# Patient Record
Sex: MALE | Race: WHITE | NOT HISPANIC OR LATINO
[De-identification: names, ages, dates, MRNs, and addresses within clinical notes are randomized per-mention and may not be internally consistent; named-entity substitution may affect disease eponyms.]

---

## 2019-12-14 ENCOUNTER — TRANSCRIPTION ENCOUNTER (OUTPATIENT)
Age: 54
End: 2019-12-14

## 2020-08-28 ENCOUNTER — APPOINTMENT (OUTPATIENT)
Dept: ORTHOPEDIC SURGERY | Facility: CLINIC | Age: 55
End: 2020-08-28
Payer: COMMERCIAL

## 2020-08-28 VITALS
HEART RATE: 66 BPM | BODY MASS INDEX: 26.48 KG/M2 | HEIGHT: 70 IN | WEIGHT: 185 LBS | DIASTOLIC BLOOD PRESSURE: 81 MMHG | SYSTOLIC BLOOD PRESSURE: 118 MMHG

## 2020-08-28 DIAGNOSIS — Z78.9 OTHER SPECIFIED HEALTH STATUS: ICD-10-CM

## 2020-08-28 DIAGNOSIS — F17.200 NICOTINE DEPENDENCE, UNSPECIFIED, UNCOMPLICATED: ICD-10-CM

## 2020-08-28 DIAGNOSIS — S66.911D STRAIN OF UNSPECIFIED MUSCLE, FASCIA AND TENDON AT WRIST AND HAND LEVEL, RIGHT HAND, SUBSEQUENT ENCOUNTER: ICD-10-CM

## 2020-08-28 PROCEDURE — 73120 X-RAY EXAM OF HAND: CPT | Mod: RT

## 2020-08-28 PROCEDURE — 99204 OFFICE O/P NEW MOD 45 MIN: CPT

## 2020-08-28 NOTE — ASSESSMENT
[FreeTextEntry1] : a 54-year-old male status post injury playing golf on Wednesday now with point tenderness over the hamate. I recommend MRI to rule out occult fracture he will use a wrist immobilizer and taken course of marks a cam for 2 weeks. We will schedule a telephone call to review the results of the MRI and discuss further treatment.

## 2020-08-28 NOTE — CONSULT LETTER
[Consult Letter:] : I had the pleasure of evaluating your patient, [unfilled]. [Please see my note below.] : Please see my note below. [Consult Closing:] : Thank you very much for allowing me to participate in the care of this patient.  If you have any questions, please do not hesitate to contact me. [Sincerely,] : Sincerely, [FreeTextEntry3] : Dr. Kristine Mares\par Orthopaedic Surgery\par Hand & Upper Extremity.\par

## 2020-08-28 NOTE — PHYSICAL EXAM
[Normal Finger/nose] : finger to nose coordination [Normal] : Oriented to person, place, and time, insight and judgement were intact and the affect was normal [de-identified] : right hand:\par skin intact moderate swelling no erythema no ecchymosis\par The tenderness over the metacarpals no tenderness over the distal radius snuffbox\par no tenderness over the triquetrum\par Point tenderness over the hamate as well as the hypothenar musculature\par Range of motion of the digits and wrist slightly limited secondary to pain and stiffness finger tip to palm distance of 1 cm\par neurovascularly intact distally [de-identified] : eusebiar [de-identified] : x-rays from outside imaging source are reviewed and there is no acute fracture noted, carpal tunnel view of the right wrist taken in the office today is reviewed no obvious fracture of the hook of hamate noted

## 2020-08-28 NOTE — HISTORY OF PRESENT ILLNESS
[FreeTextEntry1] : 54-year-old male presents for evaluation of a right hand injury.  he was playing golf on Wednesday upon swinging the club he experienced sharp pain at the ulnar aspect of the hand. He has tenderness over the hook of the hamatethat radiates to the wrist. He was seen at an urgent care where x-rays demonstrated a possible triquetral fracture. His pain is worse with activity and improved with rest

## 2020-08-31 ENCOUNTER — TRANSCRIPTION ENCOUNTER (OUTPATIENT)
Age: 55
End: 2020-08-31

## 2020-09-09 ENCOUNTER — APPOINTMENT (OUTPATIENT)
Dept: ORTHOPEDIC SURGERY | Facility: CLINIC | Age: 55
End: 2020-09-09
Payer: COMMERCIAL

## 2020-09-09 PROCEDURE — 99213 OFFICE O/P EST LOW 20 MIN: CPT | Mod: 57

## 2020-09-09 PROCEDURE — 25630 CLTX CARPL FX W/O MNPJ EA B1: CPT | Mod: RT

## 2020-09-10 NOTE — ASSESSMENT
[FreeTextEntry1] : 54M with nondisplaced fracture of the hook of the hamate.  I recommend full time use of a wrist immobilizer and NWB RUE.  f/u in 4 weeks for xrays and evaluation.

## 2020-09-10 NOTE — HISTORY OF PRESENT ILLNESS
[FreeTextEntry1] : 8/28/20: 54-year-old male presents for evaluation of a right hand injury.  he was playing golf on Wednesday upon swinging the club he experienced sharp pain at the ulnar aspect of the hand. He has tenderness over the hook of the hamatethat radiates to the wrist. He was seen at an urgent care where x-rays demonstrated a possible triquetral fracture. His pain is worse with activity and improved with rest\par \par 9/9/20: pt returns today for f/u of his MRI results.  He has had improvement in his pain but continues to have discomfort with activity.  MRI was positive for a nondispaced fx at the hook of the hamate base.

## 2020-10-07 ENCOUNTER — APPOINTMENT (OUTPATIENT)
Dept: ORTHOPEDIC SURGERY | Facility: CLINIC | Age: 55
End: 2020-10-07
Payer: COMMERCIAL

## 2020-10-07 VITALS
HEIGHT: 70 IN | HEART RATE: 66 BPM | SYSTOLIC BLOOD PRESSURE: 126 MMHG | DIASTOLIC BLOOD PRESSURE: 86 MMHG | BODY MASS INDEX: 26.48 KG/M2 | WEIGHT: 185 LBS

## 2020-10-07 PROCEDURE — 99024 POSTOP FOLLOW-UP VISIT: CPT

## 2020-10-07 PROCEDURE — 73110 X-RAY EXAM OF WRIST: CPT | Mod: RT

## 2020-10-07 NOTE — PHYSICAL EXAM
[Normal Finger/nose] : finger to nose coordination [Normal] : no peripheral adenopathy appreciated [de-identified] : right hand:\par skin intact no swelling no erythema no ecchymosis\par The tenderness over the metacarpals no tenderness over the distal radius snuffbox\par no tenderness over the triquetrum\par Point tenderness over the hamate as well as the hypothenar musculature\par Range of motion of the digits and wrist intact without pain\par neurovascularly intact distally [de-identified] : eusebiar [de-identified] : 3 x-ray views of the right wrist are reviewed there is no osseous abnormality

## 2020-10-07 NOTE — HISTORY OF PRESENT ILLNESS
[FreeTextEntry1] : 8/28/20: 54-year-old male presents for evaluation of a right hand injury.  he was playing golf on Wednesday upon swinging the club he experienced sharp pain at the ulnar aspect of the hand. He has tenderness over the hook of the hamatethat radiates to the wrist. He was seen at an urgent care where x-rays demonstrated a possible triquetral fracture. His pain is worse with activity and improved with rest\par \par 9/9/20: pt returns today for f/u of his MRI results.  He has had improvement in his pain but continues to have discomfort with activity.  MRI was positive for a nondispaced fx at the hook of the hamate base.\par \par 10/7/20: atient returns today for followup of his right hamate fracture.  is pain and mobility has improved although he does still experience pain with direct pressure over the hook of hamate. He admits that he has not been wearing his brace full time and has been using his hand "more than he should".

## 2020-10-07 NOTE — ASSESSMENT
[FreeTextEntry1] : 54-year-old male  weeks status post closed treatment of a right hamate fracture. He has had improvement in his pain and range of motion of the digits and wrist she continues to have tenderness over the hamate hook. I recommend continued light activity and a CT scan in 2 weeks to assess for healing. We will go over the results of the CT scan over the phone.

## 2020-11-10 ENCOUNTER — APPOINTMENT (OUTPATIENT)
Dept: ORTHOPEDIC SURGERY | Facility: CLINIC | Age: 55
End: 2020-11-10
Payer: COMMERCIAL

## 2020-11-10 VITALS
DIASTOLIC BLOOD PRESSURE: 89 MMHG | HEIGHT: 70 IN | BODY MASS INDEX: 26.48 KG/M2 | SYSTOLIC BLOOD PRESSURE: 143 MMHG | WEIGHT: 185 LBS | HEART RATE: 65 BPM

## 2020-11-10 DIAGNOSIS — S62.154D: ICD-10-CM

## 2020-11-10 PROCEDURE — 99024 POSTOP FOLLOW-UP VISIT: CPT

## 2020-11-10 NOTE — HISTORY OF PRESENT ILLNESS
[FreeTextEntry1] : 8/28/20: 54-year-old male presents for evaluation of a right hand injury.  he was playing golf on Wednesday upon swinging the club he experienced sharp pain at the ulnar aspect of the hand. He has tenderness over the hook of the hamatethat radiates to the wrist. He was seen at an urgent care where x-rays demonstrated a possible triquetral fracture. His pain is worse with activity and improved with rest\par \par 9/9/20: pt returns today for f/u of his MRI results.  He has had improvement in his pain but continues to have discomfort with activity.  MRI was positive for a nondispaced fx at the hook of the hamate base.\par \par 10/7/20: atient returns today for followup of his right hamate fracture.  is pain and mobility has improved although he does still experience pain with direct pressure over the hook of hamate. He admits that he has not been wearing his brace full time and has been using his hand "more than he should".\par \par 11/10/20: he patient returns today for followup of his right hamate fracture. 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 He has no pain with activities of daily living.

## 2020-11-10 NOTE — ASSESSMENT
[FreeTextEntry1] : 54-year-old malewith aright hook of hamate nonunion.Risks and benefits of continued observation with activity as tolerated versus surgical intervention for excision were discussed at length with patient and his mild symptoms he like to continue with observation at this time. He will resume activity as tolerated and followup as needed. Alert and oriented, no focal deficits, no motor or sensory deficits.

## 2020-11-10 NOTE — ASSESSMENT
[FreeTextEntry1] : 54-year-old malewith aright hook of hamate nonunion.Risks and benefits of continued observation with activity as tolerated versus surgical intervention for excision were discussed at length with patient and his mild symptoms he like to continue with observation at this time. He will resume activity as tolerated and followup as needed.

## 2020-11-10 NOTE — PHYSICAL EXAM
[Normal Finger/nose] : finger to nose coordination [Normal] : no peripheral adenopathy appreciated [de-identified] : right hand:\par skin intact no swelling no erythema no ecchymosis\par no tenderness over the metacarpals no tenderness over the distal radius snuffbox\par no tenderness over the triquetrum\par mild tenderness over the hamate as well as the hypothenar musculature\par Range of motion of the digits and wrist intact without pain\par neurovascularly intact distally [de-identified] : eusebiar [de-identified] : CT demonstrates nonunion of the hook of hamate process withdiastases at the fracture site and cortication of the fracture margins

## 2021-07-07 ENCOUNTER — TRANSCRIPTION ENCOUNTER (OUTPATIENT)
Age: 56
End: 2021-07-07

## 2021-07-09 ENCOUNTER — INPATIENT (INPATIENT)
Facility: HOSPITAL | Age: 56
LOS: 10 days | Discharge: HOME | DRG: 853 | End: 2021-07-20
Attending: SURGERY | Admitting: STUDENT IN AN ORGANIZED HEALTH CARE EDUCATION/TRAINING PROGRAM
Payer: COMMERCIAL

## 2021-07-09 ENCOUNTER — TRANSCRIPTION ENCOUNTER (OUTPATIENT)
Age: 56
End: 2021-07-09

## 2021-07-09 VITALS
OXYGEN SATURATION: 97 % | TEMPERATURE: 99 F | WEIGHT: 186.07 LBS | RESPIRATION RATE: 20 BRPM | DIASTOLIC BLOOD PRESSURE: 65 MMHG | HEART RATE: 97 BPM | HEIGHT: 70 IN | SYSTOLIC BLOOD PRESSURE: 107 MMHG

## 2021-07-09 DIAGNOSIS — J18.9 PNEUMONIA, UNSPECIFIED ORGANISM: ICD-10-CM

## 2021-07-09 DIAGNOSIS — Z90.89 ACQUIRED ABSENCE OF OTHER ORGANS: Chronic | ICD-10-CM

## 2021-07-09 DIAGNOSIS — G47.33 OBSTRUCTIVE SLEEP APNEA (ADULT) (PEDIATRIC): ICD-10-CM

## 2021-07-09 DIAGNOSIS — J96.00 ACUTE RESPIRATORY FAILURE, UNSPECIFIED WHETHER WITH HYPOXIA OR HYPERCAPNIA: ICD-10-CM

## 2021-07-09 DIAGNOSIS — U07.1 COVID-19: ICD-10-CM

## 2021-07-09 DIAGNOSIS — Z02.9 ENCOUNTER FOR ADMINISTRATIVE EXAMINATIONS, UNSPECIFIED: ICD-10-CM

## 2021-07-09 LAB
ALBUMIN SERPL ELPH-MCNC: 3.5 G/DL — SIGNIFICANT CHANGE UP (ref 3.3–5.2)
ALP SERPL-CCNC: 156 U/L — HIGH (ref 40–120)
ALT FLD-CCNC: 160 U/L — HIGH
ANION GAP SERPL CALC-SCNC: 11 MMOL/L — SIGNIFICANT CHANGE UP (ref 5–17)
AST SERPL-CCNC: 92 U/L — HIGH
BASOPHILS # BLD AUTO: 0.04 K/UL — SIGNIFICANT CHANGE UP (ref 0–0.2)
BASOPHILS NFR BLD AUTO: 0.2 % — SIGNIFICANT CHANGE UP (ref 0–2)
BILIRUB SERPL-MCNC: 2.2 MG/DL — HIGH (ref 0.4–2)
BUN SERPL-MCNC: 19.5 MG/DL — SIGNIFICANT CHANGE UP (ref 8–20)
CALCIUM SERPL-MCNC: 9.4 MG/DL — SIGNIFICANT CHANGE UP (ref 8.6–10.2)
CHLORIDE SERPL-SCNC: 100 MMOL/L — SIGNIFICANT CHANGE UP (ref 98–107)
CO2 SERPL-SCNC: 27 MMOL/L — SIGNIFICANT CHANGE UP (ref 22–29)
CREAT SERPL-MCNC: 1.02 MG/DL — SIGNIFICANT CHANGE UP (ref 0.5–1.3)
CRP SERPL-MCNC: 263 MG/L — HIGH
EOSINOPHIL # BLD AUTO: 0.04 K/UL — SIGNIFICANT CHANGE UP (ref 0–0.5)
EOSINOPHIL NFR BLD AUTO: 0.2 % — SIGNIFICANT CHANGE UP (ref 0–6)
FERRITIN SERPL-MCNC: 275 NG/ML — SIGNIFICANT CHANGE UP (ref 30–400)
GLUCOSE SERPL-MCNC: 124 MG/DL — HIGH (ref 70–99)
HCT VFR BLD CALC: 41 % — SIGNIFICANT CHANGE UP (ref 39–50)
HGB BLD-MCNC: 13.6 G/DL — SIGNIFICANT CHANGE UP (ref 13–17)
IMM GRANULOCYTES NFR BLD AUTO: 1.5 % — SIGNIFICANT CHANGE UP (ref 0–1.5)
LYMPHOCYTES # BLD AUTO: 0.46 K/UL — LOW (ref 1–3.3)
LYMPHOCYTES # BLD AUTO: 2.4 % — LOW (ref 13–44)
MCHC RBC-ENTMCNC: 28.9 PG — SIGNIFICANT CHANGE UP (ref 27–34)
MCHC RBC-ENTMCNC: 33.2 GM/DL — SIGNIFICANT CHANGE UP (ref 32–36)
MCV RBC AUTO: 87 FL — SIGNIFICANT CHANGE UP (ref 80–100)
MONOCYTES # BLD AUTO: 1.37 K/UL — HIGH (ref 0–0.9)
MONOCYTES NFR BLD AUTO: 7.2 % — SIGNIFICANT CHANGE UP (ref 2–14)
NEUTROPHILS # BLD AUTO: 16.88 K/UL — HIGH (ref 1.8–7.4)
NEUTROPHILS NFR BLD AUTO: 88.5 % — HIGH (ref 43–77)
PLATELET # BLD AUTO: 149 K/UL — LOW (ref 150–400)
POTASSIUM SERPL-MCNC: 3.6 MMOL/L — SIGNIFICANT CHANGE UP (ref 3.5–5.3)
POTASSIUM SERPL-SCNC: 3.6 MMOL/L — SIGNIFICANT CHANGE UP (ref 3.5–5.3)
PROCALCITONIN SERPL-MCNC: 21.4 NG/ML — HIGH (ref 0.02–0.1)
PROT SERPL-MCNC: 6.4 G/DL — LOW (ref 6.6–8.7)
RBC # BLD: 4.71 M/UL — SIGNIFICANT CHANGE UP (ref 4.2–5.8)
RBC # FLD: 12.8 % — SIGNIFICANT CHANGE UP (ref 10.3–14.5)
SARS-COV-2 RNA SPEC QL NAA+PROBE: SIGNIFICANT CHANGE UP
SODIUM SERPL-SCNC: 138 MMOL/L — SIGNIFICANT CHANGE UP (ref 135–145)
WBC # BLD: 19.07 K/UL — HIGH (ref 3.8–10.5)
WBC # FLD AUTO: 19.07 K/UL — HIGH (ref 3.8–10.5)

## 2021-07-09 PROCEDURE — 71275 CT ANGIOGRAPHY CHEST: CPT | Mod: 26

## 2021-07-09 PROCEDURE — 99284 EMERGENCY DEPT VISIT MOD MDM: CPT

## 2021-07-09 PROCEDURE — 71045 X-RAY EXAM CHEST 1 VIEW: CPT | Mod: 26

## 2021-07-09 PROCEDURE — 99223 1ST HOSP IP/OBS HIGH 75: CPT

## 2021-07-09 RX ORDER — AZITHROMYCIN 500 MG/1
500 TABLET, FILM COATED ORAL EVERY 24 HOURS
Refills: 0 | Status: DISCONTINUED | OUTPATIENT
Start: 2021-07-10 | End: 2021-07-11

## 2021-07-09 RX ORDER — AZITHROMYCIN 500 MG/1
TABLET, FILM COATED ORAL
Refills: 0 | Status: DISCONTINUED | OUTPATIENT
Start: 2021-07-09 | End: 2021-07-11

## 2021-07-09 RX ORDER — ENOXAPARIN SODIUM 100 MG/ML
40 INJECTION SUBCUTANEOUS DAILY
Refills: 0 | Status: DISCONTINUED | OUTPATIENT
Start: 2021-07-09 | End: 2021-07-11

## 2021-07-09 RX ORDER — ACETAMINOPHEN 500 MG
650 TABLET ORAL ONCE
Refills: 0 | Status: COMPLETED | OUTPATIENT
Start: 2021-07-09 | End: 2021-07-09

## 2021-07-09 RX ORDER — CEFTRIAXONE 500 MG/1
1000 INJECTION, POWDER, FOR SOLUTION INTRAMUSCULAR; INTRAVENOUS ONCE
Refills: 0 | Status: COMPLETED | OUTPATIENT
Start: 2021-07-09 | End: 2021-07-09

## 2021-07-09 RX ORDER — AZITHROMYCIN 500 MG/1
500 TABLET, FILM COATED ORAL ONCE
Refills: 0 | Status: COMPLETED | OUTPATIENT
Start: 2021-07-09 | End: 2021-07-09

## 2021-07-09 RX ORDER — CEFTRIAXONE 500 MG/1
1000 INJECTION, POWDER, FOR SOLUTION INTRAMUSCULAR; INTRAVENOUS EVERY 24 HOURS
Refills: 0 | Status: DISCONTINUED | OUTPATIENT
Start: 2021-07-10 | End: 2021-07-10

## 2021-07-09 RX ORDER — ACETAMINOPHEN 500 MG
650 TABLET ORAL EVERY 6 HOURS
Refills: 0 | Status: DISCONTINUED | OUTPATIENT
Start: 2021-07-09 | End: 2021-07-10

## 2021-07-09 RX ORDER — CEFTRIAXONE 500 MG/1
INJECTION, POWDER, FOR SOLUTION INTRAMUSCULAR; INTRAVENOUS
Refills: 0 | Status: DISCONTINUED | OUTPATIENT
Start: 2021-07-09 | End: 2021-07-10

## 2021-07-09 RX ADMIN — Medication 650 MILLIGRAM(S): at 14:09

## 2021-07-09 RX ADMIN — Medication 650 MILLIGRAM(S): at 21:41

## 2021-07-09 RX ADMIN — Medication 650 MILLIGRAM(S): at 15:09

## 2021-07-09 RX ADMIN — AZITHROMYCIN 255 MILLIGRAM(S): 500 TABLET, FILM COATED ORAL at 18:26

## 2021-07-09 RX ADMIN — Medication 650 MILLIGRAM(S): at 20:42

## 2021-07-09 RX ADMIN — CEFTRIAXONE 100 MILLIGRAM(S): 500 INJECTION, POWDER, FOR SOLUTION INTRAMUSCULAR; INTRAVENOUS at 17:58

## 2021-07-09 NOTE — ED PROVIDER NOTE - CONSTITUTIONAL, MLM
Well appearing, awake, alert, oriented to person, place, time/situation and in mild distress normal...

## 2021-07-09 NOTE — H&P ADULT - NSHPADDITIONALINFOADULT_GEN_ALL_CORE
IMPROVE VTE Individual Risk Assessment    RISK                                                                Points    [  ] Previous VTE                                                  3    [  ] Thrombophilia                                               2    [  ] Lower limb paralysis                                      2        (unable to hold up >15 seconds)      [  ] Current Cancer                                              2         (within 6 months)    [  ] Immobilization > 24 hrs                                1    [  ] ICU/CCU stay > 24 hours                              1    [  ] Age > 60                                                      1    IMPROVE VTE Score ____0_____    IMPROVE Score 0-1: Low Risk, No VTE prophylaxis required for most patients, encourage ambulation.   IMPROVE Score 2-3: At risk, pharmacologic VTE prophylaxis is indicated for most patients (in the absence of a contraindication)  IMPROVE Score > or = 4: High Risk, pharmacologic VTE prophylaxis is indicated for most patients (in the absence of a contraindication)

## 2021-07-09 NOTE — H&P ADULT - HISTORY OF PRESENT ILLNESS
Patient is a 55M w/ hx of sleep apnea who presents from Urgent Care who presents with fever. Of note, he was recently diagnosed with COVID on Wednesday and his sx had started one week ago. His wife has also tested positive for COVID19. He had gone to the urgent care and was diagnosed with LLL PNA. He has been vaccinated with Pfizer in April.  Patient is a 55M w/ hx of sleep apnea who presents from Urgent Care who presents with fever. Of note, he was recently diagnosed with COVID on Wednesday and his sx had started one week ago. His wife has also tested positive for COVID19. He had gone to the urgent care and was diagnosed with LLL PNA. He has been vaccinated with Pfizer in April.     In the ED, his vitals showed Temp- 99.3F, HR- 97, BP- 107/65, RR- 20, O2 sat- 97% RA. Labs notable for leukocytosis w/ neutrophil predominance, transaminitis and elevated procalcitonin.  Patient is a 55M w/ hx of sleep apnea who presents from Urgent Care who presents with fever. Of note, he was recently diagnosed with COVID on Wednesday and his sx had started one week ago. His wife has also tested positive for COVID19. He had gone to the urgent care and was diagnosed with LLL PNA. He has been vaccinated with Pfizer in April.     In the ED, his vitals showed Temp- 99.3F, HR- 97, BP- 107/65, RR- 20, O2 sat- 97% RA. Labs notable for leukocytosis w/ neutrophil predominance, transaminitis and elevated procalcitonin. Patient got tylenol, x 1.   Patient is a 55M w/ hx of sleep apnea on bipap at night who presents from Urgent Care who presents with chills. Of note, he was recently diagnosed with COVID on Wednesday and his sx had started one week ago. He started having night sweats, cough w/o productive sputum, chills, and fever (Tmax- 104F). His wife has also tested positive for COVID19 and is currently asymptomatic. He had gone to the urgent care and was diagnosed with LLL PNA. He has been vaccinated with Pfizer in April. He was febrile to 100.4F and having significant chills. Also, has nausea, SOB, and headache. Denies vomiting, dizziness, abdominal pain and chest pain. He was subsequently told by urgent care to come to the hospital for further management. He has been taking tylenol as needed.    In the ED, his vitals showed Temp- 99.3F, HR- 97, BP- 107/65, RR- 20, O2 sat- 97% RA. Labs notable for leukocytosis w/ neutrophil predominance, transaminitis and elevated procalcitonin. Patient got tylenol, x 1.

## 2021-07-09 NOTE — H&P ADULT - ASSESSMENT
Patient is a 55M w/ hx of sleep apnea who presents from Urgent Care who presents with fever. Patient is a 55M w/ hx of sleep apnea who presents from Urgent Care who presents with fever and chills found to have leukocytosis and elevated procalcitonin concerning for superimposed bacterial infection in the setting of covid19; now admitted for further workup and management.

## 2021-07-09 NOTE — ED PROVIDER NOTE - CLINICAL SUMMARY MEDICAL DECISION MAKING FREE TEXT BOX
Pt is a 55y M with PMH of sleep apnea presenting + covid with exertional dyspnea. SpO2 drops to below 90% with exertion. Will obtain labs and admit.

## 2021-07-09 NOTE — H&P ADULT - NSHPSOCIALHISTORY_GEN_ALL_CORE
Lives with his wife and kids. 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 Denies smoking and alcohol.

## 2021-07-09 NOTE — H&P ADULT - NSHPREVIEWOFSYSTEMS_GEN_ALL_CORE
CONSTITUTIONAL: No fever, no chills  EYES: No eye pain, no visual disturbance  Mouth: no pain in mouth, no cuts  RESPIRATORY: No cough, No sob  CARDIOVASCULAR: No CP, no palpitations  GASTROINTESTINAL: no abdominal pain, no n/v/d  GENITOURINARY: No dysuria, no hematuria  NEUROLOGICAL: No headaches, no weakness  SKIN: No itching, no rashes  MUSCULOSKELETAL: No joint pain, no joint swelling  PSYCHIATRY: no insomnia, no irritability CONSTITUTIONAL: + fever, + chills  EYES: No eye pain, no visual disturbance  Mouth: no pain in mouth, no cuts  RESPIRATORY: + cough, No sob  CARDIOVASCULAR: No CP, no palpitations  GASTROINTESTINAL: no abdominal pain, no n/v/d  GENITOURINARY: No dysuria, no hematuria  NEUROLOGICAL: + headaches, no weakness  SKIN: No itching, no rashes  MUSCULOSKELETAL: No joint pain, no joint swelling  PSYCHIATRY: no insomnia, no irritability

## 2021-07-09 NOTE — H&P ADULT - PROBLEM SELECTOR PLAN 1
- likely 2/2 superimposed bacterial PNA in the setting of COVID19  - will empirically treat with CTX/AZT  - F/u urine legionella and blood cx  - trend fever curve  - CTA chest to r/o PE - likely 2/2 superimposed bacterial PNA in the setting of COVID19  - will empirically treat with CTX/AZT  - F/u urine legionella and blood cx  - trend fever curve  - CTA chest to r/o PE  -tylenol Q6H - likely 2/2 superimposed bacterial PNA in the setting of COVID19  - will empirically treat with CTX/AZT  - F/u urine legionella and blood cx  - trend fever curve  - CTA chest to r/o PE. If positive for PE, will start heparin gtt  - will c/w tylenol Q6H - likely 2/2 superimposed bacterial PNA in the setting of COVID19  - will empirically treat with CTX/AZT  - F/u urine legionella and blood cx  - trend fever curve  - CTA chest to r/o PE. If positive for PE, will start heparin gtt  - will c/w tylenol Q6H  - O2 PRN

## 2021-07-09 NOTE — H&P ADULT - PROBLEM SELECTOR PLAN 4
- DVT ppx-  - Diet-  - Dispo- PT recs - DVT ppx- Lovenox 40 mg QD  - Diet- Regular diet  - Dispo- Likely home - nocturnal BIPAP ordered

## 2021-07-09 NOTE — ED ADULT NURSE NOTE - OBJECTIVE STATEMENT
55 yom presents to ed c/o fever/ body aches and chills x 7 days. patient reports being fully vaccinated for coronavirus sars-2 and started to have symptoms 7 days ago. and tested positive for covid. tylenol isnt helping patient with symptoms and is having decreased appetite.

## 2021-07-09 NOTE — ED ADULT TRIAGE NOTE - ARRIVAL FROM
Called patient to see when she could come in for visit with Dr. Villarreal.     Next 5 appointments (look out 90 days)     Sep 13, 2018  2:50 PM CDT   Office Visit with Yuridia Villarreal MD   [facility]    [id number] [place] [id number] [phone]                Jhonny Rivera RN         Home

## 2021-07-09 NOTE — ED ADULT TRIAGE NOTE - ACCOMPANIED BY
----- Message from Shirley Dennis sent at 12/31/2018  9:21 AM CST -----  Regarding: Refill  Contact: [phone]  Pt wife called and stated that the pt. needs a refill on the medication  folic acid , can be reached @[phone]    Self

## 2021-07-09 NOTE — ED ADULT TRIAGE NOTE - WEIGHT IN LBS
CM Note

 

CM Note                       

Notes:

Spoke with patient today who says her daughter and  are reviewing the rehab programs. Spoke 

with Jonathan, patient's , who states they prefer [facility] as their first 

choice. However, Pa from [facility] voiced some concerns regarding patient's recent symptom of 

hallucinations. [facility] called and stated they will take the patient. Left a message for Pa 

with [facility] to see if they are going to take patient or not.CM will follow.

 

Date Signed:  06/14/2018 04:14 PM

Electronically Signed By:Vivian Marino LCSW [id number]

## 2021-07-09 NOTE — ED PROVIDER NOTE - NS_BEDUNITTYPES_ED_ALL_ED
Patient contacted and results relayed.  Advised that blood work listed below be done for further evaluation patient voiced understanding.  Per dr watters orders entered for blood work to be done   MONITORED W/ CONTINUOUS PULSE OX

## 2021-07-09 NOTE — H&P ADULT - PROBLEM SELECTOR PLAN 3
- will continue to monitor off RA - will continue to monitor off RA  - bipap swttings - will continue to monitor off RA  - nocturnal BIPAP ordered - supportive care

## 2021-07-09 NOTE — ED PROVIDER NOTE - ATTENDING CONTRIBUTION TO CARE
I, Abraham Márquez, performed a face to face bedside interview with this patient regarding history of present illness, review of symptoms and relevant past medical, social and family history.  I completed an independent physical examination. I have communicated the patient’s plan of care and disposition with the ACP.  55 year old male presents with cough, SOB, fevers, covid positive 2 days ago, now with worsening SOB  Gen: NAD, well appearing  CV: RRR  Pul: CTA b/l  Abd: Soft, non-distended, non-tender  Neuro: no focal deficits  Pt with hypoxia, admitted for covid

## 2021-07-09 NOTE — H&P ADULT - NSHPPHYSICALEXAM_GEN_ALL_CORE
Vital Signs Last 24 Hrs  T(C): 37.4 (09 Jul 2021 12:40), Max: 37.4 (09 Jul 2021 12:40)  T(F): 99.3 (09 Jul 2021 12:40), Max: 99.3 (09 Jul 2021 12:40)  HR: 97 (09 Jul 2021 12:40) (97 - 97)  BP: 107/65 (09 Jul 2021 12:40) (107/65 - 107/65)  BP(mean): --  RR: 22 (09 Jul 2021 12:52) (20 - 22)  SpO2: 87% (09 Jul 2021 12:52) (87% - 97%)    PHYSICAL EXAM:    GENERAL: Elderly male looking   HEENT: PERRL, +EOMI  NECK: soft, Supple, No JVD,   CHEST/LUNG: Clear to auscultate bilaterally; No wheezing  HEART: S1S2+, Regular rate and rhythm; No murmurs  ABDOMEN: Soft, Nontender, Nondistended; Bowel sounds present  EXTREMITIES:  2+ Peripheral Pulses, No edema  SKIN: No rashes or lesions  NEURO: AAOX3, no focal deficits, no motor r sensory loss  PSYCH: normal mood Vital Signs Last 24 Hrs  T(C): 37.4 (09 Jul 2021 12:40), Max: 37.4 (09 Jul 2021 12:40)  T(F): 99.3 (09 Jul 2021 12:40), Max: 99.3 (09 Jul 2021 12:40)  HR: 97 (09 Jul 2021 12:40) (97 - 97)  BP: 107/65 (09 Jul 2021 12:40) (107/65 - 107/65)  BP(mean): --  RR: 22 (09 Jul 2021 12:52) (20 - 22)  SpO2: 87% (09 Jul 2021 12:52) (87% - 97%)    PHYSICAL EXAM:    GENERAL: NAD, resting comfortably  HEENT: PERRL, +EOMI  NECK: soft, Supple, No JVD,   CHEST/LUNG: Clear to auscultate bilaterally; No wheezing  HEART: S1S2+, Regular rate and rhythm; No murmurs  ABDOMEN: Soft, Nontender, Nondistended; Bowel sounds present  EXTREMITIES:  2+ Peripheral Pulses, No edema  SKIN: No rashes or lesions  NEURO: AAOX3, no focal deficits, no motor r sensory loss  PSYCH: normal mood

## 2021-07-10 LAB
ALBUMIN SERPL ELPH-MCNC: 3.1 G/DL — LOW (ref 3.3–5.2)
ALP SERPL-CCNC: 166 U/L — HIGH (ref 40–120)
ALT FLD-CCNC: 150 U/L — HIGH
ANION GAP SERPL CALC-SCNC: 11 MMOL/L — SIGNIFICANT CHANGE UP (ref 5–17)
APPEARANCE UR: CLEAR — SIGNIFICANT CHANGE UP
AST SERPL-CCNC: 71 U/L — HIGH
BACTERIA # UR AUTO: NEGATIVE — SIGNIFICANT CHANGE UP
BILIRUB SERPL-MCNC: 1 MG/DL — SIGNIFICANT CHANGE UP (ref 0.4–2)
BILIRUB UR-MCNC: NEGATIVE — SIGNIFICANT CHANGE UP
BUN SERPL-MCNC: 17.3 MG/DL — SIGNIFICANT CHANGE UP (ref 8–20)
CALCIUM SERPL-MCNC: 9 MG/DL — SIGNIFICANT CHANGE UP (ref 8.6–10.2)
CHLORIDE SERPL-SCNC: 102 MMOL/L — SIGNIFICANT CHANGE UP (ref 98–107)
CO2 SERPL-SCNC: 27 MMOL/L — SIGNIFICANT CHANGE UP (ref 22–29)
COLOR SPEC: YELLOW — SIGNIFICANT CHANGE UP
COVID-19 SPIKE DOMAIN AB INTERP: POSITIVE
COVID-19 SPIKE DOMAIN ANTIBODY RESULT: >250 U/ML — HIGH
CREAT SERPL-MCNC: 0.84 MG/DL — SIGNIFICANT CHANGE UP (ref 0.5–1.3)
CRP SERPL-MCNC: 324 MG/L — HIGH
D DIMER BLD IA.RAPID-MCNC: 467 NG/ML DDU — HIGH
DIFF PNL FLD: ABNORMAL
EPI CELLS # UR: NEGATIVE — SIGNIFICANT CHANGE UP
ERYTHROCYTE [SEDIMENTATION RATE] IN BLOOD: 41 MM/HR — HIGH (ref 0–20)
FERRITIN SERPL-MCNC: 260 NG/ML — SIGNIFICANT CHANGE UP (ref 30–400)
GLUCOSE SERPL-MCNC: 108 MG/DL — HIGH (ref 70–99)
GLUCOSE UR QL: NEGATIVE MG/DL — SIGNIFICANT CHANGE UP
HCT VFR BLD CALC: 40.7 % — SIGNIFICANT CHANGE UP (ref 39–50)
HCV AB S/CO SERPL IA: 0.12 S/CO — SIGNIFICANT CHANGE UP (ref 0–0.99)
HCV AB SERPL-IMP: SIGNIFICANT CHANGE UP
HGB BLD-MCNC: 13.5 G/DL — SIGNIFICANT CHANGE UP (ref 13–17)
KETONES UR-MCNC: ABNORMAL
LEUKOCYTE ESTERASE UR-ACNC: NEGATIVE — SIGNIFICANT CHANGE UP
MAGNESIUM SERPL-MCNC: 1.8 MG/DL — SIGNIFICANT CHANGE UP (ref 1.6–2.6)
MCHC RBC-ENTMCNC: 28.8 PG — SIGNIFICANT CHANGE UP (ref 27–34)
MCHC RBC-ENTMCNC: 33.2 GM/DL — SIGNIFICANT CHANGE UP (ref 32–36)
MCV RBC AUTO: 87 FL — SIGNIFICANT CHANGE UP (ref 80–100)
NITRITE UR-MCNC: NEGATIVE — SIGNIFICANT CHANGE UP
PH UR: 6 — SIGNIFICANT CHANGE UP (ref 5–8)
PHOSPHATE SERPL-MCNC: 1.7 MG/DL — LOW (ref 2.4–4.7)
PLATELET # BLD AUTO: 181 K/UL — SIGNIFICANT CHANGE UP (ref 150–400)
POTASSIUM SERPL-MCNC: 3.6 MMOL/L — SIGNIFICANT CHANGE UP (ref 3.5–5.3)
POTASSIUM SERPL-SCNC: 3.6 MMOL/L — SIGNIFICANT CHANGE UP (ref 3.5–5.3)
PROT SERPL-MCNC: 6.2 G/DL — LOW (ref 6.6–8.7)
PROT UR-MCNC: 30 MG/DL
RBC # BLD: 4.68 M/UL — SIGNIFICANT CHANGE UP (ref 4.2–5.8)
RBC # FLD: 13.1 % — SIGNIFICANT CHANGE UP (ref 10.3–14.5)
RBC CASTS # UR COMP ASSIST: SIGNIFICANT CHANGE UP /HPF (ref 0–4)
SARS-COV-2 IGG+IGM SERPL QL IA: >250 U/ML — HIGH
SARS-COV-2 IGG+IGM SERPL QL IA: POSITIVE
SARS-COV-2 RNA SPEC QL NAA+PROBE: SIGNIFICANT CHANGE UP
SODIUM SERPL-SCNC: 140 MMOL/L — SIGNIFICANT CHANGE UP (ref 135–145)
SP GR SPEC: 1.01 — SIGNIFICANT CHANGE UP (ref 1.01–1.02)
UROBILINOGEN FLD QL: 8 MG/DL
WBC # BLD: 19.84 K/UL — HIGH (ref 3.8–10.5)
WBC # FLD AUTO: 19.84 K/UL — HIGH (ref 3.8–10.5)
WBC UR QL: SIGNIFICANT CHANGE UP

## 2021-07-10 PROCEDURE — 99233 SBSQ HOSP IP/OBS HIGH 50: CPT

## 2021-07-10 PROCEDURE — 99223 1ST HOSP IP/OBS HIGH 75: CPT

## 2021-07-10 PROCEDURE — 74182 MRI ABDOMEN W/CONTRAST: CPT | Mod: 26

## 2021-07-10 RX ORDER — PIPERACILLIN AND TAZOBACTAM 4; .5 G/20ML; G/20ML
3.38 INJECTION, POWDER, LYOPHILIZED, FOR SOLUTION INTRAVENOUS ONCE
Refills: 0 | Status: COMPLETED | OUTPATIENT
Start: 2021-07-10 | End: 2021-07-10

## 2021-07-10 RX ORDER — SODIUM,POTASSIUM PHOSPHATES 278-250MG
1 POWDER IN PACKET (EA) ORAL ONCE
Refills: 0 | Status: COMPLETED | OUTPATIENT
Start: 2021-07-10 | End: 2021-07-10

## 2021-07-10 RX ORDER — PIPERACILLIN AND TAZOBACTAM 4; .5 G/20ML; G/20ML
3.38 INJECTION, POWDER, LYOPHILIZED, FOR SOLUTION INTRAVENOUS EVERY 8 HOURS
Refills: 0 | Status: DISCONTINUED | OUTPATIENT
Start: 2021-07-10 | End: 2021-07-13

## 2021-07-10 RX ORDER — ACETAMINOPHEN 500 MG
650 TABLET ORAL EVERY 6 HOURS
Refills: 0 | Status: DISCONTINUED | OUTPATIENT
Start: 2021-07-10 | End: 2021-07-14

## 2021-07-10 RX ADMIN — ENOXAPARIN SODIUM 40 MILLIGRAM(S): 100 INJECTION SUBCUTANEOUS at 11:30

## 2021-07-10 RX ADMIN — PIPERACILLIN AND TAZOBACTAM 25 GRAM(S): 4; .5 INJECTION, POWDER, LYOPHILIZED, FOR SOLUTION INTRAVENOUS at 21:04

## 2021-07-10 RX ADMIN — Medication 1 PACKET(S): at 12:09

## 2021-07-10 RX ADMIN — AZITHROMYCIN 255 MILLIGRAM(S): 500 TABLET, FILM COATED ORAL at 17:32

## 2021-07-10 RX ADMIN — Medication 650 MILLIGRAM(S): at 17:31

## 2021-07-10 RX ADMIN — PIPERACILLIN AND TAZOBACTAM 200 GRAM(S): 4; .5 INJECTION, POWDER, LYOPHILIZED, FOR SOLUTION INTRAVENOUS at 17:31

## 2021-07-10 RX ADMIN — Medication 650 MILLIGRAM(S): at 18:31

## 2021-07-10 NOTE — PROGRESS NOTE ADULT - ASSESSMENT
55M w/ hx of sleep apnea who presents from Urgent Care who presents with fever and chills found to have leukocytosis and elevated procalcitonin concerning for superimposed bacterial infection in the setting of covid19; now admitted for further workup and management.       Problem/Plan - 1:  ·  Problem: Acute respiratory failure.  - ct chest appreciated  - id consult appreciated  - c.w iv abx  - repeat covid and inflammatory markers     Problem/Plan - 2:  ·  Problem: Pneumonia likley gram pos and gram neg.  as above     Problem/Plan - 3:  ·  Problem: liver mass/ abscess - mri with iv contrast ordered     Problem/Plan - 4:  ·  Problem: Sleep apnea, obstructive.  Plan: - nocturnal BIPAP ordered.     5) Hypophosphatemia - replete

## 2021-07-10 NOTE — CONSULT NOTE ADULT - SUBJECTIVE AND OBJECTIVE BOX
INFECTIOUS DISEASES AND INTERNAL MEDICINE at [place]  =======================================================  Henri Salazar MD  Diplomates American Board of Internal Medicine and Infectious Diseases  Telephone [phone]  Fax            [phone]  =======================================================    MAXINE [id number]      HPI:  Patient is a 55M w/ hx of sleep apnea on bipap at night who presents from Urgent Care who presents with chills. Of note, he was recently diagnosed with COVID on Wednesday and his sx had started one week ago. He started having night sweats, cough w/o productive sputum, chills, and fever (Tmax- 104F). His wife has also tested positive for COVID19 and is currently asymptomatic. He had gone to the urgent care and was diagnosed with LLL PNA. He has been vaccinated with Pfizer in April. He was febrile to 100.4F and having significant chills. Also, has nausea, SOB, and headache. Denies vomiting, dizziness, abdominal pain and chest pain. He was subsequently told by urgent care to come to the hospital for further management. He has been taking tylenol as needed.    In the ED, his vitals showed Temp- 99.3F, HR- 97, BP- 107/65, RR- 20, O2 sat- 97% RA. Labs notable for leukocytosis w/ neutrophil predominance, transaminitis and elevated procalcitonin. Patient got tylenol, x 1.    PT REPORTS HE HAS HAD NIGHT SWEATS  PRIOR TO DX OF COVID  PT HAD COVID PCR  TEST HERE WHICH WAS NEGATIVE  CT SCAN SUGGESTIVE OF COVID AND HAS LIVER LESION  ASKED TO EVALUATE FROM ID STANDPOINT        PAST MEDICAL & SURGICAL HISTORY:  Sleep apnea, obstructive    S/P tonsillectomy        ANTIBIOTICS  azithromycin  IVPB 500 milliGRAM(s) IV Intermittent every 24 hours  azithromycin  IVPB      cefTRIAXone   IVPB 1000 milliGRAM(s) IV Intermittent every 24 hours  cefTRIAXone   IVPB          Allergies    No Known Allergies    Intolerances        SOCIAL HISTORY:     FAMILY HX   FAMILY HISTORY:  FH: heart attack (Father)        Vital Signs Last 24 Hrs  T(C): 36.8 (10 Jul 2021 04:30), Max: 37.4 (2021 12:40)  T(F): 98.3 (10 Jul 2021 04:30), Max: 99.3 (2021 12:40)  HR: 81 (10 Jul 2021 04:30) (77 - 97)  BP: 102/63 (10 Jul 2021 04:30) (102/63 - 115/69)  BP(mean): --  RR: 18 (10 Jul 2021 04:30) (18 - 22)  SpO2: 91% (10 Jul 2021 04:30) (87% - 97%)  Drug Dosing Weight  Height (cm): 177.8 (2021 12:40)  Weight (kg): 84.4 (2021 12:40)  BMI (kg/m2): 26.7 (2021 12:40)  BSA (m2): 2.02 (2021 12:40)      REVIEW OF SYSTEMS:     CONSTITUTIONAL:  As per HPI. NIGHT SWEATS    HEENT:  Eyes:  No diplopia or blurred vision. ENT:  No earache, sore throat or runny nose.    CARDIOVASCULAR:  No pressure, squeezing, strangling, tightness, heaviness or aching about the chest, neck, axilla or epigastrium.    RESPIRATORY:  No cough, shortness of breath, artis.    GASTROINTESTINAL:  No nausea, vomiting or diarrhea.    GENITOURINARY:  No dysuria, frequency or urgency.    MUSCULOSKELETAL:  As per HPI.    SKIN:  No change in skin, hair or nails.    NEUROLOGIC:  No paresthesias, fasciculations, seizures or weakness.                  PHYSICAL EXAMINATION:    GENERAL: The patient is a well-developed, well-nourished _____in no apparent distress. ___ is alert and oriented x3.    VITAL SIGNS: T(C): 36.8 (07-10-21 @ 04:30), Max: 37.4 (21 @ 12:40)  HR: 81 (07-10-21 @ 04:30) (77 - 97)  BP: 102/63 (07-10-21 @ 04:30) (102/63 - 115/69)  RR: 18 (07-10-21 @ 04:30) (18 - 22)  SpO2: 91% (07-10-21 @ 04:30) (87% - 97%)  Wt(kg): --    HEENT: Head is normocephalic and atraumatic.  ANICTERIC  NECK: Supple. No carotid bruits.  No lymphadenopathy or thyromegaly.    LUNGS:COARSE BREATH SOUNDS    HEART: Regular rate and rhythm without murmur.    ABDOMEN: Soft, nontender, and nondistended.  Positive bowel sounds.  No hepatosplenomegaly was noted. NO REBOUND NO GUARDING    EXTREMITIES: NO EDEMA NO ERYTHEMA    NEUROLOGIC: NON FOCAL      SKIN: No ulceration or induration present. NO RASH        BLOOD CULTURES       URINE CX          LABS:                        13.5   19.84 )-----------( 181      ( 10 Jul 2021 08:30 )             40.7     07-10    140  |  102  |  17.3  ----------------------------<  108<H>  3.6   |  27.0  |  0.84    Ca    9.0      10 Jul 2021 08:30  Phos  1.7     07-10  Mg     1.8     07-10    TPro  6.2<L>  /  Alb  3.1<L>  /  TBili  1.0  /  DBili  x   /  AST  71<H>  /  ALT  150<H>  /  AlkPhos  166<H>  07-10      Urinalysis Basic - ( 10 Jul 2021 10:15 )    Color: Yellow / Appearance: Clear / S.010 / pH: x  Gluc: x / Ketone: Trace  / Bili: Negative / Urobili: 8 mg/dL   Blood: x / Protein: 30 mg/dL / Nitrite: Negative   Leuk Esterase: Negative / RBC: 0-2 /HPF / WBC 0-2   Sq Epi: x / Non Sq Epi: Negative / Bacteria: Negative        RADIOLOGY & ADDITIONAL STUDIES:      ASSESSMENT/PLAN    Patient is a 55M w/ hx of sleep apnea on bipap at night who presents from Urgent Care who presents with chills. Of note, he was recently diagnosed with COVID on Wednesday and his sx had started one week ago. He started having night sweats, cough w/o productive sputum, chills, and fever (Tmax- 104F). His wife has also tested positive for COVID19 and is currently asymptomatic. He had gone to the urgent care and was diagnosed with LLL PNA. He has been vaccinated with Pfizer in April. He was febrile to 100.4F and having significant chills. Also, has nausea, SOB, and headache. Denies vomiting, dizziness, abdominal pain and chest pain. He was subsequently told by urgent care to come to the hospital for further management. He has been taking tylenol as needed.    In the ED, his vitals showed Temp- 99.3F, HR- 97, BP- 107/65, RR- 20, O2 sat- 97% RA. Labs notable for leukocytosis w/ neutrophil predominance, transaminitis and elevated procalcitonin. Patient got tylenol, x 1.    PT REPORTS HE HAS HAD NIGHT SWEATS  PRIOR TO DX OF COVID  PT HAD COVID PCR  TEST HERE WHICH WAS NEGATIVE  CT SCAN SUGGESTIVE OF COVID AND HAS LIVER LESION  AWAIT REPEAT COVID TEST HERE  SUGGEST ORDER INFLAMMATORY MARKERS D DIMER FerriTn  FOLLOW UP LFT  PT FOR MRI OF LIVER  UNCLEAR IF THIS IS  COVID OR SEPARATE PROCESS WITH FEVERS  S  FOLLOWUP REPEAT COVID   WILL FOLLOWUP LIVER IMAGING   WOULD PLACE ON ISOLATION UNTIL COVID PCR BACK  SUGGEST TO PT TO WEAR A MASK UNTIL REPEAT TESTING FINALIZED  WILL FOLLOW UP  WITH FURTHER RECOMMENDATIONS                 ANITA VANCE MD INFECTIOUS DISEASES AND INTERNAL MEDICINE at [place]  =======================================================  Henri Salazar MD  Diplomates American Board of Internal Medicine and Infectious Diseases  Telephone [phone]  Fax            [phone]  =======================================================    MAXINE [id number]      HPI:  Patient is a 55M w/ hx of sleep apnea on bipap at night who presents from Urgent Care who presents with chills. Of note, he was recently diagnosed with COVID on Wednesday and his sx had started one week ago. He started having night sweats, cough w/o productive sputum, chills, and fever (Tmax- 104F). His wife has also tested positive for COVID19 and is currently asymptomatic. He had gone to the urgent care and was diagnosed with LLL PNA. He has been vaccinated with Pfizer in April. He was febrile to 100.4F and having significant chills. Also, has nausea, SOB, and headache. Denies vomiting, dizziness, abdominal pain and chest pain. He was subsequently told by urgent care to come to the hospital for further management. He has been taking tylenol as needed.    In the ED, his vitals showed Temp- 99.3F, HR- 97, BP- 107/65, RR- 20, O2 sat- 97% RA. Labs notable for leukocytosis w/ neutrophil predominance, transaminitis and elevated procalcitonin. Patient got tylenol, x 1.    PT REPORTS HE HAS HAD NIGHT SWEATS  PRIOR TO DX OF COVID  PT HAD COVID PCR  TEST HERE WHICH WAS NEGATIVE  CT SCAN SUGGESTIVE OF COVID AND HAS LIVER LESION  ASKED TO EVALUATE FROM ID STANDPOINT        PAST MEDICAL & SURGICAL HISTORY:  Sleep apnea, obstructive    S/P tonsillectomy        ANTIBIOTICS  azithromycin  IVPB 500 milliGRAM(s) IV Intermittent every 24 hours  azithromycin  IVPB      cefTRIAXone   IVPB 1000 milliGRAM(s) IV Intermittent every 24 hours  cefTRIAXone   IVPB          Allergies    No Known Allergies    Intolerances        SOCIAL HISTORY:     FAMILY HX   FAMILY HISTORY:  FH: heart attack (Father)        Vital Signs Last 24 Hrs  T(C): 36.8 (10 Jul 2021 04:30), Max: 37.4 (2021 12:40)  T(F): 98.3 (10 Jul 2021 04:30), Max: 99.3 (2021 12:40)  HR: 81 (10 Jul 2021 04:30) (77 - 97)  BP: 102/63 (10 Jul 2021 04:30) (102/63 - 115/69)  BP(mean): --  RR: 18 (10 Jul 2021 04:30) (18 - 22)  SpO2: 91% (10 Jul 2021 04:30) (87% - 97%)  Drug Dosing Weight  Height (cm): 177.8 (2021 12:40)  Weight (kg): 84.4 (2021 12:40)  BMI (kg/m2): 26.7 (2021 12:40)  BSA (m2): 2.02 (2021 12:40)      REVIEW OF SYSTEMS:     CONSTITUTIONAL:  As per HPI. NIGHT SWEATS    HEENT:  Eyes:  No diplopia or blurred vision. ENT:  No earache, sore throat or runny nose.    CARDIOVASCULAR:  No pressure, squeezing, strangling, tightness, heaviness or aching about the chest, neck, axilla or epigastrium.    RESPIRATORY:  No cough, shortness of breath, artis.    GASTROINTESTINAL:  No nausea, vomiting or diarrhea.    GENITOURINARY:  No dysuria, frequency or urgency.    MUSCULOSKELETAL:  As per HPI.    SKIN:  No change in skin, hair or nails.    NEUROLOGIC:  No paresthesias, fasciculations, seizures or weakness.                  PHYSICAL EXAMINATION:    GENERAL: The patient is a well-developed, well-nourished _____in no apparent distress. ___ is alert and oriented x3.    VITAL SIGNS: T(C): 36.8 (07-10-21 @ 04:30), Max: 37.4 (21 @ 12:40)  HR: 81 (07-10-21 @ 04:30) (77 - 97)  BP: 102/63 (07-10-21 @ 04:30) (102/63 - 115/69)  RR: 18 (07-10-21 @ 04:30) (18 - 22)  SpO2: 91% (07-10-21 @ 04:30) (87% - 97%)  Wt(kg): --    HEENT: Head is normocephalic and atraumatic.  ANICTERIC  NECK: Supple. No carotid bruits.  No lymphadenopathy or thyromegaly.    LUNGS:COARSE BREATH SOUNDS    HEART: Regular rate and rhythm without murmur.    ABDOMEN: Soft, nontender, and nondistended.  Positive bowel sounds.  No hepatosplenomegaly was noted. NO REBOUND NO GUARDING    EXTREMITIES: NO EDEMA NO ERYTHEMA    NEUROLOGIC: NON FOCAL      SKIN: No ulceration or induration present. NO RASH        BLOOD CULTURES       URINE CX          LABS:                        13.5   19.84 )-----------( 181      ( 10 Jul 2021 08:30 )             40.7     07-10    140  |  102  |  17.3  ----------------------------<  108<H>  3.6   |  27.0  |  0.84    Ca    9.0      10 Jul 2021 08:30  Phos  1.7     07-10  Mg     1.8     07-10    TPro  6.2<L>  /  Alb  3.1<L>  /  TBili  1.0  /  DBili  x   /  AST  71<H>  /  ALT  150<H>  /  AlkPhos  166<H>  07-10      Urinalysis Basic - ( 10 Jul 2021 10:15 )    Color: Yellow / Appearance: Clear / S.010 / pH: x  Gluc: x / Ketone: Trace  / Bili: Negative / Urobili: 8 mg/dL   Blood: x / Protein: 30 mg/dL / Nitrite: Negative   Leuk Esterase: Negative / RBC: 0-2 /HPF / WBC 0-2   Sq Epi: x / Non Sq Epi: Negative / Bacteria: Negative        RADIOLOGY & ADDITIONAL STUDIES:      ASSESSMENT/PLAN    Patient is a 55M w/ hx of sleep apnea on bipap at night who presents from Urgent Care who presents with chills. Of note, he was recently diagnosed with COVID on Wednesday and his sx had started one week ago. He started having night sweats, cough w/o productive sputum, chills, and fever (Tmax- 104F). His wife has also tested positive for COVID19 and is currently asymptomatic. He had gone to the urgent care and was diagnosed with LLL PNA. He has been vaccinated with Pfizer in April. He was febrile to 100.4F and having significant chills. Also, has nausea, SOB, and headache. Denies vomiting, dizziness, abdominal pain and chest pain. He was subsequently told by urgent care to come to the hospital for further management. He has been taking tylenol as needed.    In the ED, his vitals showed Temp- 99.3F, HR- 97, BP- 107/65, RR- 20, O2 sat- 97% RA. Labs notable for leukocytosis w/ neutrophil predominance, transaminitis and elevated procalcitonin. Patient got tylenol, x 1.    PT REPORTS HE HAS HAD NIGHT SWEATS  PRIOR TO DX OF COVID  PT HAD COVID PCR  TEST HERE WHICH WAS NEGATIVE  CT SCAN SUGGESTIVE OF COVID AND HAS LIVER LESION  AWAIT REPEAT COVID TEST HERE  SUGGEST ORDER INFLAMMATORY MARKERS D DIMER FerriTn  FOLLOW UP LFT  PT FOR MRI OF LIVER  UNCLEAR IF THIS IS  COVID OR SEPARATE PROCESS WITH FEVERS  S  CONTINUE IV ABX FOR POSSIBLE BACTERIAL PROCESS   WILL CHECK URINE LEGIONELLA ANTIGEN  FOLLOWUP REPEAT COVID   WILL FOLLOWUP LIVER IMAGING   WOULD PLACE ON ISOLATION UNTIL COVID PCR BACK  SUGGEST TO PT TO WEAR A MASK UNTIL REPEAT TESTING FINALIZED  WILL FOLLOW UP  WITH FURTHER RECOMMENDATIONS   SPOKE TO HOSPITALIST                ANITA VANCE MD

## 2021-07-10 NOTE — PROGRESS NOTE ADULT - SUBJECTIVE AND OBJECTIVE BOX
MAXINE BREAUX    [id number]    55y      Male    INTERVAL HPI/OVERNIGHT EVENTS: patient being seen for pneumonia, fever and possible liver abscess.     patient states feeling better    REVIEW OF SYSTEMS:    CONSTITUTIONAL: No fever, weight loss, or fatigue  RESPIRATORY: No cough, wheezing, hemoptysis; No shortness of breath  CARDIOVASCULAR: No chest pain, palpitations  GASTROINTESTINAL: No abdominal or epigastric pain. No nausea, vomiting  NEUROLOGICAL: No headaches, memory loss, loss of strength.  MISCELLANEOUS:      Vital Signs Last 24 Hrs  T(C): 36.9 (10 Jul 2021 11:42), Max: 37.4 (2021 12:40)  T(F): 98.5 (10 Jul 2021 11:42), Max: 99.3 (2021 12:40)  HR: 87 (10 Jul 2021 11:42) (77 - 97)  BP: 112/67 (10 Jul 2021 11:42) (102/63 - 115/69)  BP(mean): --  RR: 18 (10 Jul 2021 11:42) (18 - 22)  SpO2: 95% (10 Jul 2021 11:42) (87% - 97%)    PHYSICAL EXAM:    GENERAL: NAD, well-groomed  HEENT: PERRL, +EOMI  NECK: soft, Supple, No JVD,   CHEST/LUNG: Clear to auscultation bilaterally; No wheezing  HEART: S1S2+, Regular rate and rhythm; No murmurs, rubs, or gallops  ABDOMEN: Soft, Nontender, Nondistended; Bowel sounds present  EXTREMITIES:  2+ Peripheral Pulses, No clubbing, cyanosis, or edema  SKIN: No rashes or lesions  NEURO: AAOX3, no focal deficits,    LABS:                        13.5   19.84 )-----------( 181      ( 10 Jul 2021 08:30 )             40.7     07-10    140  |  102  |  17.3  ----------------------------<  108<H>  3.6   |  27.0  |  0.84    Ca    9.0      10 Jul 2021 08:30  Phos  1.7     07-10  Mg     1.8     07-10    TPro  6.2<L>  /  Alb  3.1<L>  /  TBili  1.0  /  DBili  x   /  AST  71<H>  /  ALT  150<H>  /  AlkPhos  166<H>  07-10      Urinalysis Basic - ( 10 Jul 2021 10:15 )    Color: Yellow / Appearance: Clear / S.010 / pH: x  Gluc: x / Ketone: Trace  / Bili: Negative / Urobili: 8 mg/dL   Blood: x / Protein: 30 mg/dL / Nitrite: Negative   Leuk Esterase: Negative / RBC: 0-2 /HPF / WBC 0-2   Sq Epi: x / Non Sq Epi: Negative / Bacteria: Negative          MEDICATIONS  (STANDING):  azithromycin  IVPB 500 milliGRAM(s) IV Intermittent every 24 hours  azithromycin  IVPB      cefTRIAXone   IVPB 1000 milliGRAM(s) IV Intermittent every 24 hours  cefTRIAXone   IVPB      enoxaparin Injectable 40 milliGRAM(s) SubCutaneous daily  potassium phosphate / sodium phosphate Powder (PHOS-NaK) 1 Packet(s) Oral once    MEDICATIONS  (PRN):  acetaminophen   Tablet .. 650 milliGRAM(s) Oral every 6 hours PRN Temp greater or equal to 38C (100.4F), Mild Pain (1 - 3)      RADIOLOGY & ADDITIONAL TESTS:    mri with iv contrast - ordered

## 2021-07-11 ENCOUNTER — RESULT REVIEW (OUTPATIENT)
Age: 56
End: 2021-07-11

## 2021-07-11 LAB
AFP-TM SERPL-MCNC: <1.8 NG/ML — SIGNIFICANT CHANGE UP
ALBUMIN SERPL ELPH-MCNC: 2.9 G/DL — LOW (ref 3.3–5.2)
ALBUMIN SERPL ELPH-MCNC: 3.2 G/DL — LOW (ref 3.3–5.2)
ALP SERPL-CCNC: 247 U/L — HIGH (ref 40–120)
ALP SERPL-CCNC: 355 U/L — HIGH (ref 40–120)
ALT FLD-CCNC: 136 U/L — HIGH
ALT FLD-CCNC: 160 U/L — HIGH
ANION GAP SERPL CALC-SCNC: 11 MMOL/L — SIGNIFICANT CHANGE UP (ref 5–17)
ANION GAP SERPL CALC-SCNC: 15 MMOL/L — SIGNIFICANT CHANGE UP (ref 5–17)
APTT BLD: 26.1 SEC — LOW (ref 27.5–35.5)
AST SERPL-CCNC: 59 U/L — HIGH
AST SERPL-CCNC: 93 U/L — HIGH
BASOPHILS # BLD AUTO: 0 K/UL — SIGNIFICANT CHANGE UP (ref 0–0.2)
BASOPHILS # BLD AUTO: 0 K/UL — SIGNIFICANT CHANGE UP (ref 0–0.2)
BASOPHILS NFR BLD AUTO: 0 % — SIGNIFICANT CHANGE UP (ref 0–2)
BASOPHILS NFR BLD AUTO: 0 % — SIGNIFICANT CHANGE UP (ref 0–2)
BILIRUB SERPL-MCNC: 1.3 MG/DL — SIGNIFICANT CHANGE UP (ref 0.4–2)
BILIRUB SERPL-MCNC: 1.3 MG/DL — SIGNIFICANT CHANGE UP (ref 0.4–2)
BUN SERPL-MCNC: 15.2 MG/DL — SIGNIFICANT CHANGE UP (ref 8–20)
BUN SERPL-MCNC: 17.3 MG/DL — SIGNIFICANT CHANGE UP (ref 8–20)
CALCIUM SERPL-MCNC: 8.9 MG/DL — SIGNIFICANT CHANGE UP (ref 8.6–10.2)
CALCIUM SERPL-MCNC: 9.6 MG/DL — SIGNIFICANT CHANGE UP (ref 8.6–10.2)
CANCER AG125 SERPL-ACNC: 21 U/ML — SIGNIFICANT CHANGE UP
CANCER AG19-9 SERPL-ACNC: <2 U/ML — SIGNIFICANT CHANGE UP
CEA SERPL-MCNC: 2 NG/ML — SIGNIFICANT CHANGE UP (ref 0–3.8)
CHLORIDE SERPL-SCNC: 101 MMOL/L — SIGNIFICANT CHANGE UP (ref 98–107)
CHLORIDE SERPL-SCNC: 99 MMOL/L — SIGNIFICANT CHANGE UP (ref 98–107)
CO2 SERPL-SCNC: 23 MMOL/L — SIGNIFICANT CHANGE UP (ref 22–29)
CO2 SERPL-SCNC: 25 MMOL/L — SIGNIFICANT CHANGE UP (ref 22–29)
CREAT SERPL-MCNC: 0.84 MG/DL — SIGNIFICANT CHANGE UP (ref 0.5–1.3)
CREAT SERPL-MCNC: 0.96 MG/DL — SIGNIFICANT CHANGE UP (ref 0.5–1.3)
EOSINOPHIL # BLD AUTO: 0 K/UL — SIGNIFICANT CHANGE UP (ref 0–0.5)
EOSINOPHIL # BLD AUTO: 0 K/UL — SIGNIFICANT CHANGE UP (ref 0–0.5)
EOSINOPHIL NFR BLD AUTO: 0 % — SIGNIFICANT CHANGE UP (ref 0–6)
EOSINOPHIL NFR BLD AUTO: 0 % — SIGNIFICANT CHANGE UP (ref 0–6)
GIANT PLATELETS BLD QL SMEAR: PRESENT — SIGNIFICANT CHANGE UP
GLUCOSE BLDC GLUCOMTR-MCNC: 83 MG/DL — SIGNIFICANT CHANGE UP (ref 70–99)
GLUCOSE SERPL-MCNC: 111 MG/DL — HIGH (ref 70–99)
GLUCOSE SERPL-MCNC: 94 MG/DL — SIGNIFICANT CHANGE UP (ref 70–99)
HCT VFR BLD CALC: 39.4 % — SIGNIFICANT CHANGE UP (ref 39–50)
HCT VFR BLD CALC: 44.8 % — SIGNIFICANT CHANGE UP (ref 39–50)
HGB BLD-MCNC: 13 G/DL — SIGNIFICANT CHANGE UP (ref 13–17)
HGB BLD-MCNC: 14.3 G/DL — SIGNIFICANT CHANGE UP (ref 13–17)
INR BLD: 1.14 RATIO — SIGNIFICANT CHANGE UP (ref 0.88–1.16)
LACTATE BLDV-MCNC: 1.2 MMOL/L — SIGNIFICANT CHANGE UP (ref 0.5–2)
LACTATE BLDV-MCNC: 5.7 MMOL/L — CRITICAL HIGH (ref 0.5–2)
LYMPHOCYTES # BLD AUTO: 1.15 K/UL — SIGNIFICANT CHANGE UP (ref 1–3.3)
LYMPHOCYTES # BLD AUTO: 19.1 % — SIGNIFICANT CHANGE UP (ref 13–44)
LYMPHOCYTES # BLD AUTO: 4.33 K/UL — HIGH (ref 1–3.3)
LYMPHOCYTES # BLD AUTO: 6.9 % — LOW (ref 13–44)
MAGNESIUM SERPL-MCNC: 1.6 MG/DL — SIGNIFICANT CHANGE UP (ref 1.6–2.6)
MANUAL SMEAR VERIFICATION: SIGNIFICANT CHANGE UP
MANUAL SMEAR VERIFICATION: SIGNIFICANT CHANGE UP
MCHC RBC-ENTMCNC: 28.5 PG — SIGNIFICANT CHANGE UP (ref 27–34)
MCHC RBC-ENTMCNC: 29 PG — SIGNIFICANT CHANGE UP (ref 27–34)
MCHC RBC-ENTMCNC: 31.9 GM/DL — LOW (ref 32–36)
MCHC RBC-ENTMCNC: 33 GM/DL — SIGNIFICANT CHANGE UP (ref 32–36)
MCV RBC AUTO: 86.4 FL — SIGNIFICANT CHANGE UP (ref 80–100)
MCV RBC AUTO: 90.9 FL — SIGNIFICANT CHANGE UP (ref 80–100)
METAMYELOCYTES # FLD: 0.9 % — HIGH (ref 0–0)
MONOCYTES # BLD AUTO: 2.47 K/UL — HIGH (ref 0–0.9)
MONOCYTES # BLD AUTO: 3.95 K/UL — HIGH (ref 0–0.9)
MONOCYTES NFR BLD AUTO: 14.8 % — HIGH (ref 2–14)
MONOCYTES NFR BLD AUTO: 17.4 % — HIGH (ref 2–14)
MYELOCYTES NFR BLD: 0.9 % — HIGH (ref 0–0)
NEUTROPHILS # BLD AUTO: 12.78 K/UL — HIGH (ref 1.8–7.4)
NEUTROPHILS # BLD AUTO: 14 K/UL — HIGH (ref 1.8–7.4)
NEUTROPHILS NFR BLD AUTO: 60.8 % — SIGNIFICANT CHANGE UP (ref 43–77)
NEUTROPHILS NFR BLD AUTO: 75.6 % — SIGNIFICANT CHANGE UP (ref 43–77)
NEUTS BAND # BLD: 0.9 % — SIGNIFICANT CHANGE UP (ref 0–8)
NEUTS BAND # BLD: 0.9 % — SIGNIFICANT CHANGE UP (ref 0–8)
OVALOCYTES BLD QL SMEAR: SLIGHT — SIGNIFICANT CHANGE UP
PLAT MORPH BLD: NORMAL — SIGNIFICANT CHANGE UP
PLAT MORPH BLD: NORMAL — SIGNIFICANT CHANGE UP
PLATELET # BLD AUTO: 191 K/UL — SIGNIFICANT CHANGE UP (ref 150–400)
PLATELET # BLD AUTO: 250 K/UL — SIGNIFICANT CHANGE UP (ref 150–400)
POIKILOCYTOSIS BLD QL AUTO: SLIGHT — SIGNIFICANT CHANGE UP
POTASSIUM SERPL-MCNC: 3.4 MMOL/L — LOW (ref 3.5–5.3)
POTASSIUM SERPL-MCNC: 4 MMOL/L — SIGNIFICANT CHANGE UP (ref 3.5–5.3)
POTASSIUM SERPL-SCNC: 3.4 MMOL/L — LOW (ref 3.5–5.3)
POTASSIUM SERPL-SCNC: 4 MMOL/L — SIGNIFICANT CHANGE UP (ref 3.5–5.3)
PROCALCITONIN SERPL-MCNC: 11.61 NG/ML — HIGH (ref 0.02–0.1)
PROT SERPL-MCNC: 6.2 G/DL — LOW (ref 6.6–8.7)
PROT SERPL-MCNC: 7.2 G/DL — SIGNIFICANT CHANGE UP (ref 6.6–8.7)
PROTHROM AB SERPL-ACNC: 13.1 SEC — SIGNIFICANT CHANGE UP (ref 10.6–13.6)
RBC # BLD: 4.56 M/UL — SIGNIFICANT CHANGE UP (ref 4.2–5.8)
RBC # BLD: 4.93 M/UL — SIGNIFICANT CHANGE UP (ref 4.2–5.8)
RBC # FLD: 13.2 % — SIGNIFICANT CHANGE UP (ref 10.3–14.5)
RBC # FLD: 13.2 % — SIGNIFICANT CHANGE UP (ref 10.3–14.5)
RBC BLD AUTO: ABNORMAL
RBC BLD AUTO: NORMAL — SIGNIFICANT CHANGE UP
SMUDGE CELLS # BLD: PRESENT — SIGNIFICANT CHANGE UP
SODIUM SERPL-SCNC: 137 MMOL/L — SIGNIFICANT CHANGE UP (ref 135–145)
SODIUM SERPL-SCNC: 137 MMOL/L — SIGNIFICANT CHANGE UP (ref 135–145)
VARIANT LYMPHS # BLD: 0.9 % — SIGNIFICANT CHANGE UP (ref 0–6)
VARIANT LYMPHS # BLD: 0.9 % — SIGNIFICANT CHANGE UP (ref 0–6)
WBC # BLD: 16.7 K/UL — HIGH (ref 3.8–10.5)
WBC # BLD: 22.69 K/UL — HIGH (ref 3.8–10.5)
WBC # FLD AUTO: 16.7 K/UL — HIGH (ref 3.8–10.5)
WBC # FLD AUTO: 22.69 K/UL — HIGH (ref 3.8–10.5)

## 2021-07-11 PROCEDURE — 99253 IP/OBS CNSLTJ NEW/EST LOW 45: CPT

## 2021-07-11 PROCEDURE — 99233 SBSQ HOSP IP/OBS HIGH 50: CPT

## 2021-07-11 PROCEDURE — 71045 X-RAY EXAM CHEST 1 VIEW: CPT | Mod: 26

## 2021-07-11 PROCEDURE — 49405 IMAGE CATH FLUID COLXN VISC: CPT

## 2021-07-11 PROCEDURE — 88305 TISSUE EXAM BY PATHOLOGIST: CPT | Mod: 26

## 2021-07-11 PROCEDURE — 93010 ELECTROCARDIOGRAM REPORT: CPT

## 2021-07-11 RX ORDER — POTASSIUM CHLORIDE 20 MEQ
40 PACKET (EA) ORAL ONCE
Refills: 0 | Status: DISCONTINUED | OUTPATIENT
Start: 2021-07-11 | End: 2021-07-11

## 2021-07-11 RX ORDER — KETOROLAC TROMETHAMINE 30 MG/ML
15 SYRINGE (ML) INJECTION ONCE
Refills: 0 | Status: DISCONTINUED | OUTPATIENT
Start: 2021-07-11 | End: 2021-07-11

## 2021-07-11 RX ORDER — SODIUM CHLORIDE 9 MG/ML
2300 INJECTION, SOLUTION INTRAVENOUS ONCE
Refills: 0 | Status: COMPLETED | OUTPATIENT
Start: 2021-07-11 | End: 2021-07-11

## 2021-07-11 RX ORDER — ACETAMINOPHEN 500 MG
325 TABLET ORAL ONCE
Refills: 0 | Status: COMPLETED | OUTPATIENT
Start: 2021-07-11 | End: 2021-07-11

## 2021-07-11 RX ORDER — SODIUM CHLORIDE 9 MG/ML
1000 INJECTION, SOLUTION INTRAVENOUS
Refills: 0 | Status: DISCONTINUED | OUTPATIENT
Start: 2021-07-11 | End: 2021-07-12

## 2021-07-11 RX ORDER — IBUPROFEN 200 MG
600 TABLET ORAL ONCE
Refills: 0 | Status: COMPLETED | OUTPATIENT
Start: 2021-07-11 | End: 2021-07-11

## 2021-07-11 RX ORDER — POTASSIUM CHLORIDE 20 MEQ
40 PACKET (EA) ORAL ONCE
Refills: 0 | Status: COMPLETED | OUTPATIENT
Start: 2021-07-11 | End: 2021-07-11

## 2021-07-11 RX ORDER — VANCOMYCIN HCL 1 G
1250 VIAL (EA) INTRAVENOUS EVERY 12 HOURS
Refills: 0 | Status: DISCONTINUED | OUTPATIENT
Start: 2021-07-11 | End: 2021-07-13

## 2021-07-11 RX ADMIN — ENOXAPARIN SODIUM 40 MILLIGRAM(S): 100 INJECTION SUBCUTANEOUS at 12:04

## 2021-07-11 RX ADMIN — PIPERACILLIN AND TAZOBACTAM 25 GRAM(S): 4; .5 INJECTION, POWDER, LYOPHILIZED, FOR SOLUTION INTRAVENOUS at 05:02

## 2021-07-11 RX ADMIN — Medication 1 TABLET(S): at 12:41

## 2021-07-11 RX ADMIN — Medication 40 MILLIEQUIVALENT(S): at 18:13

## 2021-07-11 RX ADMIN — Medication 325 MILLIGRAM(S): at 20:30

## 2021-07-11 RX ADMIN — Medication 650 MILLIGRAM(S): at 19:03

## 2021-07-11 RX ADMIN — Medication 15 MILLIGRAM(S): at 22:41

## 2021-07-11 RX ADMIN — Medication 1 TABLET(S): at 13:41

## 2021-07-11 RX ADMIN — SODIUM CHLORIDE 2300 MILLILITER(S): 9 INJECTION, SOLUTION INTRAVENOUS at 22:37

## 2021-07-11 RX ADMIN — PIPERACILLIN AND TAZOBACTAM 25 GRAM(S): 4; .5 INJECTION, POWDER, LYOPHILIZED, FOR SOLUTION INTRAVENOUS at 22:36

## 2021-07-11 RX ADMIN — Medication 325 MILLIGRAM(S): at 19:30

## 2021-07-11 RX ADMIN — Medication 650 MILLIGRAM(S): at 20:03

## 2021-07-11 RX ADMIN — Medication 650 MILLIGRAM(S): at 12:41

## 2021-07-11 RX ADMIN — PIPERACILLIN AND TAZOBACTAM 25 GRAM(S): 4; .5 INJECTION, POWDER, LYOPHILIZED, FOR SOLUTION INTRAVENOUS at 14:56

## 2021-07-11 RX ADMIN — Medication 650 MILLIGRAM(S): at 13:41

## 2021-07-11 NOTE — PROGRESS NOTE ADULT - SUBJECTIVE AND OBJECTIVE BOX
MAXINE BREAUX    [id number]    55y      Male    INTERVAL HPI/OVERNIGHT EVENTS: patient being seen for hepatic abscess.     patient seen at bedside and complains of headache    REVIEW OF SYSTEMS:    CONSTITUTIONAL: No fever, weight loss, or fatigue  RESPIRATORY: No cough, wheezing, hemoptysis; No shortness of breath  CARDIOVASCULAR: No chest pain, palpitations  GASTROINTESTINAL: No abdominal or epigastric pain. No nausea, vomiting  NEUROLOGICAL: headache  MISCELLANEOUS:      Vital Signs Last 24 Hrs  T(C): 37.4 (2021 12:34), Max: 39.3 (2021 12:30)  T(F): 99.4 (2021 12:34), Max: 102.7 (2021 12:30)  HR: 91 (2021 12:30) (83 - 98)  BP: 127/76 (2021 12:30) (116/75 - 127/76)  BP(mean): --  RR: 18 (2021 12:30) (18 - 18)  SpO2: 98% (2021 12:30) (96% - 98%)    PHYSICAL EXAM:    GENERAL: NAD, speaking in full sentences  HEENT: PERRL, +EOMI  NECK: soft, Supple, No JVD,   CHEST/LUNG: Clear to auscultation bilaterally; No wheezing  HEART: S1S2+, Regular rate and rhythm; No murmurs,  ABDOMEN: Soft, Nontender, Nondistended; Bowel sounds present  EXTREMITIES:  2+ Peripheral Pulses, No clubbing, cyanosis, or edema  SKIN: No rashes or lesions  NEURO: AAOX3, no focal deficits,      LABS:                        13.0   16.70 )-----------( 191      ( 2021 08:28 )             39.4     07-11    137  |  101  |  15.2  ----------------------------<  111<H>  3.4<L>   |  25.0  |  0.84    Ca    8.9      2021 08:28  Phos  1.7     07-10  Mg     1.6     07-11    TPro  6.2<L>  /  Alb  2.9<L>  /  TBili  1.3  /  DBili  x   /  AST  59<H>  /  ALT  136<H>  /  AlkPhos  247<H>  07-11      Urinalysis Basic - ( 10 Jul 2021 10:15 )    Color: Yellow / Appearance: Clear / S.010 / pH: x  Gluc: x / Ketone: Trace  / Bili: Negative / Urobili: 8 mg/dL   Blood: x / Protein: 30 mg/dL / Nitrite: Negative   Leuk Esterase: Negative / RBC: 0-2 /HPF / WBC 0-2   Sq Epi: x / Non Sq Epi: Negative / Bacteria: Negative      MEDICATIONS  (STANDING):  lactated ringers. 1000 milliLiter(s) (125 mL/Hr) IV Continuous <Continuous>  piperacillin/tazobactam IVPB.. 3.375 Gram(s) IV Intermittent every 8 hours  potassium chloride   Powder 40 milliEquivalent(s) Oral once    MEDICATIONS  (PRN):  acetaminophen   Tablet .. 650 milliGRAM(s) Oral every 6 hours PRN Temp greater or equal to 38C (100.4F), Mild Pain (1 - 3)  acetaminophen 325 mG/butalbital 50 mG/caffeine 40 mG 1 Tablet(s) Oral every 6 hours PRN Moderate Pain (4 - 6)      RADIOLOGY & ADDITIONAL TESTS:    mri - A 6.6 cm segment 8 hepatic abscess with associated thrombosis of an adjacent branch of the right hepatic vein. Recommend follow-up to resolution after treatment.    Hepatic steatosis.    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 Recommend one-year MRCP follow-up.

## 2021-07-11 NOTE — PROGRESS NOTE ADULT - ASSESSMENT
55M w/ hx of sleep apnea on bipap at night who presents from Urgent Care who presents with chills. Of note, he was recently diagnosed with COVID on Wednesday by rapid test  and his sx had started one week ago. He started having night sweats, cough w/o productive sputum, chills, and fever (Tmax- 104F). His wife has also tested positive for COVID19 and is currently asymptomatic. He had gone to the urgent care and was diagnosed with LLL PNA. He has been vaccinated with Pfizer in April. He was febrile to 100.4F and having significant chills. Also, has nausea, SOB, and headache. Denies vomiting, dizziness, abdominal pain and chest pain. He was subsequently told by urgent care to come to the hospital for further management. He has been taking tylenol as needed.      PT REPORTS HE HAS HAD NIGHT SWEATS     PT HAD COVID PCR  TEST HERE  x2 WHICH WAS NEGATIVE  CT SCAN CHEST SHOWED A    LIVER LESION  pt s/p   MRI OF LIVER    WHICH SHOWED COLLECTION C/W ABSCESS AS WELL AS THROMBUS    ALSO ? CYSTIC ARE IN PANCREAS IPMN  WILL NEED IR GUIDED DRAINAGE OF ABSCESS  SPOKE TO IR BUT PT HAS ALREADY EATEN TODAY UNABLE TO DO ASPIRATION  PT TO DE MADE NPO AND HAVE PT/INR DONE   GI EVAL DONE  HEME TO EVAL THROMBUS AND DECIDE NEED FOR Anticoagulation  SPOKE TO WIFE ON PHONE  WILL FOLLOW UP WITH FURTHER RECOMMENEDATIONS  D/C ISOLATION AS PT HAS COVID PCR NEG X2

## 2021-07-11 NOTE — CONSULT NOTE ADULT - SUBJECTIVE AND OBJECTIVE BOX
54 y/o male with JESSICA on BIPAP at night, got Pfizer vaccine in 4/2021, noted to be covid swab+ at an Urgent care center on 7/7/2021 and was diagnosed with a LLL pneumonia but was sent to the hospital for further care. In the ER workup revealed transminitis and and MRI of  the abdomen that showed hepatic steatosis but in segment 8 he has a large 5.5x6.4x6.6 cm. multifocal fluid collection with peripheral and septal enhancement and perilesional edema compatible with hepatic abscess.  Just peripheral to the abscess there is a non-enhancing branching linear structure, likely representing a thrombosed right hepatic vein branch. Normal appearing bile ducts and there is an 8 mm. cystic lesion in the head of the pancreas contiguous with the non-dilated PD. No solid enhancing component and no main PD dilatation. Patent portal veins. Thrombus in a branch of the right hepatic vein peripheral to the abscess. No retroperitoneal ISABELLA. Colon: Sigmoid diverticulosis w/o diverticulitis and a small fat containing umbilical hernia.  Of note, the patient has 2 covid nasal swab pcr's that were negative. Patient has been febrile and is going to go for IR drainage of abscess.  Hematology consulted to address need for anticoagulation in this setting.    PAST MEDICAL & SURGICAL HISTORY:  Sleep apnea, obstructive    S/P tonsillectomy    FAMILY HISTORY:  FH: heart attack (Father)    Social History:  Lives with his wife and kids. 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 Denies smoking and alcohol. (09 Jul 2021 15:53)    MEDICATIONS  (STANDING):  lactated ringers. 1000 milliLiter(s) (125 mL/Hr) IV Continuous <Continuous>  piperacillin/tazobactam IVPB.. 3.375 Gram(s) IV Intermittent every 8 hours  vancomycin  IVPB 1250 milliGRAM(s) IV Intermittent every 12 hours    MEDICATIONS  (PRN):  acetaminophen   Tablet .. 650 milliGRAM(s) Oral every 6 hours PRN Temp greater or equal to 38C (100.4F), Mild Pain (1 - 3)  acetaminophen 325 mG/butalbital 50 mG/caffeine 40 mG 1 Tablet(s) Oral every 6 hours PRN Moderate Pain (4 - 6)    febrile at 102.7F earlier today.  PE:  deferred    Labs:                        14.3   22.69 )-----------( 250      ( 11 Jul 2021 19:48 )             44.8     07-11    137  |  99  |  17.3  ----------------------------<  94  4.0   |  23.0  |  0.96    Ca    9.6      11 Jul 2021 19:48  Phos  1.7     07-10  Mg     1.6     07-11    TPro  7.2  /  Alb  3.2<L>  /  TBili  1.3  /  DBili  x   /  AST  93<H>  /  ALT  160<H>  /  AlkPhos  355<H>  07-11

## 2021-07-11 NOTE — PROGRESS NOTE ADULT - SUBJECTIVE AND OBJECTIVE BOX
INFECTIOUS DISEASES AND INTERNAL MEDICINE at [place]  =======================================================  Henri Salazar MD  Diplomates American Board of Internal Medicine and Infectious Diseases  Telephone [phone]  Fax            [phone]  =======================================================    MAXINE BREAUX [id number]    Follow up: liver abscess    Allergies:  No Known Allergies      Medications:  acetaminophen   Tablet .. 650 milliGRAM(s) Oral every 6 hours PRN  enoxaparin Injectable 40 milliGRAM(s) SubCutaneous daily  piperacillin/tazobactam IVPB.. 3.375 Gram(s) IV Intermittent every 8 hours  potassium chloride   Solution 40 milliEquivalent(s) Oral once    SOCIAL       FAMILY   FAMILY HISTORY:  FH: heart attack (Father)      REVIEW OF SYSTEMS:  CONSTITUTIONAL:  No Fever or chills  HEENT:   No diplopia or blurred vision.  No earache, sore throat or runny nose.  CARDIOVASCULAR:  No pressure, squeezing, strangling, tightness, heaviness or aching about the chest, neck, axilla or epigastrium.  RESPIRATORY:    cough, shortness of breath,    GASTROINTESTINAL:  No nausea, vomiting or diarrhea.  GENITOURINARY:  No dysuria, frequency or urgency. No Blood in urine  MUSCULOSKELETAL:   moves all joints  SKIN:  No change in skin, hair or nails.  NEUROLOGIC:  No paresthesias, fasciculations, seizures or weakness.  PSYCHIATRIC:  No disorder of thought or mood.  ENDOCRINE:  No heat or cold intolerance, polyuria or polydipsia.  HEMATOLOGICAL:  No easy bruising or bleeding.            Physical Exam:  ICU Vital Signs Last 24 Hrs  T(C): 37.2 (11 Jul 2021 05:58), Max: 37.2 (11 Jul 2021 05:58)  T(F): 98.9 (11 Jul 2021 05:58), Max: 98.9 (11 Jul 2021 05:58)  HR: 83 (11 Jul 2021 05:58) (83 - 98)  BP: 126/86 (11 Jul 2021 05:58) (116/75 - 126/86)  BP(mean): --  ABP: --  ABP(mean): --  RR: 18 (11 Jul 2021 05:58) (18 - 18)  SpO2: 96% (11 Jul 2021 05:58) (96% - 98%)    GEN: NAD,   HEENT: normocephalic and atraumatic. EOMI. GUANAKO.    NECK: Supple. No carotid bruits.  No lymphadenopathy or thyromegaly.  LUNGS: Clear to auscultation.  HEART: Regular rate and rhythm without murmur.  ABDOMEN: Soft, nontender, and nondistended.  Positive bowel sounds.    : No CVA tenderness  EXTREMITIES: Without any cyanosis, clubbing, rash, lesions or edema.  MSK: no joint swelling  NEUROLOGIC: Cranial nerves II through XII are grossly intact.  PSYCHIATRIC: Appropriate affect .  SKIN: No ulceration or induration present.        Labs:  Vitals:  ============  T(F): 98.9 (11 Jul 2021 05:58), Max: 98.9 (11 Jul 2021 05:58)  HR: 83 (11 Jul 2021 05:58)  BP: 126/86 (11 Jul 2021 05:58)  RR: 18 (11 Jul 2021 05:58)  SpO2: 96% (11 Jul 2021 05:58) (96% - 98%)  temp max in last 48H T(F): , Max: 99.3 (07-09-21 @ 12:40)    =======================================================  Current Antibiotics:  piperacillin/tazobactam IVPB.. 3.375 Gram(s) IV Intermittent every 8 hours    Other medications:  enoxaparin Injectable 40 milliGRAM(s) SubCutaneous daily  potassium chloride   Solution 40 milliEquivalent(s) Oral once      =======================================================  Labs:                        13.0   16.70 )-----------( 191      ( 11 Jul 2021 08:28 )             39.4     07-11    137  |  101  |  15.2  ----------------------------<  111<H>  3.4<L>   |  25.0  |  0.84    Ca    8.9      11 Jul 2021 08:28  Phos  1.7     07-10  Mg     1.6     07-11    TPro  6.2<L>  /  Alb  2.9<L>  /  TBili  1.3  /  DBili  x   /  AST  59<H>  /  ALT  136<H>  /  AlkPhos  247<H>  07-11      Creatinine, Serum: 0.84 mg/dL (07-11-21 @ 08:28)  Creatinine, Serum: 0.84 mg/dL (07-10-21 @ 08:30)  Creatinine, Serum: 1.02 mg/dL (07-09-21 @ 13:39)    Procalcitonin, Serum: 11.61 ng/mL (07-11-21 @ 08:28)  Procalcitonin, Serum: 21.40 ng/mL (07-09-21 @ 13:39)    D-Dimer Assay, Quantitative: 467 ng/mL DDU (07-10-21 @ 13:54)    Ferritin, Serum: 260 ng/mL (07-10-21 @ 13:53)  Ferritin, Serum: 275 ng/mL (07-09-21 @ 13:39)    C-Reactive Protein, Serum: 324 mg/L (07-10-21 @ 13:53)  C-Reactive Protein, Serum: 263 mg/L (07-09-21 @ 13:39)    WBC Count: 16.70 K/uL (07-11-21 @ 08:28)  WBC Count: 19.84 K/uL (07-10-21 @ 08:30)  WBC Count: 19.07 K/uL (07-09-21 @ 13:39)      COVID-19 PCR: NotDetec (07-10-21 @ 10:11)  COVID-19 PCR: NotDete (07-09-21 @ 13:39)      Alkaline Phosphatase, Serum: 247 U/L (07-11-21 @ 08:28)  Alkaline Phosphatase, Serum: 166 U/L (07-10-21 @ 08:30)  Alkaline Phosphatase, Serum: 156 U/L (07-09-21 @ 13:39)  Alanine Aminotransferase (ALT/SGPT): 136 U/L (07-11-21 @ 08:28)  Alanine Aminotransferase (ALT/SGPT): 150 U/L (07-10-21 @ 08:30)  Alanine Aminotransferase (ALT/SGPT): 160 U/L (07-09-21 @ 13:39)  Aspartate Aminotransferase (AST/SGOT): 59 U/L (07-11-21 @ 08:28)  Aspartate Aminotransferase (AST/SGOT): 71 U/L (07-10-21 @ 08:30)  Aspartate Aminotransferase (AST/SGOT): 92 U/L (07-09-21 @ 13:39)  Bilirubin Total, Serum: 1.3 mg/dL (07-11-21 @ 08:28)  Bilirubin Total, Serum: 1.0 mg/dL (07-10-21 @ 08:30)  Bilirubin Total, Serum: 2.2 mg/dL (07-09-21 @ 13:39)

## 2021-07-11 NOTE — CONSULT NOTE ADULT - ASSESSMENT
56 y/o M with JESSICA admitted with fevers, cough, found to have transaminitis and subsequently MRI showed liver abscess and possible hepatic vein thrombus.    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  After procedure by IR I feel that the patient may begin anticoagulation with heparin drip. Can change to a DOAC prior to discharge. Duration can be determined as outpatient and depending on abscess response to treatment.    2) Liver abscess - Drainage by IR planned.    3) Fevers - sec to abscess, possible underlying malignancy? Post-drainage imaging at some point may help clarify this. On wide spectrum antibiotics. ID on board.       4) Covid + at urgent care - nasal swab pcr negative x 2 during his stay here so far. ID on board.    Thank you,    Dyllan Espinoza MD  [facility]

## 2021-07-11 NOTE — PROCEDURE NOTE - PROCEDURE FINDINGS AND DETAILS
10 fr drain placed into right hepatic abscess. ~50cc purulent fluid aspirated and sent for cx and cyto. drain connected to bulb.

## 2021-07-11 NOTE — CONSULT NOTE ADULT - SUBJECTIVE AND OBJECTIVE BOX
Patient is a 55y old  Male who presents with a chief complaint of fever and chills (10 Jul 2021 12:00)      HPI:  Patient is a 55M w/ hx of sleep apnea on bipap at night who presents from Urgent Care who presents with chills. Of note, he was recently diagnosed with COVID on Wednesday and his sx had started one week ago. He started having night sweats, cough w/o productive sputum, chills, and fever (Tmax- 104F). His wife has also tested positive for COVID19 and is currently asymptomatic. He had gone to the urgent care and was diagnosed with LLL PNA. He has been vaccinated with Pfizer in April. He was febrile to 100.4F and having significant chills. Also, has nausea, SOB, and headache. Denies vomiting, dizziness, abdominal pain and chest pain. He was subsequently told by urgent care to come to the hospital for further management. He has been taking tylenol as needed.In the ED, his vitals showed Temp- 99.3F, HR- 97, BP- 107/65, RR- 20, O2 sat- 97% RA. Labs notable for leukocytosis w/ neutrophil predominance, transaminitis and elevated procalcitonin. Patient got tylenol, x 1.   (09 Jul 2021 15:53). In the ED he had two COVID nasal swab PCR's that were negative. GI consult requested for abnormal MRI of his liver which was done for abnormal liver findings on Chest CT. He has hepatic steatosis but in segment 8 he has a large 5.5x6.4x6.6 cm. multifocal fluid collection with peripheral and septal enhancement and perilesional edema compatible with hepatic abscess and corresponding to the Chest CT finding. Just peripheral to the abscess there is a non-enhancing branching linear structure, likely representing a thrombosed right hepatic vein branch. Normal appearing bile ducts and there is an 8 mm. cystic lesion in the head of the pancreas contiguous with the non-dilated PD. No solid enhancing component and no main PD dilatation. Patent portal veins. Thrombus in a branch of the right hepatic vein peripheral to the abscess. No retroperitoneal ISABELLA. Colon: Sigmoid diverticulosis w/o diverticulitis and a small fat containing umbilical hernia. Pt. denies precedent foreign travel or abdominal pain. No prior h/o liver disease or ETOH abuse. No associated GI symptoms with a negative GI ROS.      REVIEW OF SYSTEMS:  Constitutional: As per HPI.  ENMT:  No difficulty hearing, tinnitus, vertigo; No sinus or throat pain  Respiratory: As per HPI.  Cardiovascular: No chest pain, palpitations, dizziness or leg swelling  Gastrointestinal: As per HPI. No abdominal or epigastric pain. No nausea, vomiting or hematemesis; No diarrhea or constipation. No melena or hematochezia.  Skin: No itching, burning, rashes or lesions   Musculoskeletal: No joint pain or swelling; No muscle, back or extremity pain  Patient has no cardiopulmonary, peripheral vascular, dermatological, neurological, or psychological symptoms or complaints at this time.    PAST MEDICAL & SURGICAL HISTORY:  Sleep apnea, obstructive    S/P tonsillectomy    S/P hernia repair        FAMILY HISTORY:  FH: heart attack (Father)        SOCIAL HISTORY:  Smoking Status: [ ] Current, [ ] Former, [x ] Never  Pack Years: N/A. No ETOH or drug abuse history.    MEDICATIONS: None at home    MEDICATIONS  (STANDING):  azithromycin  IVPB 500 milliGRAM(s) IV Intermittent every 24 hours  azithromycin  IVPB      enoxaparin Injectable 40 milliGRAM(s) SubCutaneous daily  piperacillin/tazobactam IVPB.. 3.375 Gram(s) IV Intermittent every 8 hours  potassium chloride   Solution 40 milliEquivalent(s) Oral once    MEDICATIONS  (PRN):  acetaminophen   Tablet .. 650 milliGRAM(s) Oral every 6 hours PRN Temp greater or equal to 38C (100.4F), Mild Pain (1 - 3)      Allergies    No Known Allergies    Intolerances        Vital Signs Last 24 Hrs  T(C): 37.2 (11 Jul 2021 05:58), Max: 37.2 (11 Jul 2021 05:58)  T(F): 98.9 (11 Jul 2021 05:58), Max: 98.9 (11 Jul 2021 05:58)  HR: 83 (11 Jul 2021 05:58) (83 - 98)  BP: 126/86 (11 Jul 2021 05:58) (112/67 - 126/86)  BP(mean): --  RR: 18 (11 Jul 2021 05:58) (18 - 18)  SpO2: 96% (11 Jul 2021 05:58) (95% - 98%)    07-10 @ 07:01  -  07-11 @ 07:00  --------------------------------------------------------  IN: 250 mL / OUT: 2 mL / NET: 248 mL          PHYSICAL EXAM:    General: Well developed; well nourished; in no acute distress  HEENT: MMM, conjunctiva pink and sclera anicteric.  Lungs: Clear bilaterally  Cor: RRR S1, S2 only.  Gastrointestinal: Abdomen: Soft, non-tender non-distended; Normal bowel sounds; No rebound or guarding or HSM.  JOLIE: Not done. Not presently indicated.  Extremities: Normal range of motion, No clubbing, cyanosis or edema  Neurological: Alert and oriented x3  Skin: Warm and dry. No obvious rash      LABS:                        13.0   16.70 )-----------( 191      ( 11 Jul 2021 08:28 )             39.4     07-11    137  |  101  |  15.2  ----------------------------<  111<H>  3.4<L>   |  25.0  |  0.84    Ca    8.9      11 Jul 2021 08:28  Phos  1.7     07-10  Mg     1.6     07-11    TPro  6.2<L>  /  Alb  2.9<L>  /  TBili  1.3  /  DBili  x   /  AST  59<H>  /  ALT  136<H>  /  AlkPhos  247<H>  07-11          RADIOLOGY & ADDITIONAL STUDIES:   MRI results noted above.

## 2021-07-11 NOTE — CONSULT NOTE ADULT - ASSESSMENT
Abnormal MRI of liver with what appears to be a large hepatic abscess of unclear etiology. No h/o foreign travel to suggest amebic abscess and no h/o diverticulitis and no diverticulitis on the above MRI and no c/o abdominal pain at home to suggest diverticular etiology.  No abdominal tenderness on PE. Likely infectious with unclear etiology or possible hepatic malignancy with superimposed infection and abscess also possible. Pt would have had IR aspiration of this abscess today but he ate so it has been re-scheduled for tomorrow AM. Pt. is presently on IV Zosyn as per ID. Tumor markers ordered. Case and management d/w Dr. Sloan (ID) after seeing the pt this AM. Hematology consult recommended for their input regarding the need for AC for right hepatic vein thrombosis which is likely secondary to this abscess. NPO after MN tonight for IR drainage of abscess tomorrow. Repeat labs ordered for the AM. IV Abx as per ID. Case and management also d/w Dr. Trenton Duran, referring hospitalist for this consult.

## 2021-07-11 NOTE — SEPSIS NOTE - ASSESSMENT
Code sepsis order set initiated  -CBC, CMP, procal, PT/INR   -Lactate  -UA  -Blood Cultures x 2   -CXR  -LR weight based bolus 2300cc  -Continue on zosyn  -Tylenol  -Cooling blanket  -Will follow 2hrs    
Code sepsis order set completed  -CBC w. worsening leukocytosis  -CMP WNL   -Lactate 5.7, repeat pending  -UA pending  -Blood Cultures x 2 pending  -CXR reviewed w no worsening compared to previous, pending official read  -s/p 2300cc LR bolus  -Continue on zosyn  -Vancomycin x 1 dose  -Motrin  -Cooling blanket  -Transfer to MICU, MICU PA at bedside

## 2021-07-11 NOTE — PROVIDER CONTACT NOTE (CRITICAL VALUE NOTIFICATION) - NAME OF MD/NP/PA/DO NOTIFIED:
Substance Use & Tx History





- Last Treatment


Where was last treatment: [facility] (in  [place]  w/ Suboxone  

in [place] , goes monthly claims he did  not have money  to pay  . heroin : 1  bundle

/day  via inhalation , latest use  this morning .)





COWS





- Scale


Resting Pulse: 0= PA 80 or Below


Sweatin= Chills/Flushing


Restless Observation: 1= Difficult to Sit Still


Pupil Size: 0= Normal to Room Light


Bone or Joint Aches: 2= Severe Diffuse Aches


Runny Nose/ Eye Tearin= Runny Nose/Eyes


GI Upset > 30mins: 2= Nausea/Diarrhea


Tremor Observation: 0= None


Yawning Observation: 0= None


Anxiety or Irritability: 1=Feels Anxious/Irritable


Goose Flesh Skin: 0=Smooth Skin (latest  Bup yesterday   evening  . denies MMTP

  cocaine :  2 x/week  etoh - denies   other  illicits  - denies  tobacco :  1/

4  ppd  .)


COWS Score: 9 Kassidy

## 2021-07-11 NOTE — PRE PROCEDURE NOTE - PRE PROCEDURE EVALUATION
Interventional Radiology Pre-Procedure Note    Patient is a 55y old Male found to have a hepatic abscess. Pt became septic while on zosyn and now presents for urgent drainage catheter placement.    PAST MEDICAL & SURGICAL HISTORY:  Sleep apnea, obstructive    S/P tonsillectomy         Allergies: No Known Allergies      LABS:                        14.3   22.69 )-----------( 250      ( 11 Jul 2021 19:48 )             44.8     07-11    137  |  99  |  17.3  ----------------------------<  94  4.0   |  23.0  |  0.96    Ca    9.6      11 Jul 2021 19:48  Phos  1.7     07-10  Mg     1.6     07-11    TPro  7.2  /  Alb  3.2<L>  /  TBili  1.3  /  DBili  x   /  AST  93<H>  /  ALT  160<H>  /  AlkPhos  355<H>  07-11    PT/INR - ( 11 Jul 2021 19:48 )   PT: 13.1 sec;   INR: 1.14 ratio         PTT - ( 11 Jul 2021 19:48 )  PTT:26.1 sec    Procedure/ risks/ benefits were explained, informed consent obtained from patient, verbalizes understanding.

## 2021-07-11 NOTE — PROGRESS NOTE ADULT - ASSESSMENT
55M w/ hx of sleep apnea who presents from Urgent Care who presents with fever and chills found to have leukocytosis and elevated procalcitonin concerning for superimposed bacterial infection in the setting of covid19; now admitted for further workup and management.  Patient had mri abdomen showing hepatic abscess with possible thrombosis and possible small papillary neoplasm     Problem/Plan - 1:  ·  Problem: Acute respiratory failure.  - ct chest shows infiltrates  - id following   - c.w iv abx  - covid neg x 2     Problem/Plan - 2:  ·  Problem: Hepatic abscess - seen on MRI   - npo for ir drainage tomorrow  - c.w iv abx  - follow up blood cx     Problem/Plan - 3:  ·  Problem: hepatic vein thrombosis - [facility] consulted  - patient had pfizer vaccine in april      Problem/Plan - 4:  ·  Problem: Sleep apnea, obstructive.  Plan: - nocturnal BIPAP ordered.     5) Hypokalemia - replete    6) HEADACHE - FIORICET ORDERED    plan explained to patient and wife over the phone

## 2021-07-11 NOTE — SEPSIS NOTE - NSSUBJFT_GEN_A_CORE
CODE SEPSIS 2hr FOLLOW UP:    55M w/ hx of sleep apnea who presented from Urgent Care with fever and chills found to have leukocytosis and elevated procalcitonin concerning for superimposed bacterial infection in the setting of covid19; admitted for further workup and management.  Patient had mri abdomen showing hepatic abscess with possible thrombosis and possible small papillary neoplasm. Currently receiving zosyn and actively being followed by ID. Scheduled with IR in am for drainage of abscess. Code sepsis called this evening by RN for rectal temp 103, . Seen at bedside for 2hr follow up. Pt stating he feels better and is without complaints.
55M w/ hx of sleep apnea who presented from Urgent Care with fever and chills found to have leukocytosis and elevated procalcitonin concerning for superimposed bacterial infection in the setting of covid19; admitted for further workup and management.  Patient had mri abdomen showing hepatic abscess with possible thrombosis and possible small papillary neoplasm. Currently receiving zosyn and actively being followed by ID. Scheduled with IR in am for drainage of abscess. Code sepsis called this evening by RN for rectal temp 103, .

## 2021-07-12 LAB
ALBUMIN SERPL ELPH-MCNC: 2.5 G/DL — LOW (ref 3.3–5.2)
ALP SERPL-CCNC: 319 U/L — HIGH (ref 40–120)
ALT FLD-CCNC: 131 U/L — HIGH
ANION GAP SERPL CALC-SCNC: 10 MMOL/L — SIGNIFICANT CHANGE UP (ref 5–17)
AST SERPL-CCNC: 69 U/L — HIGH
BASOPHILS # BLD AUTO: 0.08 K/UL — SIGNIFICANT CHANGE UP (ref 0–0.2)
BASOPHILS NFR BLD AUTO: 0.5 % — SIGNIFICANT CHANGE UP (ref 0–2)
BILIRUB SERPL-MCNC: 1 MG/DL — SIGNIFICANT CHANGE UP (ref 0.4–2)
BUN SERPL-MCNC: 18.5 MG/DL — SIGNIFICANT CHANGE UP (ref 8–20)
CALCIUM SERPL-MCNC: 8.7 MG/DL — SIGNIFICANT CHANGE UP (ref 8.6–10.2)
CHLORIDE SERPL-SCNC: 103 MMOL/L — SIGNIFICANT CHANGE UP (ref 98–107)
CO2 SERPL-SCNC: 24 MMOL/L — SIGNIFICANT CHANGE UP (ref 22–29)
CREAT SERPL-MCNC: 0.82 MG/DL — SIGNIFICANT CHANGE UP (ref 0.5–1.3)
EOSINOPHIL # BLD AUTO: 0.04 K/UL — SIGNIFICANT CHANGE UP (ref 0–0.5)
EOSINOPHIL NFR BLD AUTO: 0.3 % — SIGNIFICANT CHANGE UP (ref 0–6)
GLUCOSE SERPL-MCNC: 118 MG/DL — HIGH (ref 70–99)
GRAM STN FLD: SIGNIFICANT CHANGE UP
HCT VFR BLD CALC: 36.3 % — LOW (ref 39–50)
HGB BLD-MCNC: 12 G/DL — LOW (ref 13–17)
IMM GRANULOCYTES NFR BLD AUTO: 6.4 % — HIGH (ref 0–1.5)
INR BLD: 1.2 RATIO — HIGH (ref 0.88–1.16)
LYMPHOCYTES # BLD AUTO: 1.83 K/UL — SIGNIFICANT CHANGE UP (ref 1–3.3)
LYMPHOCYTES # BLD AUTO: 11.6 % — LOW (ref 13–44)
MAGNESIUM SERPL-MCNC: 1.6 MG/DL — SIGNIFICANT CHANGE UP (ref 1.6–2.6)
MCHC RBC-ENTMCNC: 28.7 PG — SIGNIFICANT CHANGE UP (ref 27–34)
MCHC RBC-ENTMCNC: 33.1 GM/DL — SIGNIFICANT CHANGE UP (ref 32–36)
MCV RBC AUTO: 86.8 FL — SIGNIFICANT CHANGE UP (ref 80–100)
MONOCYTES # BLD AUTO: 1.32 K/UL — HIGH (ref 0–0.9)
MONOCYTES NFR BLD AUTO: 8.4 % — SIGNIFICANT CHANGE UP (ref 2–14)
NEUTROPHILS # BLD AUTO: 11.47 K/UL — HIGH (ref 1.8–7.4)
NEUTROPHILS NFR BLD AUTO: 72.8 % — SIGNIFICANT CHANGE UP (ref 43–77)
PHOSPHATE SERPL-MCNC: 3.5 MG/DL — SIGNIFICANT CHANGE UP (ref 2.4–4.7)
PLATELET # BLD AUTO: 177 K/UL — SIGNIFICANT CHANGE UP (ref 150–400)
POTASSIUM SERPL-MCNC: 4.1 MMOL/L — SIGNIFICANT CHANGE UP (ref 3.5–5.3)
POTASSIUM SERPL-SCNC: 4.1 MMOL/L — SIGNIFICANT CHANGE UP (ref 3.5–5.3)
PROCALCITONIN SERPL-MCNC: 12.63 NG/ML — HIGH (ref 0.02–0.1)
PROT SERPL-MCNC: 5.8 G/DL — LOW (ref 6.6–8.7)
PROTHROM AB SERPL-ACNC: 13.8 SEC — HIGH (ref 10.6–13.6)
RBC # BLD: 4.18 M/UL — LOW (ref 4.2–5.8)
RBC # FLD: 13.2 % — SIGNIFICANT CHANGE UP (ref 10.3–14.5)
SODIUM SERPL-SCNC: 136 MMOL/L — SIGNIFICANT CHANGE UP (ref 135–145)
SPECIMEN SOURCE: SIGNIFICANT CHANGE UP
WBC # BLD: 15.74 K/UL — HIGH (ref 3.8–10.5)
WBC # FLD AUTO: 15.74 K/UL — HIGH (ref 3.8–10.5)

## 2021-07-12 PROCEDURE — 99233 SBSQ HOSP IP/OBS HIGH 50: CPT

## 2021-07-12 RX ORDER — ENOXAPARIN SODIUM 100 MG/ML
80 INJECTION SUBCUTANEOUS
Refills: 0 | Status: DISCONTINUED | OUTPATIENT
Start: 2021-07-12 | End: 2021-07-13

## 2021-07-12 RX ORDER — SODIUM CHLORIDE 9 MG/ML
1000 INJECTION, SOLUTION INTRAVENOUS
Refills: 0 | Status: DISCONTINUED | OUTPATIENT
Start: 2021-07-12 | End: 2021-07-14

## 2021-07-12 RX ADMIN — PIPERACILLIN AND TAZOBACTAM 25 GRAM(S): 4; .5 INJECTION, POWDER, LYOPHILIZED, FOR SOLUTION INTRAVENOUS at 06:01

## 2021-07-12 RX ADMIN — PIPERACILLIN AND TAZOBACTAM 25 GRAM(S): 4; .5 INJECTION, POWDER, LYOPHILIZED, FOR SOLUTION INTRAVENOUS at 21:32

## 2021-07-12 RX ADMIN — Medication 650 MILLIGRAM(S): at 00:54

## 2021-07-12 RX ADMIN — Medication 15 MILLIGRAM(S): at 00:10

## 2021-07-12 RX ADMIN — Medication 650 MILLIGRAM(S): at 02:00

## 2021-07-12 RX ADMIN — Medication 166.67 MILLIGRAM(S): at 00:41

## 2021-07-12 RX ADMIN — Medication 650 MILLIGRAM(S): at 20:41

## 2021-07-12 RX ADMIN — PIPERACILLIN AND TAZOBACTAM 25 GRAM(S): 4; .5 INJECTION, POWDER, LYOPHILIZED, FOR SOLUTION INTRAVENOUS at 14:26

## 2021-07-12 RX ADMIN — ENOXAPARIN SODIUM 80 MILLIGRAM(S): 100 INJECTION SUBCUTANEOUS at 10:22

## 2021-07-12 RX ADMIN — ENOXAPARIN SODIUM 80 MILLIGRAM(S): 100 INJECTION SUBCUTANEOUS at 18:16

## 2021-07-12 RX ADMIN — SODIUM CHLORIDE 100 MILLILITER(S): 9 INJECTION, SOLUTION INTRAVENOUS at 00:41

## 2021-07-12 RX ADMIN — Medication 650 MILLIGRAM(S): at 19:38

## 2021-07-12 RX ADMIN — Medication 166.67 MILLIGRAM(S): at 06:01

## 2021-07-12 RX ADMIN — Medication 166.67 MILLIGRAM(S): at 18:16

## 2021-07-12 NOTE — PROGRESS NOTE ADULT - SUBJECTIVE AND OBJECTIVE BOX
56 y/o male with JESSICA on BIPAP at night, got Pfizer vaccine in 4/2021, noted to be covid swab+ at an Urgent care center on 7/7/2021 and was diagnosed with a LLL pneumonia but was sent to the hospital for further care. In the ER workup revealed transminitis and and MRI of  the abdomen that showed hepatic steatosis but in segment 8 he has a large 5.5x6.4x6.6 cm. multifocal fluid collection with peripheral and septal enhancement and perilesional edema compatible with hepatic abscess.  Just peripheral to the abscess there is a non-enhancing branching linear structure, likely representing a thrombosed right hepatic vein branch. Normal appearing bile ducts and there is an 8 mm. cystic lesion in the head of the pancreas contiguous with the non-dilated PD. No solid enhancing component and no main PD dilatation. Patent portal veins. Thrombus in a branch of the right hepatic vein peripheral to the abscess. No retroperitoneal ISABELLA. Colon: Sigmoid diverticulosis w/o diverticulitis and a small fat containing umbilical hernia.  Of note, the patient has 2 covid nasal swab pcr's here in the hospital that were negative.  Patient has been febrile and last night went for an emergent IR drain placement.   Hematology consulted to address need for anticoagulation in this setting.    He is on enoxaparin and no bleeding reported. Tmax 100.8F since drainage.     PAST MEDICAL & SURGICAL HISTORY:  Sleep apnea, obstructive    S/P tonsillectomy    FAMILY HISTORY:  FH: heart attack (Father)    Social History:  Lives with his wife and kids. 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 Denies smoking and alcohol. (09 Jul 2021 15:53)    MEDICATIONS  (STANDING):  enoxaparin Injectable 80 milliGRAM(s) SubCutaneous two times a day  multiple electrolytes Injection Type 1 1000 milliLiter(s) (100 mL/Hr) IV Continuous <Continuous>  piperacillin/tazobactam IVPB.. 3.375 Gram(s) IV Intermittent every 8 hours  vancomycin  IVPB 1250 milliGRAM(s) IV Intermittent every 12 hours    MEDICATIONS  (PRN):  acetaminophen   Tablet .. 650 milliGRAM(s) Oral every 6 hours PRN Temp greater or equal to 38C (100.4F), Mild Pain (1 - 3)  acetaminophen 325 mG/butalbital 50 mG/caffeine 40 mG 1 Tablet(s) Oral every 6 hours PRN Moderate Pain (4 - 6)      febrile at 100.8F earlier today.  nad  ctab  rrr    Labs:                          12.0   15.74 )-----------( 177      ( 12 Jul 2021 05:22 )             36.3                             14.3   22.69 )-----------( 250      ( 11 Jul 2021 19:48 )             44.8     07-12    136  |  103  |  18.5  ----------------------------<  118<H>  4.1   |  24.0  |  0.82    Ca    8.7      12 Jul 2021 05:22  Phos  3.5     07-12  Mg     1.6     07-12    TPro  5.8<L>  /  Alb  2.5<L>  /  TBili  1.0  /  DBili  x   /  AST  69<H>  /  ALT  131<H>  /  AlkPhos  319<H>  07-12 07-11    137  |  99  |  17.3  ----------------------------<  94  4.0   |  23.0  |  0.96    Ca    9.6      11 Jul 2021 19:48  Phos  1.7     07-10  Mg     1.6     07-11    TPro  7.2  /  Alb  3.2<L>  /  TBili  1.3  /  DBili  x   /  AST  93<H>  /  ALT  160<H>  /  AlkPhos  355<H>  07-11

## 2021-07-12 NOTE — DIETITIAN INITIAL EVALUATION ADULT. - OTHER INFO
Patient is a 55M w/ hx of sleep apnea on bipap at night who presents from Urgent Care who presents with chills. Of note, he was recently diagnosed with COVID on Wednesday and his sx had started one week ago. He started having night sweats, cough w/o productive sputum, chills, and fever (Tmax- 104F). His wife has also tested positive for COVID19 and is currently asymptomatic. He had gone to the urgent care and was diagnosed with LLL PNA. He has been vaccinated with Pfizer in April. He was febrile to 100.4F and having significant chills. Also, has nausea, SOB, and headache. Denies vomiting, dizziness, abdominal pain and chest pain. He was subsequently told by urgent care to come to the hospital for further management. He has been taking tylenol as needed. Pt continues with suboptimal po intake. Pt encouraged to have small frequent meals and HBV protein. Pt stated his wife is planning to bring him food. Last documented BM 7/12.

## 2021-07-12 NOTE — PROGRESS NOTE ADULT - ASSESSMENT
54 y/o M with JESSICA admitted with fevers, cough, found to have transaminitis and subsequently MRI showed liver abscess and possible hepatic vein thrombus.    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 Continue with lovenox. Agree with repeat imaging after drainage slows down. Can re-assess thrombus at that time and rule out any underlying process that may have been concealed by abscess.  Duration can be determined as outpatient and depending on abscess response to treatment.    2) Liver abscess - Drain placed by IR last night. ID, surgery on board.    3) Fevers - sec to abscess, possible underlying malignancy? Post-drainage imaging at some point may help clarify this. On wide spectrum antibiotics. ID on board.       4) False positive Covid + at urgent care - nasal swab pcr negative x 2 during his stay here so far. ID on board.      Thank you,    Dyllan Espinoza MD  [facility]

## 2021-07-12 NOTE — CONSULT NOTE ADULT - SUBJECTIVE AND OBJECTIVE BOX
SURGERY CONSULT  ==============================================================================================================  HPI: 55y Male  HPI:  Patient is a 55M w/ hx of sleep apnea on bipap at night who presents from Urgent Care who presents with chills. Of note, he was recently diagnosed with COVID on Wednesday and his sx had started one week ago. He started having night sweats, cough w/o productive sputum, chills, and fever (Tmax- 104F). His wife has also tested positive for COVID19 and is currently asymptomatic. He had gone to the urgent care and was diagnosed with LLL PNA. He has been vaccinated with Pfizer in April. He was febrile to 100.4F and having significant chills. Also, has nausea, SOB, and headache. Denies vomiting, dizziness, abdominal pain and chest pain. He was subsequently told by urgent care to come to the hospital for further management. He has been taking tylenol as needed.    In the ED, his vitals showed Temp- 99.3F, HR- 97, BP- 107/65, RR- 20, O2 sat- 97% RA. Labs notable for leukocytosis w/ neutrophil predominance, transaminitis and elevated procalcitonin. Patient got tylenol, x 1.   (09 Jul 2021 15:53)      Surg/onc consult:  Patient admitted to the MICU service for sepsis requiring urgent percutaneous drainage of liver abscess with 60cc of pus. Etiology of liver abscess is not well discerned at this moment and cultures are pending. 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 For now patient is in ICU, HDN stable, however, tachypneic responding to antibiotic treatment.      PAST MEDICAL & SURGICAL HISTORY:  Sleep apnea, obstructive    S/P tonsillectomy      Home Meds: Home Medications:  Flonase 50 mcg/inh nasal spray: 2 spray(s) nasal once a day (12 Jul 2021 12:40)  Multiple Vitamins oral tablet: 1 tab(s) orally once a day (12 Jul 2021 12:40)  Vitamin D3 50,000 intl units (1250 mcg) oral capsule: 1 cap(s) orally every 7 days (12 Jul 2021 12:40)    Allergies: Allergies    No Known Allergies    Intolerances      Soc:   Advanced Directives: Presumed Full Code     CURRENT MEDICATIONS:   --------------------------------------------------------------------------------------  Neurologic Medications  acetaminophen   Tablet .. 650 milliGRAM(s) Oral every 6 hours PRN Temp greater or equal to 38C (100.4F), Mild Pain (1 - 3)  acetaminophen 325 mG/butalbital 50 mG/caffeine 40 mG 1 Tablet(s) Oral every 6 hours PRN Moderate Pain (4 - 6)    Respiratory Medications    Cardiovascular Medications    Gastrointestinal Medications  multiple electrolytes Injection Type 1 1000 milliLiter(s) IV Continuous <Continuous>    Genitourinary Medications    Hematologic/Oncologic Medications  enoxaparin Injectable 80 milliGRAM(s) SubCutaneous two times a day    Antimicrobial/Immunologic Medications  piperacillin/tazobactam IVPB.. 3.375 Gram(s) IV Intermittent every 8 hours  vancomycin  IVPB 1250 milliGRAM(s) IV Intermittent every 12 hours    Endocrine/Metabolic Medications    Topical/Other Medications    --------------------------------------------------------------------------------------    VITAL SIGNS, INS/OUTS (last 24 hours):  --------------------------------------------------------------------------------------  ICU Vital Signs Last 24 Hrs  T(C): 36.7 (12 Jul 2021 16:02), Max: 39.7 (11 Jul 2021 19:40)  T(F): 98.1 (12 Jul 2021 16:02), Max: 103.5 (11 Jul 2021 22:05)  HR: 72 (12 Jul 2021 18:00) (60 - 130)  BP: 148/81 (12 Jul 2021 18:00) (102/47 - 148/81)  BP(mean): 94 (12 Jul 2021 18:00) (64 - 107)  ABP: --  ABP(mean): --  RR: 21 (12 Jul 2021 18:00) (15 - 31)  SpO2: 98% (12 Jul 2021 18:00) (89% - 99%)    I&O's Summary    11 Jul 2021 07:01  -  12 Jul 2021 07:00  --------------------------------------------------------  IN: 1400 mL / OUT: 460 mL / NET: 940 mL    12 Jul 2021 07:01  -  12 Jul 2021 19:02  --------------------------------------------------------  IN: 990 mL / OUT: 320 mL / NET: 670 mL      --------------------------------------------------------------------------------------    GEN: NAD,   HEENT: normocephalic and atraumatic. EOMI. GUANAKO.    NECK: Supple. No carotid bruits.  No lymphadenopathy or thyromegaly.  LUNGS: Clear to auscultation.  HEART: Regular rate and rhythm without murmur.  ABDOMEN: Soft, nontender, and nondistended. RUQ drain in place with serosanguineous output.   : No CVA tenderness  EXTREMITIES: Without any cyanosis, clubbing, rash, lesions or edema.      LABS  --------------------------------------------------------------------------------------  Labs:  CAPILLARY BLOOD GLUCOSE      POCT Blood Glucose.: 83 mg/dL (11 Jul 2021 19:31)                          12.0   15.74 )-----------( 177      ( 12 Jul 2021 05:22 )             36.3       Auto Neutrophil %: 72.8 % (07-12-21 @ 05:22)  Auto Immature Granulocyte %: 6.4 % (07-12-21 @ 05:22)  Auto Neutrophil %: 60.8 % (07-11-21 @ 19:48)  Band Neutrophils %: 0.9 % (07-11-21 @ 19:48)    07-12    136  |  103  |  18.5  ----------------------------<  118<H>  4.1   |  24.0  |  0.82      Magnesium, Serum: 1.6 mg/dL (07-12-21 @ 05:22)      LFTs:             5.8  | 1.0  | 69       ------------------[319     ( 12 Jul 2021 05:22 )  2.5  | x    | 131         Lipase:x      Amylase:x         Blood Gas Venous - Lactate: 1.20 mmol/L (07-11-21 @ 22:57)  Blood Gas Venous - Lactate: 5.70 mmol/L (07-11-21 @ 19:48)      Coags:     13.8   ----< 1.20    ( 12 Jul 2021 05:22 )     x                       Culture - Blood (collected 10 Jul 2021 10:20)  Source: .Blood Blood  Preliminary Report (12 Jul 2021 11:00):    No growth at 48 hours    Culture - Blood (collected 10 Jul 2021 10:20)  Source: .Blood Blood  Preliminary Report (12 Jul 2021 11:00):    No growth at 48 hours        --------------------------------------------------------------------------------------    OTHER LABS

## 2021-07-12 NOTE — CONSULT NOTE ADULT - ASSESSMENT
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 Patient responding well to IR drain and antibiotics    Recommendations  - Continue IV antibiotics and follow speciation  - Adjust antibiotics accordingly  - Monitor drain output quality and quantity   - Re-scan the patient once drain output is minimal  - Surgery will follow

## 2021-07-12 NOTE — PROGRESS NOTE ADULT - SUBJECTIVE AND OBJECTIVE BOX
INTERVAL HPI/OVERNIGHT EVENTS:Follow-up is being performed for sepsis related to liver abscess.  Emergent the IR guided drain was placed.  The patient had undergone dental cleaning before he got sick.  Patient was seen along with surgical oncology team.    MEDICATIONS  (STANDING):  enoxaparin Injectable 80 milliGRAM(s) SubCutaneous two times a day  multiple electrolytes Injection Type 1 1000 milliLiter(s) (100 mL/Hr) IV Continuous <Continuous>  piperacillin/tazobactam IVPB.. 3.375 Gram(s) IV Intermittent every 8 hours  vancomycin  IVPB 1250 milliGRAM(s) IV Intermittent every 12 hours    MEDICATIONS  (PRN):  acetaminophen   Tablet .. 650 milliGRAM(s) Oral every 6 hours PRN Temp greater or equal to 38C (100.4F), Mild Pain (1 - 3)  acetaminophen 325 mG/butalbital 50 mG/caffeine 40 mG 1 Tablet(s) Oral every 6 hours PRN Moderate Pain (4 - 6)      Allergies    No Known Allergies    Intolerances        Vital Signs Last 24 Hrs  T(C): 36.7 (12 Jul 2021 16:02), Max: 39.7 (11 Jul 2021 19:40)  T(F): 98.1 (12 Jul 2021 16:02), Max: 103.5 (11 Jul 2021 22:05)  HR: 85 (12 Jul 2021 16:00) (60 - 130)  BP: 117/100 (12 Jul 2021 16:00) (102/47 - 133/84)  BP(mean): 107 (12 Jul 2021 16:00) (64 - 107)  RR: 27 (12 Jul 2021 16:00) (15 - 31)  SpO2: 99% (12 Jul 2021 16:00) (89% - 99%)    LABS:                        12.0   15.74 )-----------( 177      ( 12 Jul 2021 05:22 )             36.3     07-12    136  |  103  |  18.5  ----------------------------<  118<H>  4.1   |  24.0  |  0.82    Ca    8.7      12 Jul 2021 05:22  Phos  3.5     07-12  Mg     1.6     07-12    TPro  5.8<L>  /  Alb  2.5<L>  /  TBili  1.0  /  DBili  x   /  AST  69<H>  /  ALT  131<H>  /  AlkPhos  319<H>  07-12    PT/INR - ( 12 Jul 2021 05:22 )   PT: 13.8 sec;   INR: 1.20 ratio         PTT - ( 11 Jul 2021 19:48 )  PTT:26.1 sec      RADIOLOGY & ADDITIONAL TESTS:

## 2021-07-12 NOTE — DIETITIAN INITIAL EVALUATION ADULT. - PERTINENT LABORATORY DATA
07-12 Na136 mmol/L Glu 118 mg/dL<H> K+ 4.1 mmol/L Cr  0.82 mg/dL BUN 18.5 mg/dL Phos 3.5 mg/dL Alb 2.5 g/dL<L> PAB n/a

## 2021-07-12 NOTE — CHART NOTE - NSCHARTNOTEFT_GEN_A_CORE
54 y/o Male with JESSICA on CPAP with subacute fever, chills, night sweats without wt loss of appetite or wt with false positive COVID diagnosis as outpatient and ruled out at [facility] with 2 swabs of COVID 19 PCR. Patient admitted to MICU overnight after him having rapid for severe sepsis with lacticemia and underwent emergent IR guided liver abscess drainage where 50 cc of pus was drained and 60 overnight. Pending czx on vanco and zosyn. Lactate cleared. HDS. no end organ, Possible source dental procedure with deep gm cleaning with no hx of infection. 3 colonoscopy with family colonic ca hx with no pertinent findings for him. No travel or exotic meat or cheese consumption. Indoor pets who are healthy. No travel hx. Started full AC for hepatic thrombosis anuj-abscess. Needs pancreatic cyst f/u and w/u. Can be transferred to [facility] monitored bed.

## 2021-07-12 NOTE — PROGRESS NOTE ADULT - ASSESSMENT
At this time the patient is doing better.  Continue the antibiotics.  Liver abscess may be related to dental etiology.  However after the patient has recovered, he should be evaluated with a colonoscopy as he has diverticulosis and also family history of colon cancer.  Last colonoscopy was about 2 to 3 years ago.  GI will follow up peripherally.

## 2021-07-12 NOTE — DIETITIAN NUTRITION RISK NOTIFICATION - ADDITIONAL COMMENTS/DIETITIAN RECOMMENDATIONS
Add Ensure Enlive TID to optimize po intake and provide an additional 350kcal, 20g protein per serving

## 2021-07-12 NOTE — PROGRESS NOTE ADULT - SUBJECTIVE AND OBJECTIVE BOX
INFECTIOUS DISEASES AND INTERNAL MEDICINE at [facility]  =======================================================  Henri Salazar MD  Diplomates American Board of Internal Medicine and Infectious Diseases  Telephone [phone]  Fax            [phone]  =======================================================    MAXINE BREAUX [id number]    Follow up: liver abscess S/P IR GUIDED DRAINAGE LAST PM    Allergies:  No Known Allergies      Medications:  acetaminophen   Tablet .. 650 milliGRAM(s) Oral every 6 hours PRN  enoxaparin Injectable 40 milliGRAM(s) SubCutaneous daily  piperacillin/tazobactam IVPB.. 3.375 Gram(s) IV Intermittent every 8 hours  potassium chloride   Solution 40 milliEquivalent(s) Oral once    SOCIAL       FAMILY   FAMILY HISTORY:  FH: heart attack (Father)      REVIEW OF SYSTEMS:  CONSTITUTIONAL:  No Fever or chills  HEENT:   No diplopia or blurred vision.  No earache, sore throat or runny nose.  CARDIOVASCULAR:  No pressure, squeezing, strangling, tightness, heaviness or aching about the chest, neck, axilla or epigastrium.  RESPIRATORY:    cough, shortness of breath,    GASTROINTESTINAL:  No nausea, vomiting or diarrhea.  GENITOURINARY:  No dysuria, frequency or urgency. No Blood in urine  MUSCULOSKELETAL:   moves all joints  SKIN:  No change in skin, hair or nails.  NEUROLOGIC:  No paresthesias, fasciculations, seizures or weakness.  PSYCHIATRIC:  No disorder of thought or mood.  ENDOCRINE:  No heat or cold intolerance, polyuria or polydipsia.  HEMATOLOGICAL:  No easy bruising or bleeding.            Physical Exam:  I Vital Signs Last 24 Hrs  T(C): 37 (12 Jul 2021 06:00), Max: 39.7 (11 Jul 2021 19:40)  T(F): 98.6 (12 Jul 2021 06:00), Max: 103.5 (11 Jul 2021 22:05)  HR: 60 (12 Jul 2021 07:00) (60 - 130)  BP: 133/84 (12 Jul 2021 07:00) (102/47 - 133/84)  BP(mean): 96 (12 Jul 2021 07:00) (64 - 96)  RR: 16 (12 Jul 2021 07:00) (15 - 31)  SpO2: 98% (12 Jul 2021 07:00) (89% - 99%)    GEN: NAD,   HEENT: normocephalic and atraumatic. EOMI. GUANAKO.    NECK: Supple. No carotid bruits.  No lymphadenopathy or thyromegaly.  LUNGS: Clear to auscultation.  HEART: Regular rate and rhythm without murmur.  ABDOMEN: Soft, nontender, and nondistended.  Positive bowel sounds.  DRAIN IN PLACE RIGHT SIDE SEROSANGUINOUS  MATERIAL   : No CVA tenderness  EXTREMITIES: Without any cyanosis, clubbing, rash, lesions or edema.  MSK: no joint swelling  NEUROLOGIC: Cranial nerves II through XII are grossly intact.  PSYCHIATRIC: Appropriate affect .  SKIN: No ulceration or induration present.        Labs:  Vi     =======================================================  Current Antibiotics:  piperacillin/tazobactam IVPB.. 3.375 Gram(s) IV Intermittent every 8 hours  VANCO    Other medications:  enoxaparin Injectable 40 milliGRAM(s) SubCutaneous daily  potassium chloride   Solution 40 milliEquivalent(s) Oral once      =======================================================  Labs:                                  12.0   15.74 )-----------( 177      ( 12 Jul 2021 05:22 )             36.3   07-12    136  |  103  |  18.5  ----------------------------<  118<H>  4.1   |  24.0  |  0.82    Ca    8.7      12 Jul 2021 05:22  Phos  3.5     07-12  Mg     1.6     07-12    TPro  5.8<L>  /  Alb  2.5<L>  /  TBili  1.0  /  DBili  x   /  AST  69<H>  /  ALT  131<H>  /  AlkPhos  319<H>  07-12      Creatinine, Serum: 0.84 mg/dL (07-11-21 @ 08:28)  Creatinine, Serum: 0.84 mg/dL (07-10-21 @ 08:30)  Creatinine, Serum: 1.02 mg/dL (07-09-21 @ 13:39)    Procalcitonin, Serum: 11.61 ng/mL (07-11-21 @ 08:28)  Procalcitonin, Serum: 21.40 ng/mL (07-09-21 @ 13:39)    D-Dimer Assay, Quantitative: 467 ng/mL DDU (07-10-21 @ 13:54)    Ferritin, Serum: 260 ng/mL (07-10-21 @ 13:53)  Ferritin, Serum: 275 ng/mL (07-09-21 @ 13:39)    C-Reactive Protein, Serum: 324 mg/L (07-10-21 @ 13:53)  C-Reactive Protein, Serum: 263 mg/L (07-09-21 @ 13:39)    WBC Count: 16.70 K/uL (07-11-21 @ 08:28)  WBC Count: 19.84 K/uL (07-10-21 @ 08:30)  WBC Count: 19.07 K/uL (07-09-21 @ 13:39)      COVID-19 PCR: NotDetec (07-10-21 @ 10:11)  COVID-19 PCR: NotDetec (07-09-21 @ 13:39)      Alkaline Phosphatase, Serum: 247 U/L (07-11-21 @ 08:28)  Alkaline Phosphatase, Serum: 166 U/L (07-10-21 @ 08:30)  Alkaline Phosphatase, Serum: 156 U/L (07-09-21 @ 13:39)  Alanine Aminotransferase (ALT/SGPT): 136 U/L (07-11-21 @ 08:28)  Alanine Aminotransferase (ALT/SGPT): 150 U/L (07-10-21 @ 08:30)  Alanine Aminotransferase (ALT/SGPT): 160 U/L (07-09-21 @ 13:39)  Aspartate Aminotransferase (AST/SGOT): 59 U/L (07-11-21 @ 08:28)  Aspartate Aminotransferase (AST/SGOT): 71 U/L (07-10-21 @ 08:30)  Aspartate Aminotransferase (AST/SGOT): 92 U/L (07-09-21 @ 13:39)  Bilirubin Total, Serum: 1.3 mg/dL (07-11-21 @ 08:28)  Bilirubin Total, Serum: 1.0 mg/dL (07-10-21 @ 08:30)  Bilirubin Total, Serum: 2.2 mg/dL (07-09-21 @ 13:39)

## 2021-07-12 NOTE — DIETITIAN INITIAL EVALUATION ADULT. - ADD RECOMMEND
Add Ensure Enlive TID to optimize po intake and provide an additional 350kcal, 20g protein per serving. Monitor wts and labs.

## 2021-07-12 NOTE — DIETITIAN INITIAL EVALUATION ADULT. - PROBLEM SELECTOR PLAN 1
- likely 2/2 superimposed bacterial PNA in the setting of COVID19  - will empirically treat with CTX/AZT  - F/u urine legionella and blood cx  - trend fever curve  - CTA chest to r/o PE. If positive for PE, will start heparin gtt  - will c/w tylenol Q6H  - O2 PRN

## 2021-07-12 NOTE — DIETITIAN INITIAL EVALUATION ADULT. - ETIOLOGY
related to inability to meet sufficient protein-energy in setting of persistent lack of appetite, severe sepsis, hepatic abscess

## 2021-07-12 NOTE — CONSULT NOTE ADULT - SUBJECTIVE AND OBJECTIVE BOX
MAXINE BREAUX  MRN-[id number]  Patient is a 55y old  Male who presents with a chief complaint of fever and chills (2021 17:15)    HPI:  Patient is a 55M w/ hx of sleep apnea on bipap at night who presents from Urgent Care who presents with chills. Of note, he was recently diagnosed with COVID on Wednesday and his sx had started one week ago. He started having night sweats, cough w/o productive sputum, chills, and fever (Tmax- 104F). His wife has also tested positive for COVID19 and is currently asymptomatic. He had gone to the urgent care and was diagnosed with LLL PNA. He has been vaccinated with Pfizer in April. He was febrile to 100.4F and having significant chills. Also, has nausea, SOB, and headache. Denies vomiting, dizziness, abdominal pain and chest pain. He was subsequently told by urgent care to come to the hospital for further management. He has been taking tylenol as needed.    In the ED, his vitals showed Temp- 99.3F, HR- 97, BP- 107/65, RR- 20, O2 sat- 97% RA. Labs notable for leukocytosis w/ neutrophil predominance, transaminitis and elevated procalcitonin. Patient got tylenol, x 1.   (2021 15:53)      Hospital course:  Pt was incidentally found to have an hepatic abscess on MR and was placed on ABX- followed by ID and had plan for IR drainage in the AM on   Earlier tonight pt had a CODE sepsis called, where he became acutely tachycardic to 130's, febrile to 103F and hypoxic with SpO2 in 80-70%. Pt was also noted to have new onset chills ad rigors    ICU was called to see pt, pt was given IVF         REVIEW OF SYSTEMS:    Gen: + fever + chills, rigors   EYES/ENT: No visual changes;  No vertigo or throat pain   NECK: No pain   RES:  No shortness of breath or Cough  CARD: No chest pain   GI: No abdominal pain  : No dysuria  NEURO: No weakness  SKIN: No itching, rashes     Physical Exam:  Vital Signs Last 24 Hrs  T(C): 39.7 (2021 22:05), Max: 39.7 (2021 19:40)  T(F): 103.5 (2021 22:05), Max: 103.5 (2021 22:05)  HR: 96 (2021 22:05) (78 - 130)  BP: 109/66 (2021 22:05) (109/66 - 133/66)  BP(mean): 80 (2021 22:05) (80 - 88)  RR: 18 (2021 22:05) (18 - 25)  SpO2: 95% (2021 22:05) (92% - 99%)    Gen:  Awake, alert, diaphoretic   CNS: non focal, 5/5 motor function BL: UE/LE  	  Neck: no JVD  RES : clear , no wheezing + NRB                       CVS: Regular  rhythm. Normal S1/S2  Abd: Soft, non-distended. Bowel sounds present.  Skin: No rash.  Ext:  no edema    ============================I/O===========================   I&O's Detail    10 Jul 2021 07:01  -  2021 07:00  --------------------------------------------------------  IN:    Oral Fluid: 250 mL  Total IN: 250 mL    OUT:    Voided (mL): 2 mL  Total OUT: 2 mL    Total NET: 248 mL        ============================ LABS =========================                        14.3   22.69 )-----------( 250      ( 2021 19:48 )             44.8     -11    137  |  99  |  17.3  ----------------------------<  94  4.0   |  23.0  |  0.96    Ca    9.6      2021 19:48  Phos  1.7     07-10  Mg     1.6     -11    TPro  7.2  /  Alb  3.2<L>  /  TBili  1.3  /  DBili  x   /  AST  93<H>  /  ALT  160<H>  /  AlkPhos  355<H>      LIVER FUNCTIONS - ( 2021 19:48 )  Alb: 3.2 g/dL / Pro: 7.2 g/dL / ALK PHOS: 355 U/L / ALT: 160 U/L / AST: 93 U/L / GGT: x           PT/INR - ( 2021 19:48 )   PT: 13.1 sec;   INR: 1.14 ratio         PTT - ( 2021 19:48 )  PTT:26.1 sec    Urinalysis Basic - ( 10 Jul 2021 10:15 )    Color: Yellow / Appearance: Clear / S.010 / pH: x  Gluc: x / Ketone: Trace  / Bili: Negative / Urobili: 8 mg/dL   Blood: x / Protein: 30 mg/dL / Nitrite: Negative   Leuk Esterase: Negative / RBC: 0-2 /HPF / WBC 0-2   Sq Epi: x / Non Sq Epi: Negative / Bacteria: Negative      ======================Micro/Rad/Cardio=================  Culture: pending     CXR: < from: CT Angio Chest PE Protocol w/ IV Cont (21 @ 23:53) >    IMPRESSION:    No pulmonary embolus.    Pulmonary opacities with features of COVID-19 pneumonia.    Indeterminate hypodense structure in the liver. Differential diagnosis includes neoplasm and infection/abscess. Recommend clinical correlation and follow-up contrast enhanced MRI for further characterization.    Question hydronephrosis versus parapelvic renal cysts in the partially visualized kidneys. This can be further assessed on dedicated abdominal imaging.    Additional findings as described.    --- End of Report ---      < end of copied text >      ======================================================  PAST MEDICAL & SURGICAL HISTORY:  Sleep apnea, obstructive    S/P tonsillectomy      ====================ASSESSMENT ==============    Plan:  ====================== NEUROLOGY=====================  acetaminophen   Tablet .. 650 milliGRAM(s) Oral every 6 hours PRN Temp greater or equal to 38C (100.4F), Mild Pain (1 - 3)  acetaminophen 325 mG/butalbital 50 mG/caffeine 40 mG 1 Tablet(s) Oral every 6 hours PRN Moderate Pain (4 - 6)    ==================== RESPIRATORY======================  Mechanical Ventilation:      ====================CARDIOVASCULAR==================    ===================HEMATOLOGIC/ONC ===================    ===================== RENAL =========================  Continue monitoring urine output    ==================== GASTROINTESTINAL===================  lactated ringers. 1000 milliLiter(s) (125 mL/Hr) IV Continuous <Continuous>    =======================    ENDOCRINE  =====================    ========================INFECTIOUS DISEASE================  piperacillin/tazobactam IVPB.. 3.375 Gram(s) IV Intermittent every 8 hours  vancomycin  IVPB 1250 milliGRAM(s) IV Intermittent every 12 hours      Patient requires continuous monitoring with bedside rhythm monitoring, pulse oximetry, ventilator/ bipap  monitoring and intermittent blood gas analysis.    Care plan discussed with ICU care team.    Patient remained critical; required more than usual care; I have spent 35 minutes providing non-routine care, revaluated multiple times during the day.     MAXINE BREAUX  MRN-[id number]  Patient is a 55y old  Male who presents with a chief complaint of fever and chills (2021 17:15)    HPI:  Patient is a 55M w/ hx of sleep apnea on bipap at night who presents from Urgent Care who presents with chills. Of note, he was recently diagnosed with COVID on Wednesday and his sx had started one week ago. He started having night sweats, cough w/o productive sputum, chills, and fever (Tmax- 104F). His wife has also tested positive for COVID19 and is currently asymptomatic. He had gone to the urgent care and was diagnosed with LLL PNA. He has been vaccinated with Pfizer in April. He was febrile to 100.4F and having significant chills. Also, has nausea, SOB, and headache. Denies vomiting, dizziness, abdominal pain and chest pain. He was subsequently told by urgent care to come to the hospital for further management. He has been taking tylenol as needed.    In the ED, his vitals showed Temp- 99.3F, HR- 97, BP- 107/65, RR- 20, O2 sat- 97% RA. Labs notable for leukocytosis w/ neutrophil predominance, transaminitis and elevated procalcitonin. Patient got tylenol, x 1.   (2021 15:53)      Hospital course:  Pt was incidentally found to have an hepatic abscess on MR and was placed on ABX- followed by ID and had plan for IR drainage in the AM on   Earlier tonight pt had a CODE sepsis called, where he became acutely tachycardic to 130's, febrile to 103F and hypoxic with SpO2 in 80-70%. Pt was also noted to have new onset chills ad rigors, ABC rising and LA 5   Pt was started on IVF hydration 2L IVF bolus and given tylenol     ICU was called to see pt, pt diaphoretic likely from fever starting to break. Pt noted have /62 and HR 94 SpO2 88% on RA and was placed on NRB     Case discussed in detail with IR team Dr. Goldberg and ID Dr. Sloan, and care was escalated to ICU level care and pt went for IR drainage ON for source control     Pt was transferred to the ICU after IR drainage and close monitoring         REVIEW OF SYSTEMS:    Gen: + fever + chills, rigors   EYES/ENT: No visual changes;  No vertigo or throat pain   NECK: No pain   RES:  No shortness of breath or Cough  CARD: No chest pain   GI: No abdominal pain  : No dysuria  NEURO: No weakness  SKIN: No itching, rashes     Physical Exam:  Vital Signs Last 24 Hrs  T(C): 39.7 (2021 22:05), Max: 39.7 (2021 19:40)  T(F): 103.5 (2021 22:05), Max: 103.5 (2021 22:05)  HR: 96 (2021 22:05) (78 - 130)  BP: 109/66 (2021 22:05) (109/66 - 133/66)  BP(mean): 80 (2021 22:05) (80 - 88)  RR: 18 (2021 22:05) (18 - 25)  SpO2: 95% (2021 22:05) (92% - 99%)    Gen:  Awake, alert, diaphoretic   CNS: non focal, 5/5 motor function BL: UE/LE  	  Neck: no JVD  RES : clear , no wheezing + NRB                       CVS: Regular  rhythm. Normal S1/S2  Abd: Soft, non-distended. Bowel sounds present.  Skin: No rash.  Ext:  no edema    ============================I/O===========================   I&O's Detail    10 Jul 2021 07:01  -  2021 07:00  --------------------------------------------------------  IN:    Oral Fluid: 250 mL  Total IN: 250 mL    OUT:    Voided (mL): 2 mL  Total OUT: 2 mL    Total NET: 248 mL        ============================ LABS =========================                        14.3   22.69 )-----------( 250      ( 2021 19:48 )             44.8         137  |  99  |  17.3  ----------------------------<  94  4.0   |  23.0  |  0.96    Ca    9.6      2021 19:48  Phos  1.7     07-10  Mg     1.6         TPro  7.2  /  Alb  3.2<L>  /  TBili  1.3  /  DBili  x   /  AST  93<H>  /  ALT  160<H>  /  AlkPhos  355<H>      LIVER FUNCTIONS - ( 2021 19:48 )  Alb: 3.2 g/dL / Pro: 7.2 g/dL / ALK PHOS: 355 U/L / ALT: 160 U/L / AST: 93 U/L / GGT: x           PT/INR - ( 2021 19:48 )   PT: 13.1 sec;   INR: 1.14 ratio         PTT - ( 2021 19:48 )  PTT:26.1 sec    Urinalysis Basic - ( 10 Jul 2021 10:15 )    Color: Yellow / Appearance: Clear / S.010 / pH: x  Gluc: x / Ketone: Trace  / Bili: Negative / Urobili: 8 mg/dL   Blood: x / Protein: 30 mg/dL / Nitrite: Negative   Leuk Esterase: Negative / RBC: 0-2 /HPF / WBC 0-2   Sq Epi: x / Non Sq Epi: Negative / Bacteria: Negative      ======================Micro/Rad/Cardio=================  Culture: pending     CXR: < from: CT Angio Chest PE Protocol w/ IV Cont (21 @ 23:53) >    IMPRESSION:    No pulmonary embolus.    Pulmonary opacities with features of COVID-19 pneumonia.    Indeterminate hypodense structure in the liver. Differential diagnosis includes neoplasm and infection/abscess. Recommend clinical correlation and follow-up contrast enhanced MRI for further characterization.    Question hydronephrosis versus parapelvic renal cysts in the partially visualized kidneys. This can be further assessed on dedicated abdominal imaging.    Additional findings as described.    --- End of Report ---      < end of copied text >      ======================================================  PAST MEDICAL & SURGICAL HISTORY:  Sleep apnea, obstructive    S/P tonsillectomy      ====================ASSESSMENT ==============  1. severe sepsis   2. hepatic abscess   3. JESSICA  4. Bacterial PNA vs. COVID     Plan:  -Tylenol for fever and pain control   -Pt on IVF hydration switched to plasmalyte at 125  -LA trending down   -ABX broadened to zosyn and vanco   -pending repeat BCx/surgical cx and UCx   -PCT pending   -Check repeat labs and monitor renal function trend  -Monitor and replace electrolytes as needed   -will keep on sips and chips until the AM   -heme c/s for hepatic thrombus ? full dose AC       Patient requires continuous monitoring with bedside rhythm monitoring, pulse oximetry, ventilator/ bipap  monitoring and intermittent blood gas analysis.    Care plan discussed with ICU care team.    Patient remained critical; required more than usual care; I have spent 35 minutes providing non-routine care, revaluated multiple times during the day.

## 2021-07-12 NOTE — PROGRESS NOTE ADULT - ASSESSMENT
55M w/ hx of sleep apnea on bipap at night who presents from Urgent Care who presents with chills. Of note, he was recently diagnosed with COVID on Wednesday by rapid test  and his sx had started one week ago. He started having night sweats, cough w/o productive sputum, chills, and fever (Tmax- 104F). His wife has also tested positive for COVID19 and is currently asymptomatic. He had gone to the urgent care and was diagnosed with LLL PNA. He has been vaccinated with Pfizer in April. He was febrile to 100.4F and having significant chills. Also, has nausea, SOB, and headache. Denies vomiting, dizziness, abdominal pain and chest pain. He was subsequently told by urgent care to come to the hospital for further management. He has been taking tylenol as needed.      PT REPORTS HE HAS HAD NIGHT SWEATS     PT HAD COVID PCR  TEST HERE  x2 WHICH WAS NEGATIVE  CT SCAN CHEST SHOWED A    LIVER LESION  pt s/p   MRI OF LIVER    WHICH SHOWED COLLECTION C/W ABSCESS AS WELL AS THROMBUS    ALSO ? CYSTIC ARE IN PANCREAS IPMN  PT HAD INCREASE FEVERS LAST PM CODE SEPSIS CALLED WBC AND LACTATE UP  IR CALLED  AND DRAINAGE WAS DONE  LAST PM   CX AND CYTOLOGY SENT  PT WAS  TRANSFERRED TO ICU FOR FURTHER MONITORING  APPEARS COMFORTABLE THIS AM   HEMATOLOGY TO EVALUATE      WILL FOLLOW UP WITH FURTHER RECOMMENEDATIONS  D/C ISOLATION AS PT HAS COVID PCR NEG X2    55M w/ hx of sleep apnea on bipap at night who presents from Urgent Care who presents with chills. Of note, he was recently diagnosed with COVID on Wednesday by rapid test  and his sx had started one week ago. He started having night sweats, cough w/o productive sputum, chills, and fever (Tmax- 104F). His wife has also tested positive for COVID19 and is currently asymptomatic. He had gone to the urgent care and was diagnosed with LLL PNA. He has been vaccinated with Pfizer in April. He was febrile to 100.4F and having significant chills. Also, has nausea, SOB, and headache. Denies vomiting, dizziness, abdominal pain and chest pain. He was subsequently told by urgent care to come to the hospital for further management. He has been taking tylenol as needed.      PT REPORTS HE HAS HAD NIGHT SWEATS     PT HAD COVID PCR  TEST HERE  x2 WHICH WAS NEGATIVE  CT SCAN CHEST SHOWED A    LIVER LESION  pt s/p   MRI OF LIVER    WHICH SHOWED COLLECTION C/W ABSCESS AS WELL AS THROMBUS    ALSO ? CYSTIC ARE IN PANCREAS IPMN  PT HAD INCREASE FEVERS LAST PM CODE SEPSIS CALLED WBC AND LACTATE UP  IR CALLED  AND DRAINAGE WAS DONE  LAST PM   CX AND CYTOLOGY SENT  PT WAS  TRANSFERRED TO ICU FOR FURTHER MONITORING  SPOKE TO SURGICAL ONCOLOGY  THEY WILL EVALUATE TODAY  APPEARS COMFORTABLE THIS AM   HEMATOLOGY TO EVALUATE      WILL FOLLOW UP WITH FURTHER RECOMMENEDATIONS  D/C ISOLATION AS PT HAS COVID PCR NEG X2

## 2021-07-13 ENCOUNTER — TRANSCRIPTION ENCOUNTER (OUTPATIENT)
Age: 56
End: 2021-07-13

## 2021-07-13 PROBLEM — G47.33 OBSTRUCTIVE SLEEP APNEA (ADULT) (PEDIATRIC): Chronic | Status: ACTIVE | Noted: 2021-07-09

## 2021-07-13 LAB
ALBUMIN SERPL ELPH-MCNC: 2.7 G/DL — LOW (ref 3.3–5.2)
ALP SERPL-CCNC: 304 U/L — HIGH (ref 40–120)
ALT FLD-CCNC: 105 U/L — HIGH
ANION GAP SERPL CALC-SCNC: 9 MMOL/L — SIGNIFICANT CHANGE UP (ref 5–17)
APPEARANCE UR: ABNORMAL
AST SERPL-CCNC: 40 U/L — HIGH
BILIRUB SERPL-MCNC: 0.9 MG/DL — SIGNIFICANT CHANGE UP (ref 0.4–2)
BILIRUB UR-MCNC: NEGATIVE — SIGNIFICANT CHANGE UP
BUN SERPL-MCNC: 13 MG/DL — SIGNIFICANT CHANGE UP (ref 8–20)
CALCIUM SERPL-MCNC: 8.6 MG/DL — SIGNIFICANT CHANGE UP (ref 8.6–10.2)
CHLORIDE SERPL-SCNC: 102 MMOL/L — SIGNIFICANT CHANGE UP (ref 98–107)
CO2 SERPL-SCNC: 25 MMOL/L — SIGNIFICANT CHANGE UP (ref 22–29)
COLOR SPEC: YELLOW — SIGNIFICANT CHANGE UP
CREAT SERPL-MCNC: 0.7 MG/DL — SIGNIFICANT CHANGE UP (ref 0.5–1.3)
CRP SERPL-MCNC: 236 MG/L — HIGH
DIFF PNL FLD: NEGATIVE — SIGNIFICANT CHANGE UP
GLUCOSE SERPL-MCNC: 109 MG/DL — HIGH (ref 70–99)
GLUCOSE UR QL: 100 MG/DL
HCT VFR BLD CALC: 37.8 % — LOW (ref 39–50)
HGB BLD-MCNC: 12.7 G/DL — LOW (ref 13–17)
KETONES UR-MCNC: NEGATIVE — SIGNIFICANT CHANGE UP
LEUKOCYTE ESTERASE UR-ACNC: NEGATIVE — SIGNIFICANT CHANGE UP
MAGNESIUM SERPL-MCNC: 1.7 MG/DL — SIGNIFICANT CHANGE UP (ref 1.6–2.6)
MCHC RBC-ENTMCNC: 29.1 PG — SIGNIFICANT CHANGE UP (ref 27–34)
MCHC RBC-ENTMCNC: 33.6 GM/DL — SIGNIFICANT CHANGE UP (ref 32–36)
MCV RBC AUTO: 86.5 FL — SIGNIFICANT CHANGE UP (ref 80–100)
NITRITE UR-MCNC: NEGATIVE — SIGNIFICANT CHANGE UP
NON-GYNECOLOGICAL CYTOLOGY STUDY: SIGNIFICANT CHANGE UP
PH UR: 6.5 — SIGNIFICANT CHANGE UP (ref 5–8)
PHOSPHATE SERPL-MCNC: 2.3 MG/DL — LOW (ref 2.4–4.7)
PLATELET # BLD AUTO: 204 K/UL — SIGNIFICANT CHANGE UP (ref 150–400)
POTASSIUM SERPL-MCNC: 3.8 MMOL/L — SIGNIFICANT CHANGE UP (ref 3.5–5.3)
POTASSIUM SERPL-SCNC: 3.8 MMOL/L — SIGNIFICANT CHANGE UP (ref 3.5–5.3)
PROCALCITONIN SERPL-MCNC: 6.75 NG/ML — HIGH (ref 0.02–0.1)
PROT SERPL-MCNC: 6.4 G/DL — LOW (ref 6.6–8.7)
PROT UR-MCNC: 30 MG/DL
RBC # BLD: 4.37 M/UL — SIGNIFICANT CHANGE UP (ref 4.2–5.8)
RBC # FLD: 13.2 % — SIGNIFICANT CHANGE UP (ref 10.3–14.5)
SODIUM SERPL-SCNC: 136 MMOL/L — SIGNIFICANT CHANGE UP (ref 135–145)
SP GR SPEC: 1.01 — SIGNIFICANT CHANGE UP (ref 1.01–1.02)
UROBILINOGEN FLD QL: NEGATIVE MG/DL — SIGNIFICANT CHANGE UP
VANCOMYCIN TROUGH SERPL-MCNC: 5.8 UG/ML — LOW (ref 10–20)
WBC # BLD: 13.26 K/UL — HIGH (ref 3.8–10.5)
WBC # FLD AUTO: 13.26 K/UL — HIGH (ref 3.8–10.5)

## 2021-07-13 PROCEDURE — 99233 SBSQ HOSP IP/OBS HIGH 50: CPT

## 2021-07-13 PROCEDURE — 77012 CT SCAN FOR NEEDLE BIOPSY: CPT | Mod: 26

## 2021-07-13 PROCEDURE — 74177 CT ABD & PELVIS W/CONTRAST: CPT | Mod: 26

## 2021-07-13 PROCEDURE — 10160 PNXR ASPIR ABSC HMTMA BULLA: CPT

## 2021-07-13 PROCEDURE — 99255 IP/OBS CONSLTJ NEW/EST HI 80: CPT

## 2021-07-13 RX ORDER — IBUPROFEN 200 MG
400 TABLET ORAL ONCE
Refills: 0 | Status: COMPLETED | OUTPATIENT
Start: 2021-07-13 | End: 2021-07-13

## 2021-07-13 RX ORDER — MEROPENEM 1 G/30ML
1000 INJECTION INTRAVENOUS EVERY 8 HOURS
Refills: 0 | Status: DISCONTINUED | OUTPATIENT
Start: 2021-07-13 | End: 2021-07-14

## 2021-07-13 RX ORDER — HYDROMORPHONE HYDROCHLORIDE 2 MG/ML
0.5 INJECTION INTRAMUSCULAR; INTRAVENOUS; SUBCUTANEOUS EVERY 4 HOURS
Refills: 0 | Status: DISCONTINUED | OUTPATIENT
Start: 2021-07-13 | End: 2021-07-14

## 2021-07-13 RX ORDER — IBUPROFEN 200 MG
400 TABLET ORAL ONCE
Refills: 0 | Status: COMPLETED | OUTPATIENT
Start: 2021-07-13 | End: 2021-07-14

## 2021-07-13 RX ORDER — POTASSIUM PHOSPHATE, MONOBASIC POTASSIUM PHOSPHATE, DIBASIC 236; 224 MG/ML; MG/ML
15 INJECTION, SOLUTION INTRAVENOUS ONCE
Refills: 0 | Status: COMPLETED | OUTPATIENT
Start: 2021-07-13 | End: 2021-07-13

## 2021-07-13 RX ORDER — MAGNESIUM SULFATE 500 MG/ML
2 VIAL (ML) INJECTION ONCE
Refills: 0 | Status: COMPLETED | OUTPATIENT
Start: 2021-07-13 | End: 2021-07-13

## 2021-07-13 RX ORDER — VANCOMYCIN HCL 1 G
1250 VIAL (EA) INTRAVENOUS EVERY 8 HOURS
Refills: 0 | Status: DISCONTINUED | OUTPATIENT
Start: 2021-07-13 | End: 2021-07-14

## 2021-07-13 RX ADMIN — Medication 166.67 MILLIGRAM(S): at 23:52

## 2021-07-13 RX ADMIN — Medication 650 MILLIGRAM(S): at 23:53

## 2021-07-13 RX ADMIN — HYDROMORPHONE HYDROCHLORIDE 0.5 MILLIGRAM(S): 2 INJECTION INTRAMUSCULAR; INTRAVENOUS; SUBCUTANEOUS at 18:04

## 2021-07-13 RX ADMIN — Medication 400 MILLIGRAM(S): at 06:35

## 2021-07-13 RX ADMIN — HYDROMORPHONE HYDROCHLORIDE 0.5 MILLIGRAM(S): 2 INJECTION INTRAMUSCULAR; INTRAVENOUS; SUBCUTANEOUS at 23:53

## 2021-07-13 RX ADMIN — HYDROMORPHONE HYDROCHLORIDE 0.5 MILLIGRAM(S): 2 INJECTION INTRAMUSCULAR; INTRAVENOUS; SUBCUTANEOUS at 18:20

## 2021-07-13 RX ADMIN — MEROPENEM 100 MILLIGRAM(S): 1 INJECTION INTRAVENOUS at 21:52

## 2021-07-13 RX ADMIN — POTASSIUM PHOSPHATE, MONOBASIC POTASSIUM PHOSPHATE, DIBASIC 62.5 MILLIMOLE(S): 236; 224 INJECTION, SOLUTION INTRAVENOUS at 18:04

## 2021-07-13 RX ADMIN — PIPERACILLIN AND TAZOBACTAM 25 GRAM(S): 4; .5 INJECTION, POWDER, LYOPHILIZED, FOR SOLUTION INTRAVENOUS at 05:45

## 2021-07-13 RX ADMIN — MEROPENEM 100 MILLIGRAM(S): 1 INJECTION INTRAVENOUS at 16:02

## 2021-07-13 RX ADMIN — Medication 166.67 MILLIGRAM(S): at 05:45

## 2021-07-13 RX ADMIN — Medication 650 MILLIGRAM(S): at 17:37

## 2021-07-13 RX ADMIN — Medication 650 MILLIGRAM(S): at 16:02

## 2021-07-13 RX ADMIN — ENOXAPARIN SODIUM 80 MILLIGRAM(S): 100 INJECTION SUBCUTANEOUS at 05:45

## 2021-07-13 RX ADMIN — Medication 650 MILLIGRAM(S): at 06:10

## 2021-07-13 RX ADMIN — Medication 166.67 MILLIGRAM(S): at 16:02

## 2021-07-13 RX ADMIN — Medication 400 MILLIGRAM(S): at 07:35

## 2021-07-13 RX ADMIN — Medication 650 MILLIGRAM(S): at 05:10

## 2021-07-13 RX ADMIN — Medication 50 GRAM(S): at 18:04

## 2021-07-13 NOTE — CONSULT NOTE ADULT - ASSESSMENT
A/p:  55YM w/ hx of sleep apnea on bipap at night who presents from Urgent Care who presents with chills and fevers.  Associated nausea and headache. Denies vomiting, dizziness, abdominal or chest pain.  He was admitted for LLL PNA, incidental finding of a hepatic mass vs neoplasm, as well as suspicion for hepatic vein thrombosis. Patient s/p IR drainage of abscess with ~20cc of purulent material on 7/11, initiation of IV abx and therapeutic lovenox.  Patient continues to be febrile, leukocytosis improving. IR drain was dislodged, surgery continues to follow for possible need for operative intervention.  Will continue to monitor in SICU    Neuro: Continue adequate analgesia. Ensure at maximum 4G acetaminophen daily with esgic     Card: Remains hemodynamically well. SIR's improved, keep euvolemic. Lactate cleared.  May require VÍCTOR 7/14 as per cards to r/o vegetations     Pulm: JESSICA- continue home Bipap and pulmonary toileting. Remains on abx for pneumonia    GI: NPO for IR drainage vs operative drainage of hepatic abscess. F/u cultures. Trend lft's    : Voiding without issues. Lytes replete prn    Endo: Keep euglycemic    ID: Meropenum and Vanco as per ID, f/u cultures, leukocytosis improving, monitor fever curve    Hem/DVT: Therapeutic lovenox for hepatic vein thrombosis held at this time for procedure    Dispo: SICU

## 2021-07-13 NOTE — PROGRESS NOTE ADULT - SUBJECTIVE AND OBJECTIVE BOX
INTERVAL HPI/OVERNIGHT EVENTS:    Patient evaluated at bedside. No acute distress. No acute events overnight. Patient remains in ICU under medicine, drain remains in place. Patient remains HDN stable, with low grade fever to 100.8 yesterday.     MEDICATIONS  (STANDING):  enoxaparin Injectable 80 milliGRAM(s) SubCutaneous two times a day  multiple electrolytes Injection Type 1 1000 milliLiter(s) (100 mL/Hr) IV Continuous <Continuous>  piperacillin/tazobactam IVPB.. 3.375 Gram(s) IV Intermittent every 8 hours  vancomycin  IVPB 1250 milliGRAM(s) IV Intermittent every 12 hours    MEDICATIONS  (PRN):  acetaminophen   Tablet .. 650 milliGRAM(s) Oral every 6 hours PRN Temp greater or equal to 38C (100.4F), Mild Pain (1 - 3)  acetaminophen 325 mG/butalbital 50 mG/caffeine 40 mG 1 Tablet(s) Oral every 6 hours PRN Moderate Pain (4 - 6)      Vital Signs Last 24 Hrs  T(C): 37.7 (12 Jul 2021 23:32), Max: 38.2 (12 Jul 2021 19:17)  T(F): 99.8 (12 Jul 2021 23:32), Max: 100.8 (12 Jul 2021 19:17)  HR: 73 (13 Jul 2021 00:15) (60 - 85)  BP: 126/86 (12 Jul 2021 23:00) (105/87 - 148/81)  BP(mean): 99 (12 Jul 2021 23:00) (72 - 108)  RR: 19 (12 Jul 2021 23:00) (15 - 31)  SpO2: 99% (13 Jul 2021 00:15) (89% - 100%)    GEN: NAD,   HEENT: normocephalic and atraumatic. EOMI. GUANAKO.    NECK: Supple. No carotid bruits.  No lymphadenopathy or thyromegaly.  LUNGS: Clear to auscultation.  HEART: Regular rate and rhythm without murmur.  ABDOMEN: Soft, nontender, and nondistended. RUQ drain in place with serosanguineous output.   : No CVA tenderness  EXTREMITIES: Without any cyanosis, clubbing, rash, lesions or edema.        I&O's Detail    11 Jul 2021 07:01  -  12 Jul 2021 07:00  --------------------------------------------------------  IN:    IV PiggyBack: 200 mL    IV PiggyBack: 500 mL    multiple electrolytes Injection Type 1.: 700 mL  Total IN: 1400 mL    OUT:    Bulb (mL): 60 mL    Voided (mL): 400 mL  Total OUT: 460 mL    Total NET: 940 mL      12 Jul 2021 07:01  -  13 Jul 2021 01:23  --------------------------------------------------------  IN:    IV PiggyBack: 250 mL    IV PiggyBack: 150 mL    multiple electrolytes Injection Type 1.: 400 mL    Oral Fluid: 240 mL  Total IN: 1040 mL    OUT:    Bulb (mL): 45 mL    Voided (mL): 650 mL  Total OUT: 695 mL    Total NET: 345 mL          LABS:                        12.0   15.74 )-----------( 177      ( 12 Jul 2021 05:22 )             36.3     07-12    136  |  103  |  18.5  ----------------------------<  118<H>  4.1   |  24.0  |  0.82    Ca    8.7      12 Jul 2021 05:22  Phos  3.5     07-12  Mg     1.6     07-12    TPro  5.8<L>  /  Alb  2.5<L>  /  TBili  1.0  /  DBili  x   /  AST  69<H>  /  ALT  131<H>  /  AlkPhos  319<H>  07-12    PT/INR - ( 12 Jul 2021 05:22 )   PT: 13.8 sec;   INR: 1.20 ratio         PTT - ( 11 Jul 2021 19:48 )  PTT:26.1 sec      RADIOLOGY & ADDITIONAL STUDIES:

## 2021-07-13 NOTE — PROGRESS NOTE ADULT - SUBJECTIVE AND OBJECTIVE BOX
Anesthesiology pre-op review    Labs, vitals, and consults reviewed    Sepsis secondary to liver abscess, drained and then failed IR drainage, now coming for abscess drainage with robotic assistance.   Patient pending VÍCTOR to r/o endocarditis prior to general OR. Will need both consents at time of VÍCTOR.

## 2021-07-13 NOTE — PROGRESS NOTE ADULT - ASSESSMENT
55M w/ hx of sleep apnea on bipap at night who presents from Urgent Care who presents with chills. Of note, he was recently diagnosed with COVID on Wednesday by rapid test  and his sx had started one week ago. He started having night sweats, cough w/o productive sputum, chills, and fever (Tmax- 104F). His wife has also tested positive for COVID19 and is currently asymptomatic. He had gone to the urgent care and was diagnosed with LLL PNA. He has been vaccinated with Pfizer in April. He was febrile to 100.4F and having significant chills. Also, has nausea, SOB, and headache. Denies vomiting, dizziness, abdominal pain and chest pain. He was subsequently told by urgent care to come to the hospital for further management. He has been taking tylenol as needed.      PT REPORTS HE HAS HAD NIGHT SWEATS  HAD DENTAL PROCEDURE IN EARLY JUNE  OUT PT RAPID COVID WAS NEG      PT HAD COVID PCR  TEST HERE  x2 WHICH WAS NEGATIVE  CT SCAN CHEST SHOWED A    LIVER LESION  pt s/p   MRI OF LIVER    WHICH SHOWED COLLECTION C/W ABSCESS AS WELL AS THROMBUS    ALSO ? CYSTIC ARE IN PANCREAS IPMN  PT HAD INCREASE FEVERS 7/11 CODE SEPSIS CALLED WBC AND LACTATE UP  IR CALLED  AND DRAINAGE WAS DONE    CX AND CYTOLOGY SENT  PT WAS  TRANSFERRED TO ICU FOR FURTHER MONITORING  LAST PM DRAIN WAS PARTIALLY PULLED BY   ACCIDENT WHEN PT GETTING OUT OF BED   APPEARS COMFORTABLE THIS AM    FOR REPEAT ABD IMAGING AND POSSIBLE REPOSITIONING OF TUBE IF NEEDED   SPOKE TO SURGERY FOR POSSIBLE OR IF NO CHANGE  WILL CHANGE TO [facility] ALTHOUGH [facility] SHOULD PROVIDE GOOD COVERAGE   ISSUES MAY BE MORE THE NIDUS RATHER THAN ABX ISSUE  IN ADDITION PT HAD ECHOCARDIOGRAM NO EVIDENCE OF VALVE INFECTION WILL CONSIDER VÍCTOR TO FURTHER EVALUATE  ALL BLOOD CX HAVE BEEN NEGATIVE  WILL FOLLOW UP SPOKE TO WIFE ON PHONE AND SPOKE TO SURGERY AND ICU TEAM

## 2021-07-13 NOTE — CHART NOTE - NSCHARTNOTEFT_GEN_A_CORE
Vascular & Interventional Radiology Pre-Procedure Note    Procedure Name: ____CT guided liver abscess drainage___    HPI: 55y Male with ___Liver abscesses____    Patient is a 55M presented with chills and fevers. He was admitted for LLL PNA but multiloculated liver abscess noted on CT. IR drainage performed on 7/11.  Tube now dislodged. Pt referred for tube reinsertion and possible insertion of more catheters to drain other loculations.    PMH  Infection due to severe acute respiratory syndrome coronavirus 2 (SARS-CoV-2)  Obstructive Sleep Apnea    S/P tonsillectomy    Allergies: No Known Allergies      Medications (Abx/Cardiac/Anticoagulation/Blood Products)  acetaminophen   Tablet .. 650 milliGRAM(s) Oral every 6 hours PRN  acetaminophen 325 mG/butalbital 50 mG/caffeine 40 mG 1 Tablet(s) Oral every 6 hours PRN  meropenem  IVPB 1000 milliGRAM(s) IV Intermittent every 8 hours  multiple electrolytes Injection Type 1 1000 milliLiter(s) IV Continuous <Continuous>  vancomycin  IVPB 1250 milliGRAM(s) IV Intermittent every 8 hours      Data:      T(C): 37.5  HR: 73  BP: 134/88  RR: 17  SpO2: 95%    Exam  General: ____wnl___  Chest: ___wnl____  Abdomen: ___wnl____  Extremities: ___wnl____    -WBC 13.26 / HgB 12.7 / Hct 37.8 / Plt 204  -Na 136 / Cl 102 / BUN 13.0 / Glucose 109  -K 3.8 / CO2 25.0 / Cr 0.70  - / Alk Phos 304 / T.Bili 0.9  -INR1.20      Imaging: ____liver right lobe abscess with 3 loculations___    Plan:   -55y Male presents for ___CT guided liver abscess drainage____  -Risks/Benefits/alternatives explained with the patient and witnessed informed consent obtained.

## 2021-07-13 NOTE — CONSULT NOTE ADULT - ATTENDING COMMENTS
7/11/2021 - percutaneous drainage of segment 8 liver abscess (likely hematogenous spread after dental cleaning)  -BCx (7/10) - NGTD x 2 sets  -BCx (7/11) - NGTD x 2 sets  -abscess Cx (7/12) - NGTD  -vancomycin (7/11 - ?)  -Zosyn (7/10 - 7/13), meropenem (7/13 - ?)  -f/u IR for replacement of dislodged drain    hepatic vein thrombosis (segmental branch adjacent to liver abscess)  -therapeutic anticoagulation

## 2021-07-13 NOTE — PROGRESS NOTE ADULT - SUBJECTIVE AND OBJECTIVE BOX
INFECTIOUS DISEASES AND INTERNAL MEDICINE at [place]  =======================================================  Henri Salazar MD  Diplomates American Board of Internal Medicine and Infectious Diseases  Telephone [phone]  Fax            [phone]  =======================================================    MAXINE BREAUX [id number]    Follow up: liver abscess S/P IR GUIDED DRAINAGE      Allergies:  No Known Allergies      Medications:  acetaminophen   Tablet .. 650 milliGRAM(s) Oral every 6 hours PRN  enoxaparin Injectable 40 milliGRAM(s) SubCutaneous daily  piperacillin/tazobactam IVPB.. 3.375 Gram(s) IV Intermittent every 8 hours  potassium chloride   Solution 40 milliEquivalent(s) Oral once    SOCIAL       FAMILY   FAMILY HISTORY:  FH: heart attack (Father)      REVIEW OF SYSTEMS:  CONSTITUTIONAL:  No Fever or chills  HEENT:   No diplopia or blurred vision.  No earache, sore throat or runny nose.  CARDIOVASCULAR:  No pressure, squeezing, strangling, tightness, heaviness or aching about the chest, neck, axilla or epigastrium.  RESPIRATORY:    cough, shortness of breath,    GASTROINTESTINAL:  No nausea, vomiting or diarrhea.  GENITOURINARY:  No dysuria, frequency or urgency. No Blood in urine  MUSCULOSKELETAL:   moves all joints  SKIN:  No change in skin, hair or nails.  NEUROLOGIC:  No paresthesias, fasciculations, seizures or weakness.  PSYCHIATRIC:  No disorder of thought or mood.  ENDOCRINE:  No heat or cold intolerance, polyuria or polydipsia.  HEMATOLOGICAL:  No easy bruising or bleeding.            Physical Exam:  I  ICU Vital Signs Last 24 Hrs  T(C): 38.1 (13 Jul 2021 07:43), Max: 39.1 (13 Jul 2021 05:00)  T(F): 100.5 (13 Jul 2021 07:43), Max: 102.3 (13 Jul 2021 05:00)  HR: 67 (13 Jul 2021 09:00) (63 - 86)  BP: 134/95 (13 Jul 2021 08:00) (105/87 - 150/83)  BP(mean): 104 (13 Jul 2021 08:00) (84 - 108)  ABP: --  ABP(mean): --  RR: 24 (13 Jul 2021 09:00) (16 - 27)  SpO2: 95% (13 Jul 2021 09:00) (93% - 100%)      GEN: NAD,   HEENT: normocephalic and atraumatic. EOMI. GUANAKO.    NECK: Supple. No carotid bruits.  No lymphadenopathy or thyromegaly.  LUNGS: Clear to auscultation.  HEART: Regular rate and rhythm without murmur.  ABDOMEN: Soft, nontender, and nondistended.  Positive bowel sounds.  DRAIN IN PLACE RIGHT SIDE SEROSANGUINOUS  MATERIAL   : No CVA tenderness  EXTREMITIES: Without any cyanosis, clubbing, rash, lesions or edema.  MSK: no joint swelling  NEUROLOGIC: Cranial nerves II through XII are grossly intact.  PSYCHIATRIC: Appropriate affect .  SKIN: No ulceration or induration present.        Labs:  Vi     =======================================================  Current Antibiotics:  piperacillin/tazobactam IVPB.. 3.375 Gram(s) IV Intermittent every 8 hours  VANCO    Other medications:  enoxaparin Injectable 40 milliGRAM(s) SubCutaneous daily  potassium chloride   Solution 40 milliEquivalent(s) Oral once      =======================================================  Labs:                                12.7   13.26 )-----------( 204      ( 13 Jul 2021 05:14 )             37.8   07-13    136  |  102  |  13.0  ----------------------------<  109<H>  3.8   |  25.0  |  0.70    Ca    8.6      13 Jul 2021 05:14  Phos  2.3     07-13  Mg     1.7     07-13    TPro  6.4<L>  /  Alb  2.7<L>  /  TBili  0.9  /  DBili  x   /  AST  40<H>  /  ALT  105<H>  /  AlkPhos  304<H>  07-13    Procalcitonin, Serum: 11.61 ng/mL (07-11-21 @ 08:28)  Procalcitonin, Serum: 21.40 ng/mL (07-09-21 @ 13:39)    D-Dimer Assay, Quantitative: 467 ng/mL DDU (07-10-21 @ 13:54)    Ferritin, Serum: 260 ng/mL (07-10-21 @ 13:53)  Ferritin, Serum: 275 ng/mL (07-09-21 @ 13:39)    C-Reactive Protein, Serum: 324 mg/L (07-10-21 @ 13:53)  C-Reactive Protein, Serum: 263 mg/L (07-09-21 @ 13:39)    WBC Count: 16.70 K/uL (07-11-21 @ 08:28)  WBC Count: 19.84 K/uL (07-10-21 @ 08:30)  WBC Count: 19.07 K/uL (07-09-21 @ 13:39)      COVID-19 PCR: [facility] (07-10-21 @ 10:11)  COVID-19 PCR: [facility] (07-09-21 @ 13:39)      Alkaline Phosphatase, Serum: 247 U/L (07-11-21 @ 08:28)  Alkaline Phosphatase, Serum: 166 U/L (07-10-21 @ 08:30)  Alkaline Phosphatase, Serum: 156 U/L (07-09-21 @ 13:39)  Alanine Aminotransferase (ALT/SGPT): 136 U/L (07-11-21 @ 08:28)  Alanine Aminotransferase (ALT/SGPT): 150 U/L (07-10-21 @ 08:30)  Alanine Aminotransferase (ALT/SGPT): 160 U/L (07-09-21 @ 13:39)  Aspartate Aminotransferase (AST/SGOT): 59 U/L (07-11-21 @ 08:28)  Aspartate Aminotransferase (AST/SGOT): 71 U/L (07-10-21 @ 08:30)  Aspartate Aminotransferase (AST/SGOT): 92 U/L (07-09-21 @ 13:39)  Bilirubin Total, Serum: 1.3 mg/dL (07-11-21 @ 08:28)  Bilirubin Total, Serum: 1.0 mg/dL (07-10-21 @ 08:30)  Bilirubin Total, Serum: 2.2 mg/dL (07-09-21 @ 13:39)

## 2021-07-13 NOTE — PROGRESS NOTE ADULT - SUBJECTIVE AND OBJECTIVE BOX
MAXINE BREAUX  MRN-[id number]  Patient is a 55y old  Male who presents with a chief complaint of fever and chills (13 Jul 2021 01:22)    HPI:  Patient is a 55M w/ hx of sleep apnea on bipap at night who presents from Urgent Care who presents with chills. Of note, he was recently diagnosed with COVID on Wednesday and his sx had started one week ago. He started having night sweats, cough w/o productive sputum, chills, and fever (Tmax- 104F). His wife has also tested positive for COVID19 and is currently asymptomatic. He had gone to the urgent care and was diagnosed with LLL PNA. He has been vaccinated with Pfizer in April. He was febrile to 100.4F and having significant chills. Also, has nausea, SOB, and headache. Denies vomiting, dizziness, abdominal pain and chest pain. He was subsequently told by urgent care to come to the hospital for further management. He has been taking tylenol as needed.    In the ED, his vitals showed Temp- 99.3F, HR- 97, BP- 107/65, RR- 20, O2 sat- 97% RA. Labs notable for leukocytosis w/ neutrophil predominance, transaminitis and elevated procalcitonin. Patient got tylenol, x 1.   (09 Jul 2021 15:53)      Hospital course:    MRI abd on Saturday night with hepatic abscess  IR guided hepatic abscess drain on Sunday night after rapid response for severe sepsis and then admitted to ICU on early monday morning  Last night, accidental partial removal of hepatic drain after pressure application by patient while attempting to get out of bed around midnight and had anuj-drain abd pain through night and had high grade fever 102.3 early this morning with chills      REVIEW OF SYSTEMS:    Gen: ++ fever  EYES/ENT: No visual changes;  No vertigo or throat pain   NECK: No pain   RES:  No shortness of breath or Cough  CARD: No chest pain   GI: ++ abdominal pain  : No dysuria  NEURO: No weakness  SKIN: No itching, rashes     Physical Exam:  Vital Signs Last 24 Hrs  T(C): 38.1 (13 Jul 2021 07:43), Max: 39.1 (13 Jul 2021 05:00)  T(F): 100.5 (13 Jul 2021 07:43), Max: 102.3 (13 Jul 2021 05:00)  HR: 67 (13 Jul 2021 09:00) (63 - 86)  BP: 134/95 (13 Jul 2021 08:00) (105/87 - 150/83)  BP(mean): 104 (13 Jul 2021 08:00) (84 - 108)  RR: 24 (13 Jul 2021 09:00) (16 - 27)  SpO2: 95% (13 Jul 2021 09:00) (93% - 100%)    Gen:  Awake, alert   CNS: non focal 	  Neck: no JVD  RES : clear , no wheezing                      CVS: Regular  rhythm. Normal S1/S2  Abd: Soft, non-distended. Bowel sounds present.  Skin: No rash.  Ext:  no edema    ============================I/O===========================   I&O's Detail    12 Jul 2021 07:01  -  13 Jul 2021 07:00  --------------------------------------------------------  IN:    IV PiggyBack: 250 mL    IV PiggyBack: 200 mL    multiple electrolytes Injection Type 1.: 400 mL    Oral Fluid: 240 mL  Total IN: 1090 mL    OUT:    Bulb (mL): 45 mL    Voided (mL): 650 mL  Total OUT: 695 mL    Total NET: 395 mL        ============================ LABS =========================                        12.7   13.26 )-----------( 204      ( 13 Jul 2021 05:14 )             37.8     07-13    136  |  102  |  13.0  ----------------------------<  109<H>  3.8   |  25.0  |  0.70    Ca    8.6      13 Jul 2021 05:14  Phos  2.3     07-13  Mg     1.7     07-13    TPro  6.4<L>  /  Alb  2.7<L>  /  TBili  0.9  /  DBili  x   /  AST  40<H>  /  ALT  105<H>  /  AlkPhos  304<H>  07-13    LIVER FUNCTIONS - ( 13 Jul 2021 05:14 )  Alb: 2.7 g/dL / Pro: 6.4 g/dL / ALK PHOS: 304 U/L / ALT: 105 U/L / AST: 40 U/L / GGT: x           PT/INR - ( 12 Jul 2021 05:22 )   PT: 13.8 sec;   INR: 1.20 ratio         PTT - ( 11 Jul 2021 19:48 )  PTT:26.1 sec      ======================Micro/Rad/Cardio=================  Culture: Reviewed   CXR: Reviewed  Echo:Reviewed  ======================================================  PAST MEDICAL & SURGICAL HISTORY:  Sleep apnea, obstructive    S/P tonsillectomy      ====================ASSESSMENT ==============  54 y/o  male with Pmhx of JESSICA admitted with severe sepsis, lactatemia secondary to pyogenic liver abscess complicated by haptic vn thrombosis and transaminitis and incidental pancreatic head cystic lesion       ====================== NEUROLOGY=====================  acetaminophen   Tablet .. 650 milliGRAM(s) Oral every 6 hours PRN Temp greater or equal to 38C (100.4F), Mild Pain (1 - 3)  acetaminophen 325 mG/butalbital 50 mG/caffeine 40 mG 1 Tablet(s) Oral every 6 hours PRN Moderate Pain (4 - 6)    ==================== RESPIRATORY======================  On supplemental oxygen-> oxygenating and ventilating fine for now   Incentive spirometry and early mobilization as tolerated   NIV at bedtime     ====================CARDIOVASCULAR==================    HDS  TTE neg for IE; if blood cx +, contemplate VÍCTOR  IVF while febrile   Lactate trended normal     ===================HEMATOLOGIC/ONC ===================  Had started on Full dose Lovenox on 7.12 but stopped for now for possible IR procedure and subsequently should start on heparin infusion for possibility of Surgical drain related to OR visit     ===================== RENAL =========================  Continue monitoring urine output    ==================== GASTROINTESTINAL===================  multiple electrolytes Injection Type 1 1000 milliLiter(s) (100 mL/Hr) IV Continuous <Continuous>  NPO for now   =======================    ENDOCRINE  =====================  COBY    ========================INFECTIOUS DISEASE================  meropenem  IVPB 1000 milliGRAM(s) IV Intermittent every 8 hours  vancomycin  IVPB 1250 milliGRAM(s) IV Intermittent every 8 hours  ID following closely   COVID PCR neg *2  Needs Source control and hence reached out to IR [facility] and discussed case with Dr Eliot Mccabe taking the patient under SICU service and case Endorsed to Dr. Israel Swan   Patient made aware and all questions answered     Patient requires continuous monitoring with bedside rhythm monitoring, pulse oximetry,  bipap  monitoring and intermittent blood gas analysis.    Care plan discussed with ICU care team, family and consultants    Patient remained critical; I have spent 45 minutes providing non-routine care, revaluated multiple times during the day.

## 2021-07-13 NOTE — CONSULT NOTE ADULT - REASON FOR ADMISSION
fever and chills

## 2021-07-13 NOTE — PROGRESS NOTE ADULT - ASSESSMENT
54 y/o M with JESSICA admitted with fevers, cough, found to have transaminitis and subsequently MRI showed liver abscess and possible hepatic vein thrombus.    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 Continue with lovenox. Agree with repeat imaging after drainage slows down. Can re-assess thrombus at that time and rule out any underlying process that may have been concealed by abscess.  Duration can be determined as outpatient and depending on abscess response to treatment.    2) Liver abscess - Drain placed by IR and draining. ID, surgery on board. Cytology sent. Tumor markers unrevealing with normal values.    3) Fevers - still febrile this morning sec to abscess, possible underlying malignancy? Post-drainage imaging at some point may help clarify this. On wide spectrum antibiotics. ID on board.  Cultures pending.     4) False positive Covid + at urgent care - nasal swab pcr negative x 2 during his stay here so far. ID on board.       Thank you,    Dyllan Espinoza MD  [facility]

## 2021-07-13 NOTE — PROGRESS NOTE ADULT - ATTENDING COMMENTS
Patient seen and examined. IR tube likely dislodged.  Continues to spike fevers.  If no improvement after tube replacement, would strongly consider going to the OR for operative drainage.  Given the loculations and deep location, he is at high risk of failing non-operative management with perc drainage and IV abx.  Discussed with patient today on rounds.  Will transfer to my service.

## 2021-07-13 NOTE — CONSULT NOTE ADULT - ASSESSMENT
A/P: 56 y/o M with a PMHx of of JESSICA (on BiPAP) who presented to the ED from Urgent Care with complaints of fevers, chills, and weakness. Patient states that last week he began having a lot of fevers, chills, and rigors and was diagnosed with Covid at an Urgent care (rapid testing). Patient states that his fevers and rigors continued and worsened, so he went back to the ER to re-evaluate, and they sent him to the ER for further management. Patient has tested negative for Covid (PCR testing) x 2 since arrival, but bloodwork showed leukocytosis, transaminitis, and elevated procalcitonin. Patient found incidentally to have hepatic abscesses, and was placed on IV antibiotics by ID. Patient became a code sepsis on  07/11/21 prompting IDU admission with aggressive IVF administration for elevated lactate. Patient underwent IR drainage of the hepatic abscesses on 07/11/21, but last night had accidental partal removal of hepatic drain while moving out of bed. Patient has continued to have high fevers and chills throughout the night and this morning, and is planned to have IR adjust the drain. Patient notes that early in June he went to the periodontist, but otherwise has had no other issues. Patient denies any chest pain, syncope, near syncope, N/V/D, headache, or dizziness.     Hepatic Abscesses  - Unknown source.   - Blood cultures negative x 48 hours.   - IR drain placed, but accidentally dislodged.   - Plan for IR to adjust drain placement, if unsuccessful, will need OR intervention.   - Plan for eventual VÍCTOR, potentially Thursday.   - NPO night before procedure.   - ID following, continue antibiotics.     Assessment and recommendations are final when note is signed by the attending.  A/P: 56 y/o M with a PMHx of of JESSICA (on BiPAP) who presented to the ED from Urgent Care with complaints of fevers, chills, and weakness. Patient states that last week he began having a lot of fevers, chills, and rigors and was diagnosed with Covid at an Urgent care (rapid testing). Patient states that his fevers and rigors continued and worsened, so he went back to the ER to re-evaluate, and they sent him to the ER for further management. Patient has tested negative for Covid (PCR testing) x 2 since arrival, but bloodwork showed leukocytosis, transaminitis, and elevated procalcitonin. Patient found incidentally to have hepatic abscesses, and was placed on IV antibiotics by ID. Patient became a code sepsis on  07/11/21 prompting IDU admission with aggressive IVF administration for elevated lactate. Patient underwent IR drainage of the hepatic abscesses on 07/11/21, but last night had accidental partal removal of hepatic drain while moving out of bed. Patient has continued to have high fevers and chills throughout the night and this morning, and is planned to have IR adjust the drain. Patient notes that early in June he went to the periodontist, but otherwise has had no other issues. Patient denies any chest pain, syncope, near syncope, N/V/D, headache, or dizziness.     Hepatic Abscesses  - Unknown source.   - Dental work early June 2021.  - Blood cultures negative x 48 hours.   - IR drain placed, but accidentally dislodged.   - Plan for IR to adjust drain placement, if unsuccessful, will need OR intervention.   - Plan for VÍCTOR tomorrow to evaluate for endocarditis.   - NPO after midnight.   - ID following, continue antibiotics.     Assessment and recommendations are final when note is signed by the attending.

## 2021-07-13 NOTE — CONSULT NOTE ADULT - CONSULT REASON
Abnormal CT of Liver
R/O COVID AND ? LIVER LESION
Hepatic Abscesses
hepatic abscess
hepatic vein thrombosis
Liver abscess
Sepsis

## 2021-07-13 NOTE — CONSULT NOTE ADULT - SUBJECTIVE AND OBJECTIVE BOX
[place] [facility]                                                               Office:  [address] [address]                                                              Telephone: [phone]. Fax:[phone]                                                                        CARDIOLOGY CONSULTATION NOTE                                                                                             Consult requested by:  Dr. Navarro  Reason for Consultation: Hepatic Abscesses   Primary Cardiologist: None  PCP: Dr. Hernandez  History obtained by: Patient and medical record   obtained: No    Covid Status: Negative 07/10/21    Chief complaint:    Patient is a 55y old  Male who presents with a chief complaint of fever and chills (13 Jul 2021 10:08)      HPI: 56 y/o M with a PMHx of of JESSICA (on BiPAP) who presented to the ED from Urgent Care with complaints of fevers, chills, and weakness. Patient states that last week he began having a lot of fevers, chills, and rigors and was diagnosed with Covid at an Urgent care (rapid testing). Patient states that his fevers and rigors continued and worsened, so he went back to the ER to re-evaluate, and they sent him to the ER for further management. Patient has tested negative for Covid (PCR testing) x 2 since arrival, but bloodwork showed leukocytosis, transaminitis, and elevated procalcitonin. Patient found incidentally to have hepatic abscesses, and was placed on IV antibiotics by ID. Patient became a code sepsis on  07/11/21 prompting IDU admission with aggressive IVF administration for elevated lactate. Patient underwent IR drainage of the hepatic abscesses on 07/11/21, but last night had accidental partal removal of hepatic drain while moving out of bed. Patient has continued to have high fevers and chills throughout the night and this morning, and is planned to have IR adjust the drain. Patient notes that early in June he went to the periodontist, but otherwise has had no other issues. Patient denies any chest pain, syncope, near syncope, N/V/D, headache, or dizziness.     REVIEW OF SYMPTOMS:     CONSTITUTIONAL: AS PER HPI  ENMT:  No difficulty hearing, tinnitus, vertigo; No sinus or throat pain  NECK: No pain or stiffness  CARDIOVASCULAR: AS PER HPI   RESPIRATORY: Dyspnea on exertion, Shortness of breath, cough, wheezing  : No dysuria, no hematuria   GI: No dark color stool, no melena, no diarrhea, no constipation, no abdominal pain   NEURO: No headache, no dizziness, no slurred speech   MUSCULOSKELETAL: No joint pain or swelling; No muscle, back, or extremity pain  PSYCH: No agitation, no anxiety.    ALL OTHER REVIEW OF SYSTEMS ARE NEGATIVE.      PREVIOUS DIAGNOSTIC TESTING  ECHO FINDINGS:  < from: TTE Echo Complete w/ Contrast w/ Doppler (07.12.21 @ 22:18) >  PHYSICIAN INTERPRETATION:  Left Ventricle: The left ventricular internal cavity size is normal.  Left ventricular ejection fraction, by visual estimation, is 50 to 55%. Spectral Doppler shows normal pattern of LV diastolic filling.  Right Ventricle: The right ventricular size is normal. RV systolic function is normal.  Left Atrium: Mildly enlarged left atrium.  Right Atrium: Mildly enlarged right atrium.  Pericardium: Trivial pericardial effusion is present. The pericardial effusion is posterior and lateral to the left ventricle.  Mitral Valve: There is mild mitral annularcalcification. Mild mitral valve regurgitation is seen.  Tricuspid Valve: The tricuspid valve is normal in structure. Mild tricuspid regurgitation is visualized.  Aortic Valve: The aortic valve is trileaflet. Sclerotic aortic valve with normal opening. No evidence of aortic valve regurgitation is seen.  Pulmonic Valve: The pulmonic valve is normal. Trace pulmonic valve regurgitation.  Aorta: The aortic root is normal in size and structure.  Pulmonary Artery: The pulmonary artery is of normal sizeand origin.  Venous: The pulmonary veins appear normal.      Summary:   1. Left ventricular ejection fraction, by visual estimation, is 50 to 55%.   2. Mildly enlarged left atrium.   3. Mildly enlarged right atrium.   4. Mild mitral annular calcification.   5. Mild mitral valve regurgitation.   6. Mild tricuspid regurgitation.   7. Sclerotic aortic valve with normal opening.    MD Zoë Electronically signed on 7/13/2021 at 8:52:54 AM      < end of copied text >    ALLERGIES: Allergies    No Known Allergies    Intolerances        PAST MEDICAL HISTORY  No pertinent past medical history    Sleep apnea, obstructive        PAST SURGICAL HISTORY  No significant past surgical history    S/P tonsillectomy        FAMILY HISTORY:  FH: heart attack (Father)        SOCIAL HISTORY:  Denies smoking/alcohol/drugs    CURRENT MEDICATIONS:     meropenem  IVPB  multiple electrolytes Injection Type 1  vancomycin  IVPB      HOME MEDICATIONS:  Home Medications:  Flonase 50 mcg/inh nasal spray: 2 spray(s) nasal once a day (12 Jul 2021 12:40)  Multiple Vitamins oral tablet: 1 tab(s) orally once a day (12 Jul 2021 12:40)  Vitamin D3 50,000 intl units (1250 mcg) oral capsule: 1 cap(s) orally every 7 days (12 Jul 2021 12:40)      Vital Signs Last 24 Hrs  T(C): 38.1 (13 Jul 2021 07:43), Max: 39.1 (13 Jul 2021 05:00)  T(F): 100.5 (13 Jul 2021 07:43), Max: 102.3 (13 Jul 2021 05:00)  HR: 67 (13 Jul 2021 09:00) (63 - 86)  BP: 134/95 (13 Jul 2021 08:00) (106/74 - 150/83)  BP(mean): 104 (13 Jul 2021 08:00) (84 - 108)  RR: 24 (13 Jul 2021 09:00) (16 - 27)  SpO2: 95% (13 Jul 2021 09:00) (93% - 100%)      PHYSICAL EXAM:  Constitutional: Comfortable . No acute distress.   HEENT: Atraumatic and normocephalic , neck is supple . no JVD. No carotid bruit. PEERL   CNS: A&Ox3. No focal deficits. EOMI. Cranial nerves II-IX are intact.   Lymph Nodes: Cervical : Not palpable.  Respiratory: CTAB  Cardiovascular: S1S2 RRR. No murmur/rubs or gallop.  Gastrointestinal: Soft non-tender and non distended . +Bowel sounds. negative Montilla's sign.  Extremities: No edema.   Psychiatric: Calm . no agitation.  Skin: No skin rash/ulcers visualized to face, hands or feet.    Intake and output:   07-12 @ 07:01  -  07-13 @ 07:00  --------------------------------------------------------  IN: 1090 mL / OUT: 695 mL / NET: 395 mL        LABS:                        12.7   13.26 )-----------( 204      ( 13 Jul 2021 05:14 )             37.8     07-13    136  |  102  |  13.0  ----------------------------<  109<H>  3.8   |  25.0  |  0.70    Ca    8.6      13 Jul 2021 05:14  Phos  2.3     07-13  Mg     1.7     07-13    TPro  6.4<L>  /  Alb  2.7<L>  /  TBili  0.9  /  DBili  x   /  AST  40<H>  /  ALT  105<H>  /  AlkPhos  304<H>  07-13      ;p-BNP=  PT/INR - ( 12 Jul 2021 05:22 )   PT: 13.8 sec;   INR: 1.20 ratio         PTT - ( 11 Jul 2021 19:48 )  PTT:26.1 sec      INTERPRETATION OF TELEMETRY: Reviewed by me. SR  ECG: Reviewed by me. ST, NSST/T wave abnormalities    RADIOLOGY & ADDITIONAL STUDIES:    X-ray:  reviewed by me.   < from: Xray Chest 1 View AP/PA. (07.11.21 @ 20:10) >   EXAM:  XR CHEST AP OR PA 1V                          PROCEDURE DATE:  07/11/2021          INTERPRETATION:  Clinical history: 55-year-old male, hypoxia.    Two expiratory/kyphotic views are correlated with the radiographs and chest CT of 7/9/2021.    Cardiac silhouette and pulmonary vasculature are within normal limits with no consolidation, effusion, gross adenopathy, pneumothorax or acute osseous finding.    IMPRESSION:  No acute radiographic findings in this portable view, CT demonstrated small bilateral pleural effusions in the posterior costophrenic sulci    < end of copied text >    CT scan:   < from: CT Angio Chest PE Protocol w/ IV Cont (07.09.21 @ 23:53) >  FINDINGS: The patient's respiratory motion degrades images.    LUNGS AND AIRWAYS: Patent central airways. Compressive atelectasis. Predominantly peripheral groundglass opacities in the lower lobes  PLEURA: Small bilateral pleural effusion. No pneumothorax.  HEART: Upper limit of normal heart size. No pericardial effusion.  VESSELS: Adequate contrast opacification of the pulmonary arteries. No pulmonary embolus. Borderline main pulmonary artery (3.0 cm). Atherosclerosis. Normal caliber of the thoracic aorta. Bovine arch.  MEDIASTINUM AND ESTEFANIA: No lymphadenopathy.Subcentimeter lymph nodes without lymphadenopathy.  CHEST WALL AND LOWER NECK: Unremarkable.  UPPER ABDOMEN: A 6.5 x 6.0 x 6.0 cm hypodense structure with irregular border in the liver. Nonspecific bilateral perinephric stranding. Question hydronephrosis versus parapelvic renal cysts in the partially visualizedkidneys.  BONES: Degenerative changes of the spine. T8 vertebral body hemangioma.    IMPRESSION:    No pulmonary embolus.    Pulmonary opacities with features of COVID-19 pneumonia.    Indeterminate hypodense structure in the liver. Differential diagnosis includes neoplasm and infection/abscess. Recommend clinical correlation and follow-up contrast enhanced MRI for further characterization.    Question hydronephrosis versus parapelvic renal cysts in the partially visualized kidneys. This can be further assessed on dedicated abdominal imaging.    Additional findings as described.    --- End of Report ---    < end of copied text >    MRI:   < from: MR Abdomen w/ IV Cont (07.10.21 @ 20:09) >  FINDINGS:  LOWER CHEST: Trace bilateral pleural effusions.    LIVER: Normal morphology. Steatosis. Centered in segment 8, there is a 5.5 x 6.4 x 6.6 cm multiloculated fluid collection with peripheral and septal enhancement and perilesional edema (4:11), compatible with hepatic abscess and corresponding to the CT chest finding. Just peripheral to the hepatic abscess, there is a nonenhancing branching linear structure, likely a thrombosed right hepatic vein branch (9:25 and 803:37).  BILE DUCTS: Normal caliber.  GALLBLADDER: Nondistended gallbladder with mild wall edema, nonspecific.  SPLEEN: Within normal limits.  PANCREAS: A 0.8 cm cystic lesion in the head (4:29) contiguous with the nondilated duct. No solid enhancing component. No main duct dilatation  ADRENALS: Within normal limits.  KIDNEYS/URETERS: Bilateral cortical and parapelvic cysts. No hydronephrosis.    VISUALIZED PORTIONS:  BOWEL: Sigmoid diverticulosis. No bowel obstruction.  PERITONEUM: Trace perihepatic and pelvic ascites.  VESSELS: Patent portal veins. Thrombus in a branch of the right hepatic vein peripheral to the abscess.  RETROPERITONEUM/LYMPH NODES: No lymphadenopathy.  ABDOMINAL WALL: A tiny fat-containing umbilical hernia.  BONES: Within normal limits.    IMPRESSION:  A 6.6 cm segment 8 hepatic abscess withassociated thrombosis of an adjacent branch of the right hepatic vein. Recommend follow-up to resolution after treatment.    Hepatic steatosis.    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 Recommend one-year MRCP follow-up.    < end of copied text >                                                                         [place] [facility]                                                               Office:  [address] [address]                                                              Telephone: [phone]. Fax:[phone]                                                                        CARDIOLOGY CONSULTATION NOTE                                                                                             Consult requested by:  Dr. Navarro  Reason for Consultation: Hepatic Abscesses   Primary Cardiologist: None  PCP: Dr. Hernandez  History obtained by: Patient and medical record   obtained: No    Covid Status: Negative 07/10/21    Chief complaint:    Patient is a 55y old  Male who presents with a chief complaint of fever and chills (13 Jul 2021 10:08)      HPI: 56 y/o M with a PMHx of of JESSICA (on BiPAP) who presented to the ED from Urgent Care with complaints of fevers, chills, and weakness. Patient states that last week he began having a lot of fevers, chills, and rigors and was diagnosed with Covid at an Urgent care (rapid testing). Patient states that his fevers and rigors continued and worsened, so he went back to the ER to re-evaluate, and they sent him to the ER for further management. Patient has tested negative for Covid (PCR testing) x 2 since arrival, but bloodwork showed leukocytosis, transaminitis, and elevated procalcitonin. Patient found incidentally to have hepatic abscesses, and was placed on IV antibiotics by ID. Patient became a code sepsis on  07/11/21 prompting IDU admission with aggressive IVF administration for elevated lactate. Patient underwent IR drainage of the hepatic abscesses on 07/11/21, but last night had accidental partal removal of hepatic drain while moving out of bed. Patient has continued to have high fevers and chills throughout the night and this morning, and is planned to have IR adjust the drain. Patient notes that early in June he went to the periodontist, but otherwise has had no other issues. Patient denies any chest pain, syncope, near syncope, N/V/D, headache, or dizziness.     REVIEW OF SYMPTOMS:     CONSTITUTIONAL: AS PER HPI  ENMT:  No difficulty hearing, tinnitus, vertigo; No sinus or throat pain  NECK: No pain or stiffness  CARDIOVASCULAR: AS PER HPI   RESPIRATORY: Dyspnea on exertion, Shortness of breath, cough, wheezing  : No dysuria, no hematuria   GI: No dark color stool, no melena, no diarrhea, no constipation, no abdominal pain   NEURO: No headache, no dizziness, no slurred speech   MUSCULOSKELETAL: No joint pain or swelling; No muscle, back, or extremity pain  PSYCH: No agitation, no anxiety.    ALL OTHER REVIEW OF SYSTEMS ARE NEGATIVE.      PREVIOUS DIAGNOSTIC TESTING  ECHO FINDINGS:  < from: TTE Echo Complete w/ Contrast w/ Doppler (07.12.21 @ 22:18) >  PHYSICIAN INTERPRETATION:  Left Ventricle: The left ventricular internal cavity size is normal.  Left ventricular ejection fraction, by visual estimation, is 50 to 55%. Spectral Doppler shows normal pattern of LV diastolic filling.  Right Ventricle: The right ventricular size is normal. RV systolic function is normal.  Left Atrium: Mildly enlarged left atrium.  Right Atrium: Mildly enlarged right atrium.  Pericardium: Trivial pericardial effusion is present. The pericardial effusion is posterior and lateral to the left ventricle.  Mitral Valve: There is mild mitral annularcalcification. Mild mitral valve regurgitation is seen.  Tricuspid Valve: The tricuspid valve is normal in structure. Mild tricuspid regurgitation is visualized.  Aortic Valve: The aortic valve is trileaflet. Sclerotic aortic valve with normal opening. No evidence of aortic valve regurgitation is seen.  Pulmonic Valve: The pulmonic valve is normal. Trace pulmonic valve regurgitation.  Aorta: The aortic root is normal in size and structure.  Pulmonary Artery: The pulmonary artery is of normal sizeand origin.  Venous: The pulmonary veins appear normal.      Summary:   1. Left ventricular ejection fraction, by visual estimation, is 50 to 55%.   2. Mildly enlarged left atrium.   3. Mildly enlarged right atrium.   4. Mild mitral annular calcification.   5. Mild mitral valve regurgitation.   6. Mild tricuspid regurgitation.   7. Sclerotic aortic valve with normal opening.    MD Zoë Electronically signed on 7/13/2021 at 8:52:54 AM      < end of copied text >    ALLERGIES: Allergies    No Known Allergies    Intolerances        PAST MEDICAL HISTORY  No pertinent past medical history    Sleep apnea, obstructive        PAST SURGICAL HISTORY  No significant past surgical history    S/P tonsillectomy        FAMILY HISTORY:  FH: heart attack (Father)        SOCIAL HISTORY:  Denies smoking/alcohol/drugs    CURRENT MEDICATIONS:     meropenem  IVPB  multiple electrolytes Injection Type 1  vancomycin  IVPB      HOME MEDICATIONS:  Home Medications:  Flonase 50 mcg/inh nasal spray: 2 spray(s) nasal once a day (12 Jul 2021 12:40)  Multiple Vitamins oral tablet: 1 tab(s) orally once a day (12 Jul 2021 12:40)  Vitamin D3 50,000 intl units (1250 mcg) oral capsule: 1 cap(s) orally every 7 days (12 Jul 2021 12:40)      Vital Signs Last 24 Hrs  T(C): 38.1 (13 Jul 2021 07:43), Max: 39.1 (13 Jul 2021 05:00)  T(F): 100.5 (13 Jul 2021 07:43), Max: 102.3 (13 Jul 2021 05:00)  HR: 67 (13 Jul 2021 09:00) (63 - 86)  BP: 134/95 (13 Jul 2021 08:00) (106/74 - 150/83)  BP(mean): 104 (13 Jul 2021 08:00) (84 - 108)  RR: 24 (13 Jul 2021 09:00) (16 - 27)  SpO2: 95% (13 Jul 2021 09:00) (93% - 100%)      PHYSICAL EXAM:  Constitutional: Comfortable . No acute distress.   HEENT: Atraumatic and normocephalic , neck is supple . no JVD. No carotid bruit. PEERL   CNS: A&Ox3. No focal deficits. EOMI. Cranial nerves II-IX are intact.   Lymph Nodes: Cervical : Not palpable.  Respiratory: CTAB  Cardiovascular: S1S2 RRR. No murmur/rubs or gallop.  Gastrointestinal: Soft non-tender and non distended . +Bowel sounds. negative Montilla's sign.  Extremities: No edema.   Psychiatric: Calm . no agitation.  Skin: No skin rash/ulcers visualized to face, hands or feet.    Intake and output:   07-12 @ 07:01  -  07-13 @ 07:00  --------------------------------------------------------  IN: 1090 mL / OUT: 695 mL / NET: 395 mL    LABS:                      12.7   13.26 )-----------( 204      ( 13 Jul 2021 05:14 )             37.8     07-13    136  |  102  |  13.0  ----------------------------<  109<H>  3.8   |  25.0  |  0.70    Ca    8.6      13 Jul 2021 05:14  Phos  2.3     07-13  Mg     1.7     07-13    TPro  6.4<L>  /  Alb  2.7<L>  /  TBili  0.9  /  DBili  x   /  AST  40<H>  /  ALT  105<H>  /  AlkPhos  304<H>  07-13      ;p-BNP=  PT/INR - ( 12 Jul 2021 05:22 )   PT: 13.8 sec;   INR: 1.20 ratio         PTT - ( 11 Jul 2021 19:48 )  PTT:26.1 sec      INTERPRETATION OF TELEMETRY: Reviewed by me. SR  ECG: Reviewed by me. ST, NSST/T wave abnormalities    RADIOLOGY & ADDITIONAL STUDIES:    X-ray:  reviewed by me.   < from: Xray Chest 1 View AP/PA. (07.11.21 @ 20:10) >   EXAM:  XR CHEST AP OR PA 1V                          PROCEDURE DATE:  07/11/2021          INTERPRETATION:  Clinical history: 55-year-old male, hypoxia.    Two expiratory/kyphotic views are correlated with the radiographs and chest CT of 7/9/2021.    Cardiac silhouette and pulmonary vasculature are within normal limits with no consolidation, effusion, gross adenopathy, pneumothorax or acute osseous finding.    IMPRESSION:  No acute radiographic findings in this portable view, CT demonstrated small bilateral pleural effusions in the posterior costophrenic sulci    < end of copied text >    CT scan:   < from: CT Angio Chest PE Protocol w/ IV Cont (07.09.21 @ 23:53) >  FINDINGS: The patient's respiratory motion degrades images.    LUNGS AND AIRWAYS: Patent central airways. Compressive atelectasis. Predominantly peripheral groundglass opacities in the lower lobes  PLEURA: Small bilateral pleural effusion. No pneumothorax.  HEART: Upper limit of normal heart size. No pericardial effusion.  VESSELS: Adequate contrast opacification of the pulmonary arteries. No pulmonary embolus. Borderline main pulmonary artery (3.0 cm). Atherosclerosis. Normal caliber of the thoracic aorta. Bovine arch.  MEDIASTINUM AND ESTEFANIA: No lymphadenopathy.Subcentimeter lymph nodes without lymphadenopathy.  CHEST WALL AND LOWER NECK: Unremarkable.  UPPER ABDOMEN: A 6.5 x 6.0 x 6.0 cm hypodense structure with irregular border in the liver. Nonspecific bilateral perinephric stranding. Question hydronephrosis versus parapelvic renal cysts in the partially visualizedkidneys.  BONES: Degenerative changes of the spine. T8 vertebral body hemangioma.    IMPRESSION:    No pulmonary embolus.    Pulmonary opacities with features of COVID-19 pneumonia.    Indeterminate hypodense structure in the liver. Differential diagnosis includes neoplasm and infection/abscess. Recommend clinical correlation and follow-up contrast enhanced MRI for further characterization.    Question hydronephrosis versus parapelvic renal cysts in the partially visualized kidneys. This can be further assessed on dedicated abdominal imaging.    Additional findings as described.    --- End of Report ---    < end of copied text >    MRI:   < from: MR Abdomen w/ IV Cont (07.10.21 @ 20:09) >  FINDINGS:  LOWER CHEST: Trace bilateral pleural effusions.    LIVER: Normal morphology. Steatosis. Centered in segment 8, there is a 5.5 x 6.4 x 6.6 cm multiloculated fluid collection with peripheral and septal enhancement and perilesional edema (4:11), compatible with hepatic abscess and corresponding to the CT chest finding. Just peripheral to the hepatic abscess, there is a nonenhancing branching linear structure, likely a thrombosed right hepatic vein branch (9:25 and 803:37).  BILE DUCTS: Normal caliber.  GALLBLADDER: Nondistended gallbladder with mild wall edema, nonspecific.  SPLEEN: Within normal limits.  PANCREAS: A 0.8 cm cystic lesion in the head (4:29) contiguous with the nondilated duct. No solid enhancing component. No main duct dilatation  ADRENALS: Within normal limits.  KIDNEYS/URETERS: Bilateral cortical and parapelvic cysts. No hydronephrosis.    VISUALIZED PORTIONS:  BOWEL: Sigmoid diverticulosis. No bowel obstruction.  PERITONEUM: Trace perihepatic and pelvic ascites.  VESSELS: Patent portal veins. Thrombus in a branch of the right hepatic vein peripheral to the abscess.  RETROPERITONEUM/LYMPH NODES: No lymphadenopathy.  ABDOMINAL WALL: A tiny fat-containing umbilical hernia.  BONES: Within normal limits.    IMPRESSION:  A 6.6 cm segment 8 hepatic abscess withassociated thrombosis of an adjacent branch of the right hepatic vein. Recommend follow-up to resolution after treatment.    Hepatic steatosis.    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 Recommend one-year MRCP follow-up.    < end of copied text >

## 2021-07-13 NOTE — PROGRESS NOTE ADULT - SUBJECTIVE AND OBJECTIVE BOX
56 y/o male with JESSICA on BIPAP at night, got Pfizer vaccine in 4/2021, noted to be covid swab+ at an Urgent care center on 7/7/2021 and was diagnosed with a LLL pneumonia but was sent to the hospital for further care. In the ER workup revealed transminitis and and MRI of  the abdomen that showed hepatic steatosis but in segment 8 he has a large 5.5x6.4x6.6 cm. multifocal fluid collection with peripheral and septal enhancement and perilesional edema compatible with hepatic abscess.  Just peripheral to the abscess there is a non-enhancing branching linear structure, likely representing a thrombosed right hepatic vein branch. Normal appearing bile ducts and there is an 8 mm. cystic lesion in the head of the pancreas contiguous with the non-dilated PD. No solid enhancing component and no main PD dilatation. Patent portal veins. Thrombus in a branch of the right hepatic vein peripheral to the abscess. No retroperitoneal ISABELLA. Colon: Sigmoid diverticulosis w/o diverticulitis and a small fat containing umbilical hernia.  Of note, the patient has 2 covid nasal swab pcr's here in the hospital that were negative.  Patient has been febrile and last night went for an emergent IR drain placement.   Hematology consulted to address need for anticoagulation in this setting.    He is on enoxaparin and no bleeding reported. Febrile throughout morning.     PAST MEDICAL & SURGICAL HISTORY:  Sleep apnea, obstructive    S/P tonsillectomy    FAMILY HISTORY:  FH: heart attack (Father)    Social History:  Lives with his wife and kids. 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 Denies smoking and alcohol. (09 Jul 2021 15:53)    MEDICATIONS  (STANDING):  meropenem  IVPB 1000 milliGRAM(s) IV Intermittent every 8 hours  multiple electrolytes Injection Type 1 1000 milliLiter(s) (100 mL/Hr) IV Continuous <Continuous>  vancomycin  IVPB 1250 milliGRAM(s) IV Intermittent every 8 hours    MEDICATIONS  (PRN):  acetaminophen   Tablet .. 650 milliGRAM(s) Oral every 6 hours PRN Temp greater or equal to 38C (100.4F), Mild Pain (1 - 3)  acetaminophen 325 mG/butalbital 50 mG/caffeine 40 mG 1 Tablet(s) Oral every 6 hours PRN Moderate Pain (4 - 6)    Vital Signs Last 24 Hrs  T(C): 38.1 (13 Jul 2021 07:43), Max: 39.1 (13 Jul 2021 05:00)  T(F): 100.5 (13 Jul 2021 07:43), Max: 102.3 (13 Jul 2021 05:00)  HR: 67 (13 Jul 2021 09:00) (63 - 86)  BP: 134/95 (13 Jul 2021 08:00) (106/74 - 150/83)  BP(mean): 104 (13 Jul 2021 08:00) (84 - 108)  RR: 24 (13 Jul 2021 09:00) (16 - 27)  SpO2: 95% (13 Jul 2021 09:00) (93% - 100%)  nad  ctab  rrr  RUQ drain in place - serosanguinous fluid    Labs:                          12.7   13.26 )-----------( 204      ( 13 Jul 2021 05:14 )             37.8                           12.0   15.74 )-----------( 177      ( 12 Jul 2021 05:22 )             36.3                             14.3   22.69 )-----------( 250      ( 11 Jul 2021 19:48 )             44.8     07-13    136  |  102  |  13.0  ----------------------------<  109<H>  3.8   |  25.0  |  0.70    Ca    8.6      13 Jul 2021 05:14  Phos  2.3     07-13  Mg     1.7     07-13    TPro  6.4<L>  /  Alb  2.7<L>  /  TBili  0.9  /  DBili  x   /  AST  40<H>  /  ALT  105<H>  /  AlkPhos  304<H>  07-13      07-12    136  |  103  |  18.5  ----------------------------<  118<H>  4.1   |  24.0  |  0.82    Ca    8.7      12 Jul 2021 05:22  Phos  3.5     07-12  Mg     1.6     07-12    TPro  5.8<L>  /  Alb  2.5<L>  /  TBili  1.0  /  DBili  x   /  AST  69<H>  /  ALT  131<H>  /  AlkPhos  319<H>  07-12 07-11    137  |  99  |  17.3  ----------------------------<  94  4.0   |  23.0  |  0.96    Ca    9.6      11 Jul 2021 19:48  Phos  1.7     07-10  Mg     1.6     07-11    TPro  7.2  /  Alb  3.2<L>  /  TBili  1.3  /  DBili  x   /  AST  93<H>  /  ALT  160<H>  /  AlkPhos  355<H>  07-11

## 2021-07-13 NOTE — CONSULT NOTE ADULT - ATTENDING COMMENTS
above noted.  Pt is usual state of health until last weekend. Fever and chills, seen at urgent center and both him and wife diagnosed with COVID with rapid test but no pcr was not performed.  Pt found to have liver mass, abscess. S/P IR drainage. Blood cultures are negative. COVID PCR is negative as well.  No hx of immune deficiency. No drug use.   Pt had dental cleaning last month but it was a routine cleaning.   No other septic emboli noted.   TTE negative for endocarditis.   Plan for VÍCTOR in AM. Keep NPO post midnight.  I agree with A/P.

## 2021-07-13 NOTE — CONSULT NOTE ADULT - CONSULT REQUESTED DATE/TIME
11-Jul-2021 11:36
10-Jul-2021
12-Jul-2021 00:11
12-Jul-2021 14:01
13-Jul-2021 10:19
11-Jul-2021 17:15
13-Jul-2021

## 2021-07-13 NOTE — PROGRESS NOTE ADULT - ASSESSMENT
54 yo M admitted to MICU with sepsis 2/2 liver abscess without clear etiology and evidence of a pancreatic mass and thrombosis of an branch of the right hepatic vein. Patient responding well to IR drain and antibiotics    Recommendations  - Continue IV antibiotics and follow speciation  - Adjust antibiotics accordingly  - Monitor drain output quality and quantity   - Re-scan the patient once drain output is minimal  - Surgery will follow

## 2021-07-13 NOTE — CONSULT NOTE ADULT - SUBJECTIVE AND OBJECTIVE BOX
HISTORY    Patient is a 55M w/ hx of sleep apnea on bipap at night who presents from Urgent Care who presents with chills and fevers.  Associated nausea and headache. Denies vomiting, dizziness, abdominal or chest pain.  He was admitted for LLL PNA, incidental finding of a hepatic mass vs neoplasm, as well as suspicion for hepatic vein thrombosis. Patient s/p IR drainage of abscess with ~20cc of purulent material on 7/11, initiation of IV abx and therapeutic lovenox.  Patient continues to be febrile, leukocytosis improving. IR drain was dislodged, surgery continues to follow for possible need for operative intervention for drainage.       Of Note: patient had extensive dental cleaning in beginning of the month.      NKDA    PMHX: JESSICA    PSHX Tonsillectomy     No home medications     SUBJECTIVE/ROS:  [x ] A ten-point review of systems was otherwise negative except as noted.  [ ] Due to altered mental status/intubation, subjective information were not able to be obtained from the patient. History was obtained, to the extent possible, from review of the chart and collateral sources of information.      NEURO  RASS:  0   GCS:   15  CAM ICU:  Exam: No focal neuro deficits, pleasant   Meds: acetaminophen   Tablet .. 650 milliGRAM(s) Oral every 6 hours PRN Temp greater or equal to 38C (100.4F), Mild Pain (1 - 3)  acetaminophen 325 mG/butalbital 50 mG/caffeine 40 mG 1 Tablet(s) Oral every 6 hours PRN Moderate Pain (4 - 6)    [x] Adequacy of sedation and pain control has been assessed and adjusted      RESPIRATORY  RR: 17 (07-13-21 @ 12:00) (14 - 27)  SpO2: 95% (07-13-21 @ 12:00) (93% - 100%)  Wt(kg): --  Exam: unlabored, clear to auscultation bilaterally  Mechanical Ventilation:     [ ] Extubation Readiness Assessed  Meds:       CARDIOVASCULAR  HR: 73 (07-13-21 @ 12:00) (59 - 86)  BP: 134/88 (07-13-21 @ 12:00) (117/100 - 150/83)  BP(mean): 101 (07-13-21 @ 12:00) (85 - 108)  ABP: --  ABP(mean): --  Wt(kg): --  CVP(cm H2O): --  VBG - ( 11 Jul 2021 22:57 )  pH: x     /  pCO2: x     /  pO2: x     / HCO3: x     / Base Excess: x     /  SaO2: x      Lactate: 1.20               Exam: nsr  Cardiac Rhythm: nsr  Perfusion     [x ]Adequate   [ ]Inadequate  Mentation   [ x]Normal       [ ]Reduced  Extremities  [x ]Warm         [ ]Cool  Volume Status [ ]Hypervolemic [x ]Euvolemic [ ]Hypovolemic  Meds:       GI/NUTRITION  Exam: soft, nttp, nd, RUQ drain in place - serosanginous drainage minimal   Diet: NPO   Meds:     GENITOURINARY  I&O's Detail    07-12 @ 07:01  -  07-13 @ 07:00  --------------------------------------------------------  IN:    IV PiggyBack: 200 mL    IV PiggyBack: 250 mL    multiple electrolytes Injection Type 1.: 400 mL    Oral Fluid: 240 mL  Total IN: 1090 mL    OUT:    Bulb (mL): 45 mL    Voided (mL): 650 mL  Total OUT: 695 mL    Total NET: 395 mL      07-13 @ 07:01 - 07-13 @ 12:48  --------------------------------------------------------  IN:  Total IN: 0 mL    OUT:    Voided (mL): 750 mL  Total OUT: 750 mL    Total NET: -750 mL          07-13    136  |  102  |  13.0  ----------------------------<  109<H>  3.8   |  25.0  |  0.70    Ca    8.6      13 Jul 2021 05:14  Phos  2.3     07-13  Mg     1.7     07-13    TPro  6.4<L>  /  Alb  2.7<L>  /  TBili  0.9  /  DBili  x   /  AST  40<H>  /  ALT  105<H>  /  AlkPhos  304<H>  07-13    [ ] Hernandez catheter, indication: N/A  Meds: multiple electrolytes Injection Type 1 1000 milliLiter(s) IV Continuous <Continuous>      Ext: 2+ peripheral pulses    Skin: drainage guaze without drainage     HEMATOLOGIC  Meds:   [x] VTE Prophylaxis                        12.7   13.26 )-----------( 204      ( 13 Jul 2021 05:14 )             37.8     PT/INR - ( 12 Jul 2021 05:22 )   PT: 13.8 sec;   INR: 1.20 ratio         PTT - ( 11 Jul 2021 19:48 )  PTT:26.1 sec  Transfusion     [ ] PRBC   [ ] Platelets   [ ] FFP   [ ] Cryoprecipitate      INFECTIOUS DISEASES  T(C): 37.5 (07-13-21 @ 11:55), Max: 39.1 (07-13-21 @ 05:00)  Wt(kg): --  WBC Count: 13.26 K/uL (07-13 @ 05:14)    Recent Cultures:  Specimen Source: .Smear, 07-12 @ 09:36; Results --; Gram Stain:   Numerous polymorphonuclear leukocytes seen per low power field  No organisms seen per oil power field; Organism: --  Specimen Source: .Abscess hepatic collection, 07-12 @ 06:41; Results   No growth; Gram Stain: --; Organism: --  Specimen Source: .Blood Blood, 07-10 @ 10:20; Results   No growth at 48 hours; Gram Stain: --; Organism: --    Meds: meropenem  IVPB 1000 milliGRAM(s) IV Intermittent every 8 hours  vancomycin  IVPB 1250 milliGRAM(s) IV Intermittent every 8 hours        ENDOCRINE  Capillary Blood Glucose    Meds:       ACCESS DEVICES:  [ ] Peripheral IV  [ ] Central Venous Line	[ ] R	[ ] L	[ ] IJ	[ ] Fem	[ ] SC	Placed:   [ ] Arterial Line		[ ] R	[ ] L	[ ] Fem	[ ] Rad	[ ] Ax	Placed:   [ ] PICC:					[ ] Mediport  [ ] Urinary Catheter, Date Placed:   [ ] Necessity of urinary, arterial, and venous catheters discussed    OTHER MEDICATIONS:      CODE STATUS:     IMAGING:

## 2021-07-14 ENCOUNTER — RESULT REVIEW (OUTPATIENT)
Age: 56
End: 2021-07-14

## 2021-07-14 ENCOUNTER — APPOINTMENT (OUTPATIENT)
Dept: SURGICAL ONCOLOGY | Facility: HOSPITAL | Age: 56
End: 2021-07-14

## 2021-07-14 LAB
ABO RH CONFIRMATION: SIGNIFICANT CHANGE UP
ALBUMIN SERPL ELPH-MCNC: 2.7 G/DL — LOW (ref 3.3–5.2)
ALBUMIN SERPL ELPH-MCNC: 2.8 G/DL — LOW (ref 3.3–5.2)
ALP SERPL-CCNC: 275 U/L — HIGH (ref 40–120)
ALP SERPL-CCNC: 295 U/L — HIGH (ref 40–120)
ALT FLD-CCNC: 107 U/L — HIGH
ALT FLD-CCNC: 144 U/L — HIGH
ANION GAP SERPL CALC-SCNC: 10 MMOL/L — SIGNIFICANT CHANGE UP (ref 5–17)
ANION GAP SERPL CALC-SCNC: 9 MMOL/L — SIGNIFICANT CHANGE UP (ref 5–17)
APTT BLD: 29.2 SEC — SIGNIFICANT CHANGE UP (ref 27.5–35.5)
AST SERPL-CCNC: 122 U/L — HIGH
AST SERPL-CCNC: 58 U/L — HIGH
BASOPHILS # BLD AUTO: 0.05 K/UL — SIGNIFICANT CHANGE UP (ref 0–0.2)
BASOPHILS NFR BLD AUTO: 0.4 % — SIGNIFICANT CHANGE UP (ref 0–2)
BILIRUB SERPL-MCNC: 1 MG/DL — SIGNIFICANT CHANGE UP (ref 0.4–2)
BILIRUB SERPL-MCNC: 1.1 MG/DL — SIGNIFICANT CHANGE UP (ref 0.4–2)
BLD GP AB SCN SERPL QL: SIGNIFICANT CHANGE UP
BUN SERPL-MCNC: 8.9 MG/DL — SIGNIFICANT CHANGE UP (ref 8–20)
BUN SERPL-MCNC: 9.2 MG/DL — SIGNIFICANT CHANGE UP (ref 8–20)
CALCIUM SERPL-MCNC: 8.2 MG/DL — LOW (ref 8.6–10.2)
CALCIUM SERPL-MCNC: 8.7 MG/DL — SIGNIFICANT CHANGE UP (ref 8.6–10.2)
CHLORIDE SERPL-SCNC: 101 MMOL/L — SIGNIFICANT CHANGE UP (ref 98–107)
CHLORIDE SERPL-SCNC: 102 MMOL/L — SIGNIFICANT CHANGE UP (ref 98–107)
CO2 SERPL-SCNC: 24 MMOL/L — SIGNIFICANT CHANGE UP (ref 22–29)
CO2 SERPL-SCNC: 27 MMOL/L — SIGNIFICANT CHANGE UP (ref 22–29)
CREAT SERPL-MCNC: 0.58 MG/DL — SIGNIFICANT CHANGE UP (ref 0.5–1.3)
CREAT SERPL-MCNC: 0.64 MG/DL — SIGNIFICANT CHANGE UP (ref 0.5–1.3)
EOSINOPHIL # BLD AUTO: 0.16 K/UL — SIGNIFICANT CHANGE UP (ref 0–0.5)
EOSINOPHIL NFR BLD AUTO: 1.2 % — SIGNIFICANT CHANGE UP (ref 0–6)
GAS PNL BLDA: SIGNIFICANT CHANGE UP
GLUCOSE SERPL-MCNC: 102 MG/DL — HIGH (ref 70–99)
GLUCOSE SERPL-MCNC: 147 MG/DL — HIGH (ref 70–99)
HCT VFR BLD CALC: 36.5 % — LOW (ref 39–50)
HCT VFR BLD CALC: 38.9 % — LOW (ref 39–50)
HGB BLD-MCNC: 12.2 G/DL — LOW (ref 13–17)
HGB BLD-MCNC: 13.2 G/DL — SIGNIFICANT CHANGE UP (ref 13–17)
IMM GRANULOCYTES NFR BLD AUTO: 2.3 % — HIGH (ref 0–1.5)
INR BLD: 1.14 RATIO — SIGNIFICANT CHANGE UP (ref 0.88–1.16)
LYMPHOCYTES # BLD AUTO: 1.5 K/UL — SIGNIFICANT CHANGE UP (ref 1–3.3)
LYMPHOCYTES # BLD AUTO: 11.6 % — LOW (ref 13–44)
MAGNESIUM SERPL-MCNC: 2.2 MG/DL — SIGNIFICANT CHANGE UP (ref 1.6–2.6)
MCHC RBC-ENTMCNC: 29 PG — SIGNIFICANT CHANGE UP (ref 27–34)
MCHC RBC-ENTMCNC: 29.2 PG — SIGNIFICANT CHANGE UP (ref 27–34)
MCHC RBC-ENTMCNC: 33.4 GM/DL — SIGNIFICANT CHANGE UP (ref 32–36)
MCHC RBC-ENTMCNC: 33.9 GM/DL — SIGNIFICANT CHANGE UP (ref 32–36)
MCV RBC AUTO: 86.1 FL — SIGNIFICANT CHANGE UP (ref 80–100)
MCV RBC AUTO: 86.7 FL — SIGNIFICANT CHANGE UP (ref 80–100)
MONOCYTES # BLD AUTO: 1.09 K/UL — HIGH (ref 0–0.9)
MONOCYTES NFR BLD AUTO: 8.4 % — SIGNIFICANT CHANGE UP (ref 2–14)
NEUTROPHILS # BLD AUTO: 9.82 K/UL — HIGH (ref 1.8–7.4)
NEUTROPHILS NFR BLD AUTO: 76.1 % — SIGNIFICANT CHANGE UP (ref 43–77)
PHOSPHATE SERPL-MCNC: 2.4 MG/DL — SIGNIFICANT CHANGE UP (ref 2.4–4.7)
PLATELET # BLD AUTO: 230 K/UL — SIGNIFICANT CHANGE UP (ref 150–400)
PLATELET # BLD AUTO: 261 K/UL — SIGNIFICANT CHANGE UP (ref 150–400)
POTASSIUM SERPL-MCNC: 3.8 MMOL/L — SIGNIFICANT CHANGE UP (ref 3.5–5.3)
POTASSIUM SERPL-MCNC: 4.4 MMOL/L — SIGNIFICANT CHANGE UP (ref 3.5–5.3)
POTASSIUM SERPL-SCNC: 3.8 MMOL/L — SIGNIFICANT CHANGE UP (ref 3.5–5.3)
POTASSIUM SERPL-SCNC: 4.4 MMOL/L — SIGNIFICANT CHANGE UP (ref 3.5–5.3)
PROT SERPL-MCNC: 6.4 G/DL — LOW (ref 6.6–8.7)
PROT SERPL-MCNC: 6.5 G/DL — LOW (ref 6.6–8.7)
PROTHROM AB SERPL-ACNC: 13.1 SEC — SIGNIFICANT CHANGE UP (ref 10.6–13.6)
RBC # BLD: 4.21 M/UL — SIGNIFICANT CHANGE UP (ref 4.2–5.8)
RBC # BLD: 4.52 M/UL — SIGNIFICANT CHANGE UP (ref 4.2–5.8)
RBC # FLD: 13.2 % — SIGNIFICANT CHANGE UP (ref 10.3–14.5)
RBC # FLD: 13.2 % — SIGNIFICANT CHANGE UP (ref 10.3–14.5)
SARS-COV-2 RNA SPEC QL NAA+PROBE: SIGNIFICANT CHANGE UP
SODIUM SERPL-SCNC: 135 MMOL/L — SIGNIFICANT CHANGE UP (ref 135–145)
SODIUM SERPL-SCNC: 138 MMOL/L — SIGNIFICANT CHANGE UP (ref 135–145)
VANCOMYCIN TROUGH SERPL-MCNC: 14.8 UG/ML — SIGNIFICANT CHANGE UP (ref 10–20)
WBC # BLD: 12.92 K/UL — HIGH (ref 3.8–10.5)
WBC # BLD: 17.86 K/UL — HIGH (ref 3.8–10.5)
WBC # FLD AUTO: 12.92 K/UL — HIGH (ref 3.8–10.5)
WBC # FLD AUTO: 17.86 K/UL — HIGH (ref 3.8–10.5)

## 2021-07-14 PROCEDURE — 99233 SBSQ HOSP IP/OBS HIGH 50: CPT

## 2021-07-14 PROCEDURE — 93312 ECHO TRANSESOPHAGEAL: CPT | Mod: 26

## 2021-07-14 PROCEDURE — 88305 TISSUE EXAM BY PATHOLOGIST: CPT | Mod: 26

## 2021-07-14 PROCEDURE — 99232 SBSQ HOSP IP/OBS MODERATE 35: CPT

## 2021-07-14 PROCEDURE — 93325 DOPPLER ECHO COLOR FLOW MAPG: CPT | Mod: 26

## 2021-07-14 PROCEDURE — S2900 ROBOTIC SURGICAL SYSTEM: CPT | Mod: NC

## 2021-07-14 PROCEDURE — 93320 DOPPLER ECHO COMPLETE: CPT | Mod: 26

## 2021-07-14 PROCEDURE — 88173 CYTOPATH EVAL FNA REPORT: CPT | Mod: 26

## 2021-07-14 PROCEDURE — 47010 HEPATOT OPN DRG ABSC/CST 1/2: CPT | Mod: AS

## 2021-07-14 PROCEDURE — 47010 HEPATOT OPN DRG ABSC/CST 1/2: CPT

## 2021-07-14 RX ORDER — MAGNESIUM SULFATE 500 MG/ML
2 VIAL (ML) INJECTION ONCE
Refills: 0 | Status: COMPLETED | OUTPATIENT
Start: 2021-07-14 | End: 2021-07-14

## 2021-07-14 RX ORDER — BUPIVACAINE 13.3 MG/ML
20 INJECTION, SUSPENSION, LIPOSOMAL INFILTRATION ONCE
Refills: 0 | Status: COMPLETED | OUTPATIENT
Start: 2021-07-14 | End: 2021-07-14

## 2021-07-14 RX ORDER — MEROPENEM 1 G/30ML
1000 INJECTION INTRAVENOUS EVERY 8 HOURS
Refills: 0 | Status: DISCONTINUED | OUTPATIENT
Start: 2021-07-14 | End: 2021-07-20

## 2021-07-14 RX ORDER — VANCOMYCIN HCL 1 G
1250 VIAL (EA) INTRAVENOUS EVERY 8 HOURS
Refills: 0 | Status: DISCONTINUED | OUTPATIENT
Start: 2021-07-14 | End: 2021-07-16

## 2021-07-14 RX ORDER — ACETAMINOPHEN 500 MG
650 TABLET ORAL EVERY 6 HOURS
Refills: 0 | Status: DISCONTINUED | OUTPATIENT
Start: 2021-07-14 | End: 2021-07-14

## 2021-07-14 RX ORDER — SENNA PLUS 8.6 MG/1
2 TABLET ORAL AT BEDTIME
Refills: 0 | Status: DISCONTINUED | OUTPATIENT
Start: 2021-07-14 | End: 2021-07-14

## 2021-07-14 RX ORDER — HYDROMORPHONE HYDROCHLORIDE 2 MG/ML
0.5 INJECTION INTRAMUSCULAR; INTRAVENOUS; SUBCUTANEOUS EVERY 4 HOURS
Refills: 0 | Status: DISCONTINUED | OUTPATIENT
Start: 2021-07-14 | End: 2021-07-16

## 2021-07-14 RX ORDER — SENNA PLUS 8.6 MG/1
1 TABLET ORAL AT BEDTIME
Refills: 0 | Status: DISCONTINUED | OUTPATIENT
Start: 2021-07-14 | End: 2021-07-20

## 2021-07-14 RX ORDER — POTASSIUM PHOSPHATE, MONOBASIC POTASSIUM PHOSPHATE, DIBASIC 236; 224 MG/ML; MG/ML
15 INJECTION, SOLUTION INTRAVENOUS ONCE
Refills: 0 | Status: COMPLETED | OUTPATIENT
Start: 2021-07-14 | End: 2021-07-14

## 2021-07-14 RX ORDER — SODIUM CHLORIDE 9 MG/ML
1000 INJECTION, SOLUTION INTRAVENOUS
Refills: 0 | Status: DISCONTINUED | OUTPATIENT
Start: 2021-07-14 | End: 2021-07-15

## 2021-07-14 RX ORDER — SENNA PLUS 8.6 MG/1
1 TABLET ORAL AT BEDTIME
Refills: 0 | Status: DISCONTINUED | OUTPATIENT
Start: 2021-07-14 | End: 2021-07-14

## 2021-07-14 RX ORDER — TRAMADOL HYDROCHLORIDE 50 MG/1
25 TABLET ORAL EVERY 4 HOURS
Refills: 0 | Status: DISCONTINUED | OUTPATIENT
Start: 2021-07-14 | End: 2021-07-20

## 2021-07-14 RX ORDER — ACETAMINOPHEN 500 MG
650 TABLET ORAL EVERY 6 HOURS
Refills: 0 | Status: DISCONTINUED | OUTPATIENT
Start: 2021-07-14 | End: 2021-07-15

## 2021-07-14 RX ORDER — KETOROLAC TROMETHAMINE 30 MG/ML
15 SYRINGE (ML) INJECTION EVERY 6 HOURS
Refills: 0 | Status: DISCONTINUED | OUTPATIENT
Start: 2021-07-14 | End: 2021-07-15

## 2021-07-14 RX ADMIN — MEROPENEM 100 MILLIGRAM(S): 1 INJECTION INTRAVENOUS at 21:22

## 2021-07-14 RX ADMIN — Medication 166.67 MILLIGRAM(S): at 21:22

## 2021-07-14 RX ADMIN — Medication 400 MILLIGRAM(S): at 10:01

## 2021-07-14 RX ADMIN — Medication 650 MILLIGRAM(S): at 00:48

## 2021-07-14 RX ADMIN — SODIUM CHLORIDE 100 MILLILITER(S): 9 INJECTION, SOLUTION INTRAVENOUS at 06:24

## 2021-07-14 RX ADMIN — Medication 50 GRAM(S): at 10:01

## 2021-07-14 RX ADMIN — MEROPENEM 100 MILLIGRAM(S): 1 INJECTION INTRAVENOUS at 13:06

## 2021-07-14 RX ADMIN — POTASSIUM PHOSPHATE, MONOBASIC POTASSIUM PHOSPHATE, DIBASIC 62.5 MILLIMOLE(S): 236; 224 INJECTION, SOLUTION INTRAVENOUS at 10:01

## 2021-07-14 RX ADMIN — HYDROMORPHONE HYDROCHLORIDE 0.5 MILLIGRAM(S): 2 INJECTION INTRAMUSCULAR; INTRAVENOUS; SUBCUTANEOUS at 00:17

## 2021-07-14 RX ADMIN — Medication 166.67 MILLIGRAM(S): at 13:06

## 2021-07-14 RX ADMIN — MEROPENEM 100 MILLIGRAM(S): 1 INJECTION INTRAVENOUS at 06:23

## 2021-07-14 RX ADMIN — Medication 166.67 MILLIGRAM(S): at 06:23

## 2021-07-14 RX ADMIN — Medication 650 MILLIGRAM(S): at 07:00

## 2021-07-14 RX ADMIN — Medication 650 MILLIGRAM(S): at 14:10

## 2021-07-14 RX ADMIN — Medication 650 MILLIGRAM(S): at 05:24

## 2021-07-14 NOTE — PROGRESS NOTE ADULT - SUBJECTIVE AND OBJECTIVE BOX
HPI/OVERNIGHT EVENTS: Patient seen and examined at bedside this AM. No overnight events. No complaints. Denies fever, chills, nausea, vomiting, chest pain, SOB, dizziness, abd pain or any other concerning symptoms.    Vital Signs Last 24 Hrs  T(C): 37.7 (2021 00:00), Max: 39.1 (2021 05:00)  T(F): 99.8 (2021 00:00), Max: 102.3 (2021 05:00)  HR: 62 (2021 01:00) (59 - 86)  BP: 121/88 (2021 01:00) (121/82 - 150/83)  BP(mean): 96 (:) (77 - 104)  RR: 21 (:00) (14 - 34)  SpO2: 91% (:00) (91% - 100%)    I&O's Detail    2021 07:01  -  2021 07:00  --------------------------------------------------------  IN:    IV PiggyBack: 250 mL    IV PiggyBack: 200 mL    multiple electrolytes Injection Type 1.: 400 mL    Oral Fluid: 240 mL  Total IN: 1090 mL    OUT:    Bulb (mL): 45 mL    Voided (mL): 650 mL  Total OUT: 695 mL    Total NET: 395 mL      2021 07:01  -  2021 02:46  --------------------------------------------------------  IN:    IV PiggyBack: 250 mL    IV PiggyBack: 50 mL    IV PiggyBack: 250 mL    IV PiggyBack: 100 mL  Total IN: 650 mL    OUT:    Voided (mL): 1950 mL  Total OUT: 1950 mL    Total NET: -1300 mL          Constitutional: patient resting comfortably in bed, in no acute distress  HEENT: EOMI, PERRLA, MMM.  Respiratory: Non labored breathing on RA  Cardiovascular: RRR  ABDOMEN: Soft, nontender, and nondistended. RUQ drain in place with serosanguineous output.   : No CVA tenderness  EXTREMITIES: Without any cyanosis, clubbing, rash, lesions or edema.    LABS:                        12.7   13.26 )-----------( 204      ( 2021 05:14 )             37.8     07-13    136  |  102  |  13.0  ----------------------------<  109<H>  3.8   |  25.0  |  0.70    Ca    8.6      2021 05:14  Phos  2.3       Mg     1.7         TPro  6.4<L>  /  Alb  2.7<L>  /  TBili  0.9  /  DBili  x   /  AST  40<H>  /  ALT  105<H>  /  AlkPhos  304<H>      PT/INR - ( 2021 05:22 )   PT: 13.8 sec;   INR: 1.20 ratio           Urinalysis Basic - ( 2021 20:37 )    Color: Yellow / Appearance: Slightly Turbid / S.015 / pH: x  Gluc: x / Ketone: Negative  / Bili: Negative / Urobili: Negative mg/dL   Blood: x / Protein: 30 mg/dL / Nitrite: Negative   Leuk Esterase: Negative / RBC: x / WBC x   Sq Epi: x / Non Sq Epi: Few / Bacteria: x        MEDICATIONS  (STANDING):  ibuprofen  Tablet. 400 milliGRAM(s) Oral once  meropenem  IVPB 1000 milliGRAM(s) IV Intermittent every 8 hours  multiple electrolytes Injection Type 1 1000 milliLiter(s) (100 mL/Hr) IV Continuous <Continuous>  vancomycin  IVPB 1250 milliGRAM(s) IV Intermittent every 8 hours    MEDICATIONS  (PRN):  acetaminophen   Tablet .. 650 milliGRAM(s) Oral every 6 hours PRN Temp greater or equal to 38C (100.4F), Mild Pain (1 - 3)  acetaminophen 325 mG/butalbital 50 mG/caffeine 40 mG 1 Tablet(s) Oral every 6 hours PRN Moderate Pain (4 - 6)  HYDROmorphone  Injectable 0.5 milliGRAM(s) IV Push every 4 hours PRN Severe Pain (7 - 10)      MICRO:   Cultures     STUDIES:   EKG, CXR, U/S, CT, MRI

## 2021-07-14 NOTE — PROGRESS NOTE ADULT - SUBJECTIVE AND OBJECTIVE BOX
54 y/o male with JESSICA on BIPAP at night, got Pfizer vaccine in 4/2021, noted to be covid swab+ at an Urgent care center on 7/7/2021 and was diagnosed with a LLL pneumonia but was sent to the hospital for further care. In the ER workup revealed transminitis and and MRI of  the abdomen that showed hepatic steatosis but in segment 8 he has a large 5.5x6.4x6.6 cm. multifocal fluid collection with peripheral and septal enhancement and perilesional edema compatible with hepatic abscess.  Just peripheral to the abscess there is a non-enhancing branching linear structure, likely representing a thrombosed right hepatic vein branch. Normal appearing bile ducts and there is an 8 mm. cystic lesion in the head of the pancreas contiguous with the non-dilated PD. No solid enhancing component and no main PD dilatation. Patent portal veins. Thrombus in a branch of the right hepatic vein peripheral to the abscess. No retroperitoneal ISABELLA. Colon: Sigmoid diverticulosis w/o diverticulitis and a small fat containing umbilical hernia.  Of note, the patient has 2 covid nasal swab pcr's here in the hospital that were negative.  Patient has been febrile and last night went for an emergent IR drain placement.   Hematology consulted to address need for anticoagulation in this setting.    He is on enoxaparin and no bleeding reported. Tmax 100.2F today.  OR planned.      MEDICATIONS  (STANDING):  ibuprofen  Tablet. 400 milliGRAM(s) Oral once  meropenem  IVPB 1000 milliGRAM(s) IV Intermittent every 8 hours  multiple electrolytes Injection Type 1 1000 milliLiter(s) (100 mL/Hr) IV Continuous <Continuous>  vancomycin  IVPB 1250 milliGRAM(s) IV Intermittent every 8 hours    MEDICATIONS  (PRN):  acetaminophen   Tablet .. 650 milliGRAM(s) Oral every 6 hours PRN Temp greater or equal to 38C (100.4F), Mild Pain (1 - 3)  acetaminophen 325 mG/butalbital 50 mG/caffeine 40 mG 1 Tablet(s) Oral every 6 hours PRN Moderate Pain (4 - 6)  HYDROmorphone  Injectable 0.5 milliGRAM(s) IV Push every 4 hours PRN Severe Pain (7 - 10)             Vital Signs Last 24 Hrs  T(C): 37.9 (14 Jul 2021 06:00), Max: 38.4 (13 Jul 2021 19:47)  T(F): 100.2 (14 Jul 2021 06:00), Max: 101.2 (13 Jul 2021 19:47)  HR: 73 (14 Jul 2021 08:00) (59 - 86)  BP: 143/100 (14 Jul 2021 08:00) (114/64 - 144/83)  BP(mean): 111 (14 Jul 2021 08:00) (77 - 111)  RR: 22 (14 Jul 2021 08:00) (14 - 34)  SpO2: 94% (14 Jul 2021 08:00) (87% - 100%)  nad  ctab  rrr  RUQ drain in place - serosanguinous fluid      Labs:                 13.2   12.92 )-----------( 230      ( 14 Jul 2021 05:31 )             38.9                             12.7   13.26 )-----------( 204      ( 13 Jul 2021 05:14 )             37.8                           12.0   15.74 )-----------( 177      ( 12 Jul 2021 05:22 )             36.3                             14.3   22.69 )-----------( 250      ( 11 Jul 2021 19:48 )             44.8       07-14    138  |  102  |  8.9  ----------------------------<  102<H>  3.8   |  27.0  |  0.64    Ca    8.7      14 Jul 2021 05:31  Phos  2.3     07-13  Mg     1.7     07-13    TPro  6.5<L>  /  Alb  2.7<L>  /  TBili  1.0  /  DBili  x   /  AST  58<H>  /  ALT  107<H>  /  AlkPhos  295<H>  07-14      07-13    136  |  102  |  13.0  ----------------------------<  109<H>  3.8   |  25.0  |  0.70    Ca    8.6      13 Jul 2021 05:14  Phos  2.3     07-13  Mg     1.7     07-13    TPro  6.4<L>  /  Alb  2.7<L>  /  TBili  0.9  /  DBili  x   /  AST  40<H>  /  ALT  105<H>  /  AlkPhos  304<H>  07-13 07-12    136  |  103  |  18.5  ----------------------------<  118<H>  4.1   |  24.0  |  0.82    Ca    8.7      12 Jul 2021 05:22  Phos  3.5     07-12  Mg     1.6     07-12    TPro  5.8<L>  /  Alb  2.5<L>  /  TBili  1.0  /  DBili  x   /  AST  69<H>  /  ALT  131<H>  /  AlkPhos  319<H>  07-12 07-11    137  |  99  |  17.3  ----------------------------<  94  4.0   |  23.0  |  0.96    Ca    9.6      11 Jul 2021 19:48  Phos  1.7     07-10  Mg     1.6     07-11    TPro  7.2  /  Alb  3.2<L>  /  TBili  1.3  /  DBili  x   /  AST  93<H>  /  ALT  160<H>  /  AlkPhos  355<H>  07-11         56 y/o male with JESSICA on BIPAP at night, got Pfizer vaccine in 4/2021, noted to be covid swab+ at an Urgent care center on 7/7/2021 and was diagnosed with a LLL pneumonia but was sent to the hospital for further care. In the ER workup revealed transminitis and and MRI of  the abdomen that showed hepatic steatosis but in segment 8 he has a large 5.5x6.4x6.6 cm. multifocal fluid collection with peripheral and septal enhancement and perilesional edema compatible with hepatic abscess.  Just peripheral to the abscess there is a non-enhancing branching linear structure, likely representing a thrombosed right hepatic vein branch. Normal appearing bile ducts and there is an 8 mm. cystic lesion in the head of the pancreas contiguous with the non-dilated PD. No solid enhancing component and no main PD dilatation. Patent portal veins. Thrombus in a branch of the right hepatic vein peripheral to the abscess. No retroperitoneal ISABELLA. Colon: Sigmoid diverticulosis w/o diverticulitis and a small fat containing umbilical hernia.  Of note, the patient has 2 covid nasal swab pcr's here in the hospital that were negative.  Patient has been febrile and last night went for an emergent IR drain placement.   Hematology consulted to address need for anticoagulation in this setting.      Drain attempted by IR yesterday, no fluid was able to be removed so catheters removed. Improvement in fluid collection. No bleeding reported. Tmax 100.2F today.  OR planned.      MEDICATIONS  (STANDING):  ibuprofen  Tablet. 400 milliGRAM(s) Oral once  meropenem  IVPB 1000 milliGRAM(s) IV Intermittent every 8 hours  multiple electrolytes Injection Type 1 1000 milliLiter(s) (100 mL/Hr) IV Continuous <Continuous>  vancomycin  IVPB 1250 milliGRAM(s) IV Intermittent every 8 hours    MEDICATIONS  (PRN):  acetaminophen   Tablet .. 650 milliGRAM(s) Oral every 6 hours PRN Temp greater or equal to 38C (100.4F), Mild Pain (1 - 3)  acetaminophen 325 mG/butalbital 50 mG/caffeine 40 mG 1 Tablet(s) Oral every 6 hours PRN Moderate Pain (4 - 6)  HYDROmorphone  Injectable 0.5 milliGRAM(s) IV Push every 4 hours PRN Severe Pain (7 - 10)             Vital Signs Last 24 Hrs  T(C): 37.9 (14 Jul 2021 06:00), Max: 38.4 (13 Jul 2021 19:47)  T(F): 100.2 (14 Jul 2021 06:00), Max: 101.2 (13 Jul 2021 19:47)  HR: 73 (14 Jul 2021 08:00) (59 - 86)  BP: 143/100 (14 Jul 2021 08:00) (114/64 - 144/83)  BP(mean): 111 (14 Jul 2021 08:00) (77 - 111)  RR: 22 (14 Jul 2021 08:00) (14 - 34)  SpO2: 94% (14 Jul 2021 08:00) (87% - 100%)  nad  ctab  rrr        Labs:                 13.2   12.92 )-----------( 230      ( 14 Jul 2021 05:31 )             38.9                             12.7   13.26 )-----------( 204      ( 13 Jul 2021 05:14 )             37.8                           12.0   15.74 )-----------( 177      ( 12 Jul 2021 05:22 )             36.3                             14.3   22.69 )-----------( 250      ( 11 Jul 2021 19:48 )             44.8       07-14    138  |  102  |  8.9  ----------------------------<  102<H>  3.8   |  27.0  |  0.64    Ca    8.7      14 Jul 2021 05:31  Phos  2.3     07-13  Mg     1.7     07-13    TPro  6.5<L>  /  Alb  2.7<L>  /  TBili  1.0  /  DBili  x   /  AST  58<H>  /  ALT  107<H>  /  AlkPhos  295<H>  07-14      07-13    136  |  102  |  13.0  ----------------------------<  109<H>  3.8   |  25.0  |  0.70    Ca    8.6      13 Jul 2021 05:14  Phos  2.3     07-13  Mg     1.7     07-13    TPro  6.4<L>  /  Alb  2.7<L>  /  TBili  0.9  /  DBili  x   /  AST  40<H>  /  ALT  105<H>  /  AlkPhos  304<H>  07-13 07-12    136  |  103  |  18.5  ----------------------------<  118<H>  4.1   |  24.0  |  0.82    Ca    8.7      12 Jul 2021 05:22  Phos  3.5     07-12  Mg     1.6     07-12    TPro  5.8<L>  /  Alb  2.5<L>  /  TBili  1.0  /  DBili  x   /  AST  69<H>  /  ALT  131<H>  /  AlkPhos  319<H>  07-12 07-11    137  |  99  |  17.3  ----------------------------<  94  4.0   |  23.0  |  0.96    Ca    9.6      11 Jul 2021 19:48  Phos  1.7     07-10  Mg     1.6     07-11    TPro  7.2  /  Alb  3.2<L>  /  TBili  1.3  /  DBili  x   /  AST  93<H>  /  ALT  160<H>  /  AlkPhos  355<H>  07-11

## 2021-07-14 NOTE — BRIEF OPERATIVE NOTE - NSICDXBRIEFPROCEDURE_GEN_ALL_CORE_FT
PROCEDURES:  CT guided drainage of abscess of liver 13-Jul-2021 16:07:06  Wagner Santos  
PROCEDURES:  Incision and drainage, abscess, liver 14-Jul-2021 18:35:47  Jeanie Ayers

## 2021-07-14 NOTE — CHART NOTE - NSCHARTNOTEFT_GEN_A_CORE
Surgery Post-Op Note    Pre-Op Dx: Liver abscess  Procedure: Robotic debridement and drainage of liver abscess  Surgeon: Dr. Mccabe, Dr. Blackmon    SUBJECTIVE:  Pt seen and examined at the bedside. Pt w/ no complaints. Reports he is still waking up from anesthesia. Denies F/C/N/V. Pain controlled with medication.     OBJECTIVE:  Vital Signs Last 24 Hrs  T(C): 37 (2021 18:50), Max: 37.9 (2021 22:00)  T(F): 98.6 (2021 18:50), Max: 100.2 (2021 22:00)  HR: 69 (2021 20:00) (62 - 85)  BP: 135/80 (2021 20:00) (103/62 - 145/81)  BP(mean): 96 (2021 20:00) (69 - 111)  RR: 20 (2021 20:00) (10 - 28)  SpO2: 95% (2021 20:00) (87% - 97%)    Physical Exam:  General: NAD, resting comfortably in bed  Pulmonary: Nonlabored breathing, no respiratory distress  Cardiovascular: NSR  Abdominal: soft, ATTP. ND, dressings C/D/I  Drains: RUQ ESSIE drain with sanguinous output  Extremities: WWP    LABS:                        12.2   17.86 )-----------( 261      ( 2021 19:44 )             36.5     07-14    135  |  101  |  9.2  ----------------------------<  147<H>  4.4   |  24.0  |  0.58    Ca    8.2<L>      2021 19:44  Phos  2.4     07-14  Mg     2.2     07-14    TPro  6.4<L>  /  Alb  2.8<L>  /  TBili  1.1  /  DBili  x   /  AST  122<H>  /  ALT  144<H>  /  AlkPhos  275<H>  07-14    PT/INR - ( 2021 05:36 )   PT: 13.1 sec;   INR: 1.14 ratio         PTT - ( 2021 05:36 )  PTT:29.2 sec  CAPILLARY BLOOD GLUCOSE        Urinalysis Basic - ( 2021 20:37 )    Color: Yellow / Appearance: Slightly Turbid / S.015 / pH: x  Gluc: x / Ketone: Negative  / Bili: Negative / Urobili: Negative mg/dL   Blood: x / Protein: 30 mg/dL / Nitrite: Negative   Leuk Esterase: Negative / RBC: x / WBC x   Sq Epi: x / Non Sq Epi: Few / Bacteria: x      LIVER FUNCTIONS - ( 2021 19:44 )  Alb: 2.8 g/dL / Pro: 6.4 g/dL / ALK PHOS: 275 U/L / ALT: 144 U/L / AST: 122 U/L / GGT: x             ASSESSMENT: 55y Male now 4hours s/p robotic debridement and drainage of liver abscess. Recovering appropriately in SICU.    PLAN:  - Pain control  - Encourage IS  - Nausea control PRN  - Diet: CLD, consider advancing in AM  - Will plan to restart hep gtt in AM pending CBC

## 2021-07-14 NOTE — PROGRESS NOTE ADULT - SUBJECTIVE AND OBJECTIVE BOX
INTERVAL HPI/OVERNIGHT EVENTS/SUBJECTIVE: Had IR multiple attempts at drainage yesterday with no success.  Had repeat CT Last night showing still large liver abscess and small amount of pelvic fluid.  Still spiking temps.  .3.  CO Mild pain to former drain sites.  Denies NVD, CP, SOB, HA, weakness or other CO.     ICU Vital Signs Last 24 Hrs  T(C): 37.9 (2021 10:00), Max: 38.4 (2021 19:47)  T(F): 100.2 (2021 10:00), Max: 101.2 (2021 19:47)  HR: 73 (2021 08:00) (62 - 86)  BP: 143/100 (2021 08:00) (114/64 - 144/83)  BP(mean): 111 (2021 08:00) (77 - 111)  ABP: --  ABP(mean): --  RR: 22 (2021 08:00) (16 - 34)  SpO2: 94% (2021 08:00) (87% - 99%)      I&O's Detail    2021 07:01  -  2021 07:00  --------------------------------------------------------  IN:    IV PiggyBack: 50 mL    IV PiggyBack: 250 mL    IV PiggyBack: 500 mL    IV PiggyBack: 150 mL    multiple electrolytes Injection Type 1.: 700 mL  Total IN: 1650 mL    OUT:    Voided (mL): 2500 mL  Total OUT: 2500 mL    Total NET: -850 mL      2021 07:01  -  2021 11:24  --------------------------------------------------------  IN:    multiple electrolytes Injection Type 1.: 200 mL  Total IN: 200 mL    OUT:    Voided (mL): 300 mL  Total OUT: 300 mL    Total NET: -100 mL                MEDICATIONS  (STANDING):  meropenem  IVPB 1000 milliGRAM(s) IV Intermittent every 8 hours  multiple electrolytes Injection Type 1 1000 milliLiter(s) (100 mL/Hr) IV Continuous <Continuous>  senna 2 Tablet(s) Oral at bedtime  vancomycin  IVPB 1250 milliGRAM(s) IV Intermittent every 8 hours    MEDICATIONS  (PRN):  acetaminophen   Tablet .. 650 milliGRAM(s) Oral every 6 hours PRN Temp greater or equal to 38C (100.4F), Mild Pain (1 - 3)  acetaminophen 325 mG/butalbital 50 mG/caffeine 40 mG 1 Tablet(s) Oral every 6 hours PRN Moderate Pain (4 - 6)  HYDROmorphone  Injectable 0.5 milliGRAM(s) IV Push every 4 hours PRN Severe Pain (7 - 10)      PHYSICAL EXAM:     Gen: NAD, Well appearing, No cyanosis, Pallor.    Eyes: PERRL ~ 3mm, EOMI,     Neurological: A&Ox3, GCS 15, No focal deficit.     Neck: Supple. NT AT, FROM no pain.  No JVD. No meningeal signs    Pulmonary: NAD, CTA, = BL .      Cardiovascular: RRR, S1, S2, No Murmurs, rubs or gallops noted.    Gastrointestinal: ND, Soft, NT. Bandages over RUQ and subcostal. CDI. No erythema or drainage noted.     Extremities: NT, AT, no edema, erythema or palpable cord noted.  FROM, = 2+ pulses throughout.      LABS:  CBC Full  -  ( 2021 05:31 )  WBC Count : 12.92 K/uL  RBC Count : 4.52 M/uL  Hemoglobin : 13.2 g/dL  Hematocrit : 38.9 %  Platelet Count - Automated : 230 K/uL  Mean Cell Volume : 86.1 fl  Mean Cell Hemoglobin : 29.2 pg  Mean Cell Hemoglobin Concentration : 33.9 gm/dL  Auto Neutrophil # : 9.82 K/uL  Auto Lymphocyte # : 1.50 K/uL  Auto Monocyte # : 1.09 K/uL  Auto Eosinophil # : 0.16 K/uL  Auto Basophil # : 0.05 K/uL  Auto Neutrophil % : 76.1 %  Auto Lymphocyte % : 11.6 %  Auto Monocyte % : 8.4 %  Auto Eosinophil % : 1.2 %  Auto Basophil % : 0.4 %        138  |  102  |  8.9  ----------------------------<  102<H>  3.8   |  27.0  |  0.64    Ca    8.7      2021 05:31  Phos  2.3     -  Mg     1.7         TPro  6.5<L>  /  Alb  2.7<L>  /  TBili  1.0  /  DBili  x   /  AST  58<H>  /  ALT  107<H>  /  AlkPhos  295<H>      PT/INR - ( 2021 05:36 )   PT: 13.1 sec;   INR: 1.14 ratio         PTT - ( 2021 05:36 )  PTT:29.2 sec  Urinalysis Basic - ( 2021 20:37 )    Color: Yellow / Appearance: Slightly Turbid / S.015 / pH: x  Gluc: x / Ketone: Negative  / Bili: Negative / Urobili: Negative mg/dL   Blood: x / Protein: 30 mg/dL / Nitrite: Negative   Leuk Esterase: Negative / RBC: x / WBC x   Sq Epi: x / Non Sq Epi: Few / Bacteria: x      RECENT CULTURES:   .Smear XXXX   Numerous polymorphonuclear leukocytes seen per low power field  No organisms seen per oil power field XXXX     .Abscess hepatic collection XXXX XXXX   No growth     .Blood Blood-Peripheral XXXX XXXX   No growth at 48 hours     .Blood Blood-Peripheral XXXX XXXX   No growth at 48 hours    07-10 .Blood Blood XXXX XXXX   No growth at 48 hours        LIVER FUNCTIONS - ( 2021 05:31 )  Alb: 2.7 g/dL / Pro: 6.5 g/dL / ALK PHOS: 295 U/L / ALT: 107 U/L / AST: 58 U/L / GGT: x               CAPILLARY BLOOD GLUCOSE      RADIOLOGY & ADDITIONAL STUDIES:    ASSESSMENT/PLAN:  55yMale presenting with: 55 M SIRS, Sepsis, hepatic abscess vs neoplasm, hepatic vein thrombosis    Neurological:  Tylenol and Dilaudid PRN.     Pulmonary: Pt on Ra currently.  BiPAP at night PRN.    Cardiovascular: Dr Jean will perform VÍCTOR in OR Today just prior to Operative intervention.     Gastrointestinal: NPO for OR.    Genitourinary: Voids    Heme: SCDs     ID: Cayden / Vanco.   Trough in good range today.  Will CW this. FU CX.  Will Go to OR Today.      Dispo: Plan of care as above.  OR Today.             CRITICAL CARE TIME SPENT: 0

## 2021-07-14 NOTE — BRIEF OPERATIVE NOTE - NSICDXBRIEFPREOP_GEN_ALL_CORE_FT
PRE-OP DIAGNOSIS:  Liver abscess 13-Jul-2021 16:10:19  Wagner Santos  
PRE-OP DIAGNOSIS:  Liver abscess 13-Jul-2021 16:10:19  Wagner Santos

## 2021-07-14 NOTE — CHART NOTE - NSCHARTNOTEFT_GEN_A_CORE
Surgery Preop Note    Patient is a 55y old  Male who presents with a chief complaint of fever and chills (2021 16:26)    Diagnosis: Liver abscess, Sepsis, pancreatic cyst, branch of hepatic vein thrombosis  Procedure: I & D  Surgeon: Eliot                          [id number]    )-----------( 204      ( 2021 05:14 )             37.8     07-13    136  |  102  |  13.0  ----------------------------<  109<H>  3.8   |  25.0  |  0.70    Ca    8.6      2021 05:14  Phos  2.3     -  Mg     1.7         TPro  6.4<L>  /  Alb  2.7<L>  /  TBili  0.9  /  DBili  x   /  AST  40<H>  /  ALT  105<H>  /  AlkPhos  304<H>      PT/INR - ( 2021 05:22 )   PT: 13.8 sec;   INR: 1.20 ratio           Urinalysis Basic - ( 2021 20:37 )    Color: Yellow / Appearance: Slightly Turbid / S.015 / pH: x  Gluc: x / Ketone: Negative  / Bili: Negative / Urobili: Negative mg/dL   Blood: x / Protein: 30 mg/dL / Nitrite: Negative   Leuk Esterase: Negative / RBC: x / WBC x   Sq Epi: x / Non Sq Epi: Few / Bacteria: x          [x] CBC  [x ] BMP  [x] PT/PTT/INR  [x ] Chest X-ray  [x ] NPO/IVF  [x] Med Clearance  [x ] Added on to OR Schedule  [x] COVID    Assessment & Plan:  55yMale with Liver abscess, Sepsis, pancreatic cyst, branch of hepatic vein thrombosis  -For OR for I&D

## 2021-07-14 NOTE — PROGRESS NOTE ADULT - SUBJECTIVE AND OBJECTIVE BOX
[facility]                                                               Office: [address] [address]                                                              Telephone: [phone]. Fax:[phone]                                                                             PROGRESS NOTE  Reason for follow up: Hepatic abscesses  Update: Patient to go to the OR today for I&D of the hepatic abscesses today, will have VÍCTOR intra-operatively to evaluate for endocarditis. Patient still with low grade fevers all night.     Review of symptoms:   Cardiac:  No chest pain. No dyspnea. No palpitations.  Respiratory: No cough. No dyspnea  Gastrointestinal: No diarrhea. No abdominal pain. No bleeding.     Past medical history: No updates.   	  Vitals:  T(C): 37.9 (07-14-21 @ 10:00), Max: 38.4 (07-13-21 @ 19:47)  HR: 69 (07-14-21 @ 11:00) (62 - 86)  BP: 137/93 (07-14-21 @ 11:00) (103/62 - 144/83)  RR: 15 (07-14-21 @ 11:00) (15 - 34)  SpO2: 95% (07-14-21 @ 11:00) (87% - 99%)  Wt(kg): --  I&O's Summary    13 Jul 2021 07:01  -  14 Jul 2021 07:00  --------------------------------------------------------  IN: 1650 mL / OUT: 2500 mL / NET: -850 mL    14 Jul 2021 07:01  -  14 Jul 2021 12:09  --------------------------------------------------------  IN: 700 mL / OUT: 550 mL / NET: 150 mL      Weight (kg): 84.4 (07-09 @ 12:40)      PHYSICAL EXAM:  Appearance: Comfortable. No acute distress  HEENT:  Head and neck: Atraumatic. Normocephalic.  Normal oral mucosa, PERRL, Neck is supple. No JVD, No carotid bruit.   Neurologic: A&Ox 3, no focal deficits. EOMI, Cranial nerves are intact.  Lymphatic: No cervical lymphadenopathy  Cardiovascular: Normal S1 S2, No murmur, rubs/gallops. No JVD, No edema  Respiratory: Lungs clear to auscultation  Gastrointestinal:  Soft, Non-tender, + BS  Lower Extremities: No edema  Psychiatry: Patient is calm. No agitation. Mood & affect appropriate  Skin: No rashes/ecchymoses/cyanosis/ulcers visualized on the face, hands or feet.      CURRENT MEDICATIONS:    meropenem  IVPB  vancomycin  IVPB  senna  multiple electrolytes Injection Type 1      DIAGNOSTIC TESTING:  [ ] Echocardiogram:   < from: TTE Echo Complete w/ Contrast w/ Doppler (07.12.21 @ 22:18) >  PHYSICIAN INTERPRETATION:  Left Ventricle: The left ventricular internal cavity size is normal.  Left ventricular ejection fraction, by visual estimation, is 50 to 55%. Spectral Doppler shows normal pattern of LV diastolic filling.  Right Ventricle: The right ventricular size is normal. RV systolic function is normal.  Left Atrium: Mildly enlarged left atrium.  Right Atrium: Mildly enlarged right atrium.  Pericardium: Trivial pericardial effusion is present. The pericardial effusion is posterior and lateral to the left ventricle.  Mitral Valve: There is mild mitral annularcalcification. Mild mitral valve regurgitation is seen.  Tricuspid Valve: The tricuspid valve is normal in structure. Mild tricuspid regurgitation is visualized.  Aortic Valve: The aortic valve is trileaflet. Sclerotic aortic valve with normal opening. No evidence of aortic valve regurgitation is seen.  Pulmonic Valve: The pulmonic valve is normal. Trace pulmonic valve regurgitation.  Aorta: The aortic root is normal in size and structure.  Pulmonary Artery: The pulmonary artery is of normal sizeand origin.  Venous: The pulmonary veins appear normal.      Summary:   1. Left ventricular ejection fraction, by visual estimation, is 50 to 55%.   2. Mildly enlarged left atrium.   3. Mildly enlarged right atrium.   4. Mild mitral annular calcification.   5. Mild mitral valve regurgitation.   6. Mild tricuspid regurgitation.   7. Sclerotic aortic valve with normal opening.    MD Zoë Electronically signed on 7/13/2021 at 8:52:54 AM    < end of copied text >    OTHER: 	  < from: CT Abdomen and Pelvis w/ IV Cont (07.13.21 @ 23:47) >  FINDINGS:  LOWER CHEST: Bilateral pleural effusions with bilateral lower lobe consolidation.    LIVER: There is a regular poorly encapsulated/poorly defined low-attenuation lesion within the RIGHT lobe of the liver measuring 7.4 x 6.1 x 6.6 cm which corresponds tothe lesion seen on prior MRI dated July 10, 2020 oh is consistent with hepatic abscess. There is peripheral low density within the liver likely related to perfusion abnormality. Prior MRI demonstrated hepatic vein branch thrombosis in this same region. This is less well seen on the current study.  BILE DUCTS: Normal caliber.  GALLBLADDER: Within normal limits.  SPLEEN: Within normal limits.  PANCREAS: There is a 7 to 8 mm cystic lesion in the pancreatic head (3-58), better seen on prior MRI and likely represents side branch IPMN. The pancreas is otherwise unremarkable.  ADRENALS: Within normal limits.  KIDNEYS/URETERS: Bilateral parapelvic cysts without hydronephrosis.    BLADDER: Mildly thick-walled bladder. Incompletely distended.  REPRODUCTIVE ORGANS: Enlarged heterogeneous prostate.    BOWEL: No bowel obstruction. Diverticulosis without diverticulitis. Appendix is normal.  PERITONEUM: Small volume ascites.  VESSELS: Within normal limits.  RETROPERITONEUM/LYMPH NODES: No lymphadenopathy.  ABDOMINAL WALL: Within normal limits.  BONES: Within normal limits.    IMPRESSION:  1.  Hepatic abscess similar in size/appearance to prior MRI dated July 10, 2021.  2.  Bilateral parapelvic renal cysts without hydronephrosis.  3.  Small volume ascites.    --- End of Report ---      < end of copied text >      LABS:	 	                            13.2   12.92 )-----------( 230      ( 14 Jul 2021 05:31 )             38.9     07-14    138  |  102  |  8.9  ----------------------------<  102<H>  3.8   |  27.0  |  0.64    Ca    8.7      14 Jul 2021 05:31  Phos  2.3     07-13  Mg     1.7     07-13    TPro  6.5<L>  /  Alb  2.7<L>  /  TBili  1.0  /  DBili  x   /  AST  58<H>  /  ALT  107<H>  /  AlkPhos  295<H>  07-14    proBNP:   Lipid Profile:   HgA1c:   TSH:       TELEMETRY: Reviewed  SR

## 2021-07-14 NOTE — PROGRESS NOTE ADULT - ATTENDING COMMENTS
pt was seen and examined  plan of care dw np.   pt is for OR today. VÍCTOR did not show any evidence of endocarditis.  FU cultures.  We will follow with you.

## 2021-07-14 NOTE — PROGRESS NOTE ADULT - ASSESSMENT
55M w/ hx of sleep apnea on bipap at night who presents from Urgent Care who presents with chills. Of note, he was recently diagnosed with COVID on Wednesday by rapid test  and his sx had started one week ago. He started having night sweats, cough w/o productive sputum, chills, and fever (Tmax- 104F). His wife has also tested positive for COVID19 and is currently asymptomatic. He had gone to the urgent care and was diagnosed with LLL PNA. He has been vaccinated with Pfizer in April. He was febrile to 100.4F and having significant chills. Also, has nausea, SOB, and headache. Denies vomiting, dizziness, abdominal pain and chest pain. He was subsequently told by urgent care to come to the hospital for further management. He has been taking tylenol as needed.      PT REPORTS HE HAS HAD NIGHT SWEATS  HAD DENTAL PROCEDURE IN EARLY JUNE  OUT PT RAPID COVID WAS NEG      PT HAD COVID PCR  TEST HERE  x2 WHICH WAS NEGATIVE  CT SCAN CHEST SHOWED A    LIVER LESION  pt s/p   MRI OF LIVER    WHICH SHOWED COLLECTION C/W ABSCESS AS WELL AS THROMBUS    ALSO ? CYSTIC ARE IN PANCREAS IPMN  PT HAD INCREASE FEVERS 7/11 CODE SEPSIS CALLED WBC AND LACTATE UP  IR CALLED  AND DRAINAGE WAS DONE    CX AND CYTOLOGY SENT  PT WAS  TRANSFERRED TO ICU FOR FURTHER MONITORING  LAST PM DRAIN WAS PARTIALLY PULLED BY   ACCIDENT WHEN PT GETTING OUT OF BED          REPEAT ABD IMAGING and IR EVAL DONE  NO MORE FLUID ABLE TO BE DRAINED   PT FOR OR TODAY    ON MERREM /VANCO   ISSUES MAY BE MORE THE NIDUS RATHER THAN ABX ISSUE  IN ADDITION PT HAD ECHOCARDIOGRAM NO EVIDENCE OF VALVE INFECTION FOR  VÍCTOR TO FURTHER EVALUATE  ALL BLOOD CX HAVE BEEN NEGATIVE  WILL FOLLOW UP SPOKE TO WIFE ON PHONE

## 2021-07-14 NOTE — PROGRESS NOTE ADULT - ATTENDING COMMENTS
7/11/2021 - percutaneous drainage of segment 8 liver abscess (likely hematogenous spread after dental cleaning)  -BCx (7/10) - NGTD x 2 sets  -BCx (7/11) - NGTD x 2 sets  -abscess Cx (7/12) - NGTD  -vancomycin (7/11 - ?)  -Zosyn (7/10 - 7/13), meropenem (7/13 - ?)  -IR unable to replace dislodged drain on 7/13  -Tm = 101.2 (7/13 @ 1947)  -plan operative drainage of liver abscess today    hepatic vein thrombosis (segmental branch adjacent to liver abscess)  -hold therapeutic anticoagulation for surgery 7/11/2021 - percutaneous drainage of segment 8 liver abscess (likely hematogenous spread after dental cleaning)  -BCx (7/10) - NGTD x 2 sets  -BCx (7/11) - NGTD x 2 sets  -abscess Cx (7/12) - NGTD  -vancomycin (7/11 - ?)  -Zosyn (7/10 - 7/13), meropenem (7/13 - ?)  -IR unable to replace dislodged drain on 7/13  -Tm = 101.2 (7/13 @ 1947)  -plan VÍCTOR to r/o infective endocarditis  -plan operative drainage of liver abscess today    hepatic vein thrombosis (segmental branch adjacent to liver abscess)  -hold therapeutic anticoagulation for surgery

## 2021-07-14 NOTE — BRIEF OPERATIVE NOTE - OPERATION/FINDINGS
3 liver abscesses. CT guided Drainage catheters placed in all three but no pus could be obtained. US imaging showed no visible fluid in the collections and significant decrease in size from prior studies. Therefore the catheters were removed.
Intraop US of liver demonstrated multiple areas of loculated collections. Upon debridement various sections of necrotic liver tissues noted with septated areas of purulent pockets. All reachable  areas identified with US and open. Copious irrigation and suction performed. 19F muriel drain placed and held with liver stitch 3.0 silk. Drain moveable. Secured at level of skin. 12mm ports closed laparoscopically with 0vicryl. Skin closed with monocryl.  LAp TAP block performed

## 2021-07-14 NOTE — PROGRESS NOTE ADULT - ASSESSMENT
A/P: 54 y/o M with a PMHx of of JESSICA (on BiPAP) who presented to the ED from Urgent Care with complaints of fevers, chills, and weakness. Patient states that last week he began having a lot of fevers, chills, and rigors and was diagnosed with Covid at an Urgent care (rapid testing). Patient states that his fevers and rigors continued and worsened, so he went back to the ER to re-evaluate, and they sent him to the ER for further management. Patient has tested negative for Covid (PCR testing) x 2 since arrival, but bloodwork showed leukocytosis, transaminitis, and elevated procalcitonin. Patient found incidentally to have hepatic abscesses, and was placed on IV antibiotics by ID. Patient became a code sepsis on  07/11/21 prompting IDU admission with aggressive IVF administration for elevated lactate. Patient underwent IR drainage of the hepatic abscesses on 07/11/21, but last night had accidental partal removal of hepatic drain while moving out of bed. Patient has continued to have high fevers and chills throughout the night and this morning, and is planned to have IR adjust the drain. Patient notes that early in June he went to the periodontist, but otherwise has had no other issues. Patient denies any chest pain, syncope, near syncope, N/V/D, headache, or dizziness.     Hepatic Abscesses  - Unknown source.   - Dental work early June 2021.  - Blood cultures negative x 48 hours.   - IR drain placed, but accidentally dislodged.   - OR today for I & D.   - VÍCTOR intra-operatively.   - ID following, continue antibiotics.     Assessment and recommendations are final when note is signed by the attending.

## 2021-07-14 NOTE — PROGRESS NOTE ADULT - SUBJECTIVE AND OBJECTIVE BOX
INFECTIOUS DISEASES AND INTERNAL MEDICINE at [place]  =======================================================  Henri Salazar MD  Diplomates American Board of Internal Medicine and Infectious Diseases  Telephone [phone]  Fax            [phone]  =======================================================    [place] [id number]    Follow up: liver abscess S/P IR GUIDED DRAINAGE      Allergies:  No Known Allergies      Medications:  acetaminophen   Tablet .. 650 milliGRAM(s) Oral every 6 hours PRN  enoxaparin Injectable 40 milliGRAM(s) SubCutaneous daily  piperacillin/tazobactam IVPB.. 3.375 Gram(s) IV Intermittent every 8 hours  potassium chloride   Solution 40 milliEquivalent(s) Oral once    SOCIAL       FAMILY   FAMILY HISTORY:  FH: heart attack (Father)      REVIEW OF SYSTEMS:  CONSTITUTIONAL:  No Fever or chills  HEENT:   No diplopia or blurred vision.  No earache, sore throat or runny nose.  CARDIOVASCULAR:  No pressure, squeezing, strangling, tightness, heaviness or aching about the chest, neck, axilla or epigastrium.  RESPIRATORY:    cough, shortness of breath,    GASTROINTESTINAL:  No nausea, vomiting or diarrhea.  GENITOURINARY:  No dysuria, frequency or urgency. No Blood in urine  MUSCULOSKELETAL:   moves all joints  SKIN:  No change in skin, hair or nails.  NEUROLOGIC:  No paresthesias, fasciculations, seizures or weakness.  PSYCHIATRIC:  No disorder of thought or mood.  ENDOCRINE:  No heat or cold intolerance, polyuria or polydipsia.  HEMATOLOGICAL:  No easy bruising or bleeding.            Physical Exam:  I   Vital Signs Last 24 Hrs  T(C): 37.9 (14 Jul 2021 06:00), Max: 38.4 (13 Jul 2021 19:47)  T(F): 100.2 (14 Jul 2021 06:00), Max: 101.2 (13 Jul 2021 19:47)  HR: 73 (14 Jul 2021 08:00) (59 - 86)  BP: 143/100 (14 Jul 2021 08:00) (114/64 - 144/83)  BP(mean): 111 (14 Jul 2021 08:00) (77 - 111)  RR: 22 (14 Jul 2021 08:00) (14 - 34)  SpO2: 94% (14 Jul 2021 08:00) (87% - 100%)      GEN: NAD,   HEENT: normocephalic and atraumatic. EOMI. GUANAKO.    NECK: Supple. No carotid bruits.  No lymphadenopathy or thyromegaly.  LUNGS: Clear to auscultation.  HEART: Regular rate and rhythm without murmur.  ABDOMEN: Soft, nontender, and nondistended.  Positive bowel sounds.     : No CVA tenderness  EXTREMITIES: Without any cyanosis, clubbing, rash, lesions or edema.  MSK: no joint swelling  NEUROLOGIC: Cranial nerves II through XII are grossly intact.  PSYCHIATRIC: Appropriate affect .  SKIN: No ulceration or induration present.        Labs:  Vi     =======================================================  Current Antibiotics:  piperacillin/tazobactam IVPB.. 3.375 Gram(s) IV Intermittent every 8 hours  VANCO    Other medications:  enoxaparin Injectable 40 milliGRAM(s) SubCutaneous daily  potassium chloride   Solution 40 milliEquivalent(s) Oral once      =======================================================  Labs:                     (                       13.2   12.92 )-----------( 230      ( 14 Jul 2021 05:31 )             38.9   07-14    138  |  102  |  8.9  ----------------------------<  102<H>  3.8   |  27.0  |  0.64    Ca    8.7      14 Jul 2021 05:31  Phos  2.3     07-13  Mg     1.7     07-13    TPro  6.5<L>  /  Alb  2.7<L>  /  TBili  1.0  /  DBili  x   /  AST  58<H>  /  ALT  107<H>  /  AlkPhos  295<H>  07-14    Procalcitonin, Serum: 11.61 ng/mL (07-11-21 @ 08:28)  Procalcitonin, Serum: 21.40 ng/mL (07-09-21 @ 13:39)    D-Dimer Assay, Quantitative: 467 ng/mL DDU (07-10-21 @ 13:54)    Ferritin, Serum: 260 ng/mL (07-10-21 @ 13:53)  Ferritin, Serum: 275 ng/mL (07-09-21 @ 13:39)    C-Reactive Protein, Serum: 324 mg/L (07-10-21 @ 13:53)  C-Reactive Protein, Serum: 263 mg/L (07-09-21 @ 13:39)    WBC Count: 16.70 K/uL (07-11-21 @ 08:28)  WBC Count: 19.84 K/uL (07-10-21 @ 08:30)  WBC Count: 19.07 K/uL (07-09-21 @ 13:39)      [facility] (07-10-21 @ 10:11)  COVID-19 PCR: [facility] (07-09-21 @ 13:39)      Alkaline Phosphatase, Serum: 247 U/L (07-11-21 @ 08:28)  Alkaline Phosphatase, Serum: 166 U/L (07-10-21 @ 08:30)  Alkaline Phosphatase, Serum: 156 U/L (07-09-21 @ 13:39)  Alanine Aminotransferase (ALT/SGPT): 136 U/L (07-11-21 @ 08:28)  Alanine Aminotransferase (ALT/SGPT): 150 U/L (07-10-21 @ 08:30)  Alanine Aminotransferase (ALT/SGPT): 160 U/L (07-09-21 @ 13:39)  Aspartate Aminotransferase (AST/SGOT): 59 U/L (07-11-21 @ 08:28)  Aspartate Aminotransferase (AST/SGOT): 71 U/L (07-10-21 @ 08:30)  Aspartate Aminotransferase (AST/SGOT): 92 U/L (07-09-21 @ 13:39)  Bilirubin Total, Serum: 1.3 mg/dL (07-11-21 @ 08:28)  Bilirubin Total, Serum: 1.0 mg/dL (07-10-21 @ 08:30)  Bilirubin Total, Serum: 2.2 mg/dL (07-09-21 @ 13:39)

## 2021-07-14 NOTE — BRIEF OPERATIVE NOTE - COMMENTS
wound class IV   sign out provided to ICU, may restart heparin drip in am, targeting lower PTT 50-70  ADAT  restart IV abx and adjust based on C&S results wound class IV   sign out provided to ICU, may restart heparin drip in am, targeting lower PTT 50-70  ADAT  restart IV abx and adjust based on C&S results      No qualified resident was available to assist in this case. I have personally first assisted the robotic Surgeon listed in this brief op note throughout the entirety of this case.

## 2021-07-14 NOTE — PROGRESS NOTE ADULT - ASSESSMENT
54 y/o M with JESSICA admitted with fevers, cough, found to have transaminitis and subsequently MRI showed liver abscess and possible hepatic vein thrombus.    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 Continue with lovenox. Agree with repeat imaging after drainage slows down. Can re-assess thrombus at that time and rule out any underlying process that may have been concealed by abscess.  Duration can be determined as outpatient and depending on abscess response to treatment.    2) Liver abscess - Drain placed by IR and draining. ID, surgery on board. Cytology sent. Tumor markers unrevealing with normal values.    3) Fevers - still febrile this morning sec to abscess, possible underlying malignancy? Post-drainage imaging at some point may help clarify this. On wide spectrum antibiotics. ID on board.  Cultures pending.     4) False positive Covid + at urgent care - nasal swab pcr negative x 2 during his stay here so far. ID on board.       Thank you,    Dyllan Espinoza MD  [facility] 54 y/o M with JESSICA admitted with fevers, cough, found to have transaminitis and subsequently MRI showed liver abscess and possible hepatic vein thrombus.    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 Thrombus not reported on repeat CT scan. Re-assess AC after OR intervention.     2) Liver abscess - Improved. catheters removed. ID, surgery on board. Cytology sent. Tumor markers unrevealing with normal values. OR planned.    3) Fevers - still febrile this morning sec to abscess, possible underlying malignancy? Post-drainage imaging showed improvement. On wide spectrum antibiotics. ID on board.  Cultures pending.     4) False positive Covid + at urgent care - nasal swab pcr negative x 2 during his stay here so far. ID on board.       Thank you,    Dyllan Espinoza MD  [facility]

## 2021-07-14 NOTE — PROGRESS NOTE ADULT - ASSESSMENT
56 yo M S/P tolerated IR drain and antibiotics, pt is NPO for I&D surgery     Recommendations    - Monitor drain output quality and quantity   - Surgery scheduled (7/14)

## 2021-07-14 NOTE — PROGRESS NOTE ADULT - ATTENDING COMMENTS
7/11/2021 - percutaneous drainage of segment 8 liver abscess (likely hematogenous spread after dental cleaning)  -BCx (7/10) - NGTD x 2 sets  -BCx (7/11) - NGTD x 2 sets  -abscess Cx (7/12) - NGTD  -vancomycin (7/11 - ?)  -Zosyn (7/10 - 7/13), meropenem (7/13 - ?)  -IR unable to replace dislodged drain on 7/13  -Tm = 101.2 (7/13 @ 1947)  -plan VÍCTOR to r/o infective endocarditis  -plan operative drainage of liver abscess today    hepatic vein thrombosis (segmental branch adjacent to liver abscess)  -hold therapeutic anticoagulation for surgery

## 2021-07-15 LAB
ALBUMIN SERPL ELPH-MCNC: 2.7 G/DL — LOW (ref 3.3–5.2)
ALP SERPL-CCNC: 257 U/L — HIGH (ref 40–120)
ALT FLD-CCNC: 129 U/L — HIGH
ANION GAP SERPL CALC-SCNC: 10 MMOL/L — SIGNIFICANT CHANGE UP (ref 5–17)
APTT BLD: 25.9 SEC — LOW (ref 27.5–35.5)
APTT BLD: 26.1 SEC — LOW (ref 27.5–35.5)
APTT BLD: 26.6 SEC — LOW (ref 27.5–35.5)
AST SERPL-CCNC: 89 U/L — HIGH
BASOPHILS # BLD AUTO: 0 K/UL — SIGNIFICANT CHANGE UP (ref 0–0.2)
BASOPHILS NFR BLD AUTO: 0 % — SIGNIFICANT CHANGE UP (ref 0–2)
BILIRUB DIRECT SERPL-MCNC: 0.3 MG/DL — SIGNIFICANT CHANGE UP (ref 0–0.3)
BILIRUB INDIRECT FLD-MCNC: 0.4 MG/DL — SIGNIFICANT CHANGE UP (ref 0.2–1)
BILIRUB SERPL-MCNC: 0.7 MG/DL — SIGNIFICANT CHANGE UP (ref 0.4–2)
BUN SERPL-MCNC: 10 MG/DL — SIGNIFICANT CHANGE UP (ref 8–20)
CALCIUM SERPL-MCNC: 8 MG/DL — LOW (ref 8.6–10.2)
CHLORIDE SERPL-SCNC: 101 MMOL/L — SIGNIFICANT CHANGE UP (ref 98–107)
CO2 SERPL-SCNC: 25 MMOL/L — SIGNIFICANT CHANGE UP (ref 22–29)
CREAT SERPL-MCNC: 0.67 MG/DL — SIGNIFICANT CHANGE UP (ref 0.5–1.3)
CULTURE RESULTS: SIGNIFICANT CHANGE UP
CULTURE RESULTS: SIGNIFICANT CHANGE UP
E HISTOLYT AB SER-ACNC: NEGATIVE — SIGNIFICANT CHANGE UP
EOSINOPHIL # BLD AUTO: 0 K/UL — SIGNIFICANT CHANGE UP (ref 0–0.5)
EOSINOPHIL NFR BLD AUTO: 0 % — SIGNIFICANT CHANGE UP (ref 0–6)
GLUCOSE SERPL-MCNC: 147 MG/DL — HIGH (ref 70–99)
GRAM STN FLD: SIGNIFICANT CHANGE UP
GRAM STN FLD: SIGNIFICANT CHANGE UP
HCT VFR BLD CALC: 35.2 % — LOW (ref 39–50)
HCT VFR BLD CALC: 37 % — LOW (ref 39–50)
HCT VFR BLD CALC: 38.7 % — LOW (ref 39–50)
HGB BLD-MCNC: 12 G/DL — LOW (ref 13–17)
HGB BLD-MCNC: 12.5 G/DL — LOW (ref 13–17)
HGB BLD-MCNC: 13.1 G/DL — SIGNIFICANT CHANGE UP (ref 13–17)
LYMPHOCYTES # BLD AUTO: 0.64 K/UL — LOW (ref 1–3.3)
LYMPHOCYTES # BLD AUTO: 2.6 % — LOW (ref 13–44)
MAGNESIUM SERPL-MCNC: 2.1 MG/DL — SIGNIFICANT CHANGE UP (ref 1.6–2.6)
MANUAL SMEAR VERIFICATION: SIGNIFICANT CHANGE UP
MCHC RBC-ENTMCNC: 28.6 PG — SIGNIFICANT CHANGE UP (ref 27–34)
MCHC RBC-ENTMCNC: 28.9 PG — SIGNIFICANT CHANGE UP (ref 27–34)
MCHC RBC-ENTMCNC: 29 PG — SIGNIFICANT CHANGE UP (ref 27–34)
MCHC RBC-ENTMCNC: 33.8 GM/DL — SIGNIFICANT CHANGE UP (ref 32–36)
MCHC RBC-ENTMCNC: 33.9 GM/DL — SIGNIFICANT CHANGE UP (ref 32–36)
MCHC RBC-ENTMCNC: 34.1 GM/DL — SIGNIFICANT CHANGE UP (ref 32–36)
MCV RBC AUTO: 83.8 FL — SIGNIFICANT CHANGE UP (ref 80–100)
MCV RBC AUTO: 85.5 FL — SIGNIFICANT CHANGE UP (ref 80–100)
MCV RBC AUTO: 85.6 FL — SIGNIFICANT CHANGE UP (ref 80–100)
METAMYELOCYTES # FLD: 0.9 % — HIGH (ref 0–0)
MONOCYTES # BLD AUTO: 0.22 K/UL — SIGNIFICANT CHANGE UP (ref 0–0.9)
MONOCYTES NFR BLD AUTO: 0.9 % — LOW (ref 2–14)
NEUTROPHILS # BLD AUTO: 23.6 K/UL — HIGH (ref 1.8–7.4)
NEUTROPHILS NFR BLD AUTO: 95.6 % — HIGH (ref 43–77)
NIGHT BLUE STAIN TISS: SIGNIFICANT CHANGE UP
NIGHT BLUE STAIN TISS: SIGNIFICANT CHANGE UP
PHOSPHATE SERPL-MCNC: 3.3 MG/DL — SIGNIFICANT CHANGE UP (ref 2.4–4.7)
PLAT MORPH BLD: NORMAL — SIGNIFICANT CHANGE UP
PLATELET # BLD AUTO: 298 K/UL — SIGNIFICANT CHANGE UP (ref 150–400)
PLATELET # BLD AUTO: 347 K/UL — SIGNIFICANT CHANGE UP (ref 150–400)
PLATELET # BLD AUTO: 360 K/UL — SIGNIFICANT CHANGE UP (ref 150–400)
POLYCHROMASIA BLD QL SMEAR: SLIGHT — SIGNIFICANT CHANGE UP
POTASSIUM SERPL-MCNC: 4.2 MMOL/L — SIGNIFICANT CHANGE UP (ref 3.5–5.3)
POTASSIUM SERPL-SCNC: 4.2 MMOL/L — SIGNIFICANT CHANGE UP (ref 3.5–5.3)
PROT SERPL-MCNC: 6.3 G/DL — LOW (ref 6.6–8.7)
RBC # BLD: 4.2 M/UL — SIGNIFICANT CHANGE UP (ref 4.2–5.8)
RBC # BLD: 4.33 M/UL — SIGNIFICANT CHANGE UP (ref 4.2–5.8)
RBC # BLD: 4.52 M/UL — SIGNIFICANT CHANGE UP (ref 4.2–5.8)
RBC # FLD: 13.1 % — SIGNIFICANT CHANGE UP (ref 10.3–14.5)
RBC # FLD: 13.2 % — SIGNIFICANT CHANGE UP (ref 10.3–14.5)
RBC # FLD: 13.2 % — SIGNIFICANT CHANGE UP (ref 10.3–14.5)
RBC BLD AUTO: NORMAL — SIGNIFICANT CHANGE UP
SODIUM SERPL-SCNC: 136 MMOL/L — SIGNIFICANT CHANGE UP (ref 135–145)
SPECIMEN SOURCE: SIGNIFICANT CHANGE UP
VANCOMYCIN TROUGH SERPL-MCNC: 15.9 UG/ML — SIGNIFICANT CHANGE UP (ref 10–20)
WBC # BLD: 24.43 K/UL — HIGH (ref 3.8–10.5)
WBC # BLD: 24.69 K/UL — HIGH (ref 3.8–10.5)
WBC # BLD: 25.28 K/UL — HIGH (ref 3.8–10.5)
WBC # FLD AUTO: 24.43 K/UL — HIGH (ref 3.8–10.5)
WBC # FLD AUTO: 24.69 K/UL — HIGH (ref 3.8–10.5)
WBC # FLD AUTO: 25.28 K/UL — HIGH (ref 3.8–10.5)

## 2021-07-15 PROCEDURE — 99233 SBSQ HOSP IP/OBS HIGH 50: CPT

## 2021-07-15 PROCEDURE — 99232 SBSQ HOSP IP/OBS MODERATE 35: CPT

## 2021-07-15 RX ORDER — HEPARIN SODIUM 5000 [USP'U]/ML
1300 INJECTION INTRAVENOUS; SUBCUTANEOUS
Qty: 25000 | Refills: 0 | Status: DISCONTINUED | OUTPATIENT
Start: 2021-07-15 | End: 2021-07-16

## 2021-07-15 RX ORDER — HEPARIN SODIUM 5000 [USP'U]/ML
900 INJECTION INTRAVENOUS; SUBCUTANEOUS
Qty: 25000 | Refills: 0 | Status: DISCONTINUED | OUTPATIENT
Start: 2021-07-15 | End: 2021-07-15

## 2021-07-15 RX ORDER — CHLORHEXIDINE GLUCONATE 213 G/1000ML
1 SOLUTION TOPICAL DAILY
Refills: 0 | Status: DISCONTINUED | OUTPATIENT
Start: 2021-07-15 | End: 2021-07-20

## 2021-07-15 RX ADMIN — Medication 650 MILLIGRAM(S): at 07:19

## 2021-07-15 RX ADMIN — SENNA PLUS 1 TABLET(S): 8.6 TABLET ORAL at 21:13

## 2021-07-15 RX ADMIN — Medication 62.5 MILLIMOLE(S): at 00:09

## 2021-07-15 RX ADMIN — TRAMADOL HYDROCHLORIDE 25 MILLIGRAM(S): 50 TABLET ORAL at 18:20

## 2021-07-15 RX ADMIN — Medication 166.67 MILLIGRAM(S): at 06:23

## 2021-07-15 RX ADMIN — MEROPENEM 100 MILLIGRAM(S): 1 INJECTION INTRAVENOUS at 21:12

## 2021-07-15 RX ADMIN — HEPARIN SODIUM 9 UNIT(S)/HR: 5000 INJECTION INTRAVENOUS; SUBCUTANEOUS at 08:29

## 2021-07-15 RX ADMIN — Medication 15 MILLIGRAM(S): at 00:08

## 2021-07-15 RX ADMIN — TRAMADOL HYDROCHLORIDE 25 MILLIGRAM(S): 50 TABLET ORAL at 21:52

## 2021-07-15 RX ADMIN — Medication 650 MILLIGRAM(S): at 01:10

## 2021-07-15 RX ADMIN — TRAMADOL HYDROCHLORIDE 25 MILLIGRAM(S): 50 TABLET ORAL at 22:52

## 2021-07-15 RX ADMIN — Medication 166.67 MILLIGRAM(S): at 13:47

## 2021-07-15 RX ADMIN — Medication 15 MILLIGRAM(S): at 07:19

## 2021-07-15 RX ADMIN — Medication 15 MILLIGRAM(S): at 11:03

## 2021-07-15 RX ADMIN — MEROPENEM 100 MILLIGRAM(S): 1 INJECTION INTRAVENOUS at 06:24

## 2021-07-15 RX ADMIN — Medication 166.67 MILLIGRAM(S): at 21:52

## 2021-07-15 RX ADMIN — Medication 650 MILLIGRAM(S): at 11:03

## 2021-07-15 RX ADMIN — Medication 15 MILLIGRAM(S): at 00:32

## 2021-07-15 RX ADMIN — MEROPENEM 100 MILLIGRAM(S): 1 INJECTION INTRAVENOUS at 13:47

## 2021-07-15 RX ADMIN — CHLORHEXIDINE GLUCONATE 1 APPLICATION(S): 213 SOLUTION TOPICAL at 11:03

## 2021-07-15 RX ADMIN — HEPARIN SODIUM 13 UNIT(S)/HR: 5000 INJECTION INTRAVENOUS; SUBCUTANEOUS at 21:45

## 2021-07-15 RX ADMIN — Medication 650 MILLIGRAM(S): at 06:19

## 2021-07-15 RX ADMIN — TRAMADOL HYDROCHLORIDE 25 MILLIGRAM(S): 50 TABLET ORAL at 17:40

## 2021-07-15 RX ADMIN — Medication 650 MILLIGRAM(S): at 00:08

## 2021-07-15 RX ADMIN — Medication 15 MILLIGRAM(S): at 06:19

## 2021-07-15 RX ADMIN — Medication 650 MILLIGRAM(S): at 17:11

## 2021-07-15 NOTE — PROGRESS NOTE ADULT - ASSESSMENT
56 yo M POD #1 for robotic assisted liver abscess drainage, progresisng well    Recommendations    - CLD, ADAT  - Ambulate  -OOBTC  - COntinue Abx  - F/u cultures  -  Restart hep gtt

## 2021-07-15 NOTE — PROGRESS NOTE ADULT - SUBJECTIVE AND OBJECTIVE BOX
HPI/OVERNIGHT EVENTS: Patient seen and examined at bedside this AM in SICU, resting in bed, POD#1 for robotic assisted liver abscess drainage.afebrile, tolerating liquids, pain controlled, voided spontaneously, IS 1500    Vital Signs Last 24 Hrs  T(C): 37.5 (15 Jul 2021 00:00), Max: 37.9 (2021 06:00)  T(F): 99.5 (15 Jul 2021 00:00), Max: 100.2 (2021 06:00)  HR: 58 (15 Jul 2021 01:08) (58 - 85)  BP: 135/80 (2021 20:00) (103/62 - 145/81)  BP(mean): 96 (2021 20:00) (69 - 111)  RR: 20 (15 Jul 2021 01:00) (10 - 28)  SpO2: 98% (15 Jul 2021 01:08) (92% - 98%)    I&O's Detail    2021 07:01  -  2021 07:00  --------------------------------------------------------  IN:    IV PiggyBack: 150 mL    IV PiggyBack: 50 mL    IV PiggyBack: 250 mL    IV PiggyBack: 500 mL    multiple electrolytes Injection Type 1.: 700 mL  Total IN: 1650 mL    OUT:    Voided (mL): 2500 mL  Total OUT: 2500 mL    Total NET: -850 mL      2021 07:01  -  15 Jul 2021 02:08  --------------------------------------------------------  IN:    IV PiggyBack: 50 mL    IV PiggyBack: 100 mL    IV PiggyBack: 500 mL    IV PiggyBack: 312.5 mL    multiple electrolytes Injection Type 1.: 700 mL    Oral Fluid: 1000 mL  Total IN: 2662.5 mL    OUT:    Voided (mL): 1000 mL  Total OUT: 1000 mL    Total NET: 1662.5 mL          Constitutional: patient resting comfortably in bed, in no acute distress  HEENT: EOMI, PERRLA, MMM.  Respiratory: Non labored breathing on RA  Cardiovascular: RRR  Gastrointestinal: Abdomen soft, appropriately tender, no rebound or guarding, incisions C/D/I  Musculoskeletal: No joint pain, swelling or deformity; no limitation of movement  Vascular: Extremities warm and well perfused.     LABS:                        12.2   17.86 )-----------( 261      ( 2021 19:44 )             36.5     07-14    135  |  101  |  9.2  ----------------------------<  147<H>  4.4   |  24.0  |  0.58    Ca    8.2<L>      2021 19:44  Phos  2.4       Mg     2.2         TPro  6.4<L>  /  Alb  2.8<L>  /  TBili  1.1  /  DBili  x   /  AST  122<H>  /  ALT  144<H>  /  AlkPhos  275<H>      PT/INR - ( 2021 05:36 )   PT: 13.1 sec;   INR: 1.14 ratio         PTT - ( 2021 05:36 )  PTT:29.2 sec  Urinalysis Basic - ( 2021 20:37 )    Color: Yellow / Appearance: Slightly Turbid / S.015 / pH: x  Gluc: x / Ketone: Negative  / Bili: Negative / Urobili: Negative mg/dL   Blood: x / Protein: 30 mg/dL / Nitrite: Negative   Leuk Esterase: Negative / RBC: x / WBC x   Sq Epi: x / Non Sq Epi: Few / Bacteria: x        MEDICATIONS  (STANDING):  acetaminophen   Tablet .. 650 milliGRAM(s) Oral every 6 hours  ketorolac   Injectable 15 milliGRAM(s) IV Push every 6 hours  meropenem  IVPB 1000 milliGRAM(s) IV Intermittent every 8 hours  multiple electrolytes Injection Type 1 1000 milliLiter(s) (100 mL/Hr) IV Continuous <Continuous>  senna 1 Tablet(s) Oral at bedtime  vancomycin  IVPB 1250 milliGRAM(s) IV Intermittent every 8 hours    MEDICATIONS  (PRN):  HYDROmorphone  Injectable 0.5 milliGRAM(s) IV Push every 4 hours PRN Severe Pain (7 - 10)  traMADol 25 milliGRAM(s) Oral every 4 hours PRN Moderate Pain (4 - 6)

## 2021-07-15 NOTE — PROGRESS NOTE ADULT - SUBJECTIVE AND OBJECTIVE BOX
INFECTIOUS DISEASES AND INTERNAL MEDICINE at [facility]  =======================================================  Henri Salazar MD  Diplomates American Board of Internal Medicine and Infectious Diseases  Telephone [phone]  Fax            [phone]  =======================================================    MAXINE BREAUX [id number]    Follow up: liver abscess S/P IR GUIDED DRAINAGE    and S/P OPERATIVE PROCEDURE 7/14    Allergies:  No Known Allergies      Medications:  acetaminophen   Tablet .. 650 milliGRAM(s) Oral every 6 hours PRN  enoxaparin Injectable 40 milliGRAM(s) SubCutaneous daily  piperacillin/tazobactam IVPB.. 3.375 Gram(s) IV Intermittent every 8 hours  potassium chloride   Solution 40 milliEquivalent(s) Oral once    SOCIAL       FAMILY   FAMILY HISTORY:  FH: heart attack (Father)      REVIEW OF SYSTEMS:  CONSTITUTIONAL:  No Fever or chills  HEENT:   No diplopia or blurred vision.  No earache, sore throat or runny nose.  CARDIOVASCULAR:  No pressure, squeezing, strangling, tightness, heaviness or aching about the chest, neck, axilla or epigastrium.  RESPIRATORY:    cough, shortness of breath,    GASTROINTESTINAL:  No nausea, vomiting or diarrhea.  GENITOURINARY:  No dysuria, frequency or urgency. No Blood in urine  MUSCULOSKELETAL:   moves all joints  SKIN:  No change in skin, hair or nails.  NEUROLOGIC:  No paresthesias, fasciculations, seizures or weakness.  PSYCHIATRIC:  No disorder of thought or mood.  ENDOCRINE:  No heat or cold intolerance, polyuria or polydipsia.  HEMATOLOGICAL:  No easy bruising or bleeding.            Physical Exam:  I    Vital Signs Last 24 Hrs  T(C): 37.2 (15 Jul 2021 07:00), Max: 37.9 (14 Jul 2021 10:00)  T(F): 98.9 (15 Jul 2021 07:00), Max: 100.2 (14 Jul 2021 10:00)  HR: 65 (15 Jul 2021 09:00) (58 - 84)  BP: 126/82 (15 Jul 2021 09:00) (103/62 - 145/81)  BP(mean): 97 (15 Jul 2021 09:00) (69 - 105)  RR: 10 (15 Jul 2021 09:00) (10 - 28)  SpO2: 97% (15 Jul 2021 09:00) (90% - 98%)      GEN: NAD,   HEENT: normocephalic and atraumatic. EOMI. GUANAKO.    NECK: Supple. No carotid bruits.  No lymphadenopathy or thyromegaly.  LUNGS: Clear to auscultation.  HEART: Regular rate and rhythm without murmur.  ABDOMEN: Soft, nontender, and nondistended.  Positive bowel sounds.     : No CVA tenderness  EXTREMITIES: Without any cyanosis, clubbing, rash, lesions or edema.  MSK: no joint swelling  NEUROLOGIC: Cranial nerves II through XII are grossly intact.  PSYCHIATRIC: Appropriate affect .  SKIN: No ulceration or induration present.        Labs:  Vi     =======================================================  Current Antibiotics:  piperacillin/tazobactam IVPB.. 3.375 Gram(s) IV Intermittent every 8 hours  VANCO    Other medications:  enoxaparin Injectable 40 milliGRAM(s) SubCutaneous daily  potassium chloride   Solution 40 milliEquivalent(s) Oral once      =======================================================  Labs:                     (                                              12.0   24.69 )-----------( 298      ( 15 Jul 2021 04:24 )             35.2   07-15    136  |  101  |  10.0  ----------------------------<  147<H>  4.2   |  25.0  |  0.67    Ca    8.0<L>      15 Jul 2021 04:24  Phos  3.3     07-15  Mg     2.1     07-15    TPro  6.3<L>  /  Alb  2.7<L>  /  TBili  0.7  /  DBili  0.3  /  AST  89<H>  /  ALT  129<H>  /  AlkPhos  257<H>  07-15      Procalcitonin, Serum: 11.61 ng/mL (07-11-21 @ 08:28)  Procalcitonin, Serum: 21.40 ng/mL (07-09-21 @ 13:39)    D-Dimer Assay, Quantitative: 467 ng/mL DDU (07-10-21 @ 13:54)    Ferritin, Serum: 260 ng/mL (07-10-21 @ 13:53)  Ferritin, Serum: 275 ng/mL (07-09-21 @ 13:39)    C-Reactive Protein, Serum: 324 mg/L (07-10-21 @ 13:53)  C-Reactive Protein, Serum: 263 mg/L (07-09-21 @ 13:39)    WBC Count: 16.70 K/uL (07-11-21 @ 08:28)  WBC Count: 19.84 K/uL (07-10-21 @ 08:30)  WBC Count: 19.07 K/uL (07-09-21 @ 13:39)      COVID-19 PCR: NotDetec (07-10-21 @ 10:11)  COVID-19 PCR: NotDete (07-09-21 @ 13:39)      Alkaline Phosphatase, Serum: 247 U/L (07-11-21 @ 08:28)  Alkaline Phosphatase, Serum: 166 U/L (07-10-21 @ 08:30)  Alkaline Phosphatase, Serum: 156 U/L (07-09-21 @ 13:39)  Alanine Aminotransferase (ALT/SGPT): 136 U/L (07-11-21 @ 08:28)  Alanine Aminotransferase (ALT/SGPT): 150 U/L (07-10-21 @ 08:30)  Alanine Aminotransferase (ALT/SGPT): 160 U/L (07-09-21 @ 13:39)  Aspartate Aminotransferase (AST/SGOT): 59 U/L (07-11-21 @ 08:28)  Aspartate Aminotransferase (AST/SGOT): 71 U/L (07-10-21 @ 08:30)  Aspartate Aminotransferase (AST/SGOT): 92 U/L (07-09-21 @ 13:39)  Bilirubin Total, Serum: 1.3 mg/dL (07-11-21 @ 08:28)  Bilirubin Total, Serum: 1.0 mg/dL (07-10-21 @ 08:30)  Bilirubin Total, Serum: 2.2 mg/dL (07-09-21 @ 13:39)

## 2021-07-15 NOTE — PROGRESS NOTE ADULT - ATTENDING COMMENTS
7/11/2021 - percutaneous drainage of segment 8 liver abscess (likely hematogenous spread after dental cleaning)  7/14/2021 - robotic drainage of segment 8 liver abscess  -BCx (7/10) - NGTD x 2 sets  -BCx (7/11) - NGTD x 2 sets  -abscess Cx (7/12) - NGTD  -tissue Cx superficial abscess cavity (7/15) - no organisms on gram stain  -tissue Cx deep abscess cavity (7/15) - no organisms on gram stain  -vancomycin (7/11 - ?)  -Zosyn (7/10 - 7/13), meropenem (7/13 - ?)  -IR unable to replace dislodged drain on 7/13  -Tm = 100.2 (7/14 @ 1000)  -VÍCTOR (7/14) - no evidence of infective endocarditis    hepatic vein thrombosis (segmental branch adjacent to liver abscess)  -restart heparin infusion and monitor in SICU for signs of post-op bleeding

## 2021-07-15 NOTE — PROGRESS NOTE ADULT - ASSESSMENT
55M w/ hx of sleep apnea on bipap at night who presents from Urgent Care who presents with chills. Of note, he was recently diagnosed with COVID on Wednesday by rapid test  and his sx had started one week ago. He started having night sweats, cough w/o productive sputum, chills, and fever (Tmax- 104F). His wife has also tested positive for COVID19 and is currently asymptomatic. He had gone to the urgent care and was diagnosed with LLL PNA. He has been vaccinated with Pfizer in April. He was febrile to 100.4F and having significant chills. Also, has nausea, SOB, and headache. Denies vomiting, dizziness, abdominal pain and chest pain. He was subsequently told by urgent care to come to the hospital for further management. He has been taking tylenol as needed.      PT REPORTS HE HAS HAD NIGHT SWEATS  HAD DENTAL PROCEDURE IN EARLY JUNE  OUT PT RAPID COVID WAS NEG      PT HAD COVID PCR  TEST HERE  x2 WHICH WAS NEGATIVE  CT SCAN CHEST SHOWED A    LIVER LESION  pt s/p   MRI OF LIVER    WHICH SHOWED COLLECTION C/W ABSCESS AS WELL AS THROMBUS    ALSO ? CYSTIC ARE IN PANCREAS IPMN  PT HAD INCREASE FEVERS 7/11 CODE SEPSIS CALLED WBC AND LACTATE UP  IR CALLED  AND DRAINAGE WAS DONE    CX AND CYTOLOGY SENT  PT WAS  TRANSFERRED TO ICU FOR FURTHER MONITORING  LAST PM DRAIN WAS PARTIALLY PULLED BY   ACCIDENT WHEN PT GETTING OUT OF BED          REPEAT ABD IMAGING and IR EVAL DONE  NO MORE FLUID ABLE TO BE DRAINED   PT S/P OR 7/14   ON MERREM /VANCO  OPERATIVE CX PENDING SO FAR NEG   E  IN ADDITION PT HAD ECHOCARDIOGRAM NO EVIDENCE OF VALVE INFECTION     VÍCTOR REPORTED NEGATIVE   ALL BLOOD CX HAVE BEEN NEGATIVE  WILL FOLLOW UP SPOKE TO WIFE ON PHONE

## 2021-07-15 NOTE — PROGRESS NOTE ADULT - ATTENDING COMMENTS
Above noted.  pt states that he feels better today.  FU cultures  No evidence of vegetation on VÍCTOR  If pt has fever and or elevated wbc on abx and is not getting better please call us, can redo VÍCTOR in 3-5 days for further evaluation  Thank you.

## 2021-07-15 NOTE — PROGRESS NOTE ADULT - SUBJECTIVE AND OBJECTIVE BOX
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 Patient was responding well to IR drain and antibiotics until IR drain became dislodged. Patient transferred to [place] surgery under SICU care. Underwent another attempt at IR drainage, which was unsuccessful. Now s/p robotic debridement and drainage of liver abscess    24 HOUR EVENTS:  - Went to OR for robotic debridement and drainage of liver abscess  - VÍCTOR performed in OR  - NAEO    SUBJECTIVE/ROS:  [ ] A ten-point review of systems was otherwise negative except as noted.  [ ] Due to altered mental status/intubation, subjective information were not able to be obtained from the patient. History was obtained, to the extent possible, from review of the chart and collateral sources of information.      NEURO  RASS:  1    CAM ICU: neg  Exam:   Meds: acetaminophen   Tablet .. 650 milliGRAM(s) Oral every 6 hours  HYDROmorphone  Injectable 0.5 milliGRAM(s) IV Push every 4 hours PRN Severe Pain (7 - 10)  ketorolac   Injectable 15 milliGRAM(s) IV Push every 6 hours  traMADol 25 milliGRAM(s) Oral every 4 hours PRN Moderate Pain (4 - 6)    [x] Adequacy of sedation and pain control has been assessed and adjusted      RESPIRATORY  RR: 18 (07-15-21 @ 03:00) (10 - 28)  SpO2: 94% (07-15-21 @ 03:00) (90% - 98%)  Wt(kg): --  Exam: unlabored, clear to auscultation bilaterally  Mechanical Ventilation:   ABG - ( 14 Jul 2021 16:20 )  pH: 7.390 /  pCO2: 45    /  pO2: 229   / HCO3: 27    / Base Excess: 2.2   /  SaO2: 100.0   Lactate: x      [ ] Extubation Readiness Assessed  Meds:       CARDIOVASCULAR  HR: 62 (07-15-21 @ 03:00) (58 - 84)  BP: 135/80 (07-14-21 @ 20:00) (103/62 - 145/81)  BP(mean): 96 (07-14-21 @ 20:00) (69 - 111)  ABP: 100/81 (07-15-21 @ 03:00) (100/81 - 171/81)  ABP(mean): 90 (07-15-21 @ 03:00) (90 - 130)  Wt(kg): --  CVP(cm H2O): --      Exam: RRR, normal S1/S2  Cardiac Rhythm: NSR  Perfusion     [ x]Adequate   [ ]Inadequate  Mentation   [x ]Normal       [ ]Reduced  Extremities  [x ]Warm         [ ]Cool  Volume Status [ ]Hypervolemic [x ]Euvolemic [ ]Hypovolemic  Meds:       GI/NUTRITION  Exam: Soft, nondistended, tender to palpation in RUQ around drain, dressing is C/D/I  Diet, Clear Liquid (07-14-21 @ 19:09) [Active]  Meds: senna 1 Tablet(s) Oral at bedtime      GENITOURINARY  I&O's Detail    07-13 @ 07:01  -  07-14 @ 07:00  --------------------------------------------------------  IN:    IV PiggyBack: 150 mL    IV PiggyBack: 50 mL    IV PiggyBack: 250 mL    IV PiggyBack: 500 mL    multiple electrolytes Injection Type 1.: 700 mL  Total IN: 1650 mL    OUT:    Voided (mL): 2500 mL  Total OUT: 2500 mL    Total NET: -850 mL      07-14 @ 07:01  -  07-15 @ 04:43  --------------------------------------------------------  IN:    IV PiggyBack: 50 mL    IV PiggyBack: 100 mL    IV PiggyBack: 500 mL    IV PiggyBack: 437.5 mL    multiple electrolytes Injection Type 1.: 700 mL    Oral Fluid: 1000 mL  Total IN: 2787.5 mL    OUT:    Voided (mL): 1400 mL  Total OUT: 1400 mL    Total NET: 1387.5 mL          07-14    135  |  101  |  9.2  ----------------------------<  147<H>  4.4   |  24.0  |  0.58    Ca    8.2<L>      14 Jul 2021 19:44  Phos  2.4     07-14  Mg     2.2     07-14    TPro  6.4<L>  /  Alb  2.8<L>  /  TBili  1.1  /  DBili  x   /  AST  122<H>  /  ALT  144<H>  /  AlkPhos  275<H>  07-14    [ ] Hernandez catheter, indication: N/A  Meds: multiple electrolytes Injection Type 1 1000 milliLiter(s) IV Continuous <Continuous>        HEMATOLOGIC  Meds:   [x] VTE Prophylaxis                        12.0   24.69 )-----------( 298      ( 15 Jul 2021 04:24 )             35.2     PT/INR - ( 14 Jul 2021 05:36 )   PT: 13.1 sec;   INR: 1.14 ratio         PTT - ( 14 Jul 2021 05:36 )  PTT:29.2 sec  Transfusion     [ ] PRBC   [ ] Platelets   [ ] FFP   [ ] Cryoprecipitate      INFECTIOUS DISEASES  T(C): 37.5 (07-15-21 @ 00:00), Max: 37.9 (07-14-21 @ 06:00)  Wt(kg): --  WBC Count: 24.69 K/uL (07-15 @ 04:24)  WBC Count: 17.86 K/uL (07-14 @ 19:44)  WBC Count: 12.92 K/uL (07-14 @ 05:31)    Recent Cultures:  Specimen Source: .Tissue R liver lobe abscess cavity deep, 07-15 @ 00:34; Results --; Gram Stain:   Rare polymorphonuclear leukocytes per low power field  No organisms seen per oil power field; Organism: --  Specimen Source: .Tissue R liver lobe abs cavity superficial, 07-15 @ 00:23; Results --; Gram Stain:   Moderate polymorphonuclear leukocytes per low power field  No organisms seen per oil power field; Organism: --  Specimen Source: .Smear, 07-12 @ 09:36; Results --; Gram Stain:   Numerous polymorphonuclear leukocytes seen per low power field  No organisms seen per oil power field; Organism: --  Specimen Source: .Abscess hepatic collection, 07-12 @ 06:41; Results   No growth; Gram Stain: --; Organism: --  Specimen Source: .Blood Blood-Peripheral, 07-11 @ 20:43; Results   No growth at 48 hours; Gram Stain: --; Organism: --  Specimen Source: .Blood Blood-Peripheral, 07-11 @ 19:48; Results   No growth at 48 hours; Gram Stain: --; Organism: --  Specimen Source: .Blood Blood, 07-10 @ 10:20; Results   No growth at 48 hours; Gram Stain: --; Organism: --    Meds: meropenem  IVPB 1000 milliGRAM(s) IV Intermittent every 8 hours  vancomycin  IVPB 1250 milliGRAM(s) IV Intermittent every 8 hours        ENDOCRINE  Capillary Blood Glucose    Meds:       ACCESS DEVICES:  [x ] Peripheral IV  [ ] Central Venous Line	[ ] R	[ ] L	[ ] IJ	[ ] Fem	[ ] SC	Placed:   [x ] Arterial Line		[ ] R	[ x] L	[ ] Fem	[ x] Rad	[ ] Ax	Placed:   [ ] PICC:					[ ] Mediport  [ ] Urinary Catheter, Date Placed:   [ ] Necessity of urinary, arterial, and venous catheters discussed    OTHER MEDICATIONS:      CODE STATUS: Full code    IMAGING:  < from: CT Abdomen and Pelvis w/ IV Cont (07.13.21 @ 23:47) >  FINDINGS:  LOWER CHEST: Bilateral pleural effusions with bilateral lower lobe consolidation.    LIVER: There is a regular poorly encapsulated/poorly defined low-attenuation lesion within the RIGHT lobe of the liver measuring 7.4 x 6.1 x 6.6 cm which corresponds tothe lesion seen on prior MRI dated July 10, 2020 [place] is consistent with hepatic abscess. There is peripheral low density within the liver likely related to perfusion abnormality. Prior MRI demonstrated hepatic vein branch thrombosis in this same region. This is less well seen on the current study.  BILE DUCTS: Normal caliber.  GALLBLADDER: Within normal limits.  SPLEEN: Within normal limits.  PANCREAS: There is a 7 to 8 mm cystic lesion in the pancreatic head (3-58), better seen on prior MRI and likely represents side branch IPMN. The pancreas is otherwise unremarkable.  ADRENALS: Within normal limits.  KIDNEYS/URETERS: Bilateral parapelvic cysts without hydronephrosis.    BLADDER: Mildly thick-walled bladder. Incompletely distended.  REPRODUCTIVE ORGANS: Enlarged heterogeneous prostate.    BOWEL: No bowel obstruction. Diverticulosis without diverticulitis. Appendix is normal.  PERITONEUM: Small volume ascites.  VESSELS: Within normal limits.  RETROPERITONEUM/LYMPH NODES: No lymphadenopathy.  ABDOMINAL WALL: Within normal limits.  BONES: Within normal limits.    IMPRESSION:  1.  Hepatic abscess similar in size/appearance to prior MRI dated July 10, 2021.  2.  Bilateral parapelvic renal cysts without hydronephrosis.  3.  Small volume ascites.    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 Patient was responding well to IR drain and antibiotics until IR drain became dislodged. Patient transferred to [place] surgery under SICU care. Underwent another attempt at IR drainage, which was unsuccessful. Now s/p robotic debridement and drainage of liver abscess    24 HOUR EVENTS:  - Went to OR for robotic debridement and drainage of liver abscess  - VÍCTOR performed in OR  - NAEO    SUBJECTIVE/ROS:  Patient seen and examined at bedside. States he was unable to get sleep overnight. Reports despite that, he feels better today. Reports pain is controlled except with movement.     [ ] A ten-point review of systems was otherwise negative except as noted.  [ ] Due to altered mental status/intubation, subjective information were not able to be obtained from the patient. History was obtained, to the extent possible, from review of the chart and collateral sources of information.      NEURO  RASS:  1    CAM ICU: neg  Exam: Resting comfortably in bed  Meds: acetaminophen   Tablet .. 650 milliGRAM(s) Oral every 6 hours  HYDROmorphone  Injectable 0.5 milliGRAM(s) IV Push every 4 hours PRN Severe Pain (7 - 10)  ketorolac   Injectable 15 milliGRAM(s) IV Push every 6 hours  traMADol 25 milliGRAM(s) Oral every 4 hours PRN Moderate Pain (4 - 6)    [x] Adequacy of sedation and pain control has been assessed and adjusted      RESPIRATORY  RR: 18 (07-15-21 @ 03:00) (10 - 28)  SpO2: 94% (07-15-21 @ 03:00) (90% - 98%)  Wt(kg): --  Exam: unlabored, clear to auscultation bilaterally  Mechanical Ventilation:   ABG - ( 14 Jul 2021 16:20 )  pH: 7.390 /  pCO2: 45    /  pO2: 229   / HCO3: 27    / Base Excess: 2.2   /  SaO2: 100.0   Lactate: x      [ ] Extubation Readiness Assessed  Meds:       CARDIOVASCULAR  HR: 62 (07-15-21 @ 03:00) (58 - 84)  BP: 135/80 (07-14-21 @ 20:00) (103/62 - 145/81)  BP(mean): 96 (07-14-21 @ 20:00) (69 - 111)  ABP: 100/81 (07-15-21 @ 03:00) (100/81 - 171/81)  ABP(mean): 90 (07-15-21 @ 03:00) (90 - 130)  Wt(kg): --  CVP(cm H2O): --      Exam: RRR, normal S1/S2  Cardiac Rhythm: NSR  Perfusion     [ x]Adequate   [ ]Inadequate  Mentation   [x ]Normal       [ ]Reduced  Extremities  [x ]Warm         [ ]Cool  Volume Status [ ]Hypervolemic [x ]Euvolemic [ ]Hypovolemic  Meds:       GI/NUTRITION  Exam: Soft, nondistended, tender to palpation in RUQ around drain, dressing is C/D/I, ESSIE with sanguinous output  Diet, Clear Liquid (07-14-21 @ 19:09) [Active]  Meds: senna 1 Tablet(s) Oral at bedtime      GENITOURINARY  I&O's Detail    07-13 @ 07:01 - 07-14 @ 07:00  --------------------------------------------------------  IN:    IV PiggyBack: 150 mL    IV PiggyBack: 50 mL    IV PiggyBack: 250 mL    IV PiggyBack: 500 mL    multiple electrolytes Injection Type 1.: 700 mL  Total IN: 1650 mL    OUT:    Voided (mL): 2500 mL  Total OUT: 2500 mL    Total NET: -850 mL      07-14 @ 07:01  -  07-15 @ 04:43  --------------------------------------------------------  IN:    IV PiggyBack: 50 mL    IV PiggyBack: 100 mL    IV PiggyBack: 500 mL    IV PiggyBack: 437.5 mL    multiple electrolytes Injection Type 1.: 700 mL    Oral Fluid: 1000 mL  Total IN: 2787.5 mL    OUT:    Voided (mL): 1400 mL  Total OUT: 1400 mL    Total NET: 1387.5 mL          07-14    135  |  101  |  9.2  ----------------------------<  147<H>  4.4   |  24.0  |  0.58    Ca    8.2<L>      14 Jul 2021 19:44  Phos  2.4     07-14  Mg     2.2     07-14    TPro  6.4<L>  /  Alb  2.8<L>  /  TBili  1.1  /  DBili  x   /  AST  122<H>  /  ALT  144<H>  /  AlkPhos  275<H>  07-14    [ ] Hernandez catheter, indication: N/A  Meds: multiple electrolytes Injection Type 1 1000 milliLiter(s) IV Continuous <Continuous>        HEMATOLOGIC  Meds:   [x] VTE Prophylaxis                        12.0   24.69 )-----------( 298      ( 15 Jul 2021 04:24 )             35.2     PT/INR - ( 14 Jul 2021 05:36 )   PT: 13.1 sec;   INR: 1.14 ratio         PTT - ( 14 Jul 2021 05:36 )  PTT:29.2 sec  Transfusion     [ ] PRBC   [ ] Platelets   [ ] FFP   [ ] Cryoprecipitate      INFECTIOUS DISEASES  T(C): 37.5 (07-15-21 @ 00:00), Max: 37.9 (07-14-21 @ 06:00)  Wt(kg): --  WBC Count: 24.69 K/uL (07-15 @ 04:24)  WBC Count: 17.86 K/uL (07-14 @ 19:44)  WBC Count: 12.92 K/uL (07-14 @ 05:31)    Recent Cultures:  Specimen Source: .Tissue R liver lobe abscess cavity deep, 07-15 @ 00:34; Results --; Gram Stain:   Rare polymorphonuclear leukocytes per low power field  No organisms seen per oil power field; Organism: --  Specimen Source: .Tissue R liver lobe abs cavity superficial, 07-15 @ 00:23; Results --; Gram Stain:   Moderate polymorphonuclear leukocytes per low power field  No organisms seen per oil power field; Organism: --  Specimen Source: .Smear, 07-12 @ 09:36; Results --; Gram Stain:   Numerous polymorphonuclear leukocytes seen per low power field  No organisms seen per oil power field; Organism: --  Specimen Source: .Abscess hepatic collection, 07-12 @ 06:41; Results   No growth; Gram Stain: --; Organism: --  Specimen Source: .Blood Blood-Peripheral, 07-11 @ 20:43; Results   No growth at 48 hours; Gram Stain: --; Organism: --  Specimen Source: .Blood Blood-Peripheral, 07-11 @ 19:48; Results   No growth at 48 hours; Gram Stain: --; Organism: --  Specimen Source: .Blood Blood, 07-10 @ 10:20; Results   No growth at 48 hours; Gram Stain: --; Organism: --    Meds: meropenem  IVPB 1000 milliGRAM(s) IV Intermittent every 8 hours  vancomycin  IVPB 1250 milliGRAM(s) IV Intermittent every 8 hours        ENDOCRINE  Capillary Blood Glucose    Meds:       ACCESS DEVICES:  [x ] Peripheral IV  [ ] Central Venous Line	[ ] R	[ ] L	[ ] IJ	[ ] Fem	[ ] SC	Placed:   [x ] Arterial Line		[ ] R	[ x] L	[ ] Fem	[ x] Rad	[ ] Ax	Placed:   [ ] PICC:					[ ] Mediport  [ ] Urinary Catheter, Date Placed:   [ ] Necessity of urinary, arterial, and venous catheters discussed    OTHER MEDICATIONS:      CODE STATUS: Full code    IMAGING:  < from: CT Abdomen and Pelvis w/ IV Cont (07.13.21 @ 23:47) >  FINDINGS:  LOWER CHEST: Bilateral pleural effusions with bilateral lower lobe consolidation.    LIVER: There is a regular poorly encapsulated/poorly defined low-attenuation lesion within the RIGHT lobe of the liver measuring 7.4 x 6.1 x 6.6 cm which corresponds tothe lesion seen on prior MRI dated July 10, 2020 [place] is consistent with hepatic abscess. There is peripheral low density within the liver likely related to perfusion abnormality. Prior MRI demonstrated hepatic vein branch thrombosis in this same region. This is less well seen on the current study.  BILE DUCTS: Normal caliber.  GALLBLADDER: Within normal limits.  SPLEEN: Within normal limits.  PANCREAS: There is a 7 to 8 mm cystic lesion in the pancreatic head (3-58), better seen on prior MRI and likely represents side branch IPMN. The pancreas is otherwise unremarkable.  ADRENALS: Within normal limits.  KIDNEYS/URETERS: Bilateral parapelvic cysts without hydronephrosis.    BLADDER: Mildly thick-walled bladder. Incompletely distended.  REPRODUCTIVE ORGANS: Enlarged heterogeneous prostate.    BOWEL: No bowel obstruction. Diverticulosis without diverticulitis. Appendix is normal.  PERITONEUM: Small volume ascites.  VESSELS: Within normal limits.  RETROPERITONEUM/LYMPH NODES: No lymphadenopathy.  ABDOMINAL WALL: Within normal limits.  BONES: Within normal limits.    IMPRESSION:  1.  Hepatic abscess similar in size/appearance to prior MRI dated July 10, 2021.  2.  Bilateral parapelvic renal cysts without hydronephrosis.  3.  Small volume ascites.    < end of copied text >

## 2021-07-15 NOTE — PROGRESS NOTE ADULT - SUBJECTIVE AND OBJECTIVE BOX
[place] [facility]                                                               Office: [address] [address]                                                              Telephone: [phone]. Fax:[phone]                                                                             PROGRESS NOTE  Reason for follow up: Hepatic Abscesses  Update: Patient's VÍCTOR with no obvious vegetations. Pt is POD #1 from robotic debridement and drainage of liver abscess. Pt to be downgraded from ICU today.     Review of symptoms:   Cardiac:  No chest pain. No dyspnea. No palpitations.  Respiratory: No cough. No dyspnea  Gastrointestinal: No diarrhea. No abdominal pain. No bleeding.     Past medical history: No updates.   	  Vitals:  T(C): 37.4 (07-15-21 @ 11:33), Max: 37.5 (07-15-21 @ 00:00)  HR: 66 (07-15-21 @ 11:00) (58 - 79)  BP: 137/88 (07-15-21 @ 11:00) (106/96 - 137/88)  RR: 21 (07-15-21 @ 11:00) (10 - 23)  SpO2: 97% (07-15-21 @ 11:00) (90% - 98%)  Wt(kg): --  I&O's Summary    14 Jul 2021 07:01  -  15 Jul 2021 07:00  --------------------------------------------------------  IN: 3150 mL / OUT: 1440 mL / NET: 1710 mL      PHYSICAL EXAM:  Appearance: Comfortable. No acute distress  HEENT:  Head and neck: Atraumatic. Normocephalic.  Normal oral mucosa, PERRL, Neck is supple. No JVD, No carotid bruit.   Neurologic: A&Ox 3, no focal deficits. EOMI, Cranial nerves are intact.  Lymphatic: No cervical lymphadenopathy  Cardiovascular: Normal S1 S2, No murmur, rubs/gallops. No JVD, No edema  Respiratory: Lungs clear to auscultation  Gastrointestinal:  Soft, Non-tender, + BS  Lower Extremities: No edema  Psychiatry: Patient is calm. No agitation. Mood & affect appropriate  Skin: No rashes/ecchymoses/cyanosis/ulcers visualized on the face, hands or feet.      CURRENT MEDICATIONS:    meropenem  IVPB  vancomycin  IVPB  acetaminophen   Tablet ..  senna  chlorhexidine 2% Cloths  heparin  Infusion  multiple electrolytes Injection Type 1      DIAGNOSTIC TESTING:  [ ] Echocardiogram:   < from: VÍCOTR Echo Doppler (07.14.21 @ 13:56) >  PHYSICIAN INTERPRETATION:  Left Ventricle:  Global LV systolic function was normal. Left ventricular ejection fraction, by visual estimation, is 60 to 65%.  Right Ventricle: Normal right ventricular size and function.  Left Atrium: No left atrial appendage thrombus is seen and normal left atrial appendage velocities. Color flow doppler demonstrates the presence of an intact intra atrial septum.  Pericardium: A small pericardial effusion is present. There is a small pleural effusion in the left lateral region.  Mitral Valve: Structurally normal mitral valve, with normal leaflet excursion. Mild mitral valve regurgitation is seen.  Tricuspid Valve: Structurally normal tricuspid valve, with normal leaflet excursion. The tricuspid valve is not well seen. Trivial tricuspid regurgitation is visualized.  Aortic Valve: Normal trileaflet aortic valve with normal opening. The aortic valve is trileaflet. No evidence of aortic stenosis.  Pulmonic Valve: Structurally normal pulmonic valve, with normal leaflet excursion. Trace pulmonic valve regurgitation.  Aorta: The aortic root, ascending aorta and aortic arch are all structurally normal, with no evidence of dilitation or obstruction.  Venous: The pulmonary veins appear normal.  In comparison to the previous echocardiogram(s): No prior VÍCTOR study is available for comparison. No evidence of vegetation or intracardiac abscess.      Summary:   1. Left ventricular ejection fraction, by visual estimation, is 60 to 65%.   2. Normal global left ventricular systolic function.   3. Normal right ventricular size and function.   4. Structurally normal mitral valve, with normal leaflet excursion.   5. Mild mitral valve regurgitation.   6. Normal trileaflet aortic valve with normal opening.   7. Structurally normal pulmonic valve, with normal leaflet excursion.   8. Color flow doppler demonstrates the presence of an intact intra atrial septum.   9. Small pericardial effusion measured 0.7 cm adjacent to the RV.  10. Small left pleural effusion.  11. No prior VÍCTOR study is available for comparison. No evidence of vegetation or intracardiac abscess.    Ismael Looney DO Electronically signed on 7/15/2021 at 10:34:18 AM    < end of copied text >    OTHER: 	      LABS:	 	                            12.0   24.69 )-----------( 298      ( 15 Jul 2021 04:24 )             35.2     07-15    136  |  101  |  10.0  ----------------------------<  147<H>  4.2   |  25.0  |  0.67    Ca    8.0<L>      15 Jul 2021 04:24  Phos  3.3     07-15  Mg     2.1     07-15    TPro  6.3<L>  /  Alb  2.7<L>  /  TBili  0.7  /  DBili  0.3  /  AST  89<H>  /  ALT  129<H>  /  AlkPhos  257<H>  07-15    proBNP:   Lipid Profile:   HgA1c:   TSH:       TELEMETRY: Reviewed  SR

## 2021-07-15 NOTE — PROGRESS NOTE ADULT - ASSESSMENT
A/P: A/P: 56 y/o M with a PMHx of of JESSICA (on BiPAP) who presented to the ED from Urgent Care with complaints of fevers, chills, and weakness. Patient states that last week he began having a lot of fevers, chills, and rigors and was diagnosed with Covid at an Urgent care (rapid testing). Patient states that his fevers and rigors continued and worsened, so he went back to the ER to re-evaluate, and they sent him to the ER for further management. Patient has tested negative for Covid (PCR testing) x 2 since arrival, but bloodwork showed leukocytosis, transaminitis, and elevated procalcitonin. Patient found incidentally to have hepatic abscesses, and was placed on IV antibiotics by ID. Patient became a code sepsis on  07/11/21 prompting IDU admission with aggressive IVF administration for elevated lactate. Patient underwent IR drainage of the hepatic abscesses on 07/11/21, but last night had accidental partal removal of hepatic drain while moving out of bed. Patient has continued to have high fevers and chills throughout the night and this morning, and is planned to have IR adjust the drain. Patient notes that early in June he went to the periodontist, but otherwise has had no other issues. Patient denies any chest pain, syncope, near syncope, N/V/D, headache, or dizziness.     Hepatic Abscesses  - Unknown source.   - Dental work early June 2021.  - Blood cultures negative x 48 hours.   - IR drain placed, but accidentally dislodged.   - POD #1 from robotic debridement and drainage of liver abscess.  - VÍCTOR intra-operatively with no evidence of vegetations  - ID following, continue antibiotics.     Will sign off at this time, please feel free to call with any questions or issues that arise.   Assessment and recommendations are final when note is signed by the attending.

## 2021-07-15 NOTE — PROGRESS NOTE ADULT - ASSESSMENT
55yMale presenting with: 55 M SIRS, Sepsis, hepatic abscess vs neoplasm, hepatic vein thrombosis    Neurological:  Tylenol and Dilaudid PRN.     Pulmonary:   - Pt on Ra currently    - BiPAP at night PRN, pt's wife to bring home BiPAP     Cardiovascular:   - F/U official VÍCTOR report  - Monitor hemodynamics    Gastrointestinal:   - CLD, consider advancing today  - Cont bowel regimen    Genitourinary:   - Voiding    Heme:   - SCDs   - Plan to restart heparin gtt (target 50-70) for hepatic vein thrombosis pending AM CBC    ID:  -  Cayden / Vanco.    - Will CW this.   - FU OR cx/specimen    Dispo:   - stable in SICU  - possible downgrade today

## 2021-07-16 LAB
ALBUMIN SERPL ELPH-MCNC: 2.8 G/DL — LOW (ref 3.3–5.2)
ALP SERPL-CCNC: 250 U/L — HIGH (ref 40–120)
ALT FLD-CCNC: 106 U/L — HIGH
ANION GAP SERPL CALC-SCNC: 11 MMOL/L — SIGNIFICANT CHANGE UP (ref 5–17)
APTT BLD: 26.9 SEC — LOW (ref 27.5–35.5)
APTT BLD: 34 SEC — SIGNIFICANT CHANGE UP (ref 27.5–35.5)
APTT BLD: 41.6 SEC — HIGH (ref 27.5–35.5)
APTT BLD: 51.2 SEC — HIGH (ref 27.5–35.5)
AST SERPL-CCNC: 45 U/L — HIGH
BASOPHILS # BLD AUTO: 0.07 K/UL — SIGNIFICANT CHANGE UP (ref 0–0.2)
BASOPHILS NFR BLD AUTO: 0.3 % — SIGNIFICANT CHANGE UP (ref 0–2)
BILIRUB DIRECT SERPL-MCNC: 0.2 MG/DL — SIGNIFICANT CHANGE UP (ref 0–0.3)
BILIRUB INDIRECT FLD-MCNC: 0.5 MG/DL — SIGNIFICANT CHANGE UP (ref 0.2–1)
BILIRUB SERPL-MCNC: 0.7 MG/DL — SIGNIFICANT CHANGE UP (ref 0.4–2)
BUN SERPL-MCNC: 13.1 MG/DL — SIGNIFICANT CHANGE UP (ref 8–20)
CALCIUM SERPL-MCNC: 8.2 MG/DL — LOW (ref 8.6–10.2)
CHLORIDE SERPL-SCNC: 98 MMOL/L — SIGNIFICANT CHANGE UP (ref 98–107)
CO2 SERPL-SCNC: 26 MMOL/L — SIGNIFICANT CHANGE UP (ref 22–29)
CREAT SERPL-MCNC: 0.74 MG/DL — SIGNIFICANT CHANGE UP (ref 0.5–1.3)
CULTURE RESULTS: SIGNIFICANT CHANGE UP
CULTURE RESULTS: SIGNIFICANT CHANGE UP
EOSINOPHIL # BLD AUTO: 0.22 K/UL — SIGNIFICANT CHANGE UP (ref 0–0.5)
EOSINOPHIL NFR BLD AUTO: 0.9 % — SIGNIFICANT CHANGE UP (ref 0–6)
GLUCOSE SERPL-MCNC: 117 MG/DL — HIGH (ref 70–99)
HCT VFR BLD CALC: 36.8 % — LOW (ref 39–50)
HCT VFR BLD CALC: 38.2 % — LOW (ref 39–50)
HGB BLD-MCNC: 12.2 G/DL — LOW (ref 13–17)
HGB BLD-MCNC: 13.2 G/DL — SIGNIFICANT CHANGE UP (ref 13–17)
IMM GRANULOCYTES NFR BLD AUTO: 1.1 % — SIGNIFICANT CHANGE UP (ref 0–1.5)
LYMPHOCYTES # BLD AUTO: 1.97 K/UL — SIGNIFICANT CHANGE UP (ref 1–3.3)
LYMPHOCYTES # BLD AUTO: 8.4 % — LOW (ref 13–44)
MAGNESIUM SERPL-MCNC: 1.9 MG/DL — SIGNIFICANT CHANGE UP (ref 1.6–2.6)
MCHC RBC-ENTMCNC: 28.8 PG — SIGNIFICANT CHANGE UP (ref 27–34)
MCHC RBC-ENTMCNC: 29.3 PG — SIGNIFICANT CHANGE UP (ref 27–34)
MCHC RBC-ENTMCNC: 33.2 GM/DL — SIGNIFICANT CHANGE UP (ref 32–36)
MCHC RBC-ENTMCNC: 34.6 GM/DL — SIGNIFICANT CHANGE UP (ref 32–36)
MCV RBC AUTO: 84.7 FL — SIGNIFICANT CHANGE UP (ref 80–100)
MCV RBC AUTO: 87 FL — SIGNIFICANT CHANGE UP (ref 80–100)
MONOCYTES # BLD AUTO: 0.86 K/UL — SIGNIFICANT CHANGE UP (ref 0–0.9)
MONOCYTES NFR BLD AUTO: 3.7 % — SIGNIFICANT CHANGE UP (ref 2–14)
NEUTROPHILS # BLD AUTO: 20.06 K/UL — HIGH (ref 1.8–7.4)
NEUTROPHILS NFR BLD AUTO: 85.6 % — HIGH (ref 43–77)
PHOSPHATE SERPL-MCNC: 1.8 MG/DL — LOW (ref 2.4–4.7)
PLATELET # BLD AUTO: 333 K/UL — SIGNIFICANT CHANGE UP (ref 150–400)
PLATELET # BLD AUTO: 420 K/UL — HIGH (ref 150–400)
POTASSIUM SERPL-MCNC: 3.9 MMOL/L — SIGNIFICANT CHANGE UP (ref 3.5–5.3)
POTASSIUM SERPL-SCNC: 3.9 MMOL/L — SIGNIFICANT CHANGE UP (ref 3.5–5.3)
PROT SERPL-MCNC: 6.4 G/DL — LOW (ref 6.6–8.7)
RBC # BLD: 4.23 M/UL — SIGNIFICANT CHANGE UP (ref 4.2–5.8)
RBC # BLD: 4.51 M/UL — SIGNIFICANT CHANGE UP (ref 4.2–5.8)
RBC # FLD: 13.2 % — SIGNIFICANT CHANGE UP (ref 10.3–14.5)
RBC # FLD: 13.2 % — SIGNIFICANT CHANGE UP (ref 10.3–14.5)
SODIUM SERPL-SCNC: 135 MMOL/L — SIGNIFICANT CHANGE UP (ref 135–145)
SPECIMEN SOURCE: SIGNIFICANT CHANGE UP
SPECIMEN SOURCE: SIGNIFICANT CHANGE UP
VANCOMYCIN TROUGH SERPL-MCNC: 28.8 UG/ML — CRITICAL HIGH (ref 10–20)
WBC # BLD: 23.44 K/UL — HIGH (ref 3.8–10.5)
WBC # BLD: 25.64 K/UL — HIGH (ref 3.8–10.5)
WBC # FLD AUTO: 23.44 K/UL — HIGH (ref 3.8–10.5)
WBC # FLD AUTO: 25.64 K/UL — HIGH (ref 3.8–10.5)

## 2021-07-16 PROCEDURE — 99233 SBSQ HOSP IP/OBS HIGH 50: CPT

## 2021-07-16 PROCEDURE — 74018 RADEX ABDOMEN 1 VIEW: CPT | Mod: 26

## 2021-07-16 RX ORDER — SODIUM,POTASSIUM PHOSPHATES 278-250MG
2 POWDER IN PACKET (EA) ORAL ONCE
Refills: 0 | Status: COMPLETED | OUTPATIENT
Start: 2021-07-16 | End: 2021-07-16

## 2021-07-16 RX ORDER — POTASSIUM CHLORIDE 20 MEQ
20 PACKET (EA) ORAL ONCE
Refills: 0 | Status: COMPLETED | OUTPATIENT
Start: 2021-07-16 | End: 2021-07-16

## 2021-07-16 RX ORDER — HYDROMORPHONE HYDROCHLORIDE 2 MG/ML
0.5 INJECTION INTRAMUSCULAR; INTRAVENOUS; SUBCUTANEOUS EVERY 8 HOURS
Refills: 0 | Status: DISCONTINUED | OUTPATIENT
Start: 2021-07-16 | End: 2021-07-20

## 2021-07-16 RX ORDER — FLUTICASONE PROPIONATE 50 MCG
2 SPRAY, SUSPENSION NASAL
Qty: 0 | Refills: 0 | DISCHARGE

## 2021-07-16 RX ORDER — VANCOMYCIN HCL 1 G
1250 VIAL (EA) INTRAVENOUS EVERY 12 HOURS
Refills: 0 | Status: DISCONTINUED | OUTPATIENT
Start: 2021-07-16 | End: 2021-07-16

## 2021-07-16 RX ORDER — POLYETHYLENE GLYCOL 3350 17 G/17G
17 POWDER, FOR SOLUTION ORAL DAILY
Refills: 0 | Status: DISCONTINUED | OUTPATIENT
Start: 2021-07-16 | End: 2021-07-20

## 2021-07-16 RX ORDER — SIMETHICONE 80 MG/1
80 TABLET, CHEWABLE ORAL ONCE
Refills: 0 | Status: COMPLETED | OUTPATIENT
Start: 2021-07-16 | End: 2021-07-16

## 2021-07-16 RX ORDER — DEXTROSE MONOHYDRATE, SODIUM CHLORIDE, AND POTASSIUM CHLORIDE 50; .745; 4.5 G/1000ML; G/1000ML; G/1000ML
1000 INJECTION, SOLUTION INTRAVENOUS
Refills: 0 | Status: DISCONTINUED | OUTPATIENT
Start: 2021-07-16 | End: 2021-07-20

## 2021-07-16 RX ORDER — HEPARIN SODIUM 5000 [USP'U]/ML
1500 INJECTION INTRAVENOUS; SUBCUTANEOUS
Qty: 25000 | Refills: 0 | Status: DISCONTINUED | OUTPATIENT
Start: 2021-07-16 | End: 2021-07-19

## 2021-07-16 RX ORDER — HEPARIN SODIUM 5000 [USP'U]/ML
5000 INJECTION INTRAVENOUS; SUBCUTANEOUS ONCE
Refills: 0 | Status: COMPLETED | OUTPATIENT
Start: 2021-07-16 | End: 2021-07-16

## 2021-07-16 RX ORDER — ACETAMINOPHEN 500 MG
650 TABLET ORAL ONCE
Refills: 0 | Status: COMPLETED | OUTPATIENT
Start: 2021-07-16 | End: 2021-07-16

## 2021-07-16 RX ORDER — TRAMADOL HYDROCHLORIDE 50 MG/1
50 TABLET ORAL EVERY 4 HOURS
Refills: 0 | Status: DISCONTINUED | OUTPATIENT
Start: 2021-07-16 | End: 2021-07-20

## 2021-07-16 RX ADMIN — TRAMADOL HYDROCHLORIDE 50 MILLIGRAM(S): 50 TABLET ORAL at 02:53

## 2021-07-16 RX ADMIN — HEPARIN SODIUM 17.5 UNIT(S)/HR: 5000 INJECTION INTRAVENOUS; SUBCUTANEOUS at 23:13

## 2021-07-16 RX ADMIN — MEROPENEM 100 MILLIGRAM(S): 1 INJECTION INTRAVENOUS at 04:58

## 2021-07-16 RX ADMIN — DEXTROSE MONOHYDRATE, SODIUM CHLORIDE, AND POTASSIUM CHLORIDE 110 MILLILITER(S): 50; .745; 4.5 INJECTION, SOLUTION INTRAVENOUS at 14:54

## 2021-07-16 RX ADMIN — TRAMADOL HYDROCHLORIDE 50 MILLIGRAM(S): 50 TABLET ORAL at 01:53

## 2021-07-16 RX ADMIN — Medication 20 MILLIEQUIVALENT(S): at 05:43

## 2021-07-16 RX ADMIN — SENNA PLUS 1 TABLET(S): 8.6 TABLET ORAL at 23:13

## 2021-07-16 RX ADMIN — SIMETHICONE 80 MILLIGRAM(S): 80 TABLET, CHEWABLE ORAL at 04:47

## 2021-07-16 RX ADMIN — Medication 2 PACKET(S): at 06:06

## 2021-07-16 RX ADMIN — MEROPENEM 100 MILLIGRAM(S): 1 INJECTION INTRAVENOUS at 13:00

## 2021-07-16 RX ADMIN — MEROPENEM 100 MILLIGRAM(S): 1 INJECTION INTRAVENOUS at 23:12

## 2021-07-16 RX ADMIN — HEPARIN SODIUM 5000 UNIT(S): 5000 INJECTION INTRAVENOUS; SUBCUTANEOUS at 05:44

## 2021-07-16 RX ADMIN — POLYETHYLENE GLYCOL 3350 17 GRAM(S): 17 POWDER, FOR SOLUTION ORAL at 13:00

## 2021-07-16 RX ADMIN — HEPARIN SODIUM 15 UNIT(S)/HR: 5000 INJECTION INTRAVENOUS; SUBCUTANEOUS at 05:19

## 2021-07-16 RX ADMIN — HEPARIN SODIUM 17.5 UNIT(S)/HR: 5000 INJECTION INTRAVENOUS; SUBCUTANEOUS at 16:09

## 2021-07-16 RX ADMIN — CHLORHEXIDINE GLUCONATE 1 APPLICATION(S): 213 SOLUTION TOPICAL at 13:00

## 2021-07-16 RX ADMIN — Medication 650 MILLIGRAM(S): at 15:17

## 2021-07-16 NOTE — CHART NOTE - NSCHARTNOTEFT_GEN_A_CORE
HPI/Interval Events: Patient downgraded from SICU. No acute intervening events. Patient feels well, with no significant complaints. Pain controlled. Tolerating CLD, voiding. Remains hemodynamically stable and afebrile. Still distended but passing flatus. Drain with some leak and dressing was changed. No f/c/n/v, chest pain, SOB, urinary symptoms, hematochezia, melena.    MEDICATIONS  (STANDING):  chlorhexidine 2% Cloths 1 Application(s) Topical daily  dextrose 5% + sodium chloride 0.45% with potassium chloride 20 mEq/L 1000 milliLiter(s) (110 mL/Hr) IV Continuous <Continuous>  heparin  Infusion 1500 Unit(s)/Hr (17.5 mL/Hr) IV Continuous <Continuous>  meropenem  IVPB 1000 milliGRAM(s) IV Intermittent every 8 hours  polyethylene glycol 3350 17 Gram(s) Oral daily  senna 1 Tablet(s) Oral at bedtime    MEDICATIONS  (PRN):  HYDROmorphone  Injectable 0.5 milliGRAM(s) IV Push every 8 hours PRN Severe Pain (7 - 10)  traMADol 50 milliGRAM(s) Oral every 4 hours PRN Severe Pain (7 - 10)  traMADol 25 milliGRAM(s) Oral every 4 hours PRN Moderate Pain (4 - 6)      Vital Signs Last 24 Hrs  T(C): 37.3 (16 Jul 2021 17:40), Max: 37.9 (15 Jul 2021 23:23)  T(F): 99.2 (16 Jul 2021 17:40), Max: 100.3 (16 Jul 2021 06:00)  HR: 72 (16 Jul 2021 17:40) (62 - 84)  BP: 155/99 (16 Jul 2021 17:40) (115/61 - 155/99)  BP(mean): 97 (16 Jul 2021 16:00) (72 - 111)  RR: 20 (16 Jul 2021 17:40) (15 - 23)  SpO2: 96% (16 Jul 2021 17:40) (93% - 100%)    Gen: patient laying in bed, A&Ox3, NAD  HEENT: EOMI / PERRL b/l  Pulm: regular respiratory rate, no distress  CV: RRR  GI: Abdominal incisions c/d/i. Abdomen soft, distended, TTP to the right w/o rebound or guarding. Drain with serosanguineous fluid, dressing changed.  Neurological: no gross sensory / motor deficits  Ext: Warm, pink, no edema or lesions noted      I&O's Detail    15 Jul 2021 07:01  -  16 Jul 2021 07:00  --------------------------------------------------------  IN:    Heparin: 30 mL    Heparin: 129 mL    Heparin: 104 mL    IV PiggyBack: 150 mL    IV PiggyBack: 500 mL  Total IN: 913 mL    OUT:    Bulb (mL): 150 mL    Voided (mL): 1700 mL  Total OUT: 1850 mL    Total NET: -937 mL      16 Jul 2021 07:01  -  16 Jul 2021 21:56  --------------------------------------------------------  IN:    dextrose 5% + sodium chloride 0.45% w/ Additives: 220 mL    Heparin: 137.5 mL  Total IN: 357.5 mL    OUT:    Bulb (mL): 148 mL    Voided (mL): 2050 mL  Total OUT: 2198 mL    Total NET: -1840.5 mL          LABS:                        13.2   25.64 )-----------( 420      ( 16 Jul 2021 15:19 )             38.2     07-16    135  |  98  |  13.1  ----------------------------<  117<H>  3.9   |  26.0  |  0.74    Ca    8.2<L>      16 Jul 2021 04:48  Phos  1.8     07-16  Mg     1.9     07-16    TPro  6.4<L>  /  Alb  2.8<L>  /  TBili  0.7  /  DBili  0.2  /  AST  45<H>  /  ALT  106<H>  /  AlkPhos  250<H>  07-16    PTT - ( 16 Jul 2021 21:36 )  PTT:41.6 sec      A/P: 55y Male s/p robotic drainage of liver abscess now downgraded from SICU to floor. Stable on floor. PTT therapeutic.    -Continue Heparin gtt  -Keep CLD  - Monitor for bowel function   -JOSEMANUEL  -NGT if vomits  -Keep abx, follow cultures results and speciation  -OOB and walking  -PT  -F/U fever and WBC

## 2021-07-16 NOTE — PROGRESS NOTE ADULT - ASSESSMENT
56 yo M POD #2 for robotic assisted liver abscess drainage, progressing well    Recommendations    - Pain control   - Reg diet, ADAT  - Ambulate  - OOBTC  - COntinue Abx  - F/u cultures  - Restart hep gtt

## 2021-07-16 NOTE — CHART NOTE - NSCHARTNOTEFT_GEN_A_CORE
Source: Patient [x ]  Family [ ]   other [x ] EMR and discussed in A.M. Rounds     Current Diet: Diet, Regular:   Supplement Feeding Modality:  Oral  Ensure Enlive Cans or Servings Per Day:  1       Frequency:  Three Times a day (07-15-21 @ 13:27)      PO intake:  < 50% [ ]   50-75%  [ ]   %  [x ]  other :    Source for PO intake [x ] Patient [ ] family [ ] chart [ ] staff [ ] other    Current Weight:   7/14: 85.8 kg  7/9: 84.8 kg     % Weight Change: N/A     Pertinent Medications: MEDICATIONS  (STANDING):  chlorhexidine 2% Cloths 1 Application(s) Topical daily  heparin  Infusion 1500 Unit(s)/Hr (15 mL/Hr) IV Continuous <Continuous>  meropenem  IVPB 1000 milliGRAM(s) IV Intermittent every 8 hours  senna 1 Tablet(s) Oral at bedtime  vancomycin  IVPB 1250 milliGRAM(s) IV Intermittent every 8 hours    MEDICATIONS  (PRN):  HYDROmorphone  Injectable 0.5 milliGRAM(s) IV Push every 8 hours PRN Severe Pain (7 - 10)  traMADol 50 milliGRAM(s) Oral every 4 hours PRN Severe Pain (7 - 10)  traMADol 25 milliGRAM(s) Oral every 4 hours PRN Moderate Pain (4 - 6)    Pertinent Labs: CBC Full  -  ( 16 Jul 2021 04:48 )  WBC Count : 23.44 K/uL  RBC Count : 4.23 M/uL  Hemoglobin : 12.2 g/dL  Hematocrit : 36.8 %  Platelet Count - Automated : 333 K/uL  Mean Cell Volume : 87.0 fl  Mean Cell Hemoglobin : 28.8 pg  Mean Cell Hemoglobin Concentration : 33.2 gm/dL  Auto Neutrophil # : 20.06 K/uL  Auto Lymphocyte # : 1.97 K/uL  Auto Monocyte # : 0.86 K/uL  Auto Eosinophil # : 0.22 K/uL  Auto Basophil # : 0.07 K/uL  Auto Neutrophil % : 85.6 %  Auto Lymphocyte % : 8.4 %  Auto Monocyte % : 3.7 %  Auto Eosinophil % : 0.9 %  Auto Basophil % : 0.3 %        Skin: R abd lap sites, R abd drain sites    Nutrition focused physical exam previously conducted - found signs of malnutrition [ ]absent [x ]present    Subcutaneous fat loss: moderate [x ] Orbital fat pads region, [x ]Buccal fat region, [ ]Triceps region,  [ ]Ribs region    Muscle wasting: moderate [x ]Temples region, [ ]Clavicle region, [ ]Shoulder region, [ ]Scapula region, [ ]Interosseous region,  [ ]thigh region, [ ]Calf region    Estimated Needs:   [x ] no change since previous assessment  [ ] recalculated:     Brief Hospital Course:   Pt is a 55y Male admitted w/ sepsis, found to have liver abscess of unclear etiology (possibly hematogenous spread secondary to recent dental procedure), s/p attempted IR drainage on 7/13, and robotic debridement and drainage of liver abscess on 7/14.      Current Nutrition Diagnosis:  Pt remains at high nutrition risk secondary to moderate (acute) protein calorie malnutrition related to inability to meet sufficient protein-energy needs in setting of persistent lack of appetite, severe sepsis, hepatic abscess.   as evidenced by pt meeting < 75% estimated energy needs >7 days and physical signs of moderate muscle wasting and fat loss. Pt's diet now advanced to Regular. Pt reports tolerating diet well; however has decreased appetite, Ensure Enlive ordered noted.  Encouraged sipping Ensure between meals to increase caloric and protein intake. RD to continue to provide preferences and monitor PO intake. Recommendations below:     Recommendations:   1. RX: MVI and Vit. C daily (500mg).   2. Check weight daily to monitor trends   3. Encourage with meals/supplements   4. Continue with Ensure Enlive TID   5. Monitor H/H, Alk Phos.     Monitoring and Evaluation:   [ x] PO intake [x ] Tolerance to diet prescription [X] Weights  [X] Follow up per protocol [X] Labs:

## 2021-07-16 NOTE — PROGRESS NOTE ADULT - ASSESSMENT
55M w/ hx of sleep apnea on bipap at night who presents from Urgent Care who presents with chills. Of note, he was recently diagnosed with COVID on Wednesday by rapid test  and his sx had started one week ago. He started having night sweats, cough w/o productive sputum, chills, and fever (Tmax- 104F). His wife has also tested positive for COVID19 and is currently asymptomatic. He had gone to the urgent care and was diagnosed with LLL PNA. He has been vaccinated with Pfizer in April. He was febrile to 100.4F and having significant chills. Also, has nausea, SOB, and headache. Denies vomiting, dizziness, abdominal pain and chest pain. He was subsequently told by urgent care to come to the hospital for further management. He has been taking tylenol as needed.      PT REPORTS HE HAS HAD NIGHT SWEATS  HAD DENTAL PROCEDURE IN EARLY JUNE  OUT PT RAPID COVID WAS NEG      PT HAD COVID PCR  TEST HERE  x2 WHICH WAS NEGATIVE  CT SCAN CHEST SHOWED A    LIVER LESION  pt s/p   MRI OF LIVER    WHICH SHOWED COLLECTION C/W ABSCESS AS WELL AS THROMBUS    ALSO ? CYSTIC ARE IN PANCREAS IPMN  PT HAD INCREASE FEVERS 7/11 CODE SEPSIS CALLED WBC AND LACTATE UP  IR CALLED  AND DRAINAGE WAS DONE    CX AND CYTOLOGY SENT  PT WAS  TRANSFERRED TO ICU FOR FURTHER MONITORING  LAST PM DRAIN WAS PARTIALLY PULLED BY   ACCIDENT WHEN PT GETTING OUT OF BED          REPEAT ABD IMAGING and IR EVAL DONE  NO MORE FLUID ABLE TO BE DRAINED   PT S/P OR 7/14   ON MERREM /VANCO  OPERATIVE CX PENDING SO FAR NEG  WILL D/C VANCO AS UNLIKELY MRSA   SPOKE TO SURGERY KUB ORDERED    VÍCTOR REPORTED NEGATIVE   ALL BLOOD CX HAVE BEEN NEGATIVE  WILL FOLLOW UP

## 2021-07-16 NOTE — CHART NOTE - NSCHARTNOTEFT_GEN_A_CORE
SICU TRANSFER NOTE  -----------------------------  ICU Admission Date: 7/11/21  Transfer Date: 07-16-21 @ 12:06    Admission Diagnosis: Fever and chills/PNA/liver abscess    Active Problems/injuries:   - Liver abscess  - Hepatic vein thrombosis  - JESSICA    Procedures:   - IR drainage of abscess (7/11)  - Attempted IR drainage of abscess (7/13)  - Robotic debridement and drainage of liver abscess (7/14)    Consultants:  [x ] Cardiology  [ ] Endocrine  [ x] Infectious Disease  [ ] Medicine  [ ]Neurosurgery  [ ] Ortho       [ ] Weight Bearing Status:  [ ] Palliative       [ ] Advanced Directives:    [ ] Physical Medicine and Rehab       [ ] Disposition :   [ ] Plastics  [ ] Pulmonary    Medications  chlorhexidine 2% Cloths 1 Application(s) Topical daily  dextrose 5% + sodium chloride 0.45% with potassium chloride 20 mEq/L 1000 milliLiter(s) IV Continuous <Continuous>  heparin  Infusion 1500 Unit(s)/Hr IV Continuous <Continuous>  HYDROmorphone  Injectable 0.5 milliGRAM(s) IV Push every 8 hours PRN  meropenem  IVPB 1000 milliGRAM(s) IV Intermittent every 8 hours  polyethylene glycol 3350 17 Gram(s) Oral daily  senna 1 Tablet(s) Oral at bedtime  traMADol 50 milliGRAM(s) Oral every 4 hours PRN  traMADol 25 milliGRAM(s) Oral every 4 hours PRN      [ ] I attest I have reviewed and reconciled all medications prior to transfer    IV Fluids  dextrose 5% + sodium chloride 0.45% with potassium chloride 20 mEq/L: Solution, 1000 milliLiter(s) infuse at 110 mL/Hr  lactated ringers Bolus:   2300 milliLiter(s), IV Bolus, once, infuse over 60 Minute(s), Stop After 1 Doses  Special Instructions: Re-assess every 15 minutes, consult with provider for volumes GREATER THAN 2000 milliLiter(s) per 60 minute(s)  Provider's Contact #: [phone]     (Calc Info: 31 milliLiter(s)/Kg (ideal)/DOSE x 73 Kg (ideal) = 2,300 milliLiter(s)/Dose     (Requested dose was 31 milliLiter(s) per Kg (ideal))  lactated ringers.: Solution, 1000 milliLiter(s) infuse at 125 mL/Hr  Provider's Contact #: [phone]    Indication: XXXXXX    Antibiotics:  meropenem  IVPB 1000 milliGRAM(s) IV Intermittent every 8 hours    Indication: XXXXXXX End Date:XXXXXXX      I have discussed this case with [email or web address] [email or web address] upon transfer and all questions regarding ICU course were answered.  The following items are to be followed up: SICU TRANSFER NOTE  -----------------------------  ICU Admission Date: 7/11/21  Transfer Date: 07-16-21 @ 12:06    Admission Diagnosis: Fever and chills/PNA/liver abscess    Active Problems/injuries:   - Liver abscess  - Hepatic vein thrombosis  - JESSICA    Procedures:   - IR drainage of abscess (7/11)  - Attempted IR drainage of abscess (7/13)  - Robotic debridement and drainage of liver abscess (7/14)    Consultants:  [x ] Cardiology  [ ] Endocrine  [ x] Infectious Disease  [ ] Medicine  [ ]Neurosurgery  [ ] Ortho       [ ] Weight Bearing Status:  [ ] Palliative       [ ] Advanced Directives:    [ ] Physical Medicine and Rehab       [ ] Disposition :   [ ] Plastics  [ ] Pulmonary    Medications  chlorhexidine 2% Cloths 1 Application(s) Topical daily  dextrose 5% + sodium chloride 0.45% with potassium chloride 20 mEq/L 1000 milliLiter(s) IV Continuous <Continuous>  heparin  Infusion 1500 Unit(s)/Hr IV Continuous <Continuous>  HYDROmorphone  Injectable 0.5 milliGRAM(s) IV Push every 8 hours PRN  meropenem  IVPB 1000 milliGRAM(s) IV Intermittent every 8 hours  polyethylene glycol 3350 17 Gram(s) Oral daily  senna 1 Tablet(s) Oral at bedtime  traMADol 50 milliGRAM(s) Oral every 4 hours PRN  traMADol 25 milliGRAM(s) Oral every 4 hours PRN      [ x] I attest I have reviewed and reconciled all medications prior to transfer    IV Fluids  dextrose 5% + sodium chloride 0.45% with potassium chloride 20 mEq/L: Solution, 1000 milliLiter(s) infuse at 110 mL/Hr  lactated ringers Bolus:   2300 milliLiter(s), IV Bolus, once, infuse over 60 Minute(s), Stop After 1 Doses  Special Instructions: Re-assess every 15 minutes, consult with provider for volumes GREATER THAN 2000 milliLiter(s) per 60 minute(s)  Provider's Contact #: [phone]     (Calc Info: 31 milliLiter(s)/Kg (ideal)/DOSE x 73 Kg (ideal) = 2,300 milliLiter(s)/Dose     (Requested dose was 31 milliLiter(s) per Kg (ideal))  lactated ringers.: Solution, 1000 milliLiter(s) infuse at 125 mL/Hr  Provider's Contact #: [phone]    Indication: Minimal PO intake    Antibiotics:  meropenem  IVPB 1000 milliGRAM(s) IV Intermittent every 8 hours    Indication: Intra-abdominal infection End Date: TBD      I have discussed this case with Bob Gonzalez upon transfer and all questions regarding ICU course were answered.  The following items are to be followed up:  - Heparin gtt  - BM  - CLD/PO tolerance

## 2021-07-16 NOTE — PROGRESS NOTE ADULT - SUBJECTIVE AND OBJECTIVE BOX
INFECTIOUS DISEASES AND INTERNAL MEDICINE at [facility]  =======================================================  Henri Salazar MD  Diplomates [employer] Board of Internal Medicine and Infectious Diseases  Telephone [phone]  Fax            [phone]  =======================================================    MAXINE BREAUX [id number]    Follow up: liver abscess S/P IR GUIDED DRAINAGE    and S/P OPERATIVE PROCEDURE 7/14 CX NEG TO DATE    Allergies:  No Known Allergies      Medications:  acetaminophen   Tablet .. 650 milliGRAM(s) Oral every 6 hours PRN  enoxaparin Injectable 40 milliGRAM(s) SubCutaneous daily  piperacillin/tazobactam IVPB.. 3.375 Gram(s) IV Intermittent every 8 hours  potassium chloride   Solution 40 milliEquivalent(s) Oral once    SOCIAL       FAMILY   FAMILY HISTORY:  FH: heart attack (Father)      REVIEW OF SYSTEMS:  CONSTITUTIONAL:  No Fever or chills  HEENT:   No diplopia or blurred vision.  No earache, sore throat or runny nose.  CARDIOVASCULAR:  No pressure, squeezing, strangling, tightness, heaviness or aching about the chest, neck, axilla or epigastrium.  RESPIRATORY:    cough, shortness of breath,    GASTROINTESTINAL: ABD BLOATING  GENITOURINARY:  No dysuria, frequency or urgency. No Blood in urine  MUSCULOSKELETAL:   moves all joints  SKIN:  No change in skin, hair or nails.  NEUROLOGIC:  No paresthesias, fasciculations, seizures or weakness.  PSYCHIATRIC:  No disorder of thought or mood.  ENDOCRINE:  No heat or cold intolerance, polyuria or polydipsia.  HEMATOLOGICAL:  No easy bruising or bleeding.            Physical Exam:  I    Vital Signs Last 24 Hrs  T(C): 37.9 (16 Jul 2021 06:00), Max: 37.9 (15 Jul 2021 19:38)  T(F): 100.3 (16 Jul 2021 06:00), Max: 100.3 (16 Jul 2021 06:00)  HR: 74 (16 Jul 2021 08:00) (64 - 87)  BP: 138/85 (16 Jul 2021 08:00) (115/61 - 155/95)  BP(mean): 100 (16 Jul 2021 08:00) (72 - 109)  RR: 20 (16 Jul 2021 08:00) (10 - 25)  SpO2: 96% (16 Jul 2021 08:00) (93% - 100%)      GEN: NAD,   HEENT: normocephalic and atraumatic. EOMI. GUANAKO.    NECK: Supple. No carotid bruits.  No lymphadenopathy or thyromegaly.  LUNGS: Clear to auscultation.  HEART: Regular rate and rhythm without murmur.  ABDOMEN: Soft, nontender, and MILD DISTENTION   Positive bowel sounds.DRAIN  IN PLACE      : No CVA tenderness  EXTREMITIES: Without any cyanosis, clubbing, rash, lesions or edema.  MSK: no joint swelling  NEUROLOGIC: Cranial nerves II through XII are grossly intact.  PSYCHIATRIC: Appropriate affect .  SKIN: No ulceration or induration present.        Labs:  Vi     =======================================================  Current Antibiotics:  piperacillin/tazobactam IVPB.. 3.375 Gram(s) IV Intermittent every 8 hours  VANCO    Other medications:  enoxaparin Injectable 40 milliGRAM(s) SubCutaneous daily  potassium chloride   Solution 40 milliEquivalent(s) Oral once      =======================================================  Labs:                                           12.2   23.44 )-----------( 333      ( 16 Jul 2021 04:48 )             36.8   07-16    135  |  98  |  13.1  ----------------------------<  117<H>  3.9   |  26.0  |  0.74    Ca    8.2<L>      16 Jul 2021 04:48  Phos  1.8     07-16  Mg     1.9     07-16    TPro  6.4<L>  /  Alb  2.8<L>  /  TBili  0.7  /  DBili  0.2  /  AST  45<H>  /  ALT  106<H>  /  AlkPhos  250<H>  07-16                               Procalcitonin, Serum: 11.61 ng/mL (07-11-21 @ 08:28)  Procalcitonin, Serum: 21.40 ng/mL (07-09-21 @ 13:39)    D-Dimer Assay, Quantitative: 467 ng/mL DDU (07-10-21 @ 13:54)    Ferritin, Serum: 260 ng/mL (07-10-21 @ 13:53)  Ferritin, Serum: 275 ng/mL (07-09-21 @ 13:39)    C-Reactive Protein, Serum: 324 mg/L (07-10-21 @ 13:53)  C-Reactive Protein, Serum: 263 mg/L (07-09-21 @ 13:39)    WBC Count: 16.70 K/uL (07-11-21 @ 08:28)  WBC Count: 19.84 K/uL (07-10-21 @ 08:30)  WBC Count: 19.07 K/uL (07-09-21 @ 13:39)      COVID-19 PCR: NotDetec (07-10-21 @ 10:11)  COVID-19 PCR: NotDete (07-09-21 @ 13:39)      Alkaline Phosphatase, Serum: 247 U/L (07-11-21 @ 08:28)  Alkaline Phosphatase, Serum: 166 U/L (07-10-21 @ 08:30)  Alkaline Phosphatase, Serum: 156 U/L (07-09-21 @ 13:39)  Alanine Aminotransferase (ALT/SGPT): 136 U/L (07-11-21 @ 08:28)  Alanine Aminotransferase (ALT/SGPT): 150 U/L (07-10-21 @ 08:30)  Alanine Aminotransferase (ALT/SGPT): 160 U/L (07-09-21 @ 13:39)  Aspartate Aminotransferase (AST/SGOT): 59 U/L (07-11-21 @ 08:28)  Aspartate Aminotransferase (AST/SGOT): 71 U/L (07-10-21 @ 08:30)  Aspartate Aminotransferase (AST/SGOT): 92 U/L (07-09-21 @ 13:39)  Bilirubin Total, Serum: 1.3 mg/dL (07-11-21 @ 08:28)  Bilirubin Total, Serum: 1.0 mg/dL (07-10-21 @ 08:30)  Bilirubin Total, Serum: 2.2 mg/dL (07-09-21 @ 13:39)

## 2021-07-16 NOTE — PROGRESS NOTE ADULT - SUBJECTIVE AND OBJECTIVE BOX
HPI/OVERNIGHT EVENTS: Patient seen and examined at bedside this AM in SICU, resting in bed, POD#2 for robotic assisted liver abscess drainage, afebrile, tolerating liquids, pain controlled, voided spontaneously, IS 1500.  Patient is mentioning that he is having chills and loss of appetite.      ICU Vital Signs Last 24 Hrs  T(C): 37.9 (15 Jul 2021 23:23), Max: 37.9 (15 Jul 2021 19:38)  T(F): 100.2 (15 Jul 2021 23:23), Max: 100.2 (15 Jul 2021 19:38)  HR: 69 (16 Jul 2021 01:00) (58 - 87)  BP: 147/82 (16 Jul 2021 01:00) (115/61 - 147/82)  BP(mean): 100 (16 Jul 2021 01:00) (76 - 103)  ABP: 98/82 (15 Jul 2021 10:00) (80/67 - 126/95)  ABP(mean): 90 (15 Jul 2021 10:00) (72 - 106)  RR: 20 (16 Jul 2021 01:00) (10 - 25)  SpO2: 100% (16 Jul 2021 01:00) (90% - 100%)    I&O's Detail    14 Jul 2021 07:01  -  15 Jul 2021 07:00  --------------------------------------------------------  IN:    IV PiggyBack: 500 mL    IV PiggyBack: 150 mL    IV PiggyBack: 750 mL    IV PiggyBack: 50 mL    multiple electrolytes Injection Type 1.: 700 mL    Oral Fluid: 1000 mL  Total IN: 3150 mL    OUT:    Bulb (mL): 40 mL    Voided (mL): 1400 mL  Total OUT: 1440 mL    Total NET: 1710 mL      15 Jul 2021 07:01  -  16 Jul 2021 01:42  --------------------------------------------------------  IN:    Heparin: 52 mL    Heparin: 129 mL    IV PiggyBack: 500 mL    IV PiggyBack: 100 mL  Total IN: 781 mL    OUT:    Bulb (mL): 50 mL    Voided (mL): 1150 mL  Total OUT: 1200 mL    Total NET: -419 mL        Constitutional: patient resting comfortably in bed, in no acute distress  HEENT: EOMI, PERRLA, MMM.  Respiratory: Non labored breathing on RA  Cardiovascular: RRR  Gastrointestinal: Abdomen soft, appropriately tender, no rebound or guarding, incisions C/D/I, drain with minimum output.    Musculoskeletal: No joint pain, swelling or deformity; no limitation of movement  Vascular: Extremities warm and well perfused.     .  LABS:                         12.5   24.43 )-----------( 360      ( 15 Jul 2021 21:12 )             37.0     07-15    136  |  101  |  10.0  ----------------------------<  147<H>  4.2   |  25.0  |  0.67    Ca    8.0<L>      15 Jul 2021 04:24  Phos  3.3     07-15  Mg     2.1     07-15    TPro  6.3<L>  /  Alb  2.7<L>  /  TBili  0.7  /  DBili  0.3  /  AST  89<H>  /  ALT  129<H>  /  AlkPhos  257<H>  07-15    PT/INR - ( 14 Jul 2021 05:36 )   PT: 13.1 sec;   INR: 1.14 ratio         PTT - ( 15 Jul 2021 21:12 )  PTT:26.1 sec          RADIOLOGY, EKG & ADDITIONAL TESTS: Reviewed.     MEDICATIONS  (STANDING):  acetaminophen   Tablet .. 650 milliGRAM(s) Oral every 6 hours  ketorolac   Injectable 15 milliGRAM(s) IV Push every 6 hours  meropenem  IVPB 1000 milliGRAM(s) IV Intermittent every 8 hours  multiple electrolytes Injection Type 1 1000 milliLiter(s) (100 mL/Hr) IV Continuous <Continuous>  senna 1 Tablet(s) Oral at bedtime  vancomycin  IVPB 1250 milliGRAM(s) IV Intermittent every 8 hours    MEDICATIONS  (PRN):  HYDROmorphone  Injectable 0.5 milliGRAM(s) IV Push every 4 hours PRN Severe Pain (7 - 10)  traMADol 25 milliGRAM(s) Oral every 4 hours PRN Moderate Pain (4 - 6)

## 2021-07-16 NOTE — PROGRESS NOTE ADULT - SUBJECTIVE AND OBJECTIVE BOX
24h Events:  No acute events. subtherapeutic on hep gtt x 3, rate increased.     ICU Vital Signs Last 24 Hrs  T(C): 37.8 (16 Jul 2021 01:46), Max: 37.9 (15 Jul 2021 19:38)  T(F): 100.1 (16 Jul 2021 01:46), Max: 100.2 (15 Jul 2021 19:38)  HR: 73 (16 Jul 2021 02:00) (58 - 87)  BP: 142/85 (16 Jul 2021 02:00) (115/61 - 147/82)  BP(mean): 103 (16 Jul 2021 02:00) (76 - 103)  ABP: 98/82 (15 Jul 2021 10:00) (80/67 - 126/95)  ABP(mean): 90 (15 Jul 2021 10:00) (72 - 106)  RR: 21 (16 Jul 2021 02:00) (10 - 25)  SpO2: 96% (16 Jul 2021 02:00) (94% - 100%)      I&O's Detail    14 Jul 2021 07:01  -  15 Jul 2021 07:00  --------------------------------------------------------  IN:    IV PiggyBack: 500 mL    IV PiggyBack: 150 mL    IV PiggyBack: 750 mL    IV PiggyBack: 50 mL    multiple electrolytes Injection Type 1.: 700 mL    Oral Fluid: 1000 mL  Total IN: 3150 mL    OUT:    Bulb (mL): 40 mL    Voided (mL): 1400 mL  Total OUT: 1440 mL    Total NET: 1710 mL    15 Jul 2021 07:01  -  16 Jul 2021 03:09  --------------------------------------------------------  IN:    Heparin: 65 mL    Heparin: 129 mL    IV PiggyBack: 500 mL    IV PiggyBack: 100 mL  Total IN: 794 mL    OUT:    Bulb (mL): 50 mL    Voided (mL): 1150 mL  Total OUT: 1200 mL    Total NET: -406 mL    ABG - ( 14 Jul 2021 16:20 )  pH, Arterial: 7.390 pH, Blood: x     /  pCO2: 45    /  pO2: 229   / HCO3: 27    / Base Excess: 2.2   /  SaO2: 100.0     MEDICATIONS  (STANDING):  chlorhexidine 2% Cloths 1 Application(s) Topical daily  heparin  Infusion 1300 Unit(s)/Hr (13 mL/Hr) IV Continuous <Continuous>  meropenem  IVPB 1000 milliGRAM(s) IV Intermittent every 8 hours  senna 1 Tablet(s) Oral at bedtime  vancomycin  IVPB 1250 milliGRAM(s) IV Intermittent every 8 hours    MEDICATIONS  (PRN):  HYDROmorphone  Injectable 0.5 milliGRAM(s) IV Push every 8 hours PRN Severe Pain (7 - 10)  traMADol 50 milliGRAM(s) Oral every 4 hours PRN Severe Pain (7 - 10)  traMADol 25 milliGRAM(s) Oral every 4 hours PRN Moderate Pain (4 - 6)    Physical Exam:    Gen: Resting comfortably in bed    HEENT: PERRL, EOMI    Neurological: Alert and oriented x3 without focal deficit    Neck: Trachea midline, no evidence of JVD, FROM without pain, neck symmetric    Pulmonary: CTAB with decreased breath sounds at the bases    Cardiovascular: S1S2, regular rate and rhythm    Gastrointestinal: Soft, RUQ tenderness, crepitus RUQ    : Voiding clear yellow urine    Extremities: Without c/c/e    Skin: Intact    Musculoskeletal: FROM without pain, no deformity or areas of tenderness    LABS:  CBC Full  -  ( 15 Jul 2021 21:12 )  WBC Count : 24.43 K/uL  RBC Count : 4.33 M/uL  Hemoglobin : 12.5 g/dL  Hematocrit : 37.0 %  Platelet Count - Automated : 360 K/uL  Mean Cell Volume : 85.5 fl  Mean Cell Hemoglobin : 28.9 pg  Mean Cell Hemoglobin Concentration : 33.8 gm/dL  Auto Neutrophil # : x  Auto Lymphocyte # : x  Auto Monocyte # : x  Auto Eosinophil # : x  Auto Basophil # : x  Auto Neutrophil % : x  Auto Lymphocyte % : x  Auto Monocyte % : x  Auto Eosinophil % : x  Auto Basophil % : x    07-15    136  |  101  |  10.0  ----------------------------<  147<H>  4.2   |  25.0  |  0.67    Ca    8.0<L>      15 Jul 2021 04:24  Phos  3.3     07-15  Mg     2.1     07-15    TPro  6.3<L>  /  Alb  2.7<L>  /  TBili  0.7  /  DBili  0.3  /  AST  89<H>  /  ALT  129<H>  /  AlkPhos  257<H>  07-15    PT/INR - ( 14 Jul 2021 05:36 )   PT: 13.1 sec;   INR: 1.14 ratio      PTT - ( 15 Jul 2021 21:12 )  PTT:26.1 sec    RECENT CULTURES:  07-15 .Tissue R liver lobe abscess cavity deep XXXX   Rare polymorphonuclear leukocytes per low power field  No organisms seen per oil power field   No growth    07-15 .Tissue R liver lobe abs cavity superficial XXXX   Moderate polymorphonuclear leukocytes per low power field  No organisms seen per oil power field   No growth    07-12 .Smear XXXX   Numerous polymorphonuclear leukocytes seen per low power field  No organisms seen per oil power field XXXX    07-12 .Abscess hepatic collection XXXX XXXX   No growth    07-11 .Blood Blood-Peripheral XXXX XXXX   No growth at 48 hours    07-11 .Blood Blood-Peripheral XXXX XXXX   No growth at 48 hours    07-10 .Blood Blood XXXX XXXX   No growth at 5 days.    LIVER FUNCTIONS - ( 15 Jul 2021 04:24 )  Alb: 2.7 g/dL / Pro: 6.3 g/dL / ALK PHOS: 257 U/L / ALT: 129 U/L / AST: 89 U/L / GGT: x           ASSESSMENT/PLAN:  55y Male admitted w/ sepsis, found to have liver abscess of unclear etiology (possibly hematogenous spread secondary to recent dental procedure), s/p attempted IR drainage on 7/13, and robotic debridement and drainage of liver abscess on 7/14.    Neuro: Pain control with tramadol, melatonin for sleep hygiene    CV: no issues, HD stable    Pulm: BIPAP nightly for JESSICA, encourage deep breathing and IS while awake    GI/Nutrition: Regular diet, POD 2 Robotic debridement and drainage of liver abscess, monitor ESSIE output    /Renal: voiding, f/u AM labs, replete electrolytes and indicated    ID: Continue daniela and vanc for now, all bcx and fluid cx have been negative, D/C tylenol yesterday evening - monitor for fevers.     Endo: no issues    Skin: Repositioning for DTI prevention while in bed    Heme/DVT Prophylaxis: SCDs, heparin ggt for hepatic vein thormbus, f/u 3 AM ptt and adjust gtt according    Lines/Tubes: PIVs, Radial A line, R ESSIE    Dispo: PT/OT, HD stable, downgrade in AM

## 2021-07-16 NOTE — PHARMACOTHERAPY INTERVENTION NOTE - INTERVENTION TYPE RECOOMEND
Therapy Recommended - Additional therapy
Therapy Recommended - Drug indicated but not ordered

## 2021-07-16 NOTE — PROGRESS NOTE ADULT - ATTENDING COMMENTS
soft / NT / mildly distended / non-tympanitic  serosanguinous drainage in [place]kh drain bulb    X-ray abdomen (7/16) - ileus    7/11/2021 - percutaneous drainage of segment 8 liver abscess (likely hematogenous spread after dental cleaning)  7/14/2021 - robotic drainage of segment 8 liver abscess  -BCx (7/10) - negative x 2 sets  -BCx (7/11) - NGTD x 2 sets  -abscess Cx (7/12) - NGTD  -tissue Cx superficial abscess cavity (7/15) - NGTD  -tissue Cx deep abscess cavity (7/15) - NGTD  -vancomycin (7/11 - 7/16) [stopped since no MRSA or Enterococcus cultured]  -Zosyn (7/10 - 7/13), meropenem (7/13 - ?)  -IR unable to replace dislodged drain on 7/13  -Tm = 100.3 (7/16 @ 0600)  -VÍCTOR (7/14) - no evidence of infective endocarditis    hepatic vein thrombosis (segmental branch adjacent to liver abscess)  -hgb stable @ 12 g/dL on heparin infusion  -continue heparin infusion  -ok to transfer to floor

## 2021-07-17 LAB
ANION GAP SERPL CALC-SCNC: 12 MMOL/L — SIGNIFICANT CHANGE UP (ref 5–17)
APTT BLD: 35.1 SEC — SIGNIFICANT CHANGE UP (ref 27.5–35.5)
APTT BLD: 69.3 SEC — HIGH (ref 27.5–35.5)
BASOPHILS # BLD AUTO: 0.07 K/UL — SIGNIFICANT CHANGE UP (ref 0–0.2)
BASOPHILS NFR BLD AUTO: 0.3 % — SIGNIFICANT CHANGE UP (ref 0–2)
BUN SERPL-MCNC: 10.2 MG/DL — SIGNIFICANT CHANGE UP (ref 8–20)
CALCIUM SERPL-MCNC: 8.2 MG/DL — LOW (ref 8.6–10.2)
CHLORIDE SERPL-SCNC: 99 MMOL/L — SIGNIFICANT CHANGE UP (ref 98–107)
CO2 SERPL-SCNC: 23 MMOL/L — SIGNIFICANT CHANGE UP (ref 22–29)
CREAT SERPL-MCNC: 0.63 MG/DL — SIGNIFICANT CHANGE UP (ref 0.5–1.3)
CULTURE RESULTS: SIGNIFICANT CHANGE UP
EOSINOPHIL # BLD AUTO: 0.25 K/UL — SIGNIFICANT CHANGE UP (ref 0–0.5)
EOSINOPHIL NFR BLD AUTO: 1.1 % — SIGNIFICANT CHANGE UP (ref 0–6)
GLUCOSE SERPL-MCNC: 108 MG/DL — HIGH (ref 70–99)
HCT VFR BLD CALC: 38.8 % — LOW (ref 39–50)
HGB BLD-MCNC: 12.6 G/DL — LOW (ref 13–17)
IMM GRANULOCYTES NFR BLD AUTO: 1 % — SIGNIFICANT CHANGE UP (ref 0–1.5)
LYMPHOCYTES # BLD AUTO: 1.92 K/UL — SIGNIFICANT CHANGE UP (ref 1–3.3)
LYMPHOCYTES # BLD AUTO: 8.6 % — LOW (ref 13–44)
MAGNESIUM SERPL-MCNC: 2 MG/DL — SIGNIFICANT CHANGE UP (ref 1.6–2.6)
MCHC RBC-ENTMCNC: 28.7 PG — SIGNIFICANT CHANGE UP (ref 27–34)
MCHC RBC-ENTMCNC: 32.5 GM/DL — SIGNIFICANT CHANGE UP (ref 32–36)
MCV RBC AUTO: 88.4 FL — SIGNIFICANT CHANGE UP (ref 80–100)
MONOCYTES # BLD AUTO: 1.06 K/UL — HIGH (ref 0–0.9)
MONOCYTES NFR BLD AUTO: 4.8 % — SIGNIFICANT CHANGE UP (ref 2–14)
NEUTROPHILS # BLD AUTO: 18.74 K/UL — HIGH (ref 1.8–7.4)
NEUTROPHILS NFR BLD AUTO: 84.2 % — HIGH (ref 43–77)
PHOSPHATE SERPL-MCNC: 2.1 MG/DL — LOW (ref 2.4–4.7)
PLATELET # BLD AUTO: 318 K/UL — SIGNIFICANT CHANGE UP (ref 150–400)
POTASSIUM SERPL-MCNC: 4.3 MMOL/L — SIGNIFICANT CHANGE UP (ref 3.5–5.3)
POTASSIUM SERPL-SCNC: 4.3 MMOL/L — SIGNIFICANT CHANGE UP (ref 3.5–5.3)
RBC # BLD: 4.39 M/UL — SIGNIFICANT CHANGE UP (ref 4.2–5.8)
RBC # FLD: 13.3 % — SIGNIFICANT CHANGE UP (ref 10.3–14.5)
SODIUM SERPL-SCNC: 134 MMOL/L — LOW (ref 135–145)
SPECIMEN SOURCE: SIGNIFICANT CHANGE UP
WBC # BLD: 22.26 K/UL — HIGH (ref 3.8–10.5)
WBC # FLD AUTO: 22.26 K/UL — HIGH (ref 3.8–10.5)

## 2021-07-17 PROCEDURE — 99232 SBSQ HOSP IP/OBS MODERATE 35: CPT

## 2021-07-17 RX ORDER — HEPARIN SODIUM 5000 [USP'U]/ML
6500 INJECTION INTRAVENOUS; SUBCUTANEOUS ONCE
Refills: 0 | Status: COMPLETED | OUTPATIENT
Start: 2021-07-17 | End: 2021-07-17

## 2021-07-17 RX ORDER — SIMETHICONE 80 MG/1
80 TABLET, CHEWABLE ORAL
Refills: 0 | Status: DISCONTINUED | OUTPATIENT
Start: 2021-07-17 | End: 2021-07-20

## 2021-07-17 RX ORDER — DIPHENHYDRAMINE HCL 50 MG
50 CAPSULE ORAL EVERY 6 HOURS
Refills: 0 | Status: DISCONTINUED | OUTPATIENT
Start: 2021-07-17 | End: 2021-07-19

## 2021-07-17 RX ADMIN — Medication 50 MILLIGRAM(S): at 03:30

## 2021-07-17 RX ADMIN — Medication 10 MILLIGRAM(S): at 13:33

## 2021-07-17 RX ADMIN — SIMETHICONE 80 MILLIGRAM(S): 80 TABLET, CHEWABLE ORAL at 11:58

## 2021-07-17 RX ADMIN — MEROPENEM 100 MILLIGRAM(S): 1 INJECTION INTRAVENOUS at 22:43

## 2021-07-17 RX ADMIN — SIMETHICONE 80 MILLIGRAM(S): 80 TABLET, CHEWABLE ORAL at 17:30

## 2021-07-17 RX ADMIN — HEPARIN SODIUM 21.5 UNIT(S)/HR: 5000 INJECTION INTRAVENOUS; SUBCUTANEOUS at 11:55

## 2021-07-17 RX ADMIN — MEROPENEM 100 MILLIGRAM(S): 1 INJECTION INTRAVENOUS at 06:56

## 2021-07-17 RX ADMIN — SIMETHICONE 80 MILLIGRAM(S): 80 TABLET, CHEWABLE ORAL at 23:43

## 2021-07-17 RX ADMIN — CHLORHEXIDINE GLUCONATE 1 APPLICATION(S): 213 SOLUTION TOPICAL at 11:58

## 2021-07-17 RX ADMIN — HEPARIN SODIUM 6500 UNIT(S): 5000 INJECTION INTRAVENOUS; SUBCUTANEOUS at 11:59

## 2021-07-17 RX ADMIN — Medication 50 MILLIGRAM(S): at 17:30

## 2021-07-17 RX ADMIN — DEXTROSE MONOHYDRATE, SODIUM CHLORIDE, AND POTASSIUM CHLORIDE 110 MILLILITER(S): 50; .745; 4.5 INJECTION, SOLUTION INTRAVENOUS at 11:58

## 2021-07-17 RX ADMIN — HEPARIN SODIUM 21.5 UNIT(S)/HR: 5000 INJECTION INTRAVENOUS; SUBCUTANEOUS at 22:23

## 2021-07-17 RX ADMIN — MEROPENEM 100 MILLIGRAM(S): 1 INJECTION INTRAVENOUS at 13:50

## 2021-07-17 NOTE — PROGRESS NOTE ADULT - ASSESSMENT
55M w/ hx of sleep apnea on bipap at night who presents from Urgent Care who presents with chills. Of note, he was recently diagnosed with COVID on Wednesday by rapid test  and his sx had started one week ago. He started having night sweats, cough w/o productive sputum, chills, and fever (Tmax- 104F). His wife has also tested positive for COVID19 and is currently asymptomatic. He had gone to the urgent care and was diagnosed with LLL PNA. He has been vaccinated with Pfizer in April. He was febrile to 100.4F and having significant chills. Also, has nausea, SOB, and headache. Denies vomiting, dizziness, abdominal pain and chest pain. He was subsequently told by urgent care to come to the hospital for further management. He has been taking tylenol as needed.  PT REPORTS HE HAS HAD NIGHT SWEATS  HAD DENTAL PROCEDURE IN EARLY JUNE  OUT PT RAPID COVID WAS NEG      PT HAD COVID PCR  TEST HERE  x2 WHICH WAS NEGATIVE  CT SCAN CHEST SHOWED A    LIVER LESION  pt s/p   MRI OF LIVER    WHICH SHOWED COLLECTION C/W ABSCESS AS WELL AS THROMBUS    ALSO ? CYSTIC ARE IN PANCREAS IPMN  PT HAD INCREASE FEVERS 7/11 CODE SEPSIS CALLED WBC AND LACTATE UP  IR CALLED  AND DRAINAGE WAS DONE    CX AND CYTOLOGY SENT      Liver abscess  S/P IR GUIDED DRAINAGE    S/P OPERATIVE PROCEDURE 7/14 CX NEG TO DATE         Cultures pending  ON MERREM  OPERATIVE CX PENDING SO FAR NEG  VÍCTOR REPORTED NEGATIVE   ALL BLOOD CX HAVE BEEN NEGATIVE  WILL FOLLOW UP

## 2021-07-17 NOTE — PROGRESS NOTE ADULT - SUBJECTIVE AND OBJECTIVE BOX
INFECTIOUS DISEASES AND INTERNAL MEDICINE at [place]  =======================================================  Henri Salazar MD  Diplomates [employer] Board of Internal Medicine and Infectious Diseases  Telephone [phone]  Fax            [phone]  =======================================================    NAMITAMAXINE JUAREZ [id number]    Follow up: liver abscess     S/P IR GUIDED DRAINAGE    and S/P OPERATIVE PROCEDURE 7/14 CX NEG TO DATE    Allergies:  No Known Allergies      REVIEW OF SYSTEMS:  CONSTITUTIONAL:  No Fever or chills  HEENT:   No diplopia or blurred vision.  No earache, sore throat or runny nose.  CARDIOVASCULAR:  No pressure, squeezing, strangling, tightness, heaviness or aching about the chest, neck, axilla or epigastrium.  RESPIRATORY:    cough, shortness of breath,    GASTROINTESTINAL: ABD BLOATING  GENITOURINARY:  No dysuria, frequency or urgency.   MUSCULOSKELETAL:   moves all joints  SKIN:  No change in skin, hair or nails.  NEUROLOGIC:  No seizures   PSYCHIATRIC:  No disorder of thought or mood.  ENDOCRINE:  No heat or cold intolerance, polyuria or polydipsia.  HEMATOLOGICAL:  No easy bruising or bleeding.       Physical Exam:  GEN: NAD  HEENT: normocephalic and atraumatic. EOMI. PERRL.    NECK: Supple.  LUNGS: Clear to auscultation.  HEART: Regular rate and rhythm   ABDOMEN: Soft, nontender,  Positive bowel sounds. DRAIN  IN PLACE      : No CVA tenderness  EXTREMITIES: Without any edema.  MSK: no joint swelling  NEUROLOGIC: no focal deficits   PSYCHIATRIC: Appropriate affect .  SKIN: No ulceration or induration present.      Vitals:  T(F): 99.3 (17 Jul 2021 13:36), Max: 100 (16 Jul 2021 21:57)  HR: 78 (17 Jul 2021 11:10)  BP: 125/82 (17 Jul 2021 11:10)  RR: 18 (17 Jul 2021 11:10)  SpO2: 93% (17 Jul 2021 11:10) (93% - 99%)  temp max in last 48H T(F): , Max: 100.3 (07-16-21 @ 06:00)    Current Antibiotics:  meropenem  IVPB 1000 milliGRAM(s) IV Intermittent every 8 hours    Other medications:  chlorhexidine 2% Cloths 1 Application(s) Topical daily  dextrose 5% + sodium chloride 0.45% with potassium chloride 20 mEq/L 1000 milliLiter(s) IV Continuous <Continuous>  heparin  Infusion 1500 Unit(s)/Hr IV Continuous <Continuous>  polyethylene glycol 3350 17 Gram(s) Oral daily  senna 1 Tablet(s) Oral at bedtime  simethicone 80 milliGRAM(s) Chew four times a day                            12.6   22.26 )-----------( 318      ( 17 Jul 2021 06:05 )             38.8     07-17    134<L>  |  99  |  10.2  ----------------------------<  108<H>  4.3   |  23.0  |  0.63    Ca    8.2<L>      17 Jul 2021 06:05  Phos  2.1     07-17  Mg     2.0     07-17    TPro  6.4<L>  /  Alb  2.8<L>  /  TBili  0.7  /  DBili  0.2  /  AST  45<H>  /  ALT  106<H>  /  AlkPhos  250<H>  07-16      RECENT CULTURES:  07-15 @ 00:34 .Tissue R liver lobe abscess cavity deep     No growth  Rare polymorphonuclear leukocytes per low power field  No organisms seen per oil power field    07-15 @ 00:23 .Tissue R liver lobe abs cavity superficial     No growth  Moderate polymorphonuclear leukocytes per low power field  No organisms seen per oil power field    07-12 @ 09:36 .Smear     Numerous polymorphonuclear leukocytes seen per low power field  No organisms seen per oil power field    07-12 @ 06:41 .Abscess hepatic collection     No growth at 5 days    07-11 @ 20:43 .Blood Blood-Peripheral     No growth at 5 days.    07-11 @ 19:48 .Blood Blood-Peripheral     No growth at 5 days.    07-10 @ 10:20 .Blood Blood     No growth at 5 days.      WBC Count: 22.26 K/uL (07-17-21 @ 06:05)  WBC Count: 25.64 K/uL (07-16-21 @ 15:19)  WBC Count: 23.44 K/uL (07-16-21 @ 04:48)  WBC Count: 24.43 K/uL (07-15-21 @ 21:12)  WBC Count: 25.28 K/uL (07-15-21 @ 15:01)  WBC Count: 24.69 K/uL (07-15-21 @ 04:24)  WBC Count: 17.86 K/uL (07-14-21 @ 19:44)  WBC Count: 12.92 K/uL (07-14-21 @ 05:31)  WBC Count: 13.26 K/uL (07-13-21 @ 05:14)    Creatinine, Serum: 0.63 mg/dL (07-17-21 @ 06:05)  Creatinine, Serum: 0.74 mg/dL (07-16-21 @ 04:48)  Creatinine, Serum: 0.67 mg/dL (07-15-21 @ 04:24)  Creatinine, Serum: 0.58 mg/dL (07-14-21 @ 19:44)  Creatinine, Serum: 0.64 mg/dL (07-14-21 @ 05:31)  Creatinine, Serum: 0.70 mg/dL (07-13-21 @ 05:14)    C-Reactive Protein, Serum: 236 mg/L (07-13-21 @ 05:14)  C-Reactive Protein, Serum: 324 mg/L (07-10-21 @ 13:53)  C-Reactive Protein, Serum: 263 mg/L (07-09-21 @ 13:39)    Ferritin, Serum: 260 ng/mL (07-10-21 @ 13:53)  Ferritin, Serum: 275 ng/mL (07-09-21 @ 13:39)    Procalcitonin, Serum: 6.75 ng/mL (07-13-21 @ 05:14)  Procalcitonin, Serum: 12.63 ng/mL (07-12-21 @ 05:22)  Procalcitonin, Serum: 11.61 ng/mL (07-11-21 @ 08:28)  Procalcitonin, Serum: 21.40 ng/mL (07-09-21 @ 13:39)    COVID-19 PCR: NotDetec (07-14-21 @ 00:22)  COVID-19 PCR: NotDetec (07-10-21 @ 10:11)  COVID-19 PCR: NotDetec (07-09-21 @ 13:39)

## 2021-07-17 NOTE — PROGRESS NOTE ADULT - SUBJECTIVE AND OBJECTIVE BOX
INTERVAL HPI/OVERNIGHT EVENTS:    Patient evaluated at bedside. No acute distress. Patient complained of rash on his right lower extremity that was itchy, rash was demarcated and then progressed to upper extremities and right foot. Remains hemodynamically stable and afebrile. Patient downgraded yesterday, doing well otherwise. Tolerating CLD, passing gas no BM. Denies fevers, chills, nausea, emesis.  Denies chest pain, dyspnea.  Denies constipation, diarrhea. Denies headaches, dizziness, changes in vision.     MEDICATIONS  (STANDING):  chlorhexidine 2% Cloths 1 Application(s) Topical daily  dextrose 5% + sodium chloride 0.45% with potassium chloride 20 mEq/L 1000 milliLiter(s) (110 mL/Hr) IV Continuous <Continuous>  heparin  Infusion 1500 Unit(s)/Hr (17.5 mL/Hr) IV Continuous <Continuous>  meropenem  IVPB 1000 milliGRAM(s) IV Intermittent every 8 hours  polyethylene glycol 3350 17 Gram(s) Oral daily  senna 1 Tablet(s) Oral at bedtime    MEDICATIONS  (PRN):  diphenhydrAMINE 50 milliGRAM(s) Oral every 6 hours PRN Rash and/or Itching  HYDROmorphone  Injectable 0.5 milliGRAM(s) IV Push every 8 hours PRN Severe Pain (7 - 10)  traMADol 50 milliGRAM(s) Oral every 4 hours PRN Severe Pain (7 - 10)  traMADol 25 milliGRAM(s) Oral every 4 hours PRN Moderate Pain (4 - 6)      Vital Signs Last 24 Hrs  T(C): 37.7 (17 Jul 2021 03:28), Max: 37.9 (16 Jul 2021 06:00)  T(F): 99.8 (17 Jul 2021 03:28), Max: 100.3 (16 Jul 2021 06:00)  HR: 96 (17 Jul 2021 03:28) (62 - 96)  BP: 142/80 (17 Jul 2021 03:28) (118/55 - 155/99)  BP(mean): 97 (16 Jul 2021 16:00) (72 - 111)  RR: 18 (17 Jul 2021 03:28) (15 - 22)  SpO2: 96% (17 Jul 2021 03:28) (93% - 100%)    Gen: patient laying in bed, A&Ox3, NAD  HEENT: EOMI / PERRL b/l  Pulm: regular respiratory rate, no distress  CV: RRR  GI: Abdominal incisions c/d/i. Abdomen soft, distended, TTP to the right w/o rebound or guarding. Drain with serosanguineous fluid, dressing changed.  Neurological: no gross sensory / motor deficits  Ext: Warm, pink, right lower extremity and upper extremity with vesicular rash.         I&O's Detail    15 Jul 2021 07:01  -  16 Jul 2021 07:00  --------------------------------------------------------  IN:    Heparin: 30 mL    Heparin: 129 mL    Heparin: 104 mL    IV PiggyBack: 150 mL    IV PiggyBack: 500 mL  Total IN: 913 mL    OUT:    Bulb (mL): 150 mL    Voided (mL): 1700 mL  Total OUT: 1850 mL    Total NET: -937 mL      16 Jul 2021 07:01  -  17 Jul 2021 04:06  --------------------------------------------------------  IN:    dextrose 5% + sodium chloride 0.45% w/ Additives: 220 mL    Heparin: 137.5 mL  Total IN: 357.5 mL    OUT:    Bulb (mL): 188 mL    Voided (mL): 2050 mL  Total OUT: 2238 mL    Total NET: -1880.5 mL          LABS:                        13.2   25.64 )-----------( 420      ( 16 Jul 2021 15:19 )             38.2     07-16    135  |  98  |  13.1  ----------------------------<  117<H>  3.9   |  26.0  |  0.74    Ca    8.2<L>      16 Jul 2021 04:48  Phos  1.8     07-16  Mg     1.9     07-16    TPro  6.4<L>  /  Alb  2.8<L>  /  TBili  0.7  /  DBili  0.2  /  AST  45<H>  /  ALT  106<H>  /  AlkPhos  250<H>  07-16    PTT - ( 16 Jul 2021 21:36 )  PTT:41.6 sec      RADIOLOGY & ADDITIONAL STUDIES:

## 2021-07-17 NOTE — PROGRESS NOTE ADULT - ASSESSMENT
A/P: 55y Male s/p robotic drainage of liver abscess now downgraded from SICU to floor. Stable on floor. PTT therapeutic. Now with new rash, possible allergic reaction to abx?    -Continue Heparin gtt  -Keep CLD  - Monitor for bowel function   -JOSEMANUEL  -NGT if vomits  -Keep abx, follow cultures results and speciation  -OOB and walking  -PT  -F/U fever and WBC.  - Monitor Rash on benadryl, if improving continue with Merrem, if persistent switch back to Zosyn  - Re-engage ID today for antibiotic choice, given multiple antibiotics given previously and negative cultures.

## 2021-07-18 LAB
ANION GAP SERPL CALC-SCNC: 10 MMOL/L — SIGNIFICANT CHANGE UP (ref 5–17)
APTT BLD: 52.8 SEC — HIGH (ref 27.5–35.5)
APTT BLD: 70.2 SEC — HIGH (ref 27.5–35.5)
APTT BLD: 97.7 SEC — HIGH (ref 27.5–35.5)
BUN SERPL-MCNC: 7.9 MG/DL — LOW (ref 8–20)
CALCIUM SERPL-MCNC: 8.5 MG/DL — LOW (ref 8.6–10.2)
CHLORIDE SERPL-SCNC: 101 MMOL/L — SIGNIFICANT CHANGE UP (ref 98–107)
CO2 SERPL-SCNC: 24 MMOL/L — SIGNIFICANT CHANGE UP (ref 22–29)
CREAT SERPL-MCNC: 0.64 MG/DL — SIGNIFICANT CHANGE UP (ref 0.5–1.3)
GLUCOSE SERPL-MCNC: 112 MG/DL — HIGH (ref 70–99)
HCT VFR BLD CALC: 37.8 % — LOW (ref 39–50)
HGB BLD-MCNC: 12.3 G/DL — LOW (ref 13–17)
MAGNESIUM SERPL-MCNC: 1.9 MG/DL — SIGNIFICANT CHANGE UP (ref 1.6–2.6)
MCHC RBC-ENTMCNC: 28.6 PG — SIGNIFICANT CHANGE UP (ref 27–34)
MCHC RBC-ENTMCNC: 32.5 GM/DL — SIGNIFICANT CHANGE UP (ref 32–36)
MCV RBC AUTO: 87.9 FL — SIGNIFICANT CHANGE UP (ref 80–100)
PHOSPHATE SERPL-MCNC: 2.6 MG/DL — SIGNIFICANT CHANGE UP (ref 2.4–4.7)
PLATELET # BLD AUTO: 462 K/UL — HIGH (ref 150–400)
POTASSIUM SERPL-MCNC: 4.3 MMOL/L — SIGNIFICANT CHANGE UP (ref 3.5–5.3)
POTASSIUM SERPL-SCNC: 4.3 MMOL/L — SIGNIFICANT CHANGE UP (ref 3.5–5.3)
RBC # BLD: 4.3 M/UL — SIGNIFICANT CHANGE UP (ref 4.2–5.8)
RBC # FLD: 13.2 % — SIGNIFICANT CHANGE UP (ref 10.3–14.5)
SODIUM SERPL-SCNC: 135 MMOL/L — SIGNIFICANT CHANGE UP (ref 135–145)
WBC # BLD: 16.93 K/UL — HIGH (ref 3.8–10.5)
WBC # FLD AUTO: 16.93 K/UL — HIGH (ref 3.8–10.5)

## 2021-07-18 PROCEDURE — 99232 SBSQ HOSP IP/OBS MODERATE 35: CPT

## 2021-07-18 RX ADMIN — MEROPENEM 100 MILLIGRAM(S): 1 INJECTION INTRAVENOUS at 15:18

## 2021-07-18 RX ADMIN — MEROPENEM 100 MILLIGRAM(S): 1 INJECTION INTRAVENOUS at 22:33

## 2021-07-18 RX ADMIN — SIMETHICONE 80 MILLIGRAM(S): 80 TABLET, CHEWABLE ORAL at 13:00

## 2021-07-18 RX ADMIN — POLYETHYLENE GLYCOL 3350 17 GRAM(S): 17 POWDER, FOR SOLUTION ORAL at 13:00

## 2021-07-18 RX ADMIN — CHLORHEXIDINE GLUCONATE 1 APPLICATION(S): 213 SOLUTION TOPICAL at 13:00

## 2021-07-18 RX ADMIN — SIMETHICONE 80 MILLIGRAM(S): 80 TABLET, CHEWABLE ORAL at 22:32

## 2021-07-18 RX ADMIN — SIMETHICONE 80 MILLIGRAM(S): 80 TABLET, CHEWABLE ORAL at 05:28

## 2021-07-18 RX ADMIN — MEROPENEM 100 MILLIGRAM(S): 1 INJECTION INTRAVENOUS at 05:28

## 2021-07-18 RX ADMIN — SIMETHICONE 80 MILLIGRAM(S): 80 TABLET, CHEWABLE ORAL at 17:19

## 2021-07-18 RX ADMIN — Medication 50 MILLIGRAM(S): at 22:32

## 2021-07-18 NOTE — PROGRESS NOTE ADULT - SUBJECTIVE AND OBJECTIVE BOX
INTERVAL HPI/OVERNIGHT EVENTS/SUBJECTIVE:  Patient evaluated at bedside. No acute distress. Patient complained of rash on his right lower extremity that was itchy, rash was demarcated and then progressed to upper extremities and right foot. relieved  with benadryl Remains hemodynamically stable and afebrile. Tolerating CLD, passing gas no BM. hep gtt therapeutic.  Denies fevers, chills, nausea, emesis.  Denies chest pain, dyspnea.  Denies constipation, diarrhea. Denies headaches, dizziness, changes in vision.     ICU Vital Signs Last 24 Hrs  T(C): 37 (17 Jul 2021 22:21), Max: 37.8 (17 Jul 2021 19:47)  T(F): 98.6 (17 Jul 2021 22:21), Max: 100 (17 Jul 2021 19:47)  HR: 80 (17 Jul 2021 22:21) (72 - 96)  BP: 129/79 (17 Jul 2021 22:21) (125/79 - 142/80)  BP(mean): --  ABP: --  ABP(mean): --  RR: 18 (17 Jul 2021 22:21) (18 - 18)  SpO2: 91% (17 Jul 2021 22:21) (91% - 97%)      I&O's Detail    16 Jul 2021 07:01  -  17 Jul 2021 07:00  --------------------------------------------------------  IN:    dextrose 5% + sodium chloride 0.45% w/ Additives: 220 mL    Heparin: 137.5 mL  Total IN: 357.5 mL    OUT:    Bulb (mL): 188 mL    Voided (mL): 2700 mL  Total OUT: 2888 mL    Total NET: -2530.5 mL      17 Jul 2021 07:01  -  18 Jul 2021 01:27  --------------------------------------------------------  IN:  Total IN: 0 mL    OUT:    Bulb (mL): 170 mL    Voided (mL): 925 mL  Total OUT: 1095 mL    Total NET: -1095 mL    MEDICATIONS  (STANDING):  chlorhexidine 2% Cloths 1 Application(s) Topical daily  dextrose 5% + sodium chloride 0.45% with potassium chloride 20 mEq/L 1000 milliLiter(s) (110 mL/Hr) IV Continuous <Continuous>  heparin  Infusion 1500 Unit(s)/Hr (21.5 mL/Hr) IV Continuous <Continuous>  meropenem  IVPB 1000 milliGRAM(s) IV Intermittent every 8 hours  polyethylene glycol 3350 17 Gram(s) Oral daily  senna 1 Tablet(s) Oral at bedtime  simethicone 80 milliGRAM(s) Chew four times a day    MEDICATIONS  (PRN):  diphenhydrAMINE 50 milliGRAM(s) Oral every 6 hours PRN Rash and/or Itching  HYDROmorphone  Injectable 0.5 milliGRAM(s) IV Push every 8 hours PRN Severe Pain (7 - 10)  traMADol 50 milliGRAM(s) Oral every 4 hours PRN Severe Pain (7 - 10)  traMADol 25 milliGRAM(s) Oral every 4 hours PRN Moderate Pain (4 - 6)      MISC:     PHYSICAL EXAM:  Gen: patient laying in bed, A&Ox3, NAD  HEENT: EOMI / PERRL b/l  Pulm: regular respiratory rate, no distress  CV: RRR  GI: Abdominal incisions c/d/i. Abdomen soft, distended, TTP to the right w/o rebound or guarding. Drain with serosanguineous fluid, dressing changed.  Neurological: no gross sensory / motor deficits  Ext: Warm, pink, right lower extremity and upper extremity with vesicular rash.     LABS:  CBC Full  -  ( 17 Jul 2021 06:05 )  WBC Count : 22.26 K/uL  RBC Count : 4.39 M/uL  Hemoglobin : 12.6 g/dL  Hematocrit : 38.8 %  Platelet Count - Automated : 318 K/uL  Mean Cell Volume : 88.4 fl  Mean Cell Hemoglobin : 28.7 pg  Mean Cell Hemoglobin Concentration : 32.5 gm/dL  Auto Neutrophil # : 18.74 K/uL  Auto Lymphocyte # : 1.92 K/uL  Auto Monocyte # : 1.06 K/uL  Auto Eosinophil # : 0.25 K/uL  Auto Basophil # : 0.07 K/uL  Auto Neutrophil % : 84.2 %  Auto Lymphocyte % : 8.6 %  Auto Monocyte % : 4.8 %  Auto Eosinophil % : 1.1 %  Auto Basophil % : 0.3 %    07-17    134<L>  |  99  |  10.2  ----------------------------<  108<H>  4.3   |  23.0  |  0.63    Ca    8.2<L>      17 Jul 2021 06:05  Phos  2.1     07-17  Mg     2.0     07-17    TPro  6.4<L>  /  Alb  2.8<L>  /  TBili  0.7  /  DBili  0.2  /  AST  45<H>  /  ALT  106<H>  /  AlkPhos  250<H>  07-16    PTT - ( 17 Jul 2021 19:03 )  PTT:69.3 sec    RECENT CULTURES:  07-15 .Tissue R liver lobe abscess cavity deep XXXX   Rare polymorphonuclear leukocytes per low power field  No organisms seen per oil power field   No growth    07-15 .Tissue R liver lobe abs cavity superficial XXXX   Moderate polymorphonuclear leukocytes per low power field  No organisms seen per oil power field   No growth    07-12 .Smear XXXX   Numerous polymorphonuclear leukocytes seen per low power field  No organisms seen per oil power field XXXX    07-12 .Abscess hepatic collection XXXX XXXX   No growth at 5 days    07-11 .Blood Blood-Peripheral XXXX XXXX   No growth at 5 days.    07-11 .Blood Blood-Peripheral XXXX XXXX   No growth at 5 days.    LIVER FUNCTIONS - ( 16 Jul 2021 04:48 )  Alb: 2.8 g/dL / Pro: 6.4 g/dL / ALK PHOS: 250 U/L / ALT: 106 U/L / AST: 45 U/L / GGT: x           ASSESSMENT/PLAN:  55yMale presenting with:     INTERVAL HPI/OVERNIGHT EVENTS/SUBJECTIVE:  Patient evaluated at bedside. No acute distress. Patient complained of rash on his right lower extremity that was itchy, rash was demarcated and then progressed to upper extremities and right foot. relieved  with benadryl Remains hemodynamically stable and afebrile. Tolerating CLD, passing gas no BM. hep gtt therapeutic.  Denies fevers, chills, nausea, emesis.  Denies chest pain, dyspnea.  Denies constipation, diarrhea. Denies headaches, dizziness, changes in vision.     ICU Vital Signs Last 24 Hrs  T(C): 37 (17 Jul 2021 22:21), Max: 37.8 (17 Jul 2021 19:47)  T(F): 98.6 (17 Jul 2021 22:21), Max: 100 (17 Jul 2021 19:47)  HR: 80 (17 Jul 2021 22:21) (72 - 96)  BP: 129/79 (17 Jul 2021 22:21) (125/79 - 142/80)  BP(mean): --  ABP: --  ABP(mean): --  RR: 18 (17 Jul 2021 22:21) (18 - 18)  SpO2: 91% (17 Jul 2021 22:21) (91% - 97%)      I&O's Detail    16 Jul 2021 07:01  -  17 Jul 2021 07:00  --------------------------------------------------------  IN:    dextrose 5% + sodium chloride 0.45% w/ Additives: 220 mL    Heparin: 137.5 mL  Total IN: 357.5 mL    OUT:    Bulb (mL): 188 mL    Voided (mL): 2700 mL  Total OUT: 2888 mL    Total NET: -2530.5 mL      17 Jul 2021 07:01  -  18 Jul 2021 01:27  --------------------------------------------------------  IN:  Total IN: 0 mL    OUT:    Bulb (mL): 170 mL    Voided (mL): 925 mL  Total OUT: 1095 mL    Total NET: -1095 mL    MEDICATIONS  (STANDING):  chlorhexidine 2% Cloths 1 Application(s) Topical daily  dextrose 5% + sodium chloride 0.45% with potassium chloride 20 mEq/L 1000 milliLiter(s) (110 mL/Hr) IV Continuous <Continuous>  heparin  Infusion 1500 Unit(s)/Hr (21.5 mL/Hr) IV Continuous <Continuous>  meropenem  IVPB 1000 milliGRAM(s) IV Intermittent every 8 hours  polyethylene glycol 3350 17 Gram(s) Oral daily  senna 1 Tablet(s) Oral at bedtime  simethicone 80 milliGRAM(s) Chew four times a day    MEDICATIONS  (PRN):  diphenhydrAMINE 50 milliGRAM(s) Oral every 6 hours PRN Rash and/or Itching  HYDROmorphone  Injectable 0.5 milliGRAM(s) IV Push every 8 hours PRN Severe Pain (7 - 10)  traMADol 50 milliGRAM(s) Oral every 4 hours PRN Severe Pain (7 - 10)  traMADol 25 milliGRAM(s) Oral every 4 hours PRN Moderate Pain (4 - 6)      MISC:     PHYSICAL EXAM:  Gen: patient laying in bed, A&Ox3, NAD  HEENT: EOMI / PERRL b/l  Pulm: regular respiratory rate, no distress  CV: RRR  GI: Abdominal incisions c/d/i. Abdomen soft, distended, TTP to the right w/o rebound or guarding. Drain with serosanguineous fluid, dressing changed.  Neurological: no gross sensory / motor deficits  Ext: Warm, pink, right lower extremity and upper extremity with vesicular rash.     LABS:  CBC Full  -  ( 17 Jul 2021 06:05 )  WBC Count : 22.26 K/uL  RBC Count : 4.39 M/uL  Hemoglobin : 12.6 g/dL  Hematocrit : 38.8 %  Platelet Count - Automated : 318 K/uL  Mean Cell Volume : 88.4 fl  Mean Cell Hemoglobin : 28.7 pg  Mean Cell Hemoglobin Concentration : 32.5 gm/dL  Auto Neutrophil # : 18.74 K/uL  Auto Lymphocyte # : 1.92 K/uL  Auto Monocyte # : 1.06 K/uL  Auto Eosinophil # : 0.25 K/uL  Auto Basophil # : 0.07 K/uL  Auto Neutrophil % : 84.2 %  Auto Lymphocyte % : 8.6 %  Auto Monocyte % : 4.8 %  Auto Eosinophil % : 1.1 %  Auto Basophil % : 0.3 %    07-17    134<L>  |  99  |  10.2  ----------------------------<  108<H>  4.3   |  23.0  |  0.63    Ca    8.2<L>      17 Jul 2021 06:05  Phos  2.1     07-17  Mg     2.0     07-17    TPro  6.4<L>  /  Alb  2.8<L>  /  TBili  0.7  /  DBili  0.2  /  AST  45<H>  /  ALT  106<H>  /  AlkPhos  250<H>  07-16    PTT - ( 17 Jul 2021 19:03 )  PTT:69.3 sec    RECENT CULTURES:  07-15 .Tissue R liver lobe abscess cavity deep XXXX   Rare polymorphonuclear leukocytes per low power field  No organisms seen per oil power field   No growth    07-15 .Tissue R liver lobe abs cavity superficial XXXX   Moderate polymorphonuclear leukocytes per low power field  No organisms seen per oil power field   No growth    07-12 .Smear XXXX   Numerous polymorphonuclear leukocytes seen per low power field  No organisms seen per oil power field XXXX    07-12 .Abscess hepatic collection XXXX XXXX   No growth at 5 days    07-11 .Blood Blood-Peripheral XXXX XXXX   No growth at 5 days.    07-11 .Blood Blood-Peripheral XXXX XXXX   No growth at 5 days.    LIVER FUNCTIONS - ( 16 Jul 2021 04:48 )  Alb: 2.8 g/dL / Pro: 6.4 g/dL / ALK PHOS: 250 U/L / ALT: 106 U/L / AST: 45 U/L / GGT: x           ASSESSMENT/PLAN:  55y Male s/p robotic drainage of liver abscess.Stable on floor. PTT therapeutic. Now with new rash, possible allergic reaction to abx? improving with benedryl     -Continue Heparin gtt  -Keep CLD  - Monitor for bowel function   -JOSEMANUEL  -NGT if vomits  -Keep abx, follow cultures results and speciation  -OOB and walking  -PT  -F/U fever and WBC.  - Monitor Rash on benadryl, if improving continue with Merrem, if persistent switch back to Zosyn

## 2021-07-18 NOTE — PROGRESS NOTE ADULT - SUBJECTIVE AND OBJECTIVE BOX
INFECTIOUS DISEASES AND INTERNAL MEDICINE at [place]  =======================================================  Henri Salazar MD  Diplomates American Board of Internal Medicine and Infectious Diseases  Telephone [phone]  Fax            [phone]  =======================================================    NAMITAMAXINE JUAREZ [id number]    Follow up: liver abscess     S/P IR GUIDED DRAINAGE    and S/P OPERATIVE PROCEDURE 7/14 CX NEG TO DATE    Allergies:  No Known Allergies      REVIEW OF SYSTEMS:  CONSTITUTIONAL:  No Fever or chills  HEENT:   No diplopia or blurred vision.  No earache, sore throat or runny nose.  CARDIOVASCULAR:  No pressure, squeezing, strangling, tightness, heaviness or aching about the chest, neck, axilla or epigastrium.  RESPIRATORY:    cough, shortness of breath,    GASTROINTESTINAL: ABD BLOATING  GENITOURINARY:  No dysuria, frequency or urgency.   MUSCULOSKELETAL:   moves all joints  SKIN:  No change in skin, hair or nails.  NEUROLOGIC:  No seizures   PSYCHIATRIC:  No disorder of thought or mood.  ENDOCRINE:  No heat or cold intolerance, polyuria or polydipsia.  HEMATOLOGICAL:  No easy bruising or bleeding.       Physical Exam:  GEN: NAD  HEENT: normocephalic and atraumatic. EOMI. PERRL.    NECK: Supple.  LUNGS: Clear to auscultation.  HEART: Regular rate and rhythm   ABDOMEN: Soft, nontender,  Positive bowel sounds. DRAIN  IN PLACE      : No CVA tenderness  EXTREMITIES: Without any edema.  MSK: no joint swelling  NEUROLOGIC: no focal deficits   PSYCHIATRIC: Appropriate affect .  SKIN: No ulceration or induration present.      Vitals:    T(F): 98.5 (18 Jul 2021 05:25), Max: 100 (17 Jul 2021 19:47)  HR: 76 (18 Jul 2021 05:25)  BP: 121/75 (18 Jul 2021 05:25)  RR: 18 (18 Jul 2021 05:25)  SpO2: 91% (18 Jul 2021 05:25) (91% - 94%)  temp max in last 48H T(F): , Max: 100.2 (07-16-21 @ 12:00)    Current Antibiotics:  meropenem  IVPB 1000 milliGRAM(s) IV Intermittent every 8 hours    Other medications:  chlorhexidine 2% Cloths 1 Application(s) Topical daily  dextrose 5% + sodium chloride 0.45% with potassium chloride 20 mEq/L 1000 milliLiter(s) IV Continuous <Continuous>  heparin  Infusion 1500 Unit(s)/Hr IV Continuous <Continuous>  polyethylene glycol 3350 17 Gram(s) Oral daily  senna 1 Tablet(s) Oral at bedtime  simethicone 80 milliGRAM(s) Chew four times a day                            12.3   16.93 )-----------( x        ( 18 Jul 2021 06:45 )             37.8     07-18    135  |  101  |  7.9<L>  ----------------------------<  112<H>  4.3   |  24.0  |  0.64    Ca    8.5<L>      18 Jul 2021 06:45  Phos  2.6     07-18  Mg     1.9     07-18      RECENT CULTURES:  07-15 @ 00:34 .Tissue R liver lobe abscess cavity deep     No growth    Rare polymorphonuclear leukocytes per low power field  No organisms seen per oil power field      07-15 @ 00:23 .Tissue R liver lobe abs cavity superficial     No growth    Moderate polymorphonuclear leukocytes per low power field  No organisms seen per oil power field      07-12 @ 09:36 .Smear       Numerous polymorphonuclear leukocytes seen per low power field  No organisms seen per oil power field      07-12 @ 06:41 .Abscess hepatic collection     No growth at 5 days        07-11 @ 20:43 .Blood Blood-Peripheral     No growth at 5 days.        07-11 @ 19:48 .Blood Blood-Peripheral     No growth at 5 days.        07-10 @ 10:20 .Blood Blood     No growth at 5 days.            WBC Count: 16.93 K/uL (07-18-21 @ 06:45)  WBC Count: 22.26 K/uL (07-17-21 @ 06:05)  WBC Count: 25.64 K/uL (07-16-21 @ 15:19)  WBC Count: 23.44 K/uL (07-16-21 @ 04:48)  WBC Count: 24.43 K/uL (07-15-21 @ 21:12)  WBC Count: 25.28 K/uL (07-15-21 @ 15:01)  WBC Count: 24.69 K/uL (07-15-21 @ 04:24)  WBC Count: 17.86 K/uL (07-14-21 @ 19:44)  WBC Count: 12.92 K/uL (07-14-21 @ 05:31)    Creatinine, Serum: 0.64 mg/dL (07-18-21 @ 06:45)  Creatinine, Serum: 0.63 mg/dL (07-17-21 @ 06:05)  Creatinine, Serum: 0.74 mg/dL (07-16-21 @ 04:48)  Creatinine, Serum: 0.67 mg/dL (07-15-21 @ 04:24)  Creatinine, Serum: 0.58 mg/dL (07-14-21 @ 19:44)  Creatinine, Serum: 0.64 mg/dL (07-14-21 @ 05:31)    C-Reactive Protein, Serum: 236 mg/L (07-13-21 @ 05:14)  C-Reactive Protein, Serum: 324 mg/L (07-10-21 @ 13:53)  C-Reactive Protein, Serum: 263 mg/L (07-09-21 @ 13:39)    Ferritin, Serum: 260 ng/mL (07-10-21 @ 13:53)  Ferritin, Serum: 275 ng/mL (07-09-21 @ 13:39)    Procalcitonin, Serum: 6.75 ng/mL (07-13-21 @ 05:14)  Procalcitonin, Serum: 12.63 ng/mL (07-12-21 @ 05:22)  Procalcitonin, Serum: 11.61 ng/mL (07-11-21 @ 08:28)  Procalcitonin, Serum: 21.40 ng/mL (07-09-21 @ 13:39)    COVID-19 PCR: NotDetec (07-14-21 @ 00:22)  COVID-19 PCR: NotDetec (07-10-21 @ 10:11)  COVID-19 PCR: NotDete (07-09-21 @ 13:39)

## 2021-07-19 ENCOUNTER — TRANSCRIPTION ENCOUNTER (OUTPATIENT)
Age: 56
End: 2021-07-19

## 2021-07-19 LAB
ANION GAP SERPL CALC-SCNC: 8 MMOL/L — SIGNIFICANT CHANGE UP (ref 5–17)
APTT BLD: 105 SEC — HIGH (ref 27.5–35.5)
BUN SERPL-MCNC: 8.6 MG/DL — SIGNIFICANT CHANGE UP (ref 8–20)
CALCIUM SERPL-MCNC: 7.8 MG/DL — LOW (ref 8.6–10.2)
CHLORIDE SERPL-SCNC: 103 MMOL/L — SIGNIFICANT CHANGE UP (ref 98–107)
CO2 SERPL-SCNC: 25 MMOL/L — SIGNIFICANT CHANGE UP (ref 22–29)
CREAT SERPL-MCNC: 0.6 MG/DL — SIGNIFICANT CHANGE UP (ref 0.5–1.3)
CULTURE RESULTS: SIGNIFICANT CHANGE UP
CULTURE RESULTS: SIGNIFICANT CHANGE UP
GLUCOSE SERPL-MCNC: 303 MG/DL — HIGH (ref 70–99)
HCT VFR BLD CALC: 33.7 % — LOW (ref 39–50)
HGB BLD-MCNC: 11.3 G/DL — LOW (ref 13–17)
MAGNESIUM SERPL-MCNC: 1.8 MG/DL — SIGNIFICANT CHANGE UP (ref 1.6–2.6)
MCHC RBC-ENTMCNC: 29.1 PG — SIGNIFICANT CHANGE UP (ref 27–34)
MCHC RBC-ENTMCNC: 33.5 GM/DL — SIGNIFICANT CHANGE UP (ref 32–36)
MCV RBC AUTO: 86.9 FL — SIGNIFICANT CHANGE UP (ref 80–100)
PHOSPHATE SERPL-MCNC: 1.7 MG/DL — LOW (ref 2.4–4.7)
PLATELET # BLD AUTO: 465 K/UL — HIGH (ref 150–400)
POTASSIUM SERPL-MCNC: 4.9 MMOL/L — SIGNIFICANT CHANGE UP (ref 3.5–5.3)
POTASSIUM SERPL-SCNC: 4.9 MMOL/L — SIGNIFICANT CHANGE UP (ref 3.5–5.3)
RBC # BLD: 3.88 M/UL — LOW (ref 4.2–5.8)
RBC # FLD: 13.2 % — SIGNIFICANT CHANGE UP (ref 10.3–14.5)
SODIUM SERPL-SCNC: 136 MMOL/L — SIGNIFICANT CHANGE UP (ref 135–145)
SPECIMEN SOURCE: SIGNIFICANT CHANGE UP
SPECIMEN SOURCE: SIGNIFICANT CHANGE UP
SURGICAL PATHOLOGY STUDY: SIGNIFICANT CHANGE UP
WBC # BLD: 12.18 K/UL — HIGH (ref 3.8–10.5)
WBC # FLD AUTO: 12.18 K/UL — HIGH (ref 3.8–10.5)

## 2021-07-19 PROCEDURE — 99233 SBSQ HOSP IP/OBS HIGH 50: CPT

## 2021-07-19 PROCEDURE — 76937 US GUIDE VASCULAR ACCESS: CPT | Mod: 26,59

## 2021-07-19 PROCEDURE — 71045 X-RAY EXAM CHEST 1 VIEW: CPT | Mod: 26

## 2021-07-19 PROCEDURE — 36569 INSJ PICC 5 YR+ W/O IMAGING: CPT

## 2021-07-19 PROCEDURE — 76942 ECHO GUIDE FOR BIOPSY: CPT | Mod: 26,59

## 2021-07-19 RX ORDER — CHLORHEXIDINE GLUCONATE 213 G/1000ML
1 SOLUTION TOPICAL
Refills: 0 | Status: DISCONTINUED | OUTPATIENT
Start: 2021-07-19 | End: 2021-07-20

## 2021-07-19 RX ORDER — SODIUM CHLORIDE 9 MG/ML
10 INJECTION INTRAMUSCULAR; INTRAVENOUS; SUBCUTANEOUS
Refills: 0 | Status: DISCONTINUED | OUTPATIENT
Start: 2021-07-19 | End: 2021-07-20

## 2021-07-19 RX ORDER — APIXABAN 2.5 MG/1
10 TABLET, FILM COATED ORAL EVERY 12 HOURS
Refills: 0 | Status: DISCONTINUED | OUTPATIENT
Start: 2021-07-19 | End: 2021-07-20

## 2021-07-19 RX ORDER — MEROPENEM 1 G/30ML
1000 INJECTION INTRAVENOUS
Qty: 1 | Refills: 0
Start: 2021-07-19 | End: 2021-07-28

## 2021-07-19 RX ORDER — DIPHENHYDRAMINE HCL 50 MG
50 CAPSULE ORAL EVERY 8 HOURS
Refills: 0 | Status: DISCONTINUED | OUTPATIENT
Start: 2021-07-19 | End: 2021-07-20

## 2021-07-19 RX ADMIN — MEROPENEM 100 MILLIGRAM(S): 1 INJECTION INTRAVENOUS at 14:07

## 2021-07-19 RX ADMIN — SIMETHICONE 80 MILLIGRAM(S): 80 TABLET, CHEWABLE ORAL at 11:55

## 2021-07-19 RX ADMIN — POLYETHYLENE GLYCOL 3350 17 GRAM(S): 17 POWDER, FOR SOLUTION ORAL at 22:09

## 2021-07-19 RX ADMIN — SIMETHICONE 80 MILLIGRAM(S): 80 TABLET, CHEWABLE ORAL at 17:00

## 2021-07-19 RX ADMIN — SIMETHICONE 80 MILLIGRAM(S): 80 TABLET, CHEWABLE ORAL at 05:20

## 2021-07-19 RX ADMIN — Medication 50 MILLIGRAM(S): at 22:09

## 2021-07-19 RX ADMIN — CHLORHEXIDINE GLUCONATE 1 APPLICATION(S): 213 SOLUTION TOPICAL at 11:54

## 2021-07-19 RX ADMIN — SENNA PLUS 1 TABLET(S): 8.6 TABLET ORAL at 22:08

## 2021-07-19 RX ADMIN — MEROPENEM 100 MILLIGRAM(S): 1 INJECTION INTRAVENOUS at 05:18

## 2021-07-19 RX ADMIN — APIXABAN 10 MILLIGRAM(S): 2.5 TABLET, FILM COATED ORAL at 17:01

## 2021-07-19 RX ADMIN — MEROPENEM 100 MILLIGRAM(S): 1 INJECTION INTRAVENOUS at 22:35

## 2021-07-19 RX ADMIN — SIMETHICONE 80 MILLIGRAM(S): 80 TABLET, CHEWABLE ORAL at 23:36

## 2021-07-19 NOTE — DISCHARGE NOTE PROVIDER - NSDCMRMEDTOKEN_GEN_ALL_CORE_FT
meropenem 1000 mg intravenous injection: 1000 milligram(s) intravenously every 8 hours through 8/26  weekly cbc and cmp   Vitamin D3 50,000 intl units (1250 mcg) oral capsule: 1 cap(s) orally every 7 days   apixaban 5 mg oral tablet: 2 tab(s) orally every 12 hours MDD:4 tablets/ 24 hrs  meropenem 1000 mg intravenous injection: 1000 milligram(s) intravenously every 8 hours through 8/26  weekly cbc and cmp   traMADol 50 mg oral tablet: 1 tab(s) orally every 4 hours, As needed, Severe Pain (7 - 10)  Vitamin D3 50,000 intl units (1250 mcg) oral capsule: 1 cap(s) orally every 7 days

## 2021-07-19 NOTE — PROGRESS NOTE ADULT - SUBJECTIVE AND OBJECTIVE BOX
INTERVAL HPI/OVERNIGHT EVENTS:    Patient evaluated at bedside. No acute distress. Patient complained of rash 7/17 on his right lower extremity that was itchy, rash was demarcated and then progressed to upper extremities and right foot. Remains hemodynamically stable and afebrile. Patient downgraded 7/17, doing well otherwise. Tolerating CLD, passing gas no BM. Denies fevers, chills, nausea, emesis.  Denies chest pain, dyspnea.  Denies constipation, diarrhea. Denies headaches, dizziness, changes in vision.  Patient was advanced to regular diet 7/18.      MEDICATIONS  (STANDING):  chlorhexidine 2% Cloths 1 Application(s) Topical daily  dextrose 5% + sodium chloride 0.45% with potassium chloride 20 mEq/L 1000 milliLiter(s) (110 mL/Hr) IV Continuous <Continuous>  heparin  Infusion 1500 Unit(s)/Hr (17.5 mL/Hr) IV Continuous <Continuous>  meropenem  IVPB 1000 milliGRAM(s) IV Intermittent every 8 hours  polyethylene glycol 3350 17 Gram(s) Oral daily  senna 1 Tablet(s) Oral at bedtime    MEDICATIONS  (PRN):  diphenhydrAMINE 50 milliGRAM(s) Oral every 6 hours PRN Rash and/or Itching  HYDROmorphone  Injectable 0.5 milliGRAM(s) IV Push every 8 hours PRN Severe Pain (7 - 10)  traMADol 50 milliGRAM(s) Oral every 4 hours PRN Severe Pain (7 - 10)  traMADol 25 milliGRAM(s) Oral every 4 hours PRN Moderate Pain (4 - 6)    ICU Vital Signs Last 24 Hrs  T(C): 37.1 (18 Jul 2021 21:19), Max: 37.2 (18 Jul 2021 10:33)  T(F): 98.7 (18 Jul 2021 21:19), Max: 99 (18 Jul 2021 10:33)  HR: 75 (18 Jul 2021 21:19) (75 - 82)  BP: 136/76 (18 Jul 2021 21:19) (116/72 - 136/76)  BP(mean): --  ABP: --  ABP(mean): --  RR: 18 (18 Jul 2021 21:19) (18 - 18)  SpO2: 94% (18 Jul 2021 21:19) (91% - 95%)      Gen: patient laying in bed, A&Ox3, NAD  HEENT: EOMI / PERRL b/l  Pulm: regular respiratory rate, no distress  CV: RRR  GI: Abdominal incisions c/d/i. Abdomen soft, distended, TTP to the right w/o rebound or guarding. Drain with serosanguineous fluid, dressing changed.  Neurological: no gross sensory / motor deficits  Ext: Warm, pink, right lower extremity and upper extremity with vesicular rash.     I&O's Detail    17 Jul 2021 07:01  -  18 Jul 2021 07:00  --------------------------------------------------------  IN:  Total IN: 0 mL    OUT:    Bulb (mL): 250 mL    Voided (mL): 1725 mL  Total OUT: 1975 mL    Total NET: -1975 mL      18 Jul 2021 07:01  -  19 Jul 2021 02:21  --------------------------------------------------------  IN:    dextrose 5% + sodium chloride 0.45% w/ Additives: 770 mL    Heparin: 150.5 mL    IV PiggyBack: 50 mL  Total IN: 970.5 mL    OUT:    Bulb (mL): 40 mL    Voided (mL): 1100 mL  Total OUT: 1140 mL    Total NET: -169.5 mL    .  LABS:                         12.3   16.93 )-----------( 462      ( 18 Jul 2021 06:45 )             37.8     07-18    135  |  101  |  7.9<L>  ----------------------------<  112<H>  4.3   |  24.0  |  0.64    Ca    8.5<L>      18 Jul 2021 06:45  Phos  2.6     07-18  Mg     1.9     07-18      PTT - ( 18 Jul 2021 20:51 )  PTT:97.7 sec          RADIOLOGY, EKG & ADDITIONAL TESTS: Reviewed.

## 2021-07-19 NOTE — PROGRESS NOTE ADULT - SUBJECTIVE AND OBJECTIVE BOX
INFECTIOUS DISEASES AND INTERNAL MEDICINE at [place]  =======================================================  Henri Salazar MD  Diplomates American Board of Internal Medicine and Infectious Diseases  Telephone [phone]  Fax            [phone]  =======================================================    MAXINE BREAUX [id number]    Follow up: liver abscess     S/P IR GUIDED DRAINAGE    and S/P OPERATIVE PROCEDURE 7/14 CX NEG TO DATE  REMAINS AFEBRILE    Allergies:  No Known Allergies      REVIEW OF SYSTEMS:  CONSTITUTIONAL:  No Fever or chills  HEENT:   No diplopia or blurred vision.  No earache, sore throat or runny nose.  CARDIOVASCULAR:  No pressure, squeezing, strangling, tightness, heaviness or aching about the chest, neck, axilla or epigastrium.  RESPIRATORY:    cough, shortness of breath,    GASTROINTESTINAL: ABD BLOATING  GENITOURINARY:  No dysuria, frequency or urgency.   MUSCULOSKELETAL:   moves all joints  SKIN:  No change in skin, hair or nails.  NEUROLOGIC:  No seizures   PSYCHIATRIC:  No disorder of thought or mood.  ENDOCRINE:  No heat or cold intolerance, polyuria or polydipsia.  HEMATOLOGICAL:  No easy bruising or bleeding.       Physical Exam:  GEN: NAD  HEENT: normocephalic and atraumatic. EOMI. PERRL.    NECK: Supple.  LUNGS: Clear to auscultation.  HEART: Regular rate and rhythm   ABDOMEN: Soft, nontender,  Positive bowel sounds. DRAIN  IN PLACE      : No CVA tenderness  EXTREMITIES: Without any edema.  MSK: no joint swelling  NEUROLOGIC: no focal deficits   PSYCHIATRIC: Appropriate affect .  SKIN: No ulceration or induration present.      Vitals:   Vital Signs Last 24 Hrs  T(C): 36.7 (19 Jul 2021 05:00), Max: 37.2 (18 Jul 2021 10:33)  T(F): 98 (19 Jul 2021 05:00), Max: 99 (18 Jul 2021 10:33)  HR: 74 (19 Jul 2021 05:00) (74 - 82)  BP: 122/78 (19 Jul 2021 05:00) (116/72 - 136/76)  BP(mean): --  RR: 18 (19 Jul 2021 05:00) (18 - 18)  SpO2: 90% (19 Jul 2021 05:00) (90% - 95%)    Current Antibiotics:  meropenem  IVPB 1000 milliGRAM(s) IV Intermittent every 8 hours    Other medications:  chlorhexidine 2% Cloths 1 Application(s) Topical daily  dextrose 5% + sodium chloride 0.45% with potassium chloride 20 mEq/L 1000 milliLiter(s) IV Continuous <Continuous>  heparin  Infusion 1500 Unit(s)/Hr IV Continuous <Continuous>  polyethylene glycol 3350 17 Gram(s) Oral daily  senna 1 Tablet(s) Oral at bedtime  simethicone 80 milliGRAM(s) Chew four times a day                           11.3   12.18 )-----------( 465      ( 19 Jul 2021 05:45 )             33.7   07-19    136  |  103  |  8.6  ----------------------------<  303<H>  4.9   |  25.0  |  0.60    Ca    7.8<L>      19 Jul 2021 05:45  Phos  1.7     07-19  Mg     1.8     07-19          RECENT CULTURES:  07-15 @ 00:34 .Tissue R liver lobe abscess cavity deep     No growth    Rare polymorphonuclear leukocytes per low power field  No organisms seen per oil power field      07-15 @ 00:23 .Tissue R liver lobe abs cavity superficial     No growth    Moderate polymorphonuclear leukocytes per low power field  No organisms seen per oil power field      07-12 @ 09:36 .Smear       Numerous polymorphonuclear leukocytes seen per low power field  No organisms seen per oil power field      07-12 @ 06:41 .Abscess hepatic collection     No growth at 5 days        07-11 @ 20:43 .Blood Blood-Peripheral     No growth at 5 days.        07-11 @ 19:48 .Blood Blood-Peripheral     No growth at 5 days.        07-10 @ 10:20 .Blood Blood     No growth at 5 days.            WBC Count: 16.93 K/uL (07-18-21 @ 06:45)  WBC Count: 22.26 K/uL (07-17-21 @ 06:05)  WBC Count: 25.64 K/uL (07-16-21 @ 15:19)  WBC Count: 23.44 K/uL (07-16-21 @ 04:48)  WBC Count: 24.43 K/uL (07-15-21 @ 21:12)  WBC Count: 25.28 K/uL (07-15-21 @ 15:01)  WBC Count: 24.69 K/uL (07-15-21 @ 04:24)  WBC Count: 17.86 K/uL (07-14-21 @ 19:44)  WBC Count: 12.92 K/uL (07-14-21 @ 05:31)    Creatinine, Serum: 0.64 mg/dL (07-18-21 @ 06:45)  Creatinine, Serum: 0.63 mg/dL (07-17-21 @ 06:05)  Creatinine, Serum: 0.74 mg/dL (07-16-21 @ 04:48)  Creatinine, Serum: 0.67 mg/dL (07-15-21 @ 04:24)  Creatinine, Serum: 0.58 mg/dL (07-14-21 @ 19:44)  Creatinine, Serum: 0.64 mg/dL (07-14-21 @ 05:31)    C-Reactive Protein, Serum: 236 mg/L (07-13-21 @ 05:14)  C-Reactive Protein, Serum: 324 mg/L (07-10-21 @ 13:53)  C-Reactive Protein, Serum: 263 mg/L (07-09-21 @ 13:39)    Ferritin, Serum: 260 ng/mL (07-10-21 @ 13:53)  Ferritin, Serum: 275 ng/mL (07-09-21 @ 13:39)    Procalcitonin, Serum: 6.75 ng/mL (07-13-21 @ 05:14)  Procalcitonin, Serum: 12.63 ng/mL (07-12-21 @ 05:22)  Procalcitonin, Serum: 11.61 ng/mL (07-11-21 @ 08:28)  Procalcitonin, Serum: 21.40 ng/mL (07-09-21 @ 13:39)    COVID-19 PCR: NotDetec (07-14-21 @ 00:22)  COVID-19 PCR: NotDetec (07-10-21 @ 10:11)  COVID-19 PCR: NotDetec (07-09-21 @ 13:39)

## 2021-07-19 NOTE — DISCHARGE NOTE PROVIDER - CARE PROVIDER_API CALL
Kris Mccabe)  Complex General Surgical Oncology; Surgery; Surgical Oncology  [address] [place] [address]  Phone: [phone]  Fax: [phone]  Follow Up Time: 1 week

## 2021-07-19 NOTE — PROGRESS NOTE ADULT - ASSESSMENT
A/P: 55y Male s/p robotic drainage of liver abscess.  Doing well post operatively.     -Pain control   -Pulm toilet   -Diet advanced to regular 7/18  -Monitor for bowel function   -Continue Heparin gtt, follow PTT (hepatic vein thrombosis)  -Keep abx, follow cultures results and speciation, follow up ID recs  -AM labs  -OOB and walking  -PT  -Monitor rash

## 2021-07-19 NOTE — PROGRESS NOTE ADULT - ASSESSMENT
55M w/ hx of sleep apnea on bipap at night who presents from Urgent Care who presents with chills. Of note, he was recently diagnosed with COVID on Wednesday by rapid test  and his sx had started one week ago. He started having night sweats, cough w/o productive sputum, chills, and fever (Tmax- 104F). His wife has also tested positive for COVID19 and is currently asymptomatic. He had gone to the urgent care and was diagnosed with LLL PNA. He has been vaccinated with Pfizer in April. He was febrile to 100.4F and having significant chills. Also, has nausea, SOB, and headache. Denies vomiting, dizziness, abdominal pain and chest pain. He was subsequently told by urgent care to come to the hospital for further management. He has been taking tylenol as needed.  PT REPORTS HE HAS HAD NIGHT SWEATS  HAD DENTAL PROCEDURE IN EARLY JUNE  OUT PT RAPID COVID WAS NEG      PT HAD COVID PCR  TEST HERE  x2 WHICH WAS NEGATIVE  CT SCAN CHEST SHOWED A    LIVER LESION  pt s/p   MRI OF LIVER    WHICH SHOWED COLLECTION C/W ABSCESS AS WELL AS THROMBUS    ALSO ? CYSTIC ARE IN PANCREAS IPMN  PT HAD INCREASE FEVERS 7/11 CODE SEPSIS CALLED WBC AND LACTATE UP  IR CALLED  AND DRAINAGE WAS DONE    CX AND CYTOLOGY SENT      Liver abscess  S/P IR GUIDED DRAINAGE    S/P OPERATIVE PROCEDURE 7/14 CX NEG TO DATE         Cultures pending  ON MERREM  OPERATIVE CX   NEG  VÍCTOR REPORTED NEGATIVE   ALL BLOOD CX HAVE BEEN NEGATIVE  PLAN PICC LINE   IV ABX TOTAL 6 WEEKS FROM PROCEDURE   THROUGH 8/26  IF REPEAT IMAGING IMPROVED POSSIBLE 4 WEEKS AND ORAL  PT WITH RASH ON LEG ARM  ? ABX  WILL RECOMMEND PRETREAT WITH BENADRYL 1/2 BEFORE EACH DOSE   WILL FOLLOW UP           55M w/ hx of sleep apnea on bipap at night who presents from Urgent Care who presents with chills. Of note, he was recently diagnosed with COVID on Wednesday by rapid test  and his sx had started one week ago. He started having night sweats, cough w/o productive sputum, chills, and fever (Tmax- 104F). His wife has also tested positive for COVID19 and is currently asymptomatic. He had gone to the urgent care and was diagnosed with LLL PNA. He has been vaccinated with Pfizer in April. He was febrile to 100.4F and having significant chills. Also, has nausea, SOB, and headache. Denies vomiting, dizziness, abdominal pain and chest pain. He was subsequently told by urgent care to come to the hospital for further management. He has been taking tylenol as needed.  PT REPORTS HE HAS HAD NIGHT SWEATS  HAD DENTAL PROCEDURE IN EARLY JUNE  OUT PT RAPID COVID WAS NEG      PT HAD COVID PCR  TEST HERE  x2 WHICH WAS NEGATIVE  CT SCAN CHEST SHOWED A    LIVER LESION  pt s/p   MRI OF LIVER    WHICH SHOWED COLLECTION C/W ABSCESS AS WELL AS THROMBUS    ALSO ? CYSTIC ARE IN PANCREAS IPMN  PT HAD INCREASE FEVERS 7/11 CODE SEPSIS CALLED WBC AND LACTATE UP  IR CALLED  AND DRAINAGE WAS DONE    CX AND CYTOLOGY SENT      Liver abscess  S/P IR GUIDED DRAINAGE    S/P OPERATIVE PROCEDURE 7/14 CX NEG TO DATE         Cultures pending  ON MERREM  OPERATIVE CX   NEG  VÍCTOR REPORTED NEGATIVE   ALL BLOOD CX HAVE BEEN NEGATIVE  PLAN PICC LINE   IV ABX TOTAL 6 WEEKS FROM PROCEDURE   THROUGH 8/26  IF REPEAT IMAGING IMPROVED POSSIBLE 4 WEEKS AND ORAL  PT WITH RASH ON LEG ARM  ? ABX  WILL RECOMMEND PRETREAT WITH BENADRYL 1/2 BEFORE EACH DOSE   SPOKE TO WIFE ON THE PHONE

## 2021-07-19 NOTE — PROGRESS NOTE ADULT - ASSESSMENT
56 y/o M with JESSICA admitted with fevers, cough, found to have transaminitis and subsequently MRI showed liver abscess and possible hepatic vein thrombus.    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 Restarted AC after OR intervention and agree with apixaban.     2) Liver abscess - Improved s/p robotic drainage by surgery. Cytology sent. Tumor markers unrevealing with normal values. Afebrile over 24 hours. On meropenem.    3) Fevers - still febrile this morning sec to abscess. On meropenem. ID on board.     4) False positive Covid + at urgent care - nasal swab pcr negative x 2 during his stay here so far. ID on board.       Thank you,    Dyllan Espinoza MD  [facility]

## 2021-07-19 NOTE — PROGRESS NOTE ADULT - SUBJECTIVE AND OBJECTIVE BOX
54 y/o male with JESSICA on BIPAP at night, got Pfizer vaccine in 4/2021, noted to be covid swab+ at an Urgent care center on 7/7/2021 and was diagnosed with a LLL pneumonia but was sent to the hospital for further care. In the ER workup revealed transminitis and and MRI of  the abdomen that showed hepatic steatosis but in segment 8 he has a large 5.5x6.4x6.6 cm. multifocal fluid collection with peripheral and septal enhancement and perilesional edema compatible with hepatic abscess.  Just peripheral to the abscess there is a non-enhancing branching linear structure, likely representing a thrombosed right hepatic vein branch. Normal appearing bile ducts and there is an 8 mm. cystic lesion in the head of the pancreas contiguous with the non-dilated PD. No solid enhancing component and no main PD dilatation. Patent portal veins. Thrombus in a branch of the right hepatic vein peripheral to the abscess. No retroperitoneal ISABELLA. Colon: Sigmoid diverticulosis w/o diverticulitis and a small fat containing umbilical hernia.  Of note, the patient has 2 covid nasal swab pcr's here in the hospital that were negative.  Patient has been febrile and last night went for an emergent IR drain placement.   Hematology consulted to address need for anticoagulation in this setting.    PT went to OR on 7/14/2021 and underwent a successful robotic drainage of the abscess. Necrotic liver tissue also noted. Afebrile. Cultures negative so far.   No bleeding reported.        MEDICATIONS  (STANDING):  apixaban 10 milliGRAM(s) Oral every 12 hours  chlorhexidine 2% Cloths 1 Application(s) Topical daily  chlorhexidine 2% Cloths 1 Application(s) Topical <User Schedule>  dextrose 5% + sodium chloride 0.45% with potassium chloride 20 mEq/L 1000 milliLiter(s) (110 mL/Hr) IV Continuous <Continuous>  meropenem  IVPB 1000 milliGRAM(s) IV Intermittent every 8 hours  polyethylene glycol 3350 17 Gram(s) Oral daily  senna 1 Tablet(s) Oral at bedtime  simethicone 80 milliGRAM(s) Chew four times a day    MEDICATIONS  (PRN):  diphenhydrAMINE 50 milliGRAM(s) Oral every 6 hours PRN Rash and/or Itching  HYDROmorphone  Injectable 0.5 milliGRAM(s) IV Push every 8 hours PRN Severe Pain (7 - 10)  sodium chloride 0.9% lock flush 10 milliLiter(s) IV Push every 1 hour PRN Pre/post blood products, medications, blood draw, and to maintain line patency  traMADol 50 milliGRAM(s) Oral every 4 hours PRN Severe Pain (7 - 10)  traMADol 25 milliGRAM(s) Oral every 4 hours PRN Moderate Pain (4 - 6)               Vital Signs Last 24 Hrs  T(C): 36.7 (19 Jul 2021 05:00), Max: 37.2 (18 Jul 2021 10:33)  T(F): 98 (19 Jul 2021 05:00), Max: 99 (18 Jul 2021 10:33)  HR: 74 (19 Jul 2021 05:00) (74 - 82)  BP: 122/78 (19 Jul 2021 05:00) (116/72 - 136/76)  BP(mean): --  RR: 18 (19 Jul 2021 05:00) (18 - 18)  SpO2: 90% (19 Jul 2021 05:00) (90% - 95%)  nad  ctab  rrr        Labs:                            11.3   12.18 )-----------( 465      ( 19 Jul 2021 05:45 )             33.7                  13.2   12.92 )-----------( 230      ( 14 Jul 2021 05:31 )             38.9       07-19    136  |  103  |  8.6  ----------------------------<  303<H>  4.9   |  25.0  |  0.60    Ca    7.8<L>      19 Jul 2021 05:45  Phos  1.7     07-19  Mg     1.8     07-19      07-14    138  |  102  |  8.9  ----------------------------<  102<H>  3.8   |  27.0  |  0.64    Ca    8.7      14 Jul 2021 05:31  Phos  2.3     07-13  Mg     1.7     07-13    TPro  6.5<L>  /  Alb  2.7<L>  /  TBili  1.0  /  DBili  x   /  AST  58<H>  /  ALT  107<H>  /  AlkPhos  295<H>  07-14 07-13    136  |  102  |  13.0  ----------------------------<  109<H>  3.8   |  25.0  |  0.70    Ca    8.6      13 Jul 2021 05:14  Phos  2.3     07-13  Mg     1.7     07-13    TPro  6.4<L>  /  Alb  2.7<L>  /  TBili  0.9  /  DBili  x   /  AST  40<H>  /  ALT  105<H>  /  AlkPhos  304<H>  07-13

## 2021-07-20 ENCOUNTER — TRANSCRIPTION ENCOUNTER (OUTPATIENT)
Age: 56
End: 2021-07-20

## 2021-07-20 VITALS
RESPIRATION RATE: 18 BRPM | SYSTOLIC BLOOD PRESSURE: 118 MMHG | OXYGEN SATURATION: 94 % | HEART RATE: 90 BPM | TEMPERATURE: 98 F | DIASTOLIC BLOOD PRESSURE: 77 MMHG

## 2021-07-20 LAB
ANION GAP SERPL CALC-SCNC: 8 MMOL/L — SIGNIFICANT CHANGE UP (ref 5–17)
BASOPHILS # BLD AUTO: 0.07 K/UL — SIGNIFICANT CHANGE UP (ref 0–0.2)
BASOPHILS NFR BLD AUTO: 0.5 % — SIGNIFICANT CHANGE UP (ref 0–2)
BUN SERPL-MCNC: 12.1 MG/DL — SIGNIFICANT CHANGE UP (ref 8–20)
CALCIUM SERPL-MCNC: 9 MG/DL — SIGNIFICANT CHANGE UP (ref 8.6–10.2)
CHLORIDE SERPL-SCNC: 101 MMOL/L — SIGNIFICANT CHANGE UP (ref 98–107)
CO2 SERPL-SCNC: 26 MMOL/L — SIGNIFICANT CHANGE UP (ref 22–29)
CREAT SERPL-MCNC: 0.64 MG/DL — SIGNIFICANT CHANGE UP (ref 0.5–1.3)
ECHINOCOCCUS AB TITR SER: NEGATIVE — SIGNIFICANT CHANGE UP
EOSINOPHIL # BLD AUTO: 0.22 K/UL — SIGNIFICANT CHANGE UP (ref 0–0.5)
EOSINOPHIL NFR BLD AUTO: 1.7 % — SIGNIFICANT CHANGE UP (ref 0–6)
GLUCOSE SERPL-MCNC: 102 MG/DL — HIGH (ref 70–99)
HCT VFR BLD CALC: 39 % — SIGNIFICANT CHANGE UP (ref 39–50)
HGB BLD-MCNC: 12.8 G/DL — LOW (ref 13–17)
IMM GRANULOCYTES NFR BLD AUTO: 1.1 % — SIGNIFICANT CHANGE UP (ref 0–1.5)
LYMPHOCYTES # BLD AUTO: 1.41 K/UL — SIGNIFICANT CHANGE UP (ref 1–3.3)
LYMPHOCYTES # BLD AUTO: 11 % — LOW (ref 13–44)
MAGNESIUM SERPL-MCNC: 2 MG/DL — SIGNIFICANT CHANGE UP (ref 1.8–2.6)
MCHC RBC-ENTMCNC: 28.9 PG — SIGNIFICANT CHANGE UP (ref 27–34)
MCHC RBC-ENTMCNC: 32.8 GM/DL — SIGNIFICANT CHANGE UP (ref 32–36)
MCV RBC AUTO: 88 FL — SIGNIFICANT CHANGE UP (ref 80–100)
MONOCYTES # BLD AUTO: 0.76 K/UL — SIGNIFICANT CHANGE UP (ref 0–0.9)
MONOCYTES NFR BLD AUTO: 5.9 % — SIGNIFICANT CHANGE UP (ref 2–14)
NEUTROPHILS # BLD AUTO: 10.18 K/UL — HIGH (ref 1.8–7.4)
NEUTROPHILS NFR BLD AUTO: 79.8 % — HIGH (ref 43–77)
PHOSPHATE SERPL-MCNC: 2.9 MG/DL — SIGNIFICANT CHANGE UP (ref 2.4–4.7)
PLATELET # BLD AUTO: 500 K/UL — HIGH (ref 150–400)
POTASSIUM SERPL-MCNC: 4.3 MMOL/L — SIGNIFICANT CHANGE UP (ref 3.5–5.3)
POTASSIUM SERPL-SCNC: 4.3 MMOL/L — SIGNIFICANT CHANGE UP (ref 3.5–5.3)
RBC # BLD: 4.43 M/UL — SIGNIFICANT CHANGE UP (ref 4.2–5.8)
RBC # FLD: 13 % — SIGNIFICANT CHANGE UP (ref 10.3–14.5)
SODIUM SERPL-SCNC: 135 MMOL/L — SIGNIFICANT CHANGE UP (ref 135–145)
WBC # BLD: 12.78 K/UL — HIGH (ref 3.8–10.5)
WBC # FLD AUTO: 12.78 K/UL — HIGH (ref 3.8–10.5)

## 2021-07-20 PROCEDURE — 85018 HEMOGLOBIN: CPT

## 2021-07-20 PROCEDURE — 86753 PROTOZOA ANTIBODY NOS: CPT

## 2021-07-20 PROCEDURE — 82435 ASSAY OF BLOOD CHLORIDE: CPT

## 2021-07-20 PROCEDURE — 76942 ECHO GUIDE FOR BIOPSY: CPT

## 2021-07-20 PROCEDURE — 86140 C-REACTIVE PROTEIN: CPT

## 2021-07-20 PROCEDURE — 93005 ELECTROCARDIOGRAM TRACING: CPT

## 2021-07-20 PROCEDURE — 87015 SPECIMEN INFECT AGNT CONCNTJ: CPT

## 2021-07-20 PROCEDURE — 86682 HELMINTH ANTIBODY: CPT

## 2021-07-20 PROCEDURE — 94760 N-INVAS EAR/PLS OXIMETRY 1: CPT

## 2021-07-20 PROCEDURE — 87206 SMEAR FLUORESCENT/ACID STAI: CPT

## 2021-07-20 PROCEDURE — 83605 ASSAY OF LACTIC ACID: CPT

## 2021-07-20 PROCEDURE — 94660 CPAP INITIATION&MGMT: CPT

## 2021-07-20 PROCEDURE — 80076 HEPATIC FUNCTION PANEL: CPT

## 2021-07-20 PROCEDURE — 87635 SARS-COV-2 COVID-19 AMP PRB: CPT

## 2021-07-20 PROCEDURE — 80048 BASIC METABOLIC PNL TOTAL CA: CPT

## 2021-07-20 PROCEDURE — 87075 CULTR BACTERIA EXCEPT BLOOD: CPT

## 2021-07-20 PROCEDURE — 93325 DOPPLER ECHO COLOR FLOW MAPG: CPT

## 2021-07-20 PROCEDURE — 86301 IMMUNOASSAY TUMOR CA 19-9: CPT

## 2021-07-20 PROCEDURE — 88305 TISSUE EXAM BY PATHOLOGIST: CPT

## 2021-07-20 PROCEDURE — U0005: CPT

## 2021-07-20 PROCEDURE — 86900 BLOOD TYPING SEROLOGIC ABO: CPT

## 2021-07-20 PROCEDURE — 82947 ASSAY GLUCOSE BLOOD QUANT: CPT

## 2021-07-20 PROCEDURE — 36415 COLL VENOUS BLD VENIPUNCTURE: CPT

## 2021-07-20 PROCEDURE — 87040 BLOOD CULTURE FOR BACTERIA: CPT

## 2021-07-20 PROCEDURE — 93320 DOPPLER ECHO COMPLETE: CPT

## 2021-07-20 PROCEDURE — 84145 PROCALCITONIN (PCT): CPT

## 2021-07-20 PROCEDURE — 82105 ALPHA-FETOPROTEIN SERUM: CPT

## 2021-07-20 PROCEDURE — 74018 RADEX ABDOMEN 1 VIEW: CPT

## 2021-07-20 PROCEDURE — 82330 ASSAY OF CALCIUM: CPT

## 2021-07-20 PROCEDURE — 85027 COMPLETE CBC AUTOMATED: CPT

## 2021-07-20 PROCEDURE — C8929: CPT

## 2021-07-20 PROCEDURE — C1729: CPT

## 2021-07-20 PROCEDURE — 86803 HEPATITIS C AB TEST: CPT

## 2021-07-20 PROCEDURE — C1889: CPT

## 2021-07-20 PROCEDURE — 86304 IMMUNOASSAY TUMOR CA 125: CPT

## 2021-07-20 PROCEDURE — 84132 ASSAY OF SERUM POTASSIUM: CPT

## 2021-07-20 PROCEDURE — 87205 SMEAR GRAM STAIN: CPT

## 2021-07-20 PROCEDURE — 96372 THER/PROPH/DIAG INJ SC/IM: CPT

## 2021-07-20 PROCEDURE — 99232 SBSQ HOSP IP/OBS MODERATE 35: CPT

## 2021-07-20 PROCEDURE — 87116 MYCOBACTERIA CULTURE: CPT

## 2021-07-20 PROCEDURE — 97163 PT EVAL HIGH COMPLEX 45 MIN: CPT

## 2021-07-20 PROCEDURE — 93312 ECHO TRANSESOPHAGEAL: CPT

## 2021-07-20 PROCEDURE — 82728 ASSAY OF FERRITIN: CPT

## 2021-07-20 PROCEDURE — 86901 BLOOD TYPING SEROLOGIC RH(D): CPT

## 2021-07-20 PROCEDURE — 85014 HEMATOCRIT: CPT

## 2021-07-20 PROCEDURE — 74182 MRI ABDOMEN W/CONTRAST: CPT

## 2021-07-20 PROCEDURE — 84295 ASSAY OF SERUM SODIUM: CPT

## 2021-07-20 PROCEDURE — 82378 CARCINOEMBRYONIC ANTIGEN: CPT

## 2021-07-20 PROCEDURE — 71275 CT ANGIOGRAPHY CHEST: CPT

## 2021-07-20 PROCEDURE — 83735 ASSAY OF MAGNESIUM: CPT

## 2021-07-20 PROCEDURE — U0003: CPT

## 2021-07-20 PROCEDURE — 84100 ASSAY OF PHOSPHORUS: CPT

## 2021-07-20 PROCEDURE — 77012 CT SCAN FOR NEEDLE BIOPSY: CPT

## 2021-07-20 PROCEDURE — C1769: CPT

## 2021-07-20 PROCEDURE — 86850 RBC ANTIBODY SCREEN: CPT

## 2021-07-20 PROCEDURE — 82803 BLOOD GASES ANY COMBINATION: CPT

## 2021-07-20 PROCEDURE — 81001 URINALYSIS AUTO W/SCOPE: CPT

## 2021-07-20 PROCEDURE — 74177 CT ABD & PELVIS W/CONTRAST: CPT

## 2021-07-20 PROCEDURE — S2900: CPT

## 2021-07-20 PROCEDURE — 85025 COMPLETE CBC W/AUTO DIFF WBC: CPT

## 2021-07-20 PROCEDURE — 71045 X-RAY EXAM CHEST 1 VIEW: CPT

## 2021-07-20 PROCEDURE — 80202 ASSAY OF VANCOMYCIN: CPT

## 2021-07-20 PROCEDURE — 85730 THROMBOPLASTIN TIME PARTIAL: CPT

## 2021-07-20 PROCEDURE — 82962 GLUCOSE BLOOD TEST: CPT

## 2021-07-20 PROCEDURE — 86769 SARS-COV-2 COVID-19 ANTIBODY: CPT

## 2021-07-20 PROCEDURE — 88173 CYTOPATH EVAL FNA REPORT: CPT

## 2021-07-20 PROCEDURE — 85379 FIBRIN DEGRADATION QUANT: CPT

## 2021-07-20 PROCEDURE — 99285 EMERGENCY DEPT VISIT HI MDM: CPT | Mod: 25

## 2021-07-20 PROCEDURE — 87070 CULTURE OTHR SPECIMN AEROBIC: CPT

## 2021-07-20 PROCEDURE — 80053 COMPREHEN METABOLIC PANEL: CPT

## 2021-07-20 PROCEDURE — 85652 RBC SED RATE AUTOMATED: CPT

## 2021-07-20 PROCEDURE — 85610 PROTHROMBIN TIME: CPT

## 2021-07-20 PROCEDURE — 86923 COMPATIBILITY TEST ELECTRIC: CPT

## 2021-07-20 RX ORDER — APIXABAN 2.5 MG/1
2 TABLET, FILM COATED ORAL
Qty: 56 | Refills: 0
Start: 2021-07-20 | End: 2021-08-02

## 2021-07-20 RX ORDER — TRAMADOL HYDROCHLORIDE 50 MG/1
1 TABLET ORAL
Qty: 0 | Refills: 0 | DISCHARGE
Start: 2021-07-20

## 2021-07-20 RX ORDER — APIXABAN 2.5 MG/1
2 TABLET, FILM COATED ORAL
Qty: 28 | Refills: 0
Start: 2021-07-20 | End: 2021-07-26

## 2021-07-20 RX ADMIN — SIMETHICONE 80 MILLIGRAM(S): 80 TABLET, CHEWABLE ORAL at 05:22

## 2021-07-20 RX ADMIN — CHLORHEXIDINE GLUCONATE 1 APPLICATION(S): 213 SOLUTION TOPICAL at 05:22

## 2021-07-20 RX ADMIN — Medication 50 MILLIGRAM(S): at 05:22

## 2021-07-20 RX ADMIN — APIXABAN 10 MILLIGRAM(S): 2.5 TABLET, FILM COATED ORAL at 05:22

## 2021-07-20 RX ADMIN — MEROPENEM 100 MILLIGRAM(S): 1 INJECTION INTRAVENOUS at 05:23

## 2021-07-20 NOTE — PROGRESS NOTE ADULT - NUTRITIONAL ASSESSMENT
This patient has been assessed with a concern for Malnutrition and has been determined to have a diagnosis/diagnoses of Moderate protein-calorie malnutrition.    This patient is being managed with:   Diet Mechanical Soft-  Entered: Jul 18 2021  1:39PM    
This patient has been assessed with a concern for Malnutrition and has been determined to have a diagnosis/diagnoses of Moderate protein-calorie malnutrition.    This patient is being managed with:   Diet Regular-  Supplement Feeding Modality:  Oral  Ensure Enlive Cans or Servings Per Day:  1       Frequency:  Three Times a day  Entered: Jul 15 2021  1:27PM    
This patient has been assessed with a concern for Malnutrition and has been determined to have a diagnosis/diagnoses of Moderate protein-calorie malnutrition.    This patient is being managed with:   Diet Regular-  Entered: Jul 13 2021 12:10PM    Diet NPO after Midnight-     NPO Start Date: 13-Jul-2021   NPO Start Time: 23:59  Entered: Jul 13 2021 10:37AM    
This patient has been assessed with a concern for Malnutrition and has been determined to have a diagnosis/diagnoses of Moderate protein-calorie malnutrition.    This patient is being managed with:   Diet Regular-  Supplement Feeding Modality:  Oral  Ensure Enlive Cans or Servings Per Day:  1       Frequency:  Three Times a day  Entered: Jul 15 2021  1:27PM    
This patient has been assessed with a concern for Malnutrition and has been determined to have a diagnosis/diagnoses of Moderate protein-calorie malnutrition.    This patient is being managed with:   Diet Regular-  Entered: Jul 12 2021  8:04AM    
This patient has been assessed with a concern for Malnutrition and has been determined to have a diagnosis/diagnoses of Moderate protein-calorie malnutrition.    This patient is being managed with:   Diet Mechanical Soft-  Entered: Jul 18 2021  1:39PM    
This patient has been assessed with a concern for Malnutrition and has been determined to have a diagnosis/diagnoses of Moderate protein-calorie malnutrition.    This patient is being managed with:   Diet Clear Liquid-  Entered: Jul 16 2021 12:04PM    
This patient has been assessed with a concern for Malnutrition and has been determined to have a diagnosis/diagnoses of Moderate protein-calorie malnutrition.    This patient is being managed with:   Diet Regular-  Entered: Jul 12 2021  8:04AM

## 2021-07-20 NOTE — PROGRESS NOTE ADULT - ASSESSMENT
55M w/ hx of sleep apnea on bipap at night who presents from Urgent Care who presents with chills. Of note, he was recently diagnosed with COVID on Wednesday by rapid test  and his sx had started one week ago. He started having night sweats, cough w/o productive sputum, chills, and fever (Tmax- 104F). His wife has also tested positive for COVID19 and is currently asymptomatic. He had gone to the urgent care and was diagnosed with LLL PNA. He has been vaccinated with Pfizer in April. He was febrile to 100.4F and having significant chills. Also, has nausea, SOB, and headache. Denies vomiting, dizziness, abdominal pain and chest pain. He was subsequently told by urgent care to come to the hospital for further management. He has been taking tylenol as needed.  PT REPORTS HE HAS HAD NIGHT SWEATS  HAD DENTAL PROCEDURE IN EARLY JUNE  OUT PT RAPID COVID WAS NEG      PT HAD COVID PCR  TEST HERE  x2 WHICH WAS NEGATIVE  CT SCAN CHEST SHOWED A    LIVER LESION  pt s/p   MRI OF LIVER    WHICH SHOWED COLLECTION C/W ABSCESS AS WELL AS THROMBUS    ALSO ? CYSTIC ARE IN PANCREAS IPMN  PT HAD INCREASE FEVERS 7/11 CODE SEPSIS CALLED WBC AND LACTATE UP  IR CALLED  AND DRAINAGE WAS DONE    CX AND CYTOLOGY SENT  Liver abscess  S/P IR GUIDED DRAINAGE    S/P OPERATIVE PROCEDURE        ON MERREM  OPERATIVE CX   NEG  VÍCTOR REPORTED NEGATIVE   ALL BLOOD CX HAVE BEEN NEGATIVE    PICC LINE in place  IV ABX TOTAL 6 WEEKS FROM PROCEDURE   THROUGH 8/26  IF REPEAT IMAGING IMPROVED POSSIBLE 4 WEEKS AND ORAL  PT WITH RASH ON LEG ARM  ? ABX ? MIRALAX RELATED   WILL RECOMMEND PRETREAT WITH BENADRYL 1/2 BEFORE EACH DOSE   WILL FOLLOW UP   FOR D/C TODAY ONCE ALL HOME CARE ARRANGED  SPOKE TO WIFE ON PHONE          55M w/ hx of sleep apnea on bipap at night who presents from Urgent Care who presents with chills. Of note, he was recently diagnosed with COVID on Wednesday by rapid test  and his sx had started one week ago. He started having night sweats, cough w/o productive sputum, chills, and fever (Tmax- 104F). His wife has also tested positive for COVID19 and is currently asymptomatic. He had gone to the urgent care and was diagnosed with LLL PNA. He has been vaccinated with Pfizer in April. He was febrile to 100.4F and having significant chills. Also, has nausea, SOB, and headache. Denies vomiting, dizziness, abdominal pain and chest pain. He was subsequently told by urgent care to come to the hospital for further management. He has been taking tylenol as needed.  PT REPORTS HE HAS HAD NIGHT SWEATS  HAD DENTAL PROCEDURE IN EARLY JUNE  OUT PT RAPID COVID WAS NEG      PT HAD COVID PCR  TEST HERE  x2 WHICH WAS NEGATIVE  CT SCAN CHEST SHOWED A    LIVER LESION  pt s/p   MRI OF LIVER    WHICH SHOWED COLLECTION C/W ABSCESS AS WELL AS THROMBUS    ALSO ? CYSTIC ARE IN PANCREAS IPMN  PT HAD INCREASE FEVERS 7/11 CODE SEPSIS CALLED WBC AND LACTATE UP  IR CALLED  AND DRAINAGE WAS DONE    CX AND CYTOLOGY SENT  Liver abscess  S/P IR GUIDED DRAINAGE    S/P OPERATIVE PROCEDURE        ON MERREM  OPERATIVE CX   NEG  VÍCTOR REPORTED NEGATIVE   ALL BLOOD CX HAVE BEEN NEGATIVE    PICC LINE in place  IV ABX TOTAL 6 WEEKS FROM PROCEDURE   THROUGH 8/26  IF REPEAT IMAGING IMPROVED POSSIBLE 4 WEEKS AND ORAL  PT WITH RASH ON LEG ARM  ? ABX ? MIRALAX RELATED   WILL RECOMMEND PRETREAT WITH BENADRYL 1/2 BEFORE EACH DOSE   WILL FOLLOW UP   FOR D/C TODAY ONCE ALL HOME CARE ARRANGED  SPOKE TO WIFE ON PHONE     ADDENDUM  I RETURNED TO ROOM AND SPOKE TO WIFE AND PATIENT  PT HAD AREA OF RASH ON FORE ARM S AND BEHIND LEFT LEG AND NEAR DIXIE EDMONDS  TOLD PT TO PRETREAT WITH BENADRYL BEFORE DOSE IF RASH IS WORSE WILL NEED TO CHANGE MERREM  WILL FOLLOW UP

## 2021-07-20 NOTE — PROGRESS NOTE ADULT - SUBJECTIVE AND OBJECTIVE BOX
INTERVAL HPI/OVERNIGHT EVENTS:    Patient evaluated at bedside. No acute distress. Patient complained of rash 7/17 on his right lower extremity that was itchy, rash was demarcated and then progressed to upper extremities and right foot. Remains hemodynamically stable and afebrile. Patient downgraded 7/17, doing well otherwise. Tolerating diet, passing gas no BM. Denies fevers, chills, nausea, emesis.  Denies chest pain, dyspnea.  Denies constipation, diarrhea. Denies headaches, dizziness, changes in vision.  Patient was advanced to regular diet 7/18.      MEDICATIONS  (STANDING):  chlorhexidine 2% Cloths 1 Application(s) Topical daily  dextrose 5% + sodium chloride 0.45% with potassium chloride 20 mEq/L 1000 milliLiter(s) (110 mL/Hr) IV Continuous <Continuous>  heparin  Infusion 1500 Unit(s)/Hr (17.5 mL/Hr) IV Continuous <Continuous>  meropenem  IVPB 1000 milliGRAM(s) IV Intermittent every 8 hours  polyethylene glycol 3350 17 Gram(s) Oral daily  senna 1 Tablet(s) Oral at bedtime    MEDICATIONS  (PRN):  diphenhydrAMINE 50 milliGRAM(s) Oral every 6 hours PRN Rash and/or Itching  HYDROmorphone  Injectable 0.5 milliGRAM(s) IV Push every 8 hours PRN Severe Pain (7 - 10)  traMADol 50 milliGRAM(s) Oral every 4 hours PRN Severe Pain (7 - 10)  traMADol 25 milliGRAM(s) Oral every 4 hours PRN Moderate Pain (4 - 6)    ICU Vital Signs Last 24 Hrs  T(C): 37.1 (18 Jul 2021 21:19), Max: 37.2 (18 Jul 2021 10:33)  T(F): 98.7 (18 Jul 2021 21:19), Max: 99 (18 Jul 2021 10:33)  HR: 75 (18 Jul 2021 21:19) (75 - 82)  BP: 136/76 (18 Jul 2021 21:19) (116/72 - 136/76)  BP(mean): --  ABP: --  ABP(mean): --  RR: 18 (18 Jul 2021 21:19) (18 - 18)  SpO2: 94% (18 Jul 2021 21:19) (91% - 95%)      Gen: patient laying in bed, A&Ox3, NAD  HEENT: EOMI / PERRL b/l  Pulm: regular respiratory rate, no distress  CV: RRR  GI: Abdominal incisions c/d/i. Abdomen soft, distended, TTP to the right w/o rebound or guarding.   Neurological: no gross sensory / motor deficits  Ext: Warm, pink, right lower extremity and upper extremity with vesicular rash.     I&O's Detail    17 Jul 2021 07:01  -  18 Jul 2021 07:00  --------------------------------------------------------  IN:  Total IN: 0 mL    OUT:    Bulb (mL): 250 mL    Voided (mL): 1725 mL  Total OUT: 1975 mL    Total NET: -1975 mL      18 Jul 2021 07:01  -  19 Jul 2021 02:21  --------------------------------------------------------  IN:    dextrose 5% + sodium chloride 0.45% w/ Additives: 770 mL    Heparin: 150.5 mL    IV PiggyBack: 50 mL  Total IN: 970.5 mL    OUT:    Bulb (mL): 40 mL    Voided (mL): 1100 mL  Total OUT: 1140 mL    Total NET: -169.5 mL    .  LABS:                         12.3   16.93 )-----------( 462      ( 18 Jul 2021 06:45 )             37.8     07-18    135  |  101  |  7.9<L>  ----------------------------<  112<H>  4.3   |  24.0  |  0.64    Ca    8.5<L>      18 Jul 2021 06:45  Phos  2.6     07-18  Mg     1.9     07-18      PTT - ( 18 Jul 2021 20:51 )  PTT:97.7 sec          RADIOLOGY, EKG & ADDITIONAL TESTS: Reviewed.

## 2021-07-20 NOTE — DISCHARGE NOTE NURSING/CASE MANAGEMENT/SOCIAL WORK - PATIENT PORTAL LINK FT
You can access the FollowMyHealth Patient Portal offered by [facility] by registering at the following website: [email or web address]. By joining [facility]’s FollowMyHealth portal, you will also be able to view your health information using other applications (apps) compatible with our system.

## 2021-07-20 NOTE — PHYSICAL THERAPY INITIAL EVALUATION ADULT - ADDITIONAL COMMENTS
Pt. reports he lives with his family in a house with 2 ALFONSO with rail and a flight inside with a rail. Pt. reports family is at home and able to assist at all times. Independent PTA and does not own DME.

## 2021-07-20 NOTE — PROGRESS NOTE ADULT - ASSESSMENT
A/P: 55y Male s/p robotic drainage of liver abscess.  Doing well post operatively. Drain was removed. ID recommended IV merrem with benadryl 30 min prior for 6 weeks. PICC line in place    -Pain control   -Pulm toilet   -Monitor for bowel function   -On apixaban (hepatic vein thrombosis)  -Keep abx, follow cultures results and speciation, follow up ID recs (home abx for 6 weeks, script given)  -AM labs  -OOB and walking  -PT  -Monitor rash

## 2021-07-20 NOTE — PROGRESS NOTE ADULT - SUBJECTIVE AND OBJECTIVE BOX
INFECTIOUS DISEASES AND INTERNAL MEDICINE at [facility]  =======================================================  Henri Salazar MD  Diplomates American Board of Internal Medicine and Infectious Diseases  Telephone [phone]  Fax            [phone]  =======================================================    MAXINE BREAUX [id number]    Follow up: liver abscess     S/P IR GUIDED DRAINAGE    and S/P OPERATIVE PROCEDURE 7/14 CX NEG TO DATE  REMAINS AFEBRILE     Allergies:  No Known Allergies      REVIEW OF SYSTEMS:  CONSTITUTIONAL:  No Fever or chills  HEENT:   No diplopia or blurred vision.  No earache, sore throat or runny nose.  CARDIOVASCULAR:  No pressure, squeezing, strangling, tightness, heaviness or aching about the chest, neck, axilla or epigastrium.  RESPIRATORY:    cough, shortness of breath,    GASTROINTESTINAL: ABD BLOATING  GENITOURINARY:  No dysuria, frequency or urgency.   MUSCULOSKELETAL:   moves all joints  SKIN:  No change in skin, hair or nails.  NEUROLOGIC:  No seizures   PSYCHIATRIC:  No disorder of thought or mood.  ENDOCRINE:  No heat or cold intolerance, polyuria or polydipsia.  HEMATOLOGICAL:  No easy bruising or bleeding.       Physical Exam:  GEN: NAD  HEENT: normocephalic and atraumatic. EOMI. PERRL.    NECK: Supple.  LUNGS: Clear to auscultation.  HEART: Regular rate and rhythm   ABDOMEN: Soft, nontender,  Positive bowel sounds. DRAIN  is out  : No CVA tenderness  EXTREMITIES: Without any edema.  MSK: no joint swelling  NEUROLOGIC: no focal deficits   PSYCHIATRIC: Appropriate affect .  SKIN: No ulceration or induration present.      Vitals:   Vital Signs Last 24 Hrs  T(C): 36.9 (20 Jul 2021 04:55), Max: 37.6 (19 Jul 2021 16:44)  T(F): 98.5 (20 Jul 2021 04:55), Max: 99.6 (19 Jul 2021 16:44)  HR: 81 (20 Jul 2021 04:55) (79 - 88)  BP: 126/78 (20 Jul 2021 04:55) (113/77 - 126/78)  BP(mean): --  RR: 18 (20 Jul 2021 04:55) (18 - 18)  SpO2: 96% (20 Jul 2021 04:55) (93% - 96%)    Current Antibiotics:  meropenem  IVPB 1000 milliGRAM(s) IV Intermittent every 8 hours    Other medications:  chlorhexidine 2% Cloths 1 Application(s) Topical daily  dextrose 5% + sodium chloride 0.45% with potassium chloride 20 mEq/L 1000 milliLiter(s) IV Continuous <Continuous>  heparin  Infusion 1500 Unit(s)/Hr IV Continuous <Continuous>  polyethylene glycol 3350 17 Gram(s) Oral daily  senna 1 Tablet(s) Oral at bedtime  simethicone 80 milliGRAM(s) Chew four times a day                                  12.8   12.78 )-----------( 500      ( 20 Jul 2021 06:57 )             39.0   07-20    135  |  101  |  12.1  ----------------------------<  102<H>  4.3   |  26.0  |  0.64    Ca    9.0      20 Jul 2021 06:57  Phos  2.9     07-20  Mg     2.0     07-20              RECENT CULTURES:  07-15 @ 00:34 .Tissue R liver lobe abscess cavity deep     No growth    Rare polymorphonuclear leukocytes per low power field  No organisms seen per oil power field      07-15 @ 00:23 .Tissue R liver lobe abs cavity superficial     No growth    Moderate polymorphonuclear leukocytes per low power field  No organisms seen per oil power field      07-12 @ 09:36 .Smear       Numerous polymorphonuclear leukocytes seen per low power field  No organisms seen per oil power field      07-12 @ 06:41 .Abscess hepatic collection     No growth at 5 days        07-11 @ 20:43 .Blood Blood-Peripheral     No growth at 5 days.        07-11 @ 19:48 .Blood Blood-Peripheral     No growth at 5 days.        07-10 @ 10:20 .Blood Blood     No growth at 5 days.            WBC Count: 16.93 K/uL (07-18-21 @ 06:45)  WBC Count: 22.26 K/uL (07-17-21 @ 06:05)  WBC Count: 25.64 K/uL (07-16-21 @ 15:19)  WBC Count: 23.44 K/uL (07-16-21 @ 04:48)  WBC Count: 24.43 K/uL (07-15-21 @ 21:12)  WBC Count: 25.28 K/uL (07-15-21 @ 15:01)  WBC Count: 24.69 K/uL (07-15-21 @ 04:24)  WBC Count: 17.86 K/uL (07-14-21 @ 19:44)  WBC Count: 12.92 K/uL (07-14-21 @ 05:31)    Creatinine, Serum: 0.64 mg/dL (07-18-21 @ 06:45)  Creatinine, Serum: 0.63 mg/dL (07-17-21 @ 06:05)  Creatinine, Serum: 0.74 mg/dL (07-16-21 @ 04:48)  Creatinine, Serum: 0.67 mg/dL (07-15-21 @ 04:24)  Creatinine, Serum: 0.58 mg/dL (07-14-21 @ 19:44)  Creatinine, Serum: 0.64 mg/dL (07-14-21 @ 05:31)    C-Reactive Protein, Serum: 236 mg/L (07-13-21 @ 05:14)  C-Reactive Protein, Serum: 324 mg/L (07-10-21 @ 13:53)  C-Reactive Protein, Serum: 263 mg/L (07-09-21 @ 13:39)    Ferritin, Serum: 260 ng/mL (07-10-21 @ 13:53)  Ferritin, Serum: 275 ng/mL (07-09-21 @ 13:39)    Procalcitonin, Serum: 6.75 ng/mL (07-13-21 @ 05:14)  Procalcitonin, Serum: 12.63 ng/mL (07-12-21 @ 05:22)  Procalcitonin, Serum: 11.61 ng/mL (07-11-21 @ 08:28)  Procalcitonin, Serum: 21.40 ng/mL (07-09-21 @ 13:39)    COVID-19 PCR: NotDetec (07-14-21 @ 00:22)  COVID-19 PCR: NotDetec (07-10-21 @ 10:11)  COVID-19 PCR: NotDetec (07-09-21 @ 13:39)

## 2021-07-20 NOTE — PROGRESS NOTE ADULT - NSICDXPILOT_GEN_ALL_CORE
[place]

## 2021-07-20 NOTE — PROGRESS NOTE ADULT - SUBJECTIVE AND OBJECTIVE BOX
54 y/o male with JESSICA on BIPAP at night, got Pfizer vaccine in 4/2021, noted to be covid swab+ at an Urgent care center on 7/7/2021 and was diagnosed with a LLL pneumonia but was sent to the hospital for further care. In the ER workup revealed transminitis and and MRI of  the abdomen that showed hepatic steatosis but in segment 8 he has a large 5.5x6.4x6.6 cm. multifocal fluid collection with peripheral and septal enhancement and perilesional edema compatible with hepatic abscess.  Just peripheral to the abscess there is a non-enhancing branching linear structure, likely representing a thrombosed right hepatic vein branch. Normal appearing bile ducts and there is an 8 mm. cystic lesion in the head of the pancreas contiguous with the non-dilated PD. No solid enhancing component and no main PD dilatation. Patent portal veins. Thrombus in a branch of the right hepatic vein peripheral to the abscess. No retroperitoneal ISABELLA. Colon: Sigmoid diverticulosis w/o diverticulitis and a small fat containing umbilical hernia.  Of note, the patient has 2 covid nasal swab pcr's here in the hospital that were negative.  Patient has been febrile and last night went for an emergent IR drain placement.   Hematology consulted to address need for anticoagulation in this setting.    PT went to OR on 7/14/2021 and underwent a successful robotic drainage of the abscess. Necrotic liver tissue also noted. Afebrile. Cultures negative so far.   No bleeding reported. Feeling better.   Likely discharge today. Home on IV antibiotics.        MEDICATIONS  (STANDING):  apixaban 10 milliGRAM(s) Oral every 12 hours  chlorhexidine 2% Cloths 1 Application(s) Topical daily  chlorhexidine 2% Cloths 1 Application(s) Topical <User Schedule>  dextrose 5% + sodium chloride 0.45% with potassium chloride 20 mEq/L 1000 milliLiter(s) (110 mL/Hr) IV Continuous <Continuous>  diphenhydrAMINE 50 milliGRAM(s) Oral every 8 hours  meropenem  IVPB 1000 milliGRAM(s) IV Intermittent every 8 hours  polyethylene glycol 3350 17 Gram(s) Oral daily  senna 1 Tablet(s) Oral at bedtime  simethicone 80 milliGRAM(s) Chew four times a day    MEDICATIONS  (PRN):  HYDROmorphone  Injectable 0.5 milliGRAM(s) IV Push every 8 hours PRN Severe Pain (7 - 10)  sodium chloride 0.9% lock flush 10 milliLiter(s) IV Push every 1 hour PRN Pre/post blood products, medications, blood draw, and to maintain line patency  traMADol 50 milliGRAM(s) Oral every 4 hours PRN Severe Pain (7 - 10)  traMADol 25 milliGRAM(s) Oral every 4 hours PRN Moderate Pain (4 - 6)    Vital Signs Last 24 Hrs  T(C): 36.6 (20 Jul 2021 11:02), Max: 37.6 (19 Jul 2021 16:44)  T(F): 97.9 (20 Jul 2021 11:02), Max: 99.6 (19 Jul 2021 16:44)  HR: 90 (20 Jul 2021 11:02) (79 - 90)  BP: 118/77 (20 Jul 2021 11:02) (113/77 - 126/78)  BP(mean): --  RR: 18 (20 Jul 2021 11:02) (18 - 18)  SpO2: 94% (20 Jul 2021 11:02) (94% - 96%)  nad  ctab  rrr  abd soft      Labs:                          12.8   12.78 )-----------( 500      ( 20 Jul 2021 06:57 )             39.0                             11.3   12.18 )-----------( 465      ( 19 Jul 2021 05:45 )             33.7                  13.2   12.92 )-----------( 230      ( 14 Jul 2021 05:31 )             38.9       07-20    135  |  101  |  12.1  ----------------------------<  102<H>  4.3   |  26.0  |  0.64    Ca    9.0      20 Jul 2021 06:57  Phos  2.9     07-20  Mg     2.0     07-20        07-19    136  |  103  |  8.6  ----------------------------<  303<H>  4.9   |  25.0  |  0.60    Ca    7.8<L>      19 Jul 2021 05:45  Phos  1.7     07-19  Mg     1.8     07-19      07-14    138  |  102  |  8.9  ----------------------------<  102<H>  3.8   |  27.0  |  0.64    Ca    8.7      14 Jul 2021 05:31  Phos  2.3     07-13  Mg     1.7     07-13    TPro  6.5<L>  /  Alb  2.7<L>  /  TBili  1.0  /  DBili  x   /  AST  58<H>  /  ALT  107<H>  /  AlkPhos  295<H>  07-14 07-13    136  |  102  |  13.0  ----------------------------<  109<H>  3.8   |  25.0  |  0.70    Ca    8.6      13 Jul 2021 05:14  Phos  2.3     07-13  Mg     1.7     07-13    TPro  6.4<L>  /  Alb  2.7<L>  /  TBili  0.9  /  DBili  x   /  AST  40<H>  /  ALT  105<H>  /  AlkPhos  304<H>  07-13

## 2021-07-20 NOTE — PROGRESS NOTE ADULT - PROVIDER SPECIALTY LIST ADULT
Heme/Onc
Infectious Disease
Internal Medicine
Surgery
Cardiology
Infectious Disease
Internal Medicine
SICU
Surgery
Heme/Onc
Infectious Disease
Infectious Disease
Surgery
Anesthesia
Heme/Onc
Infectious Disease
SICU
Surgery
Cardiology
Critical Care
Heme/Onc
Heme/Onc
Infectious Disease
Infectious Disease
SICU
Surgery
Gastroenterology

## 2021-07-20 NOTE — PROGRESS NOTE ADULT - ASSESSMENT
54 y/o M with JESSICA admitted with fevers, cough, found to have transaminitis and subsequently MRI showed liver abscess and possible hepatic vein thrombus.    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 Restarted AC after OR intervention and agree with apixaban. Duration can be discussed as an outpatient.    2) Liver abscess - Improved s/p robotic drainage by surgery. Cytology sent. Tumor markers unrevealing with normal values. Afebrile over 24 hours. On meropenem. As per ID he will need  6 weeks total of antibiotics. Gets pre-treatment benadryl due to rash.    3) Fevers - afebrile this morning sec to abscess. On meropenem. ID on board.     4) False positive Covid + at urgent care - nasal swab pcr negative x 2 during his stay here so far. ID on board.       Thank you,    Dyllan Espinoza MD  [facility]

## 2021-07-20 NOTE — PROGRESS NOTE ADULT - REASON FOR ADMISSION
fever and chills

## 2021-07-29 ENCOUNTER — APPOINTMENT (OUTPATIENT)
Dept: SURGICAL ONCOLOGY | Facility: CLINIC | Age: 56
End: 2021-07-29
Payer: COMMERCIAL

## 2021-07-29 VITALS
TEMPERATURE: 97.3 F | SYSTOLIC BLOOD PRESSURE: 104 MMHG | OXYGEN SATURATION: 99 % | BODY MASS INDEX: 24.48 KG/M2 | HEART RATE: 86 BPM | WEIGHT: 171 LBS | HEIGHT: 70 IN | DIASTOLIC BLOOD PRESSURE: 74 MMHG

## 2021-07-29 PROCEDURE — 99024 POSTOP FOLLOW-UP VISIT: CPT

## 2021-07-29 NOTE — BRIEF OPERATIVE NOTE - ANESTHESIOLOGIST NAME
Labs show kidney function is as expected with her on diuretic  Repeat BMP in 2 months, watch creatinine  K and Na are fine  TSH is low, is she on thyroid medication? , this indicates she has mild hyperthyroidism and should see PCP or Endocrine lisy sena crna, MD

## 2021-08-04 ENCOUNTER — APPOINTMENT (OUTPATIENT)
Dept: INTERNAL MEDICINE | Facility: CLINIC | Age: 56
End: 2021-08-04
Payer: COMMERCIAL

## 2021-08-04 VITALS
TEMPERATURE: 96.5 F | SYSTOLIC BLOOD PRESSURE: 125 MMHG | HEIGHT: 70 IN | DIASTOLIC BLOOD PRESSURE: 80 MMHG | WEIGHT: 170 LBS | BODY MASS INDEX: 24.34 KG/M2

## 2021-08-04 PROCEDURE — 99215 OFFICE O/P EST HI 40 MIN: CPT

## 2021-08-04 RX ORDER — MELOXICAM 7.5 MG/1
7.5 TABLET ORAL TWICE DAILY
Qty: 28 | Refills: 0 | Status: DISCONTINUED | COMMUNITY
Start: 2020-08-28 | End: 2021-08-04

## 2021-08-04 NOTE — PHYSICAL EXAM
[General Appearance - Alert] : alert [General Appearance - In No Acute Distress] : in no acute distress [Sclera] : the sclera and conjunctiva were normal [PERRL With Normal Accommodation] : pupils were equal in size, round, reactive to light [Extraocular Movements] : extraocular movements were intact [Outer Ear] : the ears and nose were normal in appearance [Oropharynx] : the oropharynx was normal with no thrush [Neck Appearance] : the appearance of the neck was normal [Neck Cervical Mass (___cm)] : no neck mass was observed [Jugular Venous Distention Increased] : there was no jugular-venous distention [Thyroid Diffuse Enlargement] : the thyroid was not enlarged [Auscultation Breath Sounds / Voice Sounds] : lungs were clear to auscultation bilaterally [Heart Rate And Rhythm] : heart rate was normal and rhythm regular [Heart Sounds] : normal S1 and S2 [Heart Sounds Gallop] : no gallops [Murmurs] : no murmurs [Heart Sounds Pericardial Friction Rub] : no pericardial rub [Full Pulse] : the pedal pulses are present [Edema] : there was no peripheral edema [Bowel Sounds] : normal bowel sounds [Abdomen Soft] : soft [Abdomen Tenderness] : non-tender [] : no hepato-splenomegaly [Abdomen Mass (___ Cm)] : no abdominal mass palpated [Costovertebral Angle Tenderness] : no CVA tenderness [No Palpable Adenopathy] : no palpable adenopathy [Musculoskeletal - Swelling] : no joint swelling [Nail Clubbing] : no clubbing  or cyanosis of the fingernails [Motor Tone] : muscle strength and tone were normal [FreeTextEntry1] : SOME AREAS OF DESQUAMTED SKIN

## 2021-08-04 NOTE — ASSESSMENT
[FreeTextEntry1] : 56 YO MALE WITH PMH OF SLEEP APNEA WITH RECENT ADMISSION AT [place] WITH LIVER ABSCESS OF UNCLEAR ETIOLOGY DOES HAVE A HX OF DIVERTICULOSIS AND ? IF HE HAD DIVERTICULITS PT HAD IR ASPIARTION AND THEN CONTINUE FEVERS AND HAD LIVER DRAINAGE IN THE OPERATING ROOM \par VÍCTOR NEG FOR ENDOCARDITIS\par UNFORTUNATELY  ALL CX WERE NEGATIVE AND PT WAS PLACED ON EMPIRIC MERREM AND DISCHARGED PT HAD A SMALL RASH BUT IT BECAME WORSE AND ABX WERE CHANGED TO IV CIPRO AND ORAL FLAGYL\par PLAN WAS FOR 6 WEEKS IV ABX  THROUGH 8/26 \par PT ALSO HAD HEPATIC VEIN THROMBOSIS AND PLACED ON ELIQUIS\par HERE FOR FOLLOWUP WITH HIS WIFE\par PT OVERALL FEELS OK NO CP OR SOB  RASH HAS RESOLVED HAS SOME AREAS OF SKING WITH DESQUAMATION BUT HEELING WELL NO ABD PAIN\par AND WBC AND LIVER ENZYMES ARE IMPROVED\par WILL D/W SURG ONC DR TABOR AND JOSE ON REPEAT IMAGING\par WILL FOLLOW UP OVERALL STABLE\par ANSERED ALL QUESITONS

## 2021-08-09 NOTE — HISTORY OF PRESENT ILLNESS
[de-identified] : 55 year-old male presents for an initial postop visit.\par \par He presented to urgent care with chills, night sweats and cough on 7/9/21.  He had recently been diagnosed with Covid 19 the week prior.  He was referred to the ED at [facility].  Labs were notable for leukocytosis, transaminitis and elevated procalcitonin.  CT and MRI of the liver demonstrated a liver lesion with a collection consistent with abscess as well as thrombus.  IR performed a percutaneous abscess drainage with drain placement on 7/13/21.\par \par On 7/14/21, he underwent  a robotic ultrasound guided incision and drainage of a multiloculated liver abscess. He had a normal intraoperative VÍCTOR. Cultures were sent which have shown no growth to date. He remained on meropenem and is expected to be on a six week course of antibiotics. 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  Final pathology was consistent with liver abscess with on evidence of malignancy. \par \par

## 2021-08-09 NOTE — PHYSICAL EXAM
[Normal] : supple, no neck mass and thyroid not enlarged [Normal Neck Lymph Nodes] : normal neck lymph nodes  [Normal Supraclavicular Lymph Nodes] : normal supraclavicular lymph nodes [Normal Groin Lymph Nodes] : normal groin lymph nodes [Normal Axillary Lymph Nodes] : normal axillary lymph nodes [Normal] : oriented to person, place and time, with appropriate affect [de-identified] : skin sloughing due to resolving rash [de-identified] : soft NT ND; incisions c/d/i

## 2021-08-09 NOTE — CONSULT LETTER
[Dear  ___] : Dear  [unfilled], [Courtesy Letter:] : I had the pleasure of seeing your patient, [unfilled], in my office today. [Please see my note below.] : Please see my note below. [Consult Closing:] : Thank you very much for allowing me to participate in the care of this patient.  If you have any questions, please do not hesitate to contact me. [Sincerely,] : Sincerely, [FreeTextEntry3] : Kris Mccabe MD, MPH, FACS, FSSO\par , [facility]\par [facility] [place]\par Associate Professor of Surgery\par [employer] [facility]

## 2021-08-09 NOTE — ASSESSMENT
[FreeTextEntry1] : IMP:\par Status post robotic ultrasound guided incision and drainage of a multiloculated liver abscess on 7/14/21.  He had a normal intraoperative VÍCTOR. Cultures were sent which have shown no growth to date. He remained on meropenem and is expected to be on a six week course of antibiotics. 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  Final pathology was consistent with liver abscess with on evidence of malignancy. \par \par PLAN:\par 1) Antibiotics per ID\par 2) RTO in 3-4 weeks\par 3) Increase PO intake\par 4) Repeat imaging before coming off of abx

## 2021-08-09 NOTE — REASON FOR VISIT
[Post-Op] : a post-op for [FreeTextEntry2] : status post robotic ultrasound-guided incision and drainage of multiloculated liver abscess on 7/14/21

## 2021-08-11 ENCOUNTER — APPOINTMENT (OUTPATIENT)
Dept: INTERVENTIONAL RADIOLOGY/VASCULAR | Facility: CLINIC | Age: 56
End: 2021-08-11
Payer: COMMERCIAL

## 2021-08-11 ENCOUNTER — OUTPATIENT (OUTPATIENT)
Dept: OUTPATIENT SERVICES | Facility: HOSPITAL | Age: 56
LOS: 1 days | End: 2021-08-11

## 2021-08-11 ENCOUNTER — RESULT REVIEW (OUTPATIENT)
Age: 56
End: 2021-08-11

## 2021-08-11 VITALS
OXYGEN SATURATION: 100 % | DIASTOLIC BLOOD PRESSURE: 86 MMHG | RESPIRATION RATE: 18 BRPM | TEMPERATURE: 98 F | HEART RATE: 66 BPM | SYSTOLIC BLOOD PRESSURE: 126 MMHG

## 2021-08-11 DIAGNOSIS — Z90.89 ACQUIRED ABSENCE OF OTHER ORGANS: Chronic | ICD-10-CM

## 2021-08-11 DIAGNOSIS — Z79.2 LONG TERM (CURRENT) USE OF ANTIBIOTICS: ICD-10-CM

## 2021-08-11 PROCEDURE — 36573 INSJ PICC RS&I 5 YR+: CPT

## 2021-08-12 NOTE — HISTORY OF PRESENT ILLNESS
[FreeTextEntry1] : PRE CALL PRIOR TO APPOINTMENT: \par \par Phone Number: [phone]\par \par Last done:  3 WEEKS AGO, BECAME Dislodged here for new \par \par COVID-19 SWAB:  N/A NOT NEEDED FOR PICC-LINE INSERTION \par \par RX on file: YES\par \par Referring MD: RAJIV \par \par Appointment date: 8/11/2021\par \par Clotting or Bleeding disorders: NO \par \par PPM / Defibrillator: NO \par \par NPO status advised: N/A \par \par History of fall: NO \par \par Assistant device for walking: NO  \par \par  home: NO \par \par Blood Thinners: ELIQUIS                           \par \par Prescribing MD agree to have blood thinner medication held: N/A NO HOLD \par \par Capacity to make decisions: YES \par \par HCP: NO \par \par DNR: NO  \par \par Person contact for Pre-Call: DAY OF S/W PT \par \par --------------------------------------------------------------------------------------------------------\par \par \par \par Dahlia- Operative Assessment: (Day of Procedure)\par \par NPO status: N/A \par \par Falls risk: NO \par \par Labs: N/A    \par \par IVL: LEFT FA ARRIVED IN OFFICE WITH \par \par IR MD: Dr. Joan Miller \par \par Urine Pregnancy: N/A MALE \par \par PRE-OP instructions: YES \par \par POST-OP teaching initiated:  YES \par \par Allergy bracelet on: NKA \par \par Antibiotic given: N/A \par \par

## 2021-08-16 ENCOUNTER — RESULT REVIEW (OUTPATIENT)
Age: 56
End: 2021-08-16

## 2021-08-17 NOTE — CDI QUERY NOTE - NSCDIOTHERTXTBX_GEN_ALL_CORE_HH
ED Adult flow sheet 7/9 @1252  RR 22  SpO2 85%    ED Adult flow sheet 7/9 @1240  HR 97    Lab 7/09 @ 13:39  WBC 19.07  Procalcitonin 21.40      Progress Note Adult-SICU Nurse Practitioner/Attending [Charted Location: [place] [address]] [Authored: 16-Jul-2021 03:09]  ASSESSMENT/PLAN:  55y Male admitted w/ sepsis, found to have liver abscess of unclear etiology (possibly hematogenous spread secondary to recent dental procedure), s/p attempted IR drainage on 7/13, and robotic debridement and drainage of liver abscess on 7/14.      Progress Note Adult-Surgery Resident/Attending [Charted Location: [place] [address]] [Authored: 13-Jul-2021 01:22]-  Assessment and Plan:   ? Assessment    54 yo M admitted to MICU with sepsis 2/2 liver abscess without clear etiology and evidence of a pancreatic mass and thrombosis of an branch of the right hepatic vein.       Progress Note Adult-Anesthesia Attending [Charted Location: [place] [address]] [Authored: 13-Jul-2021 16:26]-  Sepsis secondary to liver abscess, drained and then failed IR drainage, now coming for abscess drainage with robotic assistance.       Please clarify if sepsis is Present on admission (POA) or not ?    A) Sepsis present on admission  B) Sepsis developed during hospital stay   C)Other  D) Not clinically significant

## 2021-08-18 ENCOUNTER — OUTPATIENT (OUTPATIENT)
Dept: OUTPATIENT SERVICES | Facility: HOSPITAL | Age: 56
LOS: 1 days | End: 2021-08-18
Payer: COMMERCIAL

## 2021-08-18 ENCOUNTER — APPOINTMENT (OUTPATIENT)
Dept: CT IMAGING | Facility: CLINIC | Age: 56
End: 2021-08-18
Payer: COMMERCIAL

## 2021-08-18 DIAGNOSIS — Z90.89 ACQUIRED ABSENCE OF OTHER ORGANS: Chronic | ICD-10-CM

## 2021-08-18 DIAGNOSIS — Z00.8 ENCOUNTER FOR OTHER GENERAL EXAMINATION: ICD-10-CM

## 2021-08-18 DIAGNOSIS — K75.0 ABSCESS OF LIVER: ICD-10-CM

## 2021-08-18 PROCEDURE — 74178 CT ABD&PLV WO CNTR FLWD CNTR: CPT | Mod: 26

## 2021-08-18 PROCEDURE — 82565 ASSAY OF CREATININE: CPT

## 2021-08-18 PROCEDURE — 74178 CT ABD&PLV WO CNTR FLWD CNTR: CPT

## 2021-08-18 NOTE — CHART NOTE - NSCHARTNOTEFT_GEN_A_CORE
Please note upon review of chart the following diagnosis exists for this patient:  1) Sepsis present on admission    ED Adult flow sheet 7/9 @1252  RR 22  SpO2 85%    ED Adult flow sheet 7/9 @1240  HR 97    Lab 7/09 @ 13:39  WBC 19.07  Procalcitonin 21.40      Progress Note Adult-SICU Nurse Practitioner/Attending [Charted Location: [place] [address]] [Authored: 16-Jul-2021 03:09]  ASSESSMENT/PLAN:  55y Male admitted w/ sepsis, found to have liver abscess of unclear etiology (possibly hematogenous spread secondary to recent dental procedure), s/p attempted IR drainage on 7/13, and robotic debridement and drainage of liver abscess on 7/14.      Progress Note Adult-Surgery Resident/Attending [Charted Location: [place] [address]] [Authored: 13-Jul-2021 01:22]-  Assessment and Plan:   ? Assessment    56 yo M admitted to MICU with sepsis 2/2 liver abscess without clear etiology and evidence of a pancreatic mass and thrombosis of an branch of the right hepatic vein.       Progress Note Adult-Anesthesia Attending [Charted Location: [place] [address]] [Authored: 13-Jul-2021 16:26]-  Sepsis secondary to liver abscess, drained and then failed IR drainage, now coming for abscess drainage with robotic assistance.

## 2021-08-18 NOTE — CHART NOTE - NSCHARTNOTESELECT_GEN_ALL_CORE
Downgrade note
IR preprocedure note
Transfer note/Event Note
Chart Note
Nutrition Services
Post-op note
Transfer Note
Zena op note/Event Note

## 2021-08-19 ENCOUNTER — APPOINTMENT (OUTPATIENT)
Dept: SURGICAL ONCOLOGY | Facility: CLINIC | Age: 56
End: 2021-08-19
Payer: COMMERCIAL

## 2021-08-19 VITALS
SYSTOLIC BLOOD PRESSURE: 142 MMHG | OXYGEN SATURATION: 100 % | BODY MASS INDEX: 24.77 KG/M2 | HEIGHT: 70 IN | WEIGHT: 173 LBS | DIASTOLIC BLOOD PRESSURE: 71 MMHG | HEART RATE: 78 BPM

## 2021-08-19 PROCEDURE — 99024 POSTOP FOLLOW-UP VISIT: CPT

## 2021-08-24 ENCOUNTER — APPOINTMENT (OUTPATIENT)
Dept: HEMATOLOGY ONCOLOGY | Facility: CLINIC | Age: 56
End: 2021-08-24

## 2021-08-25 ENCOUNTER — APPOINTMENT (OUTPATIENT)
Dept: INTERNAL MEDICINE | Facility: CLINIC | Age: 56
End: 2021-08-25
Payer: COMMERCIAL

## 2021-08-25 VITALS
BODY MASS INDEX: 24.77 KG/M2 | TEMPERATURE: 96.5 F | WEIGHT: 173 LBS | DIASTOLIC BLOOD PRESSURE: 70 MMHG | HEIGHT: 70 IN | SYSTOLIC BLOOD PRESSURE: 120 MMHG

## 2021-08-25 DIAGNOSIS — Z79.2 LONG TERM (CURRENT) USE OF ANTIBIOTICS: ICD-10-CM

## 2021-08-25 PROCEDURE — 99215 OFFICE O/P EST HI 40 MIN: CPT

## 2021-08-25 NOTE — HISTORY OF PRESENT ILLNESS
[FreeTextEntry1] : 54 YO MALE WITH PMH OF SLEEP APNEA WITH RECENT ADMISSION AT [place] WITH LIVER ABSCESS OF UNCLEAR ETIOLOGY DOES HAVE A HX OF DIVERTICULOSIS AND ? IF HE HAD DIVERTICULITS PT HAD IR ASPIARTION AND THEN CONTINUE FEVERS AND HAD LIVER DRAINAGE IN THE OPERATING ROOM UNFORTUNATELY  ALL CX WERE NEGATIVE AND PT WAS PLACED ON EMPIRIC MERREM AND DISCHARGED PT HAD A SMALL RASH BUT IT BECAME WORSE AND ABX WERE CHANGED TO IV CIPRO AND ORAL FLAGYL\par PLAN WAS FOR 6 WEEKS IV ABX WHICH HE IS COMPLETING\par HERE FOR FOLLOWUP WITH HIS WIFE

## 2021-08-25 NOTE — ASSESSMENT
[FreeTextEntry1] : 56 YO MALE WITH PMH OF SLEEP APNEA WITH RECENT ADMISSION AT [place] WITH LIVER ABSCESS OF UNCLEAR ETIOLOGY DOES HAVE A HX OF DIVERTICULOSIS AND ? IF HE HAD DIVERTICULITIS PT HAD IR ASPIARTION AND THEN CONTINUE FEVERS AND HAD LIVER DRAINAGE IN THE OPERATING ROOM UNFORTUNATELY  ALL CX WERE NEGATIVE AND PT WAS PLACED ON EMPIRIC MERREM AND DISCHARGED PT HAD A SMALL RASH BUT IT BECAME WORSE AND ABX WERE CHANGED TO IV CIPRO AND ORAL FLAGYL\par PLAN WAS FOR 6 WEEKS IV ABX WHICH HE IS COMPLETING\par HERE FOR FOLLOWUP WITH HIS WIFE\par PT UNDERWENT CT SCAN OF ABD PELVIS WHIHCH SHOWED MARKED IMPROVEMENT IN SIZE OF LIVER ABSCESS\par PICC LINE REMOVED BY ME IN THE OFFICE TODAY\par WILL CHAGNE TO ORAL CIRPO FLAGYL FOR ADDITIONAL 2 WEEKS\par WILL D/W SURG ONCOLOGY ABOUT TIMING OF REPEAT IMAGING\par OVERALLPT IMPROVED NO NIGHT SEATS NO FEVERS  ANXIOUS OT EXERCISE\par  SPOKE  TO SURGERY AND WILL PLAN REPEAT IMAGNG IN ABOUT 2 MONTHS\par WILL D/W PT ON NEXT VISIT

## 2021-09-04 LAB
CULTURE RESULTS: SIGNIFICANT CHANGE UP
CULTURE RESULTS: SIGNIFICANT CHANGE UP
SPECIMEN SOURCE: SIGNIFICANT CHANGE UP
SPECIMEN SOURCE: SIGNIFICANT CHANGE UP

## 2021-09-09 ENCOUNTER — APPOINTMENT (OUTPATIENT)
Dept: INTERNAL MEDICINE | Facility: CLINIC | Age: 56
End: 2021-09-09
Payer: COMMERCIAL

## 2021-09-09 VITALS
BODY MASS INDEX: 25.05 KG/M2 | HEIGHT: 70 IN | TEMPERATURE: 96 F | DIASTOLIC BLOOD PRESSURE: 80 MMHG | WEIGHT: 175 LBS | SYSTOLIC BLOOD PRESSURE: 130 MMHG

## 2021-09-09 PROCEDURE — 99215 OFFICE O/P EST HI 40 MIN: CPT

## 2021-09-09 NOTE — HISTORY OF PRESENT ILLNESS
[FreeTextEntry1] : 56 YO MALE WITH PMH OF SLEEP APNEA WITH RECENT ADMISSION AT [place] WITH LIVER ABSCESS OF UNCLEAR ETIOLOGY DOES HAVE A HX OF DIVERTICULOSIS AND ? IF HE HAD DIVERTICULITS PT HAD IR ASPIARTION AND THEN CONTINUE FEVERS AND HAD LIVER DRAINAGE IN THE OPERATING ROOM UNFORTUNATELY  ALL CX WERE NEGATIVE AND PT WAS PLACED ON EMPIRIC MERREM AND DISCHARGED PT HAD A SMALL RASH BUT IT BECAME WORSE AND ABX WERE CHANGED TO IV CIPRO AND ORAL FLAGYL\par PLAN WAS FOR 6 WEEKS IV ABX WHICH HE IS COMPLETED AND HAS BEEN ON ORAL ABX ABOUT 2 WEEKS \par HERE FOR FOLLOWUP WITH HIS WIFE OVERALL FEESL OK HAS OSM ETHIN STOOL AND SOMELEFT SIDE TWITCHES NO FEVER NO CHILL S

## 2021-09-09 NOTE — ASSESSMENT
[FreeTextEntry1] : IMP:\par Status post robotic ultrasound guided incision and drainage of a multiloculated liver abscess on 7/14/21.  He had a normal intraoperative VÍCTOR. Cultures were sent which have shown no growth to date. He remained on meropenem and is expected to be on a six week course of antibiotics. 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  Final pathology was consistent with liver abscess with on evidence of malignancy. \par \par CT A/P 8/18/2021- Interval significant decrease in size of previous hepatic abscess, now more amorphous.\par \par PLAN:\par 1) Antibiotics per ID\par 2) Increase PO intake\par 3) RTO in 2-3 months\par 4) Based on imaging there does not look to be continued right hepatic vein thrombosis, would follow-up with hematologist regarding length of anticoagulation

## 2021-09-09 NOTE — PHYSICAL EXAM
[General Appearance - Alert] : alert [General Appearance - In No Acute Distress] : in no acute distress [Sclera] : the sclera and conjunctiva were normal [PERRL With Normal Accommodation] : pupils were equal in size, round, reactive to light [Extraocular Movements] : extraocular movements were intact [Outer Ear] : the ears and nose were normal in appearance [Oropharynx] : the oropharynx was normal with no thrush [Neck Appearance] : the appearance of the neck was normal [Neck Cervical Mass (___cm)] : no neck mass was observed [Jugular Venous Distention Increased] : there was no jugular-venous distention [Thyroid Diffuse Enlargement] : the thyroid was not enlarged [Auscultation Breath Sounds / Voice Sounds] : lungs were clear to auscultation bilaterally [Heart Rate And Rhythm] : heart rate was normal and rhythm regular [Heart Sounds] : normal S1 and S2 [Murmurs] : no murmurs [Heart Sounds Gallop] : no gallops [Heart Sounds Pericardial Friction Rub] : no pericardial rub [Full Pulse] : the pedal pulses are present [Edema] : there was no peripheral edema [Bowel Sounds] : normal bowel sounds [Abdomen Soft] : soft [Abdomen Tenderness] : non-tender [] : no hepato-splenomegaly [Abdomen Mass (___ Cm)] : no abdominal mass palpated [Costovertebral Angle Tenderness] : no CVA tenderness [No Palpable Adenopathy] : no palpable adenopathy [Musculoskeletal - Swelling] : no joint swelling [Nail Clubbing] : no clubbing  or cyanosis of the fingernails [Motor Tone] : muscle strength and tone were normal [FreeTextEntry1] : SOME AREAS OF DESQUAMTED SKIN

## 2021-09-09 NOTE — ASSESSMENT
[FreeTextEntry1] : 54 YO MALE WITH PMH OF SLEEP APNEA WITH RECENT ADMISSION AT [place] WITH LIVER ABSCESS OF UNCLEAR ETIOLOGY DOES HAVE A HX OF DIVERTICULOSIS AND ? IF HE HAD DIVERTICULITS PT HAD IR ASPIARTION AND THEN CONTINUE FEVERS AND HAD LIVER DRAINAGE IN THE OPERATING ROOM \par VÍCTOR NEG FOR ENDOCARDITIS\par UNFORTUNATELY  ALL CX WERE NEGATIVE AND PT WAS PLACED ON EMPIRIC MERREM AND DISCHARGED PT HAD A SMALL RASH BUT IT BECAME WORSE AND ABX WERE CHANGED TO IV CIPRO AND ORAL FLAGYL\par COMPLETED    R 6 WEEKS IV ABX  THROUGH 8/26  AND THEN HAS COMPLETD 2 MORE WEEK S\par PT ALSO HAD HEPATIC VEIN THROMBOSIS AND PLACED ON ELIQUIS\par HERE FOR FOLLOWUP WITH HIS WIFE\par PT OVERALL FEELS OK NO CP OR SOB  RASH HAS RESOLVED  D NO ABD PAIN\par AND WBC AND LIVER ENZYMES ARE IMPROVED\par   D/W SURG ONC DR LEANDER KAUFMAN LAST VISIT AND PLAN ON REPEAT IMAGING IN 2 MONTHS \par WILL FOLLOW UP OVERALL STABLE\par OBSERVE OFF ABX\par AND REPEAT IMAGING WILL FOLLOW UP HERE IN AMONTH\par OVERALL CONTINUES TO IMPROVE AND  HAS GAINED WEIGHT PLAN ON FOLLOW UP HERE AFTER HIS COLONOSCOPY

## 2021-09-09 NOTE — HISTORY OF PRESENT ILLNESS
[de-identified] : 55 year-old male presents for a postop visit.\par \par He presented to urgent care with chills, night sweats and cough on 7/9/21.  He had recently been diagnosed with Covid 19 the week prior.  He was referred to the ED at [facility].  Labs were notable for leukocytosis, transaminitis and elevated procalcitonin.  CT and MRI of the liver demonstrated a liver lesion with a collection consistent with abscess as well as thrombus.  IR performed a percutaneous abscess drainage with drain placement on 7/13/21.\par \par On 7/14/21, he underwent  a robotic ultrasound guided incision and drainage of a multiloculated liver abscess. He had a normal intraoperative VÍCTOR. Cultures were sent which have shown no growth to date. He remained on meropenem and is expected to be on a six week course of antibiotics. 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  Final pathology was consistent with liver abscess with on evidence of malignancy. \par \par INTERVAL HISTORY:\par 8/19/2021- Pt. underwent left PICC placement for home IV antibiotics on 8/11/2021.  Will require a 6 week course of Abx as per Dr. Sloan (ID). CT A/P 8/18/2021 showed ....................\par

## 2021-10-13 ENCOUNTER — APPOINTMENT (OUTPATIENT)
Dept: INTERNAL MEDICINE | Facility: CLINIC | Age: 56
End: 2021-10-13
Payer: COMMERCIAL

## 2021-10-13 PROCEDURE — 99442: CPT

## 2021-10-13 NOTE — HISTORY OF PRESENT ILLNESS
[FreeTextEntry1] :  54 YO MALE WITH PMH OF SLEEP APNEA WITH RECENT ADMISSION AT [facility] WITH LIVER ABSCESS OF UNCLEAR ETIOLOGY DOES HAVE A HX OF DIVERTICULOSIS AND ? IF HE HAD DIVERTICULITS PT HAD IR ASPIARTION AND THEN CONTINUE FEVERS AND HAD LIVER DRAINAGE IN THE OPERATING ROOM \par VÍCTOR NEG FOR ENDOCARDITIS\par UNFORTUNATELY ALL CX WERE NEGATIVE AND PT WAS PLACED ON EMPIRIC MERREM AND DISCHARGED PT HAD A SMALL RASH BUT IT BECAME WORSE AND ABX WERE CHANGED TO IV CIPRO AND ORAL FLAGYL\par COMPLETED R 6 WEEKS IV ABX THROUGH 8/26 AND THEN HAS COMPLETD 2 MORE WEEK S\par PT ALSO HAD HEPATIC VEIN THROMBOSIS AND PLACED ON ELIQUIS\par OVERALL FEELS OK  \par  OFF ABX\par AND REPEAT IMAGING AS PER DR TABOR\par OVERALL CONTINUES TO IMPROVE AND HAS GAINED WEIGHT   HAD COLONOSCOPY WITH REPORTED NORMAL RESULTS  PT STATES HE WILL SEND OFFICLA RESULTS\par \par WILL D/W SURGERY\par OVERALL IMPORVED\par

## 2021-12-02 ENCOUNTER — APPOINTMENT (OUTPATIENT)
Dept: SURGICAL ONCOLOGY | Facility: CLINIC | Age: 56
End: 2021-12-02
Payer: COMMERCIAL

## 2021-12-02 VITALS — DIASTOLIC BLOOD PRESSURE: 81 MMHG | SYSTOLIC BLOOD PRESSURE: 123 MMHG | OXYGEN SATURATION: 98 % | HEART RATE: 70 BPM

## 2021-12-02 PROCEDURE — 99213 OFFICE O/P EST LOW 20 MIN: CPT

## 2021-12-02 NOTE — ASSESSMENT
[FreeTextEntry1] : IMP:\par Status post robotic ultrasound guided incision and drainage of a multiloculated liver abscess on 7/14/21.  He had a normal intraoperative VÍCTOR. Cultures were sent which have shown no growth to date. He remained on meropenem and is expected to be on a six week course of antibiotics. 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  Final pathology was consistent with liver abscess with on evidence of malignancy. \par \par Pt. underwent left PICC placement for home IV antibiotics on 8/11/2021. He completed a 6 week course of antibiotics as per Dr. Sloan (ID). CT A/P 8/18/2021 showed interval significant decrease in size of previous hepatic abscess, now more amorphous.   No recent imaging. Pt. continues to improve. \par \par PLAN:\par 1) RTO in 3 months\par 2) Will plan for reimaging at that point if symptoms otherwise at 1 year (8/2022)

## 2021-12-02 NOTE — PHYSICAL EXAM
[Normal] : supple, no neck mass and thyroid not enlarged [Normal Neck Lymph Nodes] : normal neck lymph nodes  [Normal Supraclavicular Lymph Nodes] : normal supraclavicular lymph nodes [Normal Groin Lymph Nodes] : normal groin lymph nodes [Normal Axillary Lymph Nodes] : normal axillary lymph nodes [Normal] : oriented to person, place and time, with appropriate affect [de-identified] : soft NT ND; scars well healed

## 2021-12-02 NOTE — CONSULT LETTER
[Dear  ___] : Dear  [unfilled], [Courtesy Letter:] : I had the pleasure of seeing your patient, [unfilled], in my office today. [Please see my note below.] : Please see my note below. [Consult Closing:] : Thank you very much for allowing me to participate in the care of this patient.  If you have any questions, please do not hesitate to contact me. [Sincerely,] : Sincerely, [FreeTextEntry3] : Kris Mccabe MD, MPH, FACS, FSSO\par , [facility]\par [facility]\par Associate Professor of Surgery\par [employer] [facility]

## 2021-12-02 NOTE — REASON FOR VISIT
[Follow-Up Visit] : a follow-up visit for [FreeTextEntry2] : status post robotic ultrasound-guided incision and drainage of multiloculated liver abscess on 7/14/21

## 2021-12-02 NOTE — HISTORY OF PRESENT ILLNESS
[de-identified] : 56 year-old male presents for a follow up visit. \par \par He presented to urgent care with chills, night sweats and cough on 7/9/21.  He had recently been diagnosed with Covid 19 the week prior.  He was referred to the ED at [facility].  Labs were notable for leukocytosis, transaminitis and elevated procalcitonin.  CT and MRI of the liver demonstrated a liver lesion with a collection consistent with abscess as well as thrombus.  IR performed a percutaneous abscess drainage with drain placement on 7/13/21.\par \par On 7/14/21, he underwent  a robotic ultrasound guided incision and drainage of a multiloculated liver abscess. He had a normal intraoperative VÍCTOR. Cultures were sent which have shown no growth to date. He remained on meropenem and is expected to be on a six week course of antibiotics. 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  Final pathology was consistent with liver abscess with on evidence of malignancy. \par \par INTERVAL HISTORY:\par 8/19/2021- Pt. underwent left PICC placement for home IV antibiotics on 8/11/2021.  Will require a 6 week course of Abx as per Dr. Sloan (ID). CT A/P 8/18/2021 showed interval significant decrease in size of previous hepatic abscess, now more amorphous. \par \par 12/2/2021- Completed a 6 week course of antibiotics. Remains under the care of Dr. Sloan (ID). Continues to improve.  Denies abdominal pain, nausea, vomiting, changes in appetite or bowel habits. No imaging since 8/2021. \par \par Got COVID while on vacation in [place] - fully recovered.  Off of Eliquis for hepatic vein thrombosis.

## 2022-02-04 ENCOUNTER — NON-APPOINTMENT (OUTPATIENT)
Age: 57
End: 2022-02-04

## 2022-03-10 NOTE — ASSESSMENT
[FreeTextEntry1] : IMP:\par Status post robotic ultrasound guided incision and drainage of a multiloculated liver abscess on 7/14/21.  He had a normal intraoperative VÍCTOR. Cultures were sent which have shown no growth to date. He remained on meropenem and is expected to be on a six week course of antibiotics. 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  Final pathology was consistent with liver abscess with on evidence of malignancy. \par \par Pt. underwent left PICC placement for home IV antibiotics on 8/11/2021. He completed a 6 week course of antibiotics as per Dr. Sloan (ID). CT A/P 8/18/2021 showed interval significant decrease in size of previous hepatic abscess, now more amorphous.   No recent imaging. Pt. continues to improve. \par \par PLAN:\par 1) RTO in \par 2) CT A/P ....

## 2022-03-10 NOTE — HISTORY OF PRESENT ILLNESS
[de-identified] : 56 year-old male presents for a follow up visit. \par \par He presented to urgent care with chills, night sweats and cough on 7/9/21.  He had recently been diagnosed with Covid 19 the week prior.  He was referred to the ED at [facility].  Labs were notable for leukocytosis, transaminitis and elevated procalcitonin.  CT and MRI of the liver demonstrated a liver lesion with a collection consistent with abscess as well as thrombus.  IR performed a percutaneous abscess drainage with drain placement on 7/13/21.\par \par On 7/14/21, he underwent  a robotic ultrasound guided incision and drainage of a multiloculated liver abscess. He had a normal intraoperative VÍCTOR. Cultures were sent which have shown no growth to date. He remained on meropenem and is expected to be on a six week course of antibiotics. 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  Final pathology was consistent with liver abscess with on evidence of malignancy. \par \par INTERVAL HISTORY:\par 8/19/2021- Pt. underwent left PICC placement for home IV antibiotics on 8/11/2021.  Will require a 6 week course of Abx as per Dr. Sloan (ID). CT A/P 8/18/2021 showed interval significant decrease in size of previous hepatic abscess, now more amorphous. \par \par 12/2/2021- Completed a 6 week course of antibiotics. Remains under the care of Dr. Sloan (ID). Continues to improve.  Denies abdominal pain, nausea, vomiting, changes in appetite or bowel habits. No imaging since 8/2021. \par \par Got COVID while on vacation in [place] - fully recovered.  Off of Eliquis for hepatic vein thrombosis.\par \par 3/17/2022-

## 2022-03-17 ENCOUNTER — APPOINTMENT (OUTPATIENT)
Dept: SURGICAL ONCOLOGY | Facility: CLINIC | Age: 57
End: 2022-03-17

## 2022-05-04 ENCOUNTER — APPOINTMENT (OUTPATIENT)
Dept: COLORECTAL SURGERY | Facility: CLINIC | Age: 57
End: 2022-05-04
Payer: COMMERCIAL

## 2022-05-04 VITALS
TEMPERATURE: 96.8 F | WEIGHT: 175 LBS | RESPIRATION RATE: 16 BRPM | SYSTOLIC BLOOD PRESSURE: 119 MMHG | HEART RATE: 67 BPM | HEIGHT: 70 IN | BODY MASS INDEX: 25.05 KG/M2 | DIASTOLIC BLOOD PRESSURE: 80 MMHG

## 2022-05-04 DIAGNOSIS — K75.0 ABSCESS OF LIVER: ICD-10-CM

## 2022-05-04 DIAGNOSIS — I82.0 BUDD-CHIARI SYNDROME: ICD-10-CM

## 2022-05-04 PROCEDURE — 99245 OFF/OP CONSLTJ NEW/EST HI 55: CPT

## 2022-05-04 NOTE — REVIEW OF SYSTEMS
[Feeling Poorly] : feeling poorly [As Noted in HPI] : as noted in HPI [Constipation] : constipation [Negative] : Heme/Lymph [FreeTextEntry7] : Chronic lower abdominal left-sided pain

## 2022-05-04 NOTE — HISTORY OF PRESENT ILLNESS
[FreeTextEntry1] : Consultation requested by Dr. Beal for complicated Recurrent and persistent sigmoid diverticulitis. 56-year-old male who last year was found to have a large liver abscess that required surgical drainage. He's had multiple attacks of sigmoid diverticulitis and that is the most likely culprit for his liver abscess. Has had approximately 3-4 sigmoid diverticulitis attacks most recently last month.Symptoms have been worsening

## 2022-05-04 NOTE — ASSESSMENT
[FreeTextEntry1] : 56-year-old male with complicated sigmoid diverticulitis with previous liver abscess.He has a smoldering case of diverticulitis with chronic lower abdominal pain.Given the complicated nature of his liver abscess and persistent symptoms I recommend a laparoscopic possible open sigmoid colon resection.Risk and benefits of the surgery have been discussed which include bleeding, infection, sepsis, multiorgan failure, inadvertent injury including hollow viscus, solid organ, ureter neurovascular and neurological structures, DVT PE, heart attack, stroke, hernias, recurrence, Leakage of anastomosis requiring reoperation possible stoma and death.

## 2022-05-04 NOTE — PHYSICAL EXAM
[Abdomen Masses] : No abdominal masses [Abdomen Tenderness] : ~T ~M Abdominal tenderness [Tender] : nontender [JVD] : no jugular venous distention  [Normal Breath Sounds] : Normal breath sounds [Normal Heart Sounds] : normal heart sounds [Normal Rate and Rhythm] : normal rate and rhythm [No Rash or Lesion] : No rash or lesion [Alert] : alert [Oriented to Person] : oriented to person [Oriented to Place] : oriented to place [Oriented to Time] : oriented to time [Calm] : calm [de-identified] : Looks well in no distress, of stated age. [de-identified] : pupils equal reactive to light normocephalic atraumatic. [de-identified] : moves all 4 extremities appropriately with 5 over 5 strength

## 2022-05-04 NOTE — CONSULT LETTER
[Dear  ___] : Dear  [unfilled], [Consult Letter:] : I had the pleasure of evaluating your patient, [unfilled]. [Please see my note below.] : Please see my note below. [Consult Closing:] : Thank you very much for allowing me to participate in the care of this patient.  If you have any questions, please do not hesitate to contact me. [Sincerely,] : Sincerely, [FreeTextEntry3] : Raj Flores M.D. FACS, FASCRS

## 2022-06-14 ENCOUNTER — OUTPATIENT (OUTPATIENT)
Dept: OUTPATIENT SERVICES | Facility: HOSPITAL | Age: 57
LOS: 1 days | End: 2022-06-14
Payer: COMMERCIAL

## 2022-06-14 ENCOUNTER — RESULT REVIEW (OUTPATIENT)
Age: 57
End: 2022-06-14

## 2022-06-14 VITALS
SYSTOLIC BLOOD PRESSURE: 123 MMHG | RESPIRATION RATE: 16 BRPM | DIASTOLIC BLOOD PRESSURE: 80 MMHG | OXYGEN SATURATION: 100 % | HEART RATE: 67 BPM | HEIGHT: 70 IN | TEMPERATURE: 97 F | WEIGHT: 182.98 LBS

## 2022-06-14 DIAGNOSIS — K57.92 DIVERTICULITIS OF INTESTINE, PART UNSPECIFIED, WITHOUT PERFORATION OR ABSCESS WITHOUT BLEEDING: Chronic | ICD-10-CM

## 2022-06-14 DIAGNOSIS — Z01.818 ENCOUNTER FOR OTHER PREPROCEDURAL EXAMINATION: ICD-10-CM

## 2022-06-14 DIAGNOSIS — K57.80 DIVERTICULITIS OF INTESTINE, PART UNSPECIFIED, WITH PERFORATION AND ABSCESS WITHOUT BLEEDING: ICD-10-CM

## 2022-06-14 DIAGNOSIS — Z98.890 OTHER SPECIFIED POSTPROCEDURAL STATES: Chronic | ICD-10-CM

## 2022-06-14 DIAGNOSIS — Z90.89 ACQUIRED ABSENCE OF OTHER ORGANS: Chronic | ICD-10-CM

## 2022-06-14 DIAGNOSIS — Z29.9 ENCOUNTER FOR PROPHYLACTIC MEASURES, UNSPECIFIED: ICD-10-CM

## 2022-06-14 LAB
A1C WITH ESTIMATED AVERAGE GLUCOSE RESULT: 5.2 % — SIGNIFICANT CHANGE UP (ref 4–5.6)
ALBUMIN SERPL ELPH-MCNC: 3.9 G/DL — SIGNIFICANT CHANGE UP (ref 3.3–5)
ALBUMIN SERPL ELPH-MCNC: 3.9 G/DL — SIGNIFICANT CHANGE UP (ref 3.3–5)
ALP SERPL-CCNC: 61 U/L — SIGNIFICANT CHANGE UP (ref 40–120)
ALT FLD-CCNC: 41 U/L — SIGNIFICANT CHANGE UP (ref 12–78)
ANION GAP SERPL CALC-SCNC: 5 MMOL/L — SIGNIFICANT CHANGE UP (ref 5–17)
APTT BLD: 29.8 SEC — SIGNIFICANT CHANGE UP (ref 27.5–35.5)
AST SERPL-CCNC: 18 U/L — SIGNIFICANT CHANGE UP (ref 15–37)
BASOPHILS # BLD AUTO: 0.07 K/UL — SIGNIFICANT CHANGE UP (ref 0–0.2)
BASOPHILS NFR BLD AUTO: 0.9 % — SIGNIFICANT CHANGE UP (ref 0–2)
BILIRUB DIRECT SERPL-MCNC: <0.1 MG/DL — SIGNIFICANT CHANGE UP (ref 0–0.3)
BILIRUB INDIRECT FLD-MCNC: >0.7 MG/DL — SIGNIFICANT CHANGE UP (ref 0.2–1)
BILIRUB SERPL-MCNC: 0.8 MG/DL — SIGNIFICANT CHANGE UP (ref 0.2–1.2)
BUN SERPL-MCNC: 22 MG/DL — SIGNIFICANT CHANGE UP (ref 7–23)
CALCIUM SERPL-MCNC: 9.3 MG/DL — SIGNIFICANT CHANGE UP (ref 8.5–10.1)
CHLORIDE SERPL-SCNC: 106 MMOL/L — SIGNIFICANT CHANGE UP (ref 96–108)
CO2 SERPL-SCNC: 29 MMOL/L — SIGNIFICANT CHANGE UP (ref 22–31)
CREAT SERPL-MCNC: 0.96 MG/DL — SIGNIFICANT CHANGE UP (ref 0.5–1.3)
EGFR: 93 ML/MIN/1.73M2 — SIGNIFICANT CHANGE UP
EOSINOPHIL # BLD AUTO: 0.15 K/UL — SIGNIFICANT CHANGE UP (ref 0–0.5)
EOSINOPHIL NFR BLD AUTO: 1.9 % — SIGNIFICANT CHANGE UP (ref 0–6)
ESTIMATED AVERAGE GLUCOSE: 103 MG/DL — SIGNIFICANT CHANGE UP (ref 68–114)
GLUCOSE SERPL-MCNC: 93 MG/DL — SIGNIFICANT CHANGE UP (ref 70–99)
HCT VFR BLD CALC: 43.4 % — SIGNIFICANT CHANGE UP (ref 39–50)
HGB BLD-MCNC: 14.7 G/DL — SIGNIFICANT CHANGE UP (ref 13–17)
IMM GRANULOCYTES NFR BLD AUTO: 0.1 % — SIGNIFICANT CHANGE UP (ref 0–1.5)
INR BLD: 1.09 RATIO — SIGNIFICANT CHANGE UP (ref 0.88–1.16)
LYMPHOCYTES # BLD AUTO: 1.72 K/UL — SIGNIFICANT CHANGE UP (ref 1–3.3)
LYMPHOCYTES # BLD AUTO: 22 % — SIGNIFICANT CHANGE UP (ref 13–44)
MCHC RBC-ENTMCNC: 29.4 PG — SIGNIFICANT CHANGE UP (ref 27–34)
MCHC RBC-ENTMCNC: 33.9 GM/DL — SIGNIFICANT CHANGE UP (ref 32–36)
MCV RBC AUTO: 86.8 FL — SIGNIFICANT CHANGE UP (ref 80–100)
MONOCYTES # BLD AUTO: 0.47 K/UL — SIGNIFICANT CHANGE UP (ref 0–0.9)
MONOCYTES NFR BLD AUTO: 6 % — SIGNIFICANT CHANGE UP (ref 2–14)
NEUTROPHILS # BLD AUTO: 5.4 K/UL — SIGNIFICANT CHANGE UP (ref 1.8–7.4)
NEUTROPHILS NFR BLD AUTO: 69.1 % — SIGNIFICANT CHANGE UP (ref 43–77)
PLATELET # BLD AUTO: 219 K/UL — SIGNIFICANT CHANGE UP (ref 150–400)
POTASSIUM SERPL-MCNC: 4.5 MMOL/L — SIGNIFICANT CHANGE UP (ref 3.5–5.3)
POTASSIUM SERPL-SCNC: 4.5 MMOL/L — SIGNIFICANT CHANGE UP (ref 3.5–5.3)
PROT SERPL-MCNC: 7 GM/DL — SIGNIFICANT CHANGE UP (ref 6–8.3)
PROTHROM AB SERPL-ACNC: 12.6 SEC — SIGNIFICANT CHANGE UP (ref 10.5–13.4)
RBC # BLD: 5 M/UL — SIGNIFICANT CHANGE UP (ref 4.2–5.8)
RBC # FLD: 12.6 % — SIGNIFICANT CHANGE UP (ref 10.3–14.5)
SODIUM SERPL-SCNC: 140 MMOL/L — SIGNIFICANT CHANGE UP (ref 135–145)
WBC # BLD: 7.82 K/UL — SIGNIFICANT CHANGE UP (ref 3.8–10.5)
WBC # FLD AUTO: 7.82 K/UL — SIGNIFICANT CHANGE UP (ref 3.8–10.5)

## 2022-06-14 PROCEDURE — 93010 ELECTROCARDIOGRAM REPORT: CPT

## 2022-06-14 PROCEDURE — G0463: CPT | Mod: 25

## 2022-06-14 PROCEDURE — 80048 BASIC METABOLIC PNL TOTAL CA: CPT

## 2022-06-14 PROCEDURE — 93005 ELECTROCARDIOGRAM TRACING: CPT

## 2022-06-14 PROCEDURE — 36415 COLL VENOUS BLD VENIPUNCTURE: CPT

## 2022-06-14 PROCEDURE — 86850 RBC ANTIBODY SCREEN: CPT

## 2022-06-14 PROCEDURE — 82040 ASSAY OF SERUM ALBUMIN: CPT

## 2022-06-14 PROCEDURE — 71046 X-RAY EXAM CHEST 2 VIEWS: CPT

## 2022-06-14 PROCEDURE — 86901 BLOOD TYPING SEROLOGIC RH(D): CPT

## 2022-06-14 PROCEDURE — 86900 BLOOD TYPING SEROLOGIC ABO: CPT

## 2022-06-14 PROCEDURE — 71046 X-RAY EXAM CHEST 2 VIEWS: CPT | Mod: 26

## 2022-06-14 PROCEDURE — 85025 COMPLETE CBC W/AUTO DIFF WBC: CPT

## 2022-06-14 PROCEDURE — 83036 HEMOGLOBIN GLYCOSYLATED A1C: CPT

## 2022-06-14 PROCEDURE — 85610 PROTHROMBIN TIME: CPT

## 2022-06-14 PROCEDURE — 85730 THROMBOPLASTIN TIME PARTIAL: CPT

## 2022-06-14 PROCEDURE — 80076 HEPATIC FUNCTION PANEL: CPT

## 2022-06-14 RX ORDER — CHOLECALCIFEROL (VITAMIN D3) 125 MCG
1 CAPSULE ORAL
Qty: 0 | Refills: 0 | DISCHARGE

## 2022-06-14 NOTE — H&P PST ADULT - NSICDXPASTMEDICALHX_GEN_ALL_CORE_FT
PAST MEDICAL HISTORY:  Diverticulitis     History of other infection Liver infection--7/2021    Sleep apnea, obstructive

## 2022-06-14 NOTE — H&P PST ADULT - HISTORY OF PRESENT ILLNESS
56 y.o  56 y.o WD, WN male presents to PST with hx of Diverticulitis. Patient was dx with Diverticulitis last July 2021 after suffering a liver infection . He states he required a surgical intervention for the liver infection and also had a colonoscopy which revealed Diverticulitis. He reports multiple exacerbations of his Diverticulitis over the past year. He has followed with colorectal surgeon and now scheduled for Laparoscopic possible Open Sigmoid Colon Resection, Mobilization of Splenic Flexure

## 2022-06-14 NOTE — H&P PST ADULT - NEGATIVE ENMT SYMPTOMS
no hearing difficulty/no tinnitus/no sinus symptoms/no nasal congestion/no post-nasal discharge/no throat pain/no dysphagia

## 2022-06-14 NOTE — H&P PST ADULT - NSICDXPASTSURGICALHX_GEN_ALL_CORE_FT
PAST SURGICAL HISTORY:  History of surgery of liver Debridement--7/2021    S/P tonsillectomy as a child

## 2022-06-14 NOTE — H&P PST ADULT - NS SC CAGE ALCOHOL GUILTY ABOUT
Call to [facility] to have them fax the urine culture results again.   
Can we phil down a copy of that recent culture report, it's not scanned in yet....  
Left message on patient's voicemail that a prescription for cefpodoxime has been sent to pharmacy and to return call with any questions or concerns.   
Patient called stating about a month ago she was diagnosed with UTI and was put on medication. She thought she was doing fine. Then it started back up few days ago. She is complaining of pain during urination and on and off frequent urination. She states she mediation she was taking was strong and hard on her stomach. She wants something called in to [facility] on [address] and [address] in [facility]. Please call patient to discuss.   
no

## 2022-06-14 NOTE — H&P PST ADULT - ASSESSMENT
56 y.o male scheduled for Laparoscopic possible Open Sigmoid  56 y.o male scheduled for Laparoscopic possible Open Sigmoid Colon Resection, Mobilization of Splenic Flexure   Plan  1. Stop all NSAIDS, herbal supplements and vitamins for 7 days.  2. NPO at midnight.  3. Take the following medications--none-- with small sips of water on the morning of your procedure/surgery.  4. Use EZ sponges as directed  5. Labs, EKG, CXR  as per surgeon  6. PMD SOREN Fournier visit for optimization prior to surgery as per surgeon  7. COVID swab appt: 2022    CAPRINI SCORE    AGE RELATED RISK FACTORS                                                       MOBILITY RELATED FACTORS  [x ] Age 41-60 years                                            (1 Point)                  [ ] Bed rest                                                        (1 Point)  [ ] Age: 61-74 years                                           (2 Points)                [ ] Plaster cast                                                   (2 Points)  [ ] Age= 75 years                                              (3 Points)                 [ ] Bed bound for more than 72 hours                   (2 Points)    DISEASE RELATED RISK FACTORS                                               GENDER SPECIFIC FACTORS  [ ] Edema in the lower extremities                       (1 Point)                  [ ] Pregnancy                                                     (1 Point)  [ ] Varicose veins                                               (1 Point)                  [ ] Post-partum < 6 weeks                                   (1 Point)             [x ] BMI > 25 Kg/m2                                            (1 Point)                  [ ] Hormonal therapy  or oral contraception            (1 Point)                 [ ] Sepsis (in the previous month)                        (1 Point)                  [ ] History of pregnancy complications  [ ] Pneumonia or serious lung disease                                               [ ] Unexplained or recurrent                       (1 Point)           (in the previous month)                               (1 Point)  [ ] Abnormal pulmonary function test                     (1 Point)                 SURGERY RELATED RISK FACTORS  [ ] Acute myocardial infarction                              (1 Point)                 [ ]  Section                                            (1 Point)  [ ] Congestive heart failure (in the previous month)  (1 Point)                 [ ] Minor surgery                                                 (1 Point)   [ ] Inflammatory bowel disease                             (1 Point)                 [ ] Arthroscopic surgery                                        (2 Points)  [ ] Central venous access                                    (2 Points)                [x ] General surgery lasting more than 45 minutes   (2 Points)       [ ] Stroke (in the previous month)                          (5 Points)               [ ] Elective arthroplasty                                        (5 Points)                                                                                                                                               HEMATOLOGY RELATED FACTORS                                                 TRAUMA RELATED RISK FACTORS  [ ] Prior episodes of VTE                                     (3 Points)                 [ ] Fracture of the hip, pelvis, or leg                       (5 Points)  [ ] Positive family history for VTE                         (3 Points)                 [ ] Acute spinal cord injury (in the previous month)  (5 Points)  [ ] Prothrombin 66738 A                                      (3 Points)                 [ ] Paralysis  (less than 1 month)                          (5 Points)  [ ] Factor V Leiden                                             (3 Points)                 [ ] Multiple Trauma within 1 month                         (5 Points)  [ ] Lupus anticoagulants                                     (3 Points)                                                           [ ] Anticardiolipin antibodies                                (3 Points)                                                       [ ] High homocysteine in the blood                      (3 Points)                                             [ ] Other congenital or acquired thrombophilia       (3 Points)                                                [ ] Heparin induced thrombocytopenia                  (3 Points)                                          Total Score [      4    ]    The Caprini score indicates this patient is at risk for a VTE event (score 3-5).  Most surgical patients in this group would benefit from pharmacologic prophylaxis.  The surgical team will determine the balance between VTE risk and bleeding risk

## 2022-06-14 NOTE — H&P PST ADULT - NEUROLOGICAL
Talked to pt. Informed about need to schedule stress echo and referral has been placed, so he can schedule    negative A&O x3/sensation intact

## 2022-06-15 DIAGNOSIS — K57.80 DIVERTICULITIS OF INTESTINE, PART UNSPECIFIED, WITH PERFORATION AND ABSCESS WITHOUT BLEEDING: ICD-10-CM

## 2022-06-15 DIAGNOSIS — Z01.818 ENCOUNTER FOR OTHER PREPROCEDURAL EXAMINATION: ICD-10-CM

## 2022-06-16 ENCOUNTER — APPOINTMENT (OUTPATIENT)
Dept: SURGICAL ONCOLOGY | Facility: CLINIC | Age: 57
End: 2022-06-16

## 2022-06-16 RX ORDER — SODIUM CHLORIDE 9 MG/ML
3 INJECTION INTRAMUSCULAR; INTRAVENOUS; SUBCUTANEOUS EVERY 8 HOURS
Refills: 0 | Status: DISCONTINUED | OUTPATIENT
Start: 2022-06-23 | End: 2022-06-24

## 2022-06-17 PROBLEM — Z86.19 PERSONAL HISTORY OF OTHER INFECTIOUS AND PARASITIC DISEASES: Chronic | Status: ACTIVE | Noted: 2022-06-14

## 2022-06-17 PROBLEM — K57.92 DIVERTICULITIS OF INTESTINE, PART UNSPECIFIED, WITHOUT PERFORATION OR ABSCESS WITHOUT BLEEDING: Chronic | Status: ACTIVE | Noted: 2022-06-14

## 2022-06-22 RX ORDER — PROCHLORPERAZINE MALEATE 5 MG
10 TABLET ORAL ONCE
Refills: 0 | Status: DISCONTINUED | OUTPATIENT
Start: 2022-06-23 | End: 2022-06-24

## 2022-06-22 RX ORDER — SODIUM CHLORIDE 9 MG/ML
3 INJECTION INTRAMUSCULAR; INTRAVENOUS; SUBCUTANEOUS EVERY 8 HOURS
Refills: 0 | Status: DISCONTINUED | OUTPATIENT
Start: 2022-06-23 | End: 2022-06-24

## 2022-06-23 ENCOUNTER — RESULT REVIEW (OUTPATIENT)
Age: 57
End: 2022-06-23

## 2022-06-23 ENCOUNTER — TRANSCRIPTION ENCOUNTER (OUTPATIENT)
Age: 57
End: 2022-06-23

## 2022-06-23 ENCOUNTER — INPATIENT (INPATIENT)
Facility: HOSPITAL | Age: 57
LOS: 0 days | Discharge: HOME | DRG: 331 | End: 2022-06-24
Attending: COLON & RECTAL SURGERY | Admitting: COLON & RECTAL SURGERY
Payer: COMMERCIAL

## 2022-06-23 ENCOUNTER — APPOINTMENT (OUTPATIENT)
Dept: COLORECTAL SURGERY | Facility: HOSPITAL | Age: 57
End: 2022-06-23
Payer: COMMERCIAL

## 2022-06-23 VITALS
DIASTOLIC BLOOD PRESSURE: 72 MMHG | HEIGHT: 70 IN | OXYGEN SATURATION: 100 % | TEMPERATURE: 97 F | SYSTOLIC BLOOD PRESSURE: 111 MMHG | WEIGHT: 182.98 LBS | HEART RATE: 76 BPM | RESPIRATION RATE: 14 BRPM

## 2022-06-23 DIAGNOSIS — Z90.89 ACQUIRED ABSENCE OF OTHER ORGANS: Chronic | ICD-10-CM

## 2022-06-23 DIAGNOSIS — K57.80 DIVERTICULITIS OF INTESTINE, PART UNSPECIFIED, WITH PERFORATION AND ABSCESS WITHOUT BLEEDING: ICD-10-CM

## 2022-06-23 DIAGNOSIS — K57.32 DIVERTICULITIS OF LARGE INTESTINE WITHOUT PERFORATION OR ABSCESS WITHOUT BLEEDING: ICD-10-CM

## 2022-06-23 DIAGNOSIS — Z98.890 OTHER SPECIFIED POSTPROCEDURAL STATES: Chronic | ICD-10-CM

## 2022-06-23 PROCEDURE — 88304 TISSUE EXAM BY PATHOLOGIST: CPT

## 2022-06-23 PROCEDURE — 80048 BASIC METABOLIC PNL TOTAL CA: CPT

## 2022-06-23 PROCEDURE — 82962 GLUCOSE BLOOD TEST: CPT

## 2022-06-23 PROCEDURE — C1889: CPT

## 2022-06-23 PROCEDURE — 99221 1ST HOSP IP/OBS SF/LOW 40: CPT | Mod: 25

## 2022-06-23 PROCEDURE — 88304 TISSUE EXAM BY PATHOLOGIST: CPT | Mod: 26

## 2022-06-23 PROCEDURE — 44207 L COLECTOMY/COLOPROCTOSTOMY: CPT | Mod: AS

## 2022-06-23 PROCEDURE — 44207 L COLECTOMY/COLOPROCTOSTOMY: CPT

## 2022-06-23 PROCEDURE — 44213 LAP MOBIL SPLENIC FL ADD-ON: CPT

## 2022-06-23 PROCEDURE — 44213 LAP MOBIL SPLENIC FL ADD-ON: CPT | Mod: AS

## 2022-06-23 PROCEDURE — 83735 ASSAY OF MAGNESIUM: CPT

## 2022-06-23 PROCEDURE — 88307 TISSUE EXAM BY PATHOLOGIST: CPT | Mod: 26

## 2022-06-23 PROCEDURE — 85027 COMPLETE CBC AUTOMATED: CPT

## 2022-06-23 PROCEDURE — 36415 COLL VENOUS BLD VENIPUNCTURE: CPT

## 2022-06-23 PROCEDURE — 88307 TISSUE EXAM BY PATHOLOGIST: CPT

## 2022-06-23 PROCEDURE — 84100 ASSAY OF PHOSPHORUS: CPT

## 2022-06-23 RX ORDER — ACETAMINOPHEN 500 MG
1000 TABLET ORAL EVERY 6 HOURS
Refills: 0 | Status: COMPLETED | OUTPATIENT
Start: 2022-06-23 | End: 2022-06-24

## 2022-06-23 RX ORDER — ONDANSETRON 8 MG/1
4 TABLET, FILM COATED ORAL ONCE
Refills: 0 | Status: DISCONTINUED | OUTPATIENT
Start: 2022-06-23 | End: 2022-06-23

## 2022-06-23 RX ORDER — OXYCODONE HYDROCHLORIDE 5 MG/1
5 TABLET ORAL EVERY 4 HOURS
Refills: 0 | Status: DISCONTINUED | OUTPATIENT
Start: 2022-06-23 | End: 2022-06-24

## 2022-06-23 RX ORDER — MEPERIDINE HYDROCHLORIDE 50 MG/ML
12.5 INJECTION INTRAMUSCULAR; INTRAVENOUS; SUBCUTANEOUS
Refills: 0 | Status: DISCONTINUED | OUTPATIENT
Start: 2022-06-23 | End: 2022-06-23

## 2022-06-23 RX ORDER — FAMOTIDINE 10 MG/ML
20 INJECTION INTRAVENOUS ONCE
Refills: 0 | Status: COMPLETED | OUTPATIENT
Start: 2022-06-23 | End: 2022-06-23

## 2022-06-23 RX ORDER — ENOXAPARIN SODIUM 100 MG/ML
40 INJECTION SUBCUTANEOUS EVERY 24 HOURS
Refills: 0 | Status: DISCONTINUED | OUTPATIENT
Start: 2022-06-24 | End: 2022-06-24

## 2022-06-23 RX ORDER — ALVIMOPAN 12 MG/1
12 CAPSULE ORAL ONCE
Refills: 0 | Status: COMPLETED | OUTPATIENT
Start: 2022-06-23 | End: 2022-06-23

## 2022-06-23 RX ORDER — CEFOXITIN 1 G/1
2 INJECTION, POWDER, FOR SOLUTION INTRAVENOUS ONCE
Refills: 0 | Status: COMPLETED | OUTPATIENT
Start: 2022-06-23 | End: 2022-06-23

## 2022-06-23 RX ORDER — SODIUM CHLORIDE 9 MG/ML
1000 INJECTION, SOLUTION INTRAVENOUS
Refills: 0 | Status: DISCONTINUED | OUTPATIENT
Start: 2022-06-23 | End: 2022-06-23

## 2022-06-23 RX ORDER — HYDROMORPHONE HYDROCHLORIDE 2 MG/ML
1 INJECTION INTRAMUSCULAR; INTRAVENOUS; SUBCUTANEOUS
Refills: 0 | Status: DISCONTINUED | OUTPATIENT
Start: 2022-06-23 | End: 2022-06-23

## 2022-06-23 RX ORDER — OXYCODONE HYDROCHLORIDE 5 MG/1
10 TABLET ORAL EVERY 4 HOURS
Refills: 0 | Status: DISCONTINUED | OUTPATIENT
Start: 2022-06-23 | End: 2022-06-24

## 2022-06-23 RX ORDER — KETOROLAC TROMETHAMINE 30 MG/ML
15 SYRINGE (ML) INJECTION EVERY 6 HOURS
Refills: 0 | Status: DISCONTINUED | OUTPATIENT
Start: 2022-06-23 | End: 2022-06-24

## 2022-06-23 RX ORDER — FENTANYL CITRATE 50 UG/ML
50 INJECTION INTRAVENOUS
Refills: 0 | Status: DISCONTINUED | OUTPATIENT
Start: 2022-06-23 | End: 2022-06-23

## 2022-06-23 RX ORDER — SODIUM CHLORIDE 9 MG/ML
1000 INJECTION, SOLUTION INTRAVENOUS
Refills: 0 | Status: DISCONTINUED | OUTPATIENT
Start: 2022-06-23 | End: 2022-06-24

## 2022-06-23 RX ORDER — CEFOTETAN DISODIUM 1 G
2 VIAL (EA) INJECTION ONCE
Refills: 0 | Status: DISCONTINUED | OUTPATIENT
Start: 2022-06-23 | End: 2022-06-23

## 2022-06-23 RX ORDER — ALVIMOPAN 12 MG/1
12 CAPSULE ORAL EVERY 12 HOURS
Refills: 0 | Status: DISCONTINUED | OUTPATIENT
Start: 2022-06-23 | End: 2022-06-24

## 2022-06-23 RX ORDER — HEPARIN SODIUM 5000 [USP'U]/ML
5000 INJECTION INTRAVENOUS; SUBCUTANEOUS ONCE
Refills: 0 | Status: COMPLETED | OUTPATIENT
Start: 2022-06-23 | End: 2022-06-23

## 2022-06-23 RX ADMIN — Medication 400 MILLIGRAM(S): at 17:07

## 2022-06-23 RX ADMIN — SODIUM CHLORIDE 3 MILLILITER(S): 9 INJECTION INTRAMUSCULAR; INTRAVENOUS; SUBCUTANEOUS at 22:20

## 2022-06-23 RX ADMIN — Medication 1000 MILLIGRAM(S): at 17:07

## 2022-06-23 RX ADMIN — ALVIMOPAN 12 MILLIGRAM(S): 12 CAPSULE ORAL at 07:38

## 2022-06-23 RX ADMIN — CEFOXITIN 100 GRAM(S): 1 INJECTION, POWDER, FOR SOLUTION INTRAVENOUS at 21:16

## 2022-06-23 RX ADMIN — FAMOTIDINE 20 MILLIGRAM(S): 10 INJECTION INTRAVENOUS at 07:37

## 2022-06-23 RX ADMIN — SODIUM CHLORIDE 3 MILLILITER(S): 9 INJECTION INTRAMUSCULAR; INTRAVENOUS; SUBCUTANEOUS at 13:05

## 2022-06-23 RX ADMIN — Medication 15 MILLIGRAM(S): at 17:07

## 2022-06-23 RX ADMIN — HEPARIN SODIUM 5000 UNIT(S): 5000 INJECTION INTRAVENOUS; SUBCUTANEOUS at 07:38

## 2022-06-23 RX ADMIN — SODIUM CHLORIDE 125 MILLILITER(S): 9 INJECTION, SOLUTION INTRAVENOUS at 12:06

## 2022-06-23 RX ADMIN — ALVIMOPAN 12 MILLIGRAM(S): 12 CAPSULE ORAL at 21:16

## 2022-06-23 NOTE — PATIENT PROFILE ADULT - CAREGIVER RELATION TO PATIENT
Physical Therapy      Patient Name:  Scarlett Reddy   MRN:  [id number]    Patient not seen today secondary to (Pt declined 2* fatigue from dialysis overnight.). Will follow-up at next scheduled session as able.    Radha Nguyen, PT, DPT   10/15/2018  [phone]   wife

## 2022-06-23 NOTE — CONSULT NOTE ADULT - SUBJECTIVE AND OBJECTIVE BOX
PCP:   Dr. SOREN Fournier    CHIEF COMPLAINT: sigmoid Diverticulitis    HISTORY OF THE PRESENT ILLNESS:  this is a 57 yo male with pmh: hepatic vein thrombosis in 2021, was on eliquis, GERD, JESSICA, liver abscess requiring drainage and recurrent diverticulitis.  Pt has had multiple attacks of diverticulitis and the liver abscess was felt to be related. most recent diverticulitis was in April.  Pt referred to Dr Ferraro for sigmoid colectomy.  Pt is seen on 2 north, awake, alert, comfortable, in NAD.  We are consulted for medical management    PAST MEDICAL HISTORY: as above    PAST SURGICAL HISTORY: T&A    FAMILY HISTORY:   Father: MI    SOCIAL HISTORY:  never a smoker,  social alcohol, no drugs    ALLERGIES: meropenem    HOME MEDS: see med rec    REVIEW OF SYSTEMS:   All 10 systems reviewed in detailed and found to be negative with the exception of what has already been described above    MEDICATIONS  (STANDING):  acetaminophen   IVPB .. 1000 milliGRAM(s) IV Intermittent every 6 hours  alvimopan 12 milliGRAM(s) Oral every 12 hours  cefOXitin  IVPB 2 Gram(s) IV Intermittent once  ketorolac   Injectable 15 milliGRAM(s) IV Push every 6 hours  lactated ringers. 1000 milliLiter(s) (125 mL/Hr) IV Continuous <Continuous>  sodium chloride 0.9% lock flush 3 milliLiter(s) IV Push every 8 hours  sodium chloride 0.9% lock flush 3 milliLiter(s) IV Push every 8 hours    MEDICATIONS  (PRN):  oxyCODONE    IR 5 milliGRAM(s) Oral every 4 hours PRN Moderate Pain (4 - 6)  oxyCODONE    IR 10 milliGRAM(s) Oral every 4 hours PRN Severe Pain (7 - 10)  prochlorperazine   Injectable 10 milliGRAM(s) IV Push once PRN Nausea not relieved by Zofran      VITALS:  T(F): 97.5 (06-23-22 @ 12:38), Max: 97.6 (06-23-22 @ 11:15)  HR: 56 (06-23-22 @ 12:38) (55 - 76)  BP: 116/71 (06-23-22 @ 12:38) (111/72 - 135/77)  RR: 18 (06-23-22 @ 12:38) (10 - 18)  SpO2: 100% (06-23-22 @ 12:38) (98% - 100%)  Wt(kg): --    I&O's Summary    23 Jun 2022 07:01  -  23 Jun 2022 15:39  --------------------------------------------------------  IN: 2118 mL / OUT: 165 mL / NET: 1953 mL        CAPILLARY BLOOD GLUCOSE      POCT Blood Glucose.: 97 mg/dL (23 Jun 2022 11:18)  POCT Blood Glucose.: 114 mg/dL (23 Jun 2022 10:17)  POCT Blood Glucose.: 92 mg/dL (23 Jun 2022 07:42)    PHYSICAL EXAM:    GENERAL: Comfortable, no acute distress   HEAD:  Normocephalic, atraumatic  EYES: EOMI, PERRLA  HEENT: Moist mucous membranes  NECK: Supple, No JVD  NERVOUS SYSTEM:  Alert & Oriented X3, Motor Strength 5/5 B/L upper and lower extremities  CHEST/LUNG: Clear to auscultation bilaterally  HEART: Regular rate and rhythm  ABDOMEN: Soft, +post op tenderness,  Nondistended, Bowel sounds present, lap sites c/d/i  GENITOURINARY: Voiding, no palpable bladder  EXTREMITIES:   No clubbing, cyanosis, or edema  MUSCULOSKELETAL- No muscle tenderness, no joint tenderness  SKIN-no rash        LABS: pending        IMPRESSION:  57 yo male with above pmh a/w:    # recurrent diverticulitis  #s/p sigmoid colectomy  pain control  complete abxs  IVFs  diet per CRS  wound care per CRS    #JESSICA      #VTE prophylaxis   lovenox  venodynes  amb           PCP:   Dr. SOREN Fournier    CHIEF COMPLAINT: sigmoid Diverticulitis    HISTORY OF THE PRESENT ILLNESS:  this is a 55 yo male with pmh: hepatic vein thrombosis in 2021, was on eliquis, GERD, JESSICA, liver abscess requiring drainage coinciding with thrombosis and recurrent diverticulitis.  Pt has had multiple attacks of diverticulitis and the liver abscess was felt to be related. most recent diverticulitis was in April.  Pt referred to Dr Ferraro for sigmoid colectomy.  Pt is seen on 2 north, awake, alert, comfortable, in NAD.  We are consulted for medical management    PAST MEDICAL HISTORY: as above    PAST SURGICAL HISTORY: T&A    FAMILY HISTORY:   Father: MI    SOCIAL HISTORY:  never a smoker,  social alcohol, no drugs    ALLERGIES: meropenem    HOME MEDS: see med rec    REVIEW OF SYSTEMS:   All 10 systems reviewed in detailed and found to be negative with the exception of what has already been described above    MEDICATIONS  (STANDING):  acetaminophen   IVPB .. 1000 milliGRAM(s) IV Intermittent every 6 hours  alvimopan 12 milliGRAM(s) Oral every 12 hours  cefOXitin  IVPB 2 Gram(s) IV Intermittent once  ketorolac   Injectable 15 milliGRAM(s) IV Push every 6 hours  lactated ringers. 1000 milliLiter(s) (125 mL/Hr) IV Continuous <Continuous>  sodium chloride 0.9% lock flush 3 milliLiter(s) IV Push every 8 hours  sodium chloride 0.9% lock flush 3 milliLiter(s) IV Push every 8 hours    MEDICATIONS  (PRN):  oxyCODONE    IR 5 milliGRAM(s) Oral every 4 hours PRN Moderate Pain (4 - 6)  oxyCODONE    IR 10 milliGRAM(s) Oral every 4 hours PRN Severe Pain (7 - 10)  prochlorperazine   Injectable 10 milliGRAM(s) IV Push once PRN Nausea not relieved by Zofran      VITALS:  T(F): 97.5 (06-23-22 @ 12:38), Max: 97.6 (06-23-22 @ 11:15)  HR: 56 (06-23-22 @ 12:38) (55 - 76)  BP: 116/71 (06-23-22 @ 12:38) (111/72 - 135/77)  RR: 18 (06-23-22 @ 12:38) (10 - 18)  SpO2: 100% (06-23-22 @ 12:38) (98% - 100%)  Wt(kg): --    I&O's Summary    23 Jun 2022 07:01  -  23 Jun 2022 15:39  --------------------------------------------------------  IN: 2118 mL / OUT: 165 mL / NET: 1953 mL        CAPILLARY BLOOD GLUCOSE      POCT Blood Glucose.: 97 mg/dL (23 Jun 2022 11:18)  POCT Blood Glucose.: 114 mg/dL (23 Jun 2022 10:17)  POCT Blood Glucose.: 92 mg/dL (23 Jun 2022 07:42)    PHYSICAL EXAM:    GENERAL: Comfortable, no acute distress   HEAD:  Normocephalic, atraumatic  EYES: EOMI, PERRLA  HEENT: Moist mucous membranes  NECK: Supple, No JVD  NERVOUS SYSTEM:  Alert & Oriented X3, Motor Strength 5/5 B/L upper and lower extremities  CHEST/LUNG: Clear to auscultation bilaterally  HEART: Regular rate and rhythm  ABDOMEN: Soft, +post op tenderness,  Nondistended, Bowel sounds present, lap sites c/d/i, suprapubic incision c/d/i with dermabond  GENITOURINARY: Voiding, no palpable bladder  EXTREMITIES:   No clubbing, cyanosis, or edema  MUSCULOSKELETAL- No muscle tenderness, no joint tenderness  SKIN-no rash        LABS: pending        IMPRESSION:  55 yo male with above pmh a/w:    # recurrent diverticulitis  #s/p sigmoid colectomy  pain control  complete abxs  IVFs  diet per CRS  wound care per CRS    #JESSICA  uses BIPAP  monitor O2 sats  supplement with O2 prn      #VTE prophylaxis   lovenox  venodynes  amb    thank you for the consult, will follow

## 2022-06-23 NOTE — CONSULT NOTE ADULT - NS ATTEND AMEND GEN_ALL_CORE FT
Patient is seen and examined at bedside with KLAUS Peterson  55yo/M with recurrent sigmoid diverticulitis presented for lap sigmoidectomy  Seen postop, no pain  Hernandez+  Awaiting return of bowel function postop  Incentive spiroemtry  Ambulating  Agree with above assessment and plan

## 2022-06-24 ENCOUNTER — TRANSCRIPTION ENCOUNTER (OUTPATIENT)
Age: 57
End: 2022-06-24

## 2022-06-24 VITALS
OXYGEN SATURATION: 100 % | TEMPERATURE: 98 F | SYSTOLIC BLOOD PRESSURE: 119 MMHG | HEART RATE: 68 BPM | DIASTOLIC BLOOD PRESSURE: 82 MMHG | RESPIRATION RATE: 17 BRPM

## 2022-06-24 LAB
ANION GAP SERPL CALC-SCNC: 6 MMOL/L — SIGNIFICANT CHANGE UP (ref 5–17)
BUN SERPL-MCNC: 10 MG/DL — SIGNIFICANT CHANGE UP (ref 7–23)
CALCIUM SERPL-MCNC: 8.8 MG/DL — SIGNIFICANT CHANGE UP (ref 8.5–10.1)
CHLORIDE SERPL-SCNC: 107 MMOL/L — SIGNIFICANT CHANGE UP (ref 96–108)
CO2 SERPL-SCNC: 26 MMOL/L — SIGNIFICANT CHANGE UP (ref 22–31)
CREAT SERPL-MCNC: 1.02 MG/DL — SIGNIFICANT CHANGE UP (ref 0.5–1.3)
EGFR: 86 ML/MIN/1.73M2 — SIGNIFICANT CHANGE UP
GLUCOSE SERPL-MCNC: 120 MG/DL — HIGH (ref 70–99)
HCT VFR BLD CALC: 41.9 % — SIGNIFICANT CHANGE UP (ref 39–50)
HGB BLD-MCNC: 14.1 G/DL — SIGNIFICANT CHANGE UP (ref 13–17)
MAGNESIUM SERPL-MCNC: 1.8 MG/DL — SIGNIFICANT CHANGE UP (ref 1.6–2.6)
MCHC RBC-ENTMCNC: 29.5 PG — SIGNIFICANT CHANGE UP (ref 27–34)
MCHC RBC-ENTMCNC: 33.7 GM/DL — SIGNIFICANT CHANGE UP (ref 32–36)
MCV RBC AUTO: 87.7 FL — SIGNIFICANT CHANGE UP (ref 80–100)
PHOSPHATE SERPL-MCNC: 2.7 MG/DL — SIGNIFICANT CHANGE UP (ref 2.5–4.5)
PLATELET # BLD AUTO: 249 K/UL — SIGNIFICANT CHANGE UP (ref 150–400)
POTASSIUM SERPL-MCNC: 4.2 MMOL/L — SIGNIFICANT CHANGE UP (ref 3.5–5.3)
POTASSIUM SERPL-SCNC: 4.2 MMOL/L — SIGNIFICANT CHANGE UP (ref 3.5–5.3)
RBC # BLD: 4.78 M/UL — SIGNIFICANT CHANGE UP (ref 4.2–5.8)
RBC # FLD: 12.5 % — SIGNIFICANT CHANGE UP (ref 10.3–14.5)
SODIUM SERPL-SCNC: 139 MMOL/L — SIGNIFICANT CHANGE UP (ref 135–145)
WBC # BLD: 17.02 K/UL — HIGH (ref 3.8–10.5)
WBC # FLD AUTO: 17.02 K/UL — HIGH (ref 3.8–10.5)

## 2022-06-24 PROCEDURE — 99232 SBSQ HOSP IP/OBS MODERATE 35: CPT

## 2022-06-24 RX ORDER — IBUPROFEN 200 MG
1 TABLET ORAL
Qty: 30 | Refills: 0
Start: 2022-06-24

## 2022-06-24 RX ORDER — OXYCODONE AND ACETAMINOPHEN 5; 325 MG/1; MG/1
1 TABLET ORAL
Qty: 20 | Refills: 0
Start: 2022-06-24

## 2022-06-24 RX ORDER — ACETAMINOPHEN 500 MG
2 TABLET ORAL
Qty: 60 | Refills: 0
Start: 2022-06-24

## 2022-06-24 RX ADMIN — Medication 400 MILLIGRAM(S): at 05:59

## 2022-06-24 RX ADMIN — Medication 15 MILLIGRAM(S): at 05:59

## 2022-06-24 RX ADMIN — ALVIMOPAN 12 MILLIGRAM(S): 12 CAPSULE ORAL at 11:48

## 2022-06-24 RX ADMIN — Medication 400 MILLIGRAM(S): at 11:47

## 2022-06-24 RX ADMIN — ENOXAPARIN SODIUM 40 MILLIGRAM(S): 100 INJECTION SUBCUTANEOUS at 00:48

## 2022-06-24 RX ADMIN — Medication 15 MILLIGRAM(S): at 11:48

## 2022-06-24 RX ADMIN — Medication 1000 MILLIGRAM(S): at 00:48

## 2022-06-24 RX ADMIN — Medication 400 MILLIGRAM(S): at 00:48

## 2022-06-24 RX ADMIN — Medication 1000 MILLIGRAM(S): at 11:48

## 2022-06-24 RX ADMIN — Medication 15 MILLIGRAM(S): at 00:48

## 2022-06-24 NOTE — PROGRESS NOTE ADULT - ASSESSMENT
57 yo s/p Hand assisted lap sigmoid resection POD 1.    Plan:    - Advance diet to Low residue diet.  - D/c home today after tolerating diet.       Plan was discussed with colorectal team

## 2022-06-24 NOTE — DISCHARGE NOTE PROVIDER - HOSPITAL COURSE
Pt was admitted for elective diverticulitis surgery, s/p lap sigmoidectomy, doing well. Pt was admitted for elective diverticulitis surgery, s/p lap sigmoidectomy, doing well. Postop course, vital signs stable, return of bowel function, pain controlled.

## 2022-06-24 NOTE — DISCHARGE NOTE PROVIDER - NSDCMRMEDTOKEN_GEN_ALL_CORE_FT
ibuprofen 800 mg oral tablet: 1 tab(s) orally every 8 hours   Tylenol 325 mg oral tablet: 2 tab(s) orally every 6 hours    ibuprofen 800 mg oral tablet: 1 tab(s) orally every 8 hours   oxycodone-acetaminophen 5 mg-325 mg oral tablet: 1 tab(s) orally every 6 hours, As Needed -for moderate pain [id number]  Tylenol 325 mg oral tablet: 2 tab(s) orally every 6 hours

## 2022-06-24 NOTE — DISCHARGE NOTE PROVIDER - NSDCCPTREATMENT_GEN_ALL_CORE_FT
PRINCIPAL PROCEDURE  Procedure: Hand assisted laparoscopic sigmoid colectomy  Findings and Treatment:

## 2022-06-24 NOTE — DISCHARGE NOTE NURSING/CASE MANAGEMENT/SOCIAL WORK - PATIENT PORTAL LINK FT
You can access the FollowMyHealth Patient Portal offered by [facility] by registering at the following website: [email or web address]. By joining [facility]’s FollowMyHealth portal, you will also be able to view your health information using other applications (apps) compatible with our system.

## 2022-06-24 NOTE — DISCHARGE NOTE NURSING/CASE MANAGEMENT/SOCIAL WORK - NSDCPNINST_GEN_ALL_CORE
------Call MD for any increase in pain, nausea, vomiting; fever over 101F; swelling, redness, oozing at incision site; pain in calf. No creams, gels, or ointments on incisions. No swimming, hot tubs or bathing; do not submerge incision.------    *** Next Tylenol 1000 mg available at 6 pm= tonight. ***

## 2022-06-24 NOTE — PROGRESS NOTE ADULT - SUBJECTIVE AND OBJECTIVE BOX
Patient was seen and examined today bedside, patient denied any pain, nausea or vomiting, fever or chills3. No acute events were reported from nursing staff.    ICU Vital Signs Last 24 Hrs  T(C): 36.5 (24 Jun 2022 04:56), Max: 36.8 (23 Jun 2022 20:04)  T(F): 97.7 (24 Jun 2022 04:56), Max: 98.3 (23 Jun 2022 20:04)  HR: 75 (24 Jun 2022 04:56) (55 - 88)  BP: 123/71 (24 Jun 2022 04:56) (116/71 - 137/61)  BP(mean): 79 (23 Jun 2022 20:04) (79 - 79)  ABP: --  ABP(mean): --  RR: 18 (24 Jun 2022 04:56) (10 - 18)  SpO2: 100% (24 Jun 2022 04:56) (93% - 100%)    PE:  General: no acute distress.  Abd: Soft, NT/ND.                          14.1   17.02 )-----------( 249      ( 24 Jun 2022 08:33 )             41.9   06-24    139  |  107  |  10  ----------------------------<  120<H>  4.2   |  26  |  1.02    Ca    8.8      24 Jun 2022 08:33

## 2022-06-24 NOTE — DISCHARGE NOTE PROVIDER - NSDCACTIVITY_GEN_ALL_CORE
Return to Work/School allowed/Showering allowed/Stairs allowed/Driving allowed/Walking - Indoors allowed/No heavy lifting/straining/Walking - Outdoors allowed Showering allowed/Stairs allowed/Driving allowed/Walking - Indoors allowed/No heavy lifting/straining/Walking - Outdoors allowed/Follow Instructions Provided by your Surgical Team

## 2022-06-24 NOTE — DISCHARGE NOTE PROVIDER - CARE PROVIDER_API CALL
Raj Flores)  ColonRectal Surgery; Surgery  [address] [address]  Phone: [phone]  Fax: [phone]  Follow Up Time: 2 weeks

## 2022-06-24 NOTE — DISCHARGE NOTE NURSING/CASE MANAGEMENT/SOCIAL WORK - NSDCPEFALRISK_GEN_ALL_CORE
For information on Fall & Injury Prevention, visit: [email or web address] OR  [email or web address] OR  https://www.cdc.gov/steadi/patient.html

## 2022-06-24 NOTE — PROGRESS NOTE ADULT - ATTENDING COMMENTS
Patient seen and examined at bedside this morning. Patient is doing well, tolerating clear liquids, did report very mild nausea. He is passing flatus. Abdomen is soft, nondistended, appropriately tender, incisions C/D/I    Vital Signs Last 24 Hrs  T(C): 36.9 (24 Jun 2022 09:51), Max: 36.9 (24 Jun 2022 09:51)  T(F): 98.5 (24 Jun 2022 09:51), Max: 98.5 (24 Jun 2022 09:51)  HR: 68 (24 Jun 2022 09:51) (55 - 88)  BP: 119/82 (24 Jun 2022 09:51) (116/71 - 137/61)  BP(mean): 79 (23 Jun 2022 20:04) (79 - 79)  RR: 17 (24 Jun 2022 09:51) (12 - 18)  SpO2: 100% (24 Jun 2022 09:51) (93% - 100%)                          14.1   17.02 )-----------( 249      ( 24 Jun 2022 08:33 )             41.9     Continue to ensure adequate pain control  Advance diet as tolerated  Hernandez discontinued, follow up voids  DVT ppx  IS use 10/hr  WBC reactive post-op  Continue supportive care during recovery  Tentative plan for discharge today

## 2022-06-24 NOTE — PROGRESS NOTE ADULT - SUBJECTIVE AND OBJECTIVE BOX
PCP:   Dr. SOREN Fournier    CHIEF COMPLAINT: sigmoid Diverticulitis    HISTORY OF THE PRESENT ILLNESS:  this is a 55 yo male with pmh: hepatic vein thrombosis in 2021, was on eliquis, GERD, JESSICA, liver abscess requiring drainage coinciding with thrombosis and recurrent diverticulitis.  Pt has had multiple attacks of diverticulitis and the liver abscess was felt to be related. most recent diverticulitis was in April.  Pt referred to Dr Ferraro for sigmoid colectomy.   We are consulted for medical management        6/24: seen at bedside, awake, alert, tricia po, + BM, + flatus, childers d/c'd, + voiding, going home    REVIEW OF SYSTEMS:   All 10 systems reviewed in detailed and found to be negative with the exception of what has already been described above    MEDICATIONS  (STANDING):  alvimopan 12 milliGRAM(s) Oral every 12 hours  enoxaparin Injectable 40 milliGRAM(s) SubCutaneous every 24 hours  sodium chloride 0.9% lock flush 3 milliLiter(s) IV Push every 8 hours  sodium chloride 0.9% lock flush 3 milliLiter(s) IV Push every 8 hours    MEDICATIONS  (PRN):  oxyCODONE    IR 5 milliGRAM(s) Oral every 4 hours PRN Moderate Pain (4 - 6)  oxyCODONE    IR 10 milliGRAM(s) Oral every 4 hours PRN Severe Pain (7 - 10)  prochlorperazine   Injectable 10 milliGRAM(s) IV Push once PRN Nausea not relieved by Zofran    Vital Signs Last 24 Hrs  T(C): 36.9 (06-24-22 @ 09:51), Max: 36.9 (06-24-22 @ 09:51)  T(F): 98.5 (06-24-22 @ 09:51), Max: 98.5 (06-24-22 @ 09:51)  HR: 68 (06-24-22 @ 09:51) (68 - 88)  BP: 119/82 (06-24-22 @ 09:51) (119/82 - 137/61)  BP(mean): 79 (06-23-22 @ 20:04) (79 - 79)  RR: 17 (06-24-22 @ 09:51) (17 - 18)  SpO2: 100% (06-24-22 @ 09:51) (93% - 100%)    CAPILLARY BLOOD GLUCOSE    POCT Blood Glucose.: 112 mg/dL (06.24.22 @ 06:05)   POCT Blood Glucose.: 108 mg/dL (06.24.22 @ 00:53)   POCT Blood Glucose.: 97 mg/dL (23 Jun 2022 11:18)  POCT Blood Glucose.: 114 mg/dL (23 Jun 2022 10:17)  POCT Blood Glucose.: 92 mg/dL (23 Jun 2022 07:42)    PHYSICAL EXAM:    GENERAL: Comfortable, no acute distress   HEAD:  Normocephalic, atraumatic  EYES: EOMI, PERRLA  HEENT: Moist mucous membranes  NECK: Supple, No JVD  NERVOUS SYSTEM:  Alert & Oriented X3, Motor Strength 5/5 B/L upper and lower extremities  CHEST/LUNG: Clear to auscultation bilaterally  HEART: Regular rate and rhythm  ABDOMEN: Soft, +post op tenderness,  Nondistended, Bowel sounds present, lap sites c/d/i, suprapubic incision c/d/i with dermabond  GENITOURINARY: Voiding, no palpable bladder  EXTREMITIES:   No clubbing, cyanosis, or edema  MUSCULOSKELETAL- No muscle tenderness, no joint tenderness  SKIN-no rash        LABS:                        14.1   17.02 )-----------( 249      ( 24 Jun 2022 08:33 )             41.9       06-24    139  |  107  |  10  ----------------------------<  120<H>  4.2   |  26  |  1.02    Ca    8.8      24 Jun 2022 08:33  Phos  2.7     06-24  Mg     1.8     06-24        IMPRESSION:  55 yo male with above pmh a/w:    # recurrent diverticulitis  #s/p sigmoid colectomy  pain control  complete abxs  IVFs  diet per CRS  wound care per CRS      # Leukocytosis   likely reactive   afebrile, no s/s of infn    #JESSICA  uses BIPAP  monitor O2 sats  supplement with O2 prn      #VTE prophylaxis   lovenox  venodynes  amb    dispo: home today

## 2022-06-29 DIAGNOSIS — K57.20 DIVERTICULITIS OF LARGE INTESTINE WITH PERFORATION AND ABSCESS WITHOUT BLEEDING: ICD-10-CM

## 2022-06-29 DIAGNOSIS — D72.829 ELEVATED WHITE BLOOD CELL COUNT, UNSPECIFIED: ICD-10-CM

## 2022-06-29 DIAGNOSIS — Z86.718 PERSONAL HISTORY OF OTHER VENOUS THROMBOSIS AND EMBOLISM: ICD-10-CM

## 2022-06-29 DIAGNOSIS — G47.33 OBSTRUCTIVE SLEEP APNEA (ADULT) (PEDIATRIC): ICD-10-CM

## 2022-06-29 DIAGNOSIS — K21.9 GASTRO-ESOPHAGEAL REFLUX DISEASE WITHOUT ESOPHAGITIS: ICD-10-CM

## 2022-07-06 ENCOUNTER — APPOINTMENT (OUTPATIENT)
Dept: COLORECTAL SURGERY | Facility: CLINIC | Age: 57
End: 2022-07-06

## 2022-07-06 VITALS
TEMPERATURE: 96.5 F | RESPIRATION RATE: 14 BRPM | WEIGHT: 180 LBS | DIASTOLIC BLOOD PRESSURE: 79 MMHG | OXYGEN SATURATION: 95 % | HEIGHT: 70 IN | SYSTOLIC BLOOD PRESSURE: 119 MMHG | BODY MASS INDEX: 25.77 KG/M2 | HEART RATE: 66 BPM

## 2022-07-06 PROCEDURE — 99024 POSTOP FOLLOW-UP VISIT: CPT

## 2022-08-01 RX ORDER — NEOMYCIN SULFATE 500 MG/1
500 TABLET ORAL
Qty: 6 | Refills: 0 | Status: DISCONTINUED | COMMUNITY
Start: 2022-05-05 | End: 2022-08-01

## 2022-08-01 RX ORDER — CIPROFLOXACIN HYDROCHLORIDE 500 MG/1
500 TABLET, FILM COATED ORAL
Qty: 60 | Refills: 1 | Status: DISCONTINUED | COMMUNITY
Start: 2021-08-25 | End: 2022-08-01

## 2022-08-01 RX ORDER — METRONIDAZOLE 500 MG/1
500 TABLET ORAL 3 TIMES DAILY
Qty: 90 | Refills: 1 | Status: DISCONTINUED | COMMUNITY
Start: 2021-07-21 | End: 2022-08-01

## 2022-08-01 RX ORDER — METRONIDAZOLE 500 MG/1
500 TABLET ORAL
Qty: 3 | Refills: 0 | Status: DISCONTINUED | COMMUNITY
Start: 2022-05-05 | End: 2022-08-01

## 2022-08-01 RX ORDER — CIPROFLOXACIN LACTATE 200MG/20ML
VIAL (ML) INTRAVENOUS
Refills: 0 | Status: DISCONTINUED | COMMUNITY
End: 2022-08-01

## 2022-08-01 RX ORDER — IBUPROFEN 800 MG/1
800 TABLET ORAL
Qty: 30 | Refills: 0 | Status: ACTIVE | COMMUNITY
Start: 2022-06-24

## 2022-08-01 RX ORDER — OXYCODONE AND ACETAMINOPHEN 5; 325 MG/1; MG/1
5-325 TABLET ORAL
Qty: 20 | Refills: 0 | Status: ACTIVE | COMMUNITY
Start: 2022-06-24

## 2022-08-01 RX ORDER — ACETAMINOPHEN 325 MG/1
325 TABLET ORAL
Qty: 60 | Refills: 0 | Status: ACTIVE | COMMUNITY
Start: 2022-06-24

## 2022-08-01 NOTE — ASSESSMENT
[FreeTextEntry1] : 56 year old male s/p lap sigmoid resection for recurrent diverticulitis. Doing well. Surgical incision were w/o s/s of infection. Surgical pathology reviewed with patient. All questions/concerns addressed to his satisfaction.  \par \par s/p lap sigmoid resection\par - regular diet\par - fiber, stool softeners as needed\par - keep incision clean and dry\par - daily showers with antibacterial soap\par - no heavy lifting for 4 more weeks\par \par RTO in 4-6 weeks to f/u with Dr. Flores

## 2022-08-01 NOTE — HISTORY OF PRESENT ILLNESS
[FreeTextEntry1] : Consultation requested by Dr. Beal for complicated Recurrent and persistent sigmoid diverticulitis. 56-year-old male who last year was found to have a large liver abscess that required surgical drainage. He's had multiple attacks of sigmoid diverticulitis and that is the most likely culprit for his liver abscess. Has had approximately 3-4 sigmoid diverticulitis attacks most recently last month.Symptoms have been worsening\par \par 7/6/22 - Mr. Carbajal presents to the office for POV s/p laparoscopic sigmoid resection for complicated recurrent sigmoid diverticulitis.  Doing well. Tolerating PO, ambulating, soft BMs.  Denies fever/chills, n/v.

## 2022-08-01 NOTE — PHYSICAL EXAM
[Abdomen Masses] : No abdominal masses [Abdomen Tenderness] : ~T No ~M abdominal tenderness [Respiratory Effort] : normal respiratory effort [No Rash or Lesion] : No rash or lesion [Alert] : alert [Oriented to Person] : oriented to person [Oriented to Place] : oriented to place [Oriented to Time] : oriented to time [Calm] : calm [de-identified] : soft, NDNT, surgical incision CDI, no erythema, drainage or odor noted.  [de-identified] : well appearing, no acute distress [de-identified] : CHRISTINE x4 [de-identified] : warm and dry

## 2022-08-01 NOTE — DATA REVIEWED
[FreeTextEntry1] : surgical pathology reviewed - benign\par \par  Pathology             Final\par \par No Documents Attached\par \par \par \par \par   [facility] Accession Number : [id number]\par Patient:   MAXINE BREAUX\par \par \par Accession:                             [id number]\par \par Collected Date/Time:                   6/23/2022 08:53 EDT\par Received Date/Time:                    6/23/2022 10:37 EDT\par \par Surgical Pathology Report - Auth (Verified)\par \par Specimen(s) Submitted\par 1  Sigmoid colon with extensive diverticulitis\par 2  Colorectal anastomosis\par \par Final Diagnosis\par 1. Large bowel, sigmoid, excision:\par -Diverticulosis and diverticulitis with abscess formation\par \par \par 2. Colorectal anastomosis:\par -Benign colon\par Verified by: GERTRUDE VERAS M.D.\par (Electronic Signature)\par Reported on: 06/29/22 13:22 EDT, [address] [facility]\par Phone: [phone]   Fax: [phone]\par _________________________________________________________________\par \par \par Clinical Information\par K57.32; K57.80\par Sigmoid diverticulitis, liver abscess and adhesions\par \par Gross Description\par 1.  Received fresh labeled  "sigmoid colon with extensive\par diverticulitis"  and consists of a 13 cm in length by 2.7-3.7 cm in\par diameter segment of colon with an abundant amount of attached adipose\par tissue. It is received stapled at one margin and open at the opposite\par margin. The serosa is pink-tan and smooth. Sectioning reveals possible\par abscess site and multiple diverticular openings, that come within 1\par cm of the stapled margin. The mucosa is tan with normal folds and wall\par thickness ranges from 0.4-0.7 cm.\par Representative sections in 2 cassettes:\par 1A: Representative possible abscess\par 1B: Representative diverticulum\par \par 2.  Received: In formalin labeled  "colorectal anastomosis"\par Size:  Two, 1.9 x 1.4 x 1.2 cm 2 x 1.9 x 1.2 cm\par Description:  Pink-tan, congested, annular\par Cut Surface:  Tan, soft with numerous staples and sutures\par Anvil:  Present\par Submitted:  Representative sections in one cassette: 2A\par \par Mylene Fall 06/24/2022 10:06 AM\par \par \par Disclaimer\par In addition to other data that may appear on the specimen containers, all\par labels have been inspected to confirm the presence of the patient's name\par and date of birth.\par \par  \par \par  Ordered by: RAJ ANDERSON       Collected/Examined: 23Jun2022 08:53AM       \par Verified by: SHAWANDA EID 29Jun2022 03:00PM       \par  Result Communication: No patient communication needed at this time;\par Stage: Final       \par  Performed at: [facility]       Resulted: 29Jun2022 01:22PM       Last Updated: 29Jun2022 03:00PM       Accession: [id number]

## 2022-08-02 ENCOUNTER — APPOINTMENT (OUTPATIENT)
Dept: COLORECTAL SURGERY | Facility: CLINIC | Age: 57
End: 2022-08-02

## 2022-08-02 DIAGNOSIS — K57.80 DIVERTICULITIS OF INTESTINE, PART UNSPECIFIED, WITH PERFORATION AND ABSCESS W/OUT BLEEDING: ICD-10-CM

## 2022-08-02 PROCEDURE — 99024 POSTOP FOLLOW-UP VISIT: CPT

## 2022-08-02 NOTE — HISTORY OF PRESENT ILLNESS
[FreeTextEntry1] : Postop sigmoid colon resection for diverticulitis doing well.  No complaints very happy with the results of her surgery

## 2022-08-02 NOTE — ASSESSMENT
[FreeTextEntry1] : 56-year-old male postop laparoscopic sigmoid colon resection for extensive diverticulitis.  Doing well.

## 2022-08-02 NOTE — PHYSICAL EXAM
[de-identified] : Well-healed incisions [de-identified] : Looks well in no distress, of stated age.

## 2023-05-02 ENCOUNTER — TRANSCRIPTION ENCOUNTER (OUTPATIENT)
Age: 58
End: 2023-05-02

## 2023-05-11 ENCOUNTER — NON-APPOINTMENT (OUTPATIENT)
Age: 58
End: 2023-05-11

## 2023-05-11 ENCOUNTER — APPOINTMENT (OUTPATIENT)
Dept: NEUROSURGERY | Facility: CLINIC | Age: 58
End: 2023-05-11
Payer: COMMERCIAL

## 2023-05-11 VITALS
HEIGHT: 70 IN | OXYGEN SATURATION: 98 % | BODY MASS INDEX: 26.2 KG/M2 | WEIGHT: 183 LBS | SYSTOLIC BLOOD PRESSURE: 140 MMHG | HEART RATE: 84 BPM | DIASTOLIC BLOOD PRESSURE: 88 MMHG

## 2023-05-11 PROCEDURE — 99204 OFFICE O/P NEW MOD 45 MIN: CPT

## 2023-05-11 NOTE — PHYSICAL EXAM
[General Appearance - Alert] : alert [General Appearance - In No Acute Distress] : in no acute distress [Oriented To Time, Place, And Person] : oriented to person, place, and time [Impaired Insight] : insight and judgment were intact [Affect] : the affect was normal [Person] : oriented to person [Place] : oriented to place [Time] : oriented to time [Short Term Intact] : short term memory intact [Remote Intact] : remote memory intact [Span Intact] : the attention span was normal [Concentration Intact] : normal concentrating ability [Fluency] : fluency intact [Comprehension] : comprehension intact [Current Events] : adequate knowledge of current events [Past History] : adequate knowledge of personal past history [Vocabulary] : adequate range of vocabulary [Cranial Nerves Optic (II)] : visual acuity intact bilaterally,  pupils equal round and reactive to light [Cranial Nerves Oculomotor (III)] : extraocular motion intact [Cranial Nerves Trigeminal (V)] : facial sensation intact symmetrically [Cranial Nerves Facial (VII)] : face symmetrical [Cranial Nerves Vestibulocochlear (VIII)] : hearing was intact bilaterally [Cranial Nerves Glossopharyngeal (IX)] : tongue and palate midline [Cranial Nerves Accessory (XI - Cranial And Spinal)] : head turning and shoulder shrug symmetric [Cranial Nerves Hypoglossal (XII)] : there was no tongue deviation with protrusion [Motor Strength] : muscle strength was normal in all four extremities [No Muscle Atrophy] : normal bulk in all four extremities [Sensation Tactile Decrease] : light touch was intact [Balance] : balance was intact [2+] : Patella left 2+ [Past-pointing] : there was no past-pointing [Tremor] : no tremor present [L'Hermitte's] : neck flexion did not produce tingling down the spine/arms [Spurling's - Opposite Side] : Negative Spurling's on opposite side [Spurling's Same Side] : Negative Spurling's on same side [No Visual Abnormalities] : no visible abnormailities [No Tenderness to Palpation] : no spine tenderness on palpation [Full ROM] : full ROM [No Pain with ROM] : no pain with motion in any direction [Straight-Leg Raise Test - Left] : straight leg raise of the left leg was negative [Straight-Leg Raise Test - Right] : straight leg raise  of the right leg was negative [Normal] : normal [Able to toe walk] : the patient was able to toe walk [Able to heel walk] : the patient was able to heel walk [Intact] : all reflexes within normal limits bilaterally [Sclera] : the sclera and conjunctiva were normal [PERRL With Normal Accommodation] : pupils were equal in size, round, reactive to light, with normal accommodation [Extraocular Movements] : extraocular movements were intact [Outer Ear] : the ears and nose were normal in appearance [Oropharynx] : the oropharynx was normal [Neck Appearance] : the appearance of the neck was normal [Neck Cervical Mass (___cm)] : no neck mass was observed [Jugular Venous Distention Increased] : there was no jugular-venous distention [Thyroid Diffuse Enlargement] : the thyroid was not enlarged [Thyroid Nodule] : there were no palpable thyroid nodules [Auscultation Breath Sounds / Voice Sounds] : lungs were clear to auscultation bilaterally [Heart Rate And Rhythm] : heart rate was normal and rhythm regular [Heart Sounds] : normal S1 and S2 [Heart Sounds Gallop] : no gallops [Murmurs] : no murmurs [Heart Sounds Pericardial Friction Rub] : no pericardial rub [Full Pulse] : the pedal pulses are present [Edema] : there was no peripheral edema [Bowel Sounds] : normal bowel sounds [Abdomen Soft] : soft [Abdomen Tenderness] : non-tender [] : no hepato-splenomegaly [Abdomen Mass (___ Cm)] : no abdominal mass palpated [No CVA Tenderness] : no ~M costovertebral angle tenderness [No Spinal Tenderness] : no spinal tenderness [Abnormal Walk] : normal gait [Nail Clubbing] : no clubbing  or cyanosis of the fingernails [Musculoskeletal - Swelling] : no joint swelling seen [Motor Tone] : muscle strength and tone were normal

## 2023-05-11 NOTE — DATA REVIEWED
[de-identified] : lumbar spine 4/2023, thoracic4/2023 [de-identified] : bone density scan 4/2023

## 2023-05-11 NOTE — REASON FOR VISIT
[New Patient Visit] : a new patient visit [Referred By: _________] : Patient was referred by ALEXANDRU [Spouse] : spouse [FreeTextEntry1] : "lesions on my spine"

## 2023-05-11 NOTE — ASSESSMENT
[FreeTextEntry1] : Impression:\par \par Very pleasant 57 year old male who presents today accompanied by his wife for a surgical opinion regarding the lesions that were noted in his spine.\par \par Patient states the the pain in his pain shoots down his right leg to his foot. His wife states that the pain wakes him up at night. he is unable to sit or lay flat as this increases the pain. Standing and walking alleviate the pain.\par \par \par Discussion:\par \par Surgery recommendation was a result of shared decision making. The patient has tried conservative treatment and failed therapy. A long discussion occurred regarding all surgical and non-surgical options. \par \par After detailed imaging was reviewed and the patient was examined, surgical intervention was discussed with the patient to halt progression of symptoms. The potential risks and benefits of surgical intervention as well as alternative treatment options were outlined in great detail. Adequate time was allowed for the patient to ask questions. The patient stated an understanding to all answers given. Pre-op requirements, including a pre op education class, medical clearance and pre op testing as well as post- op expectations were discussed in detail.  Patient agrees to proceed with surgery.\par \par Pre op planning and care coordination in progress. Spine surgery questionnaire and virtual spine class information provided.\par Teach back protocol was implemented.\par \par \par Plan:\par \par SX: T10-11 Laminoplasty for intradural mass, L1 Laminoplasty for intradural mass, L5 laminoplasty for intra dural mass.\par \par PAtient to schedule.\par

## 2023-05-17 ENCOUNTER — OUTPATIENT (OUTPATIENT)
Dept: OUTPATIENT SERVICES | Facility: HOSPITAL | Age: 58
LOS: 1 days | End: 2023-05-17
Payer: COMMERCIAL

## 2023-05-17 VITALS
TEMPERATURE: 98 F | DIASTOLIC BLOOD PRESSURE: 84 MMHG | OXYGEN SATURATION: 100 % | WEIGHT: 186.07 LBS | HEART RATE: 67 BPM | SYSTOLIC BLOOD PRESSURE: 139 MMHG | RESPIRATION RATE: 18 BRPM | HEIGHT: 70 IN

## 2023-05-17 DIAGNOSIS — Z01.818 ENCOUNTER FOR OTHER PREPROCEDURAL EXAMINATION: ICD-10-CM

## 2023-05-17 DIAGNOSIS — Z90.89 ACQUIRED ABSENCE OF OTHER ORGANS: Chronic | ICD-10-CM

## 2023-05-17 DIAGNOSIS — M89.8X8 OTHER SPECIFIED DISORDERS OF BONE, OTHER SITE: ICD-10-CM

## 2023-05-17 DIAGNOSIS — Z98.890 OTHER SPECIFIED POSTPROCEDURAL STATES: Chronic | ICD-10-CM

## 2023-05-17 LAB
ANION GAP SERPL CALC-SCNC: 9 MMOL/L — SIGNIFICANT CHANGE UP (ref 5–17)
BLD GP AB SCN SERPL QL: NEGATIVE — SIGNIFICANT CHANGE UP
BUN SERPL-MCNC: 20 MG/DL — SIGNIFICANT CHANGE UP (ref 7–23)
CALCIUM SERPL-MCNC: 9.3 MG/DL — SIGNIFICANT CHANGE UP (ref 8.4–10.5)
CHLORIDE SERPL-SCNC: 102 MMOL/L — SIGNIFICANT CHANGE UP (ref 96–108)
CO2 SERPL-SCNC: 27 MMOL/L — SIGNIFICANT CHANGE UP (ref 22–31)
CREAT SERPL-MCNC: 0.81 MG/DL — SIGNIFICANT CHANGE UP (ref 0.5–1.3)
EGFR: 103 ML/MIN/1.73M2 — SIGNIFICANT CHANGE UP
GLUCOSE SERPL-MCNC: 98 MG/DL — SIGNIFICANT CHANGE UP (ref 70–99)
HCT VFR BLD CALC: 43.9 % — SIGNIFICANT CHANGE UP (ref 39–50)
HGB BLD-MCNC: 14.7 G/DL — SIGNIFICANT CHANGE UP (ref 13–17)
MCHC RBC-ENTMCNC: 29.1 PG — SIGNIFICANT CHANGE UP (ref 27–34)
MCHC RBC-ENTMCNC: 33.5 GM/DL — SIGNIFICANT CHANGE UP (ref 32–36)
MCV RBC AUTO: 86.9 FL — SIGNIFICANT CHANGE UP (ref 80–100)
NRBC # BLD: 0 /100 WBCS — SIGNIFICANT CHANGE UP (ref 0–0)
PLATELET # BLD AUTO: 201 K/UL — SIGNIFICANT CHANGE UP (ref 150–400)
POTASSIUM SERPL-MCNC: 4.3 MMOL/L — SIGNIFICANT CHANGE UP (ref 3.5–5.3)
POTASSIUM SERPL-SCNC: 4.3 MMOL/L — SIGNIFICANT CHANGE UP (ref 3.5–5.3)
RBC # BLD: 5.05 M/UL — SIGNIFICANT CHANGE UP (ref 4.2–5.8)
RBC # FLD: 12.5 % — SIGNIFICANT CHANGE UP (ref 10.3–14.5)
RH IG SCN BLD-IMP: NEGATIVE — SIGNIFICANT CHANGE UP
SODIUM SERPL-SCNC: 138 MMOL/L — SIGNIFICANT CHANGE UP (ref 135–145)
WBC # BLD: 7.59 K/UL — SIGNIFICANT CHANGE UP (ref 3.8–10.5)
WBC # FLD AUTO: 7.59 K/UL — SIGNIFICANT CHANGE UP (ref 3.8–10.5)

## 2023-05-17 PROCEDURE — 86900 BLOOD TYPING SEROLOGIC ABO: CPT

## 2023-05-17 PROCEDURE — 36415 COLL VENOUS BLD VENIPUNCTURE: CPT

## 2023-05-17 PROCEDURE — 86901 BLOOD TYPING SEROLOGIC RH(D): CPT

## 2023-05-17 PROCEDURE — 87640 STAPH A DNA AMP PROBE: CPT

## 2023-05-17 PROCEDURE — G0463: CPT

## 2023-05-17 PROCEDURE — 85027 COMPLETE CBC AUTOMATED: CPT

## 2023-05-17 PROCEDURE — 80048 BASIC METABOLIC PNL TOTAL CA: CPT

## 2023-05-17 PROCEDURE — 87641 MR-STAPH DNA AMP PROBE: CPT

## 2023-05-17 PROCEDURE — 86850 RBC ANTIBODY SCREEN: CPT

## 2023-05-17 RX ORDER — VANCOMYCIN HCL 1 G
1250 VIAL (EA) INTRAVENOUS ONCE
Refills: 0 | Status: DISCONTINUED | OUTPATIENT
Start: 2023-05-23 | End: 2023-05-24

## 2023-05-17 NOTE — H&P PST ADULT - HISTORY OF PRESENT ILLNESS
This is a 58 y/o male PMH severe JESSICA on CPAP, S/P tonsillectomy as a child c/b scarring of the sinuses, reports having recent colon resection and liver resection without any anesthesia/intubation difficulties,  diverticulitis, S/P colon resection 6/2022, liver abscess, S/P liver resection, Presents today for T10-T11 laminoplasty for interdural mass, L1 laminoplasty for intradural mass, L5 laminoplasty of intradural mass.     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  Presents today for T10-T11 laminoplasty for interdural mass, L1 laminoplasty for intradural mass, L5 laminoplasty of intradural mass.     No H/O COVID infection

## 2023-05-17 NOTE — H&P PST ADULT - ENMT
After obtaining written consent and per orders of Dr. Ajith Fortune, injection of flu shot given by Monty Miranda LPN. Order and injection/medication verified by second nurse/ma review by Radha Tom LPN. Patient tolerated procedure well. VIS was given to them. No reactions noted. negative no gross abnormalities

## 2023-05-17 NOTE — H&P PST ADULT - ASSESSMENT
DASI score 5.72 able to walk 2 blocks, can climb at least one flight of stairs, only limited by back and leg pain, at baseline was lifting weights and golfing. No loose, broken or removable teeth

## 2023-05-18 LAB
MRSA PCR RESULT.: SIGNIFICANT CHANGE UP
S AUREUS DNA NOSE QL NAA+PROBE: SIGNIFICANT CHANGE UP

## 2023-05-22 ENCOUNTER — TRANSCRIPTION ENCOUNTER (OUTPATIENT)
Age: 58
End: 2023-05-22

## 2023-05-23 ENCOUNTER — APPOINTMENT (OUTPATIENT)
Dept: NEUROSURGERY | Facility: HOSPITAL | Age: 58
End: 2023-05-23

## 2023-05-23 ENCOUNTER — RESULT REVIEW (OUTPATIENT)
Age: 58
End: 2023-05-23

## 2023-05-23 ENCOUNTER — INPATIENT (INPATIENT)
Facility: HOSPITAL | Age: 58
LOS: 1 days | Discharge: HOME | DRG: 520 | End: 2023-05-25
Attending: NEUROLOGICAL SURGERY | Admitting: NEUROLOGICAL SURGERY
Payer: COMMERCIAL

## 2023-05-23 VITALS
HEIGHT: 70 IN | TEMPERATURE: 98 F | RESPIRATION RATE: 18 BRPM | SYSTOLIC BLOOD PRESSURE: 133 MMHG | DIASTOLIC BLOOD PRESSURE: 78 MMHG | WEIGHT: 186.07 LBS | OXYGEN SATURATION: 98 % | HEART RATE: 77 BPM

## 2023-05-23 DIAGNOSIS — Z98.890 OTHER SPECIFIED POSTPROCEDURAL STATES: Chronic | ICD-10-CM

## 2023-05-23 DIAGNOSIS — Z90.89 ACQUIRED ABSENCE OF OTHER ORGANS: Chronic | ICD-10-CM

## 2023-05-23 DIAGNOSIS — M89.8X8 OTHER SPECIFIED DISORDERS OF BONE, OTHER SITE: ICD-10-CM

## 2023-05-23 PROCEDURE — 63281 BX/EXC IDRL SPINE LESN THRC: CPT | Mod: 22

## 2023-05-23 PROCEDURE — 63295 REPAIR LAMINECTOMY DEFECT: CPT

## 2023-05-23 PROCEDURE — 88307 TISSUE EXAM BY PATHOLOGIST: CPT | Mod: 26

## 2023-05-23 PROCEDURE — 88342 IMHCHEM/IMCYTCHM 1ST ANTB: CPT | Mod: 26

## 2023-05-23 PROCEDURE — 69990 MICROSURGERY ADD-ON: CPT

## 2023-05-23 PROCEDURE — 63282 BX/EXC IDRL SPINE LESN LMBR: CPT | Mod: 22,59

## 2023-05-23 PROCEDURE — 72020 X-RAY EXAM OF SPINE 1 VIEW: CPT | Mod: 26

## 2023-05-23 DEVICE — SCREW TI SLF DRILL 5MM MATRIXMIDFACE S: Type: IMPLANTABLE DEVICE | Status: FUNCTIONAL

## 2023-05-23 DEVICE — SCREW SLF DRILL 6MM TI MATRIXMIDFACE: Type: IMPLANTABLE DEVICE | Status: FUNCTIONAL

## 2023-05-23 DEVICE — CLIP APPLIER ETHICON LIGACLIP 9 3/8" SMALL: Type: IMPLANTABLE DEVICE | Status: FUNCTIONAL

## 2023-05-23 DEVICE — FLOSEAL FAST PREP 10ML: Type: IMPLANTABLE DEVICE | Status: FUNCTIONAL

## 2023-05-23 DEVICE — PLATE MATRIXMIDFACE TI ADAPT 20H 0.8MM: Type: IMPLANTABLE DEVICE | Status: FUNCTIONAL

## 2023-05-23 DEVICE — TACHOSIL 4.8 X 4.8CM: Type: IMPLANTABLE DEVICE | Status: FUNCTIONAL

## 2023-05-23 RX ORDER — FAMOTIDINE 10 MG/ML
20 INJECTION INTRAVENOUS EVERY 12 HOURS
Refills: 0 | Status: DISCONTINUED | OUTPATIENT
Start: 2023-05-23 | End: 2023-05-25

## 2023-05-23 RX ORDER — SODIUM CHLORIDE 9 MG/ML
3 INJECTION INTRAMUSCULAR; INTRAVENOUS; SUBCUTANEOUS EVERY 8 HOURS
Refills: 0 | Status: DISCONTINUED | OUTPATIENT
Start: 2023-05-23 | End: 2023-05-23

## 2023-05-23 RX ORDER — CHLORHEXIDINE GLUCONATE 213 G/1000ML
1 SOLUTION TOPICAL ONCE
Refills: 0 | Status: DISCONTINUED | OUTPATIENT
Start: 2023-05-23 | End: 2023-05-23

## 2023-05-23 RX ORDER — SENNA PLUS 8.6 MG/1
2 TABLET ORAL AT BEDTIME
Refills: 0 | Status: DISCONTINUED | OUTPATIENT
Start: 2023-05-23 | End: 2023-05-25

## 2023-05-23 RX ORDER — DEXAMETHASONE 0.5 MG/5ML
4 ELIXIR ORAL EVERY 6 HOURS
Refills: 0 | Status: COMPLETED | OUTPATIENT
Start: 2023-05-23 | End: 2023-05-24

## 2023-05-23 RX ORDER — OXYCODONE HYDROCHLORIDE 5 MG/1
5 TABLET ORAL EVERY 6 HOURS
Refills: 0 | Status: DISCONTINUED | OUTPATIENT
Start: 2023-05-23 | End: 2023-05-25

## 2023-05-23 RX ORDER — SODIUM CHLORIDE 9 MG/ML
1000 INJECTION INTRAMUSCULAR; INTRAVENOUS; SUBCUTANEOUS
Refills: 0 | Status: DISCONTINUED | OUTPATIENT
Start: 2023-05-23 | End: 2023-05-25

## 2023-05-23 RX ORDER — LIDOCAINE HCL 20 MG/ML
0.2 VIAL (ML) INJECTION ONCE
Refills: 0 | Status: DISCONTINUED | OUTPATIENT
Start: 2023-05-23 | End: 2023-05-23

## 2023-05-23 RX ORDER — CYCLOBENZAPRINE HYDROCHLORIDE 10 MG/1
10 TABLET, FILM COATED ORAL EVERY 8 HOURS
Refills: 0 | Status: DISCONTINUED | OUTPATIENT
Start: 2023-05-23 | End: 2023-05-25

## 2023-05-23 RX ORDER — ONDANSETRON 8 MG/1
4 TABLET, FILM COATED ORAL ONCE
Refills: 0 | Status: DISCONTINUED | OUTPATIENT
Start: 2023-05-23 | End: 2023-05-23

## 2023-05-23 RX ORDER — ONDANSETRON 8 MG/1
4 TABLET, FILM COATED ORAL EVERY 6 HOURS
Refills: 0 | Status: DISCONTINUED | OUTPATIENT
Start: 2023-05-23 | End: 2023-05-25

## 2023-05-23 RX ORDER — HYDROMORPHONE HYDROCHLORIDE 2 MG/ML
0.5 INJECTION INTRAMUSCULAR; INTRAVENOUS; SUBCUTANEOUS
Refills: 0 | Status: DISCONTINUED | OUTPATIENT
Start: 2023-05-23 | End: 2023-05-23

## 2023-05-23 RX ORDER — CEFAZOLIN SODIUM 1 G
2000 VIAL (EA) INJECTION EVERY 8 HOURS
Refills: 0 | Status: COMPLETED | OUTPATIENT
Start: 2023-05-23 | End: 2023-05-24

## 2023-05-23 RX ORDER — HYDROMORPHONE HYDROCHLORIDE 2 MG/ML
0.25 INJECTION INTRAMUSCULAR; INTRAVENOUS; SUBCUTANEOUS
Refills: 0 | Status: DISCONTINUED | OUTPATIENT
Start: 2023-05-23 | End: 2023-05-23

## 2023-05-23 RX ORDER — ACETAMINOPHEN 500 MG
1000 TABLET ORAL EVERY 6 HOURS
Refills: 0 | Status: DISCONTINUED | OUTPATIENT
Start: 2023-05-23 | End: 2023-05-25

## 2023-05-23 RX ORDER — MAGNESIUM HYDROXIDE 400 MG/1
30 TABLET, CHEWABLE ORAL EVERY 12 HOURS
Refills: 0 | Status: DISCONTINUED | OUTPATIENT
Start: 2023-05-23 | End: 2023-05-25

## 2023-05-23 RX ADMIN — Medication 1000 MILLIGRAM(S): at 21:24

## 2023-05-23 RX ADMIN — Medication 1000 MILLIGRAM(S): at 21:05

## 2023-05-23 RX ADMIN — Medication 4 MILLIGRAM(S): at 18:57

## 2023-05-23 RX ADMIN — SENNA PLUS 2 TABLET(S): 8.6 TABLET ORAL at 21:06

## 2023-05-23 RX ADMIN — Medication 100 MILLIGRAM(S): at 21:04

## 2023-05-23 RX ADMIN — CYCLOBENZAPRINE HYDROCHLORIDE 10 MILLIGRAM(S): 10 TABLET, FILM COATED ORAL at 23:10

## 2023-05-23 RX ADMIN — SODIUM CHLORIDE 75 MILLILITER(S): 9 INJECTION INTRAMUSCULAR; INTRAVENOUS; SUBCUTANEOUS at 20:07

## 2023-05-23 RX ADMIN — Medication 4 MILLIGRAM(S): at 23:10

## 2023-05-23 NOTE — BRIEF OPERATIVE NOTE - NSICDXBRIEFPREOP_GEN_ALL_CORE_FT
PRE-OP DIAGNOSIS:  Thoracic spine tumor 23-May-2023 16:47:31  Adonay Domingo  Lumbar spine tumor 23-May-2023 16:47:39  Adonay Domingo   [id number] Comprehensive

## 2023-05-23 NOTE — PHYSICAL THERAPY INITIAL EVALUATION ADULT - DID THE PATIENT HAVE SURGERY?
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

## 2023-05-23 NOTE — H&P ADULT - ASSESSMENT
57 year old male who presented with back and leg pain and was found to have T10, L1 and L5 intradural masses. Presents for resection.

## 2023-05-23 NOTE — H&P ADULT - HISTORY OF PRESENT ILLNESS
57 year old male who presented with back and leg pain and was found to have T10, L1 and L5 intradural masses. Presents for resection. No changes in medical status since seen in clinic.

## 2023-05-24 LAB
ANION GAP SERPL CALC-SCNC: 13 MMOL/L — SIGNIFICANT CHANGE UP (ref 5–17)
BASOPHILS # BLD AUTO: 0.05 K/UL — SIGNIFICANT CHANGE UP (ref 0–0.2)
BASOPHILS NFR BLD AUTO: 0.3 % — SIGNIFICANT CHANGE UP (ref 0–2)
BUN SERPL-MCNC: 13 MG/DL — SIGNIFICANT CHANGE UP (ref 7–23)
CALCIUM SERPL-MCNC: 8.5 MG/DL — SIGNIFICANT CHANGE UP (ref 8.4–10.5)
CHLORIDE SERPL-SCNC: 104 MMOL/L — SIGNIFICANT CHANGE UP (ref 96–108)
CO2 SERPL-SCNC: 23 MMOL/L — SIGNIFICANT CHANGE UP (ref 22–31)
CREAT SERPL-MCNC: 0.86 MG/DL — SIGNIFICANT CHANGE UP (ref 0.5–1.3)
EGFR: 101 ML/MIN/1.73M2 — SIGNIFICANT CHANGE UP
EOSINOPHIL # BLD AUTO: 0 K/UL — SIGNIFICANT CHANGE UP (ref 0–0.5)
EOSINOPHIL NFR BLD AUTO: 0 % — SIGNIFICANT CHANGE UP (ref 0–6)
GLUCOSE SERPL-MCNC: 144 MG/DL — HIGH (ref 70–99)
HCT VFR BLD CALC: 43.1 % — SIGNIFICANT CHANGE UP (ref 39–50)
HGB BLD-MCNC: 15.1 G/DL — SIGNIFICANT CHANGE UP (ref 13–17)
IMM GRANULOCYTES NFR BLD AUTO: 0.5 % — SIGNIFICANT CHANGE UP (ref 0–0.9)
LYMPHOCYTES # BLD AUTO: 0.75 K/UL — LOW (ref 1–3.3)
LYMPHOCYTES # BLD AUTO: 4.6 % — LOW (ref 13–44)
MCHC RBC-ENTMCNC: 29.8 PG — SIGNIFICANT CHANGE UP (ref 27–34)
MCHC RBC-ENTMCNC: 35 GM/DL — SIGNIFICANT CHANGE UP (ref 32–36)
MCV RBC AUTO: 85 FL — SIGNIFICANT CHANGE UP (ref 80–100)
MONOCYTES # BLD AUTO: 0.65 K/UL — SIGNIFICANT CHANGE UP (ref 0–0.9)
MONOCYTES NFR BLD AUTO: 4 % — SIGNIFICANT CHANGE UP (ref 2–14)
NEUTROPHILS # BLD AUTO: 14.76 K/UL — HIGH (ref 1.8–7.4)
NEUTROPHILS NFR BLD AUTO: 90.6 % — HIGH (ref 43–77)
NRBC # BLD: 0 /100 WBCS — SIGNIFICANT CHANGE UP (ref 0–0)
PLATELET # BLD AUTO: 233 K/UL — SIGNIFICANT CHANGE UP (ref 150–400)
POTASSIUM SERPL-MCNC: 4.2 MMOL/L — SIGNIFICANT CHANGE UP (ref 3.5–5.3)
POTASSIUM SERPL-SCNC: 4.2 MMOL/L — SIGNIFICANT CHANGE UP (ref 3.5–5.3)
RBC # BLD: 5.07 M/UL — SIGNIFICANT CHANGE UP (ref 4.2–5.8)
RBC # FLD: 12.8 % — SIGNIFICANT CHANGE UP (ref 10.3–14.5)
SODIUM SERPL-SCNC: 140 MMOL/L — SIGNIFICANT CHANGE UP (ref 135–145)
WBC # BLD: 16.29 K/UL — HIGH (ref 3.8–10.5)
WBC # FLD AUTO: 16.29 K/UL — HIGH (ref 3.8–10.5)

## 2023-05-24 RX ORDER — ACETAMINOPHEN 500 MG
1000 TABLET ORAL ONCE
Refills: 0 | Status: COMPLETED | OUTPATIENT
Start: 2023-05-24 | End: 2023-05-24

## 2023-05-24 RX ORDER — OXYCODONE HYDROCHLORIDE 5 MG/1
10 TABLET ORAL EVERY 4 HOURS
Refills: 0 | Status: DISCONTINUED | OUTPATIENT
Start: 2023-05-24 | End: 2023-05-25

## 2023-05-24 RX ORDER — SIMETHICONE 80 MG/1
80 TABLET, CHEWABLE ORAL ONCE
Refills: 0 | Status: DISCONTINUED | OUTPATIENT
Start: 2023-05-24 | End: 2023-05-25

## 2023-05-24 RX ORDER — POLYETHYLENE GLYCOL 3350 17 G/17G
17 POWDER, FOR SOLUTION ORAL
Refills: 0 | Status: DISCONTINUED | OUTPATIENT
Start: 2023-05-24 | End: 2023-05-25

## 2023-05-24 RX ORDER — ENOXAPARIN SODIUM 100 MG/ML
40 INJECTION SUBCUTANEOUS EVERY 24 HOURS
Refills: 0 | Status: DISCONTINUED | OUTPATIENT
Start: 2023-05-24 | End: 2023-05-25

## 2023-05-24 RX ADMIN — CYCLOBENZAPRINE HYDROCHLORIDE 10 MILLIGRAM(S): 10 TABLET, FILM COATED ORAL at 08:10

## 2023-05-24 RX ADMIN — Medication 1000 MILLIGRAM(S): at 12:25

## 2023-05-24 RX ADMIN — Medication 4 MILLIGRAM(S): at 05:40

## 2023-05-24 RX ADMIN — Medication 100 MILLIGRAM(S): at 05:40

## 2023-05-24 RX ADMIN — ENOXAPARIN SODIUM 40 MILLIGRAM(S): 100 INJECTION SUBCUTANEOUS at 17:58

## 2023-05-24 RX ADMIN — Medication 1000 MILLIGRAM(S): at 17:25

## 2023-05-24 RX ADMIN — Medication 1000 MILLIGRAM(S): at 05:40

## 2023-05-24 RX ADMIN — CYCLOBENZAPRINE HYDROCHLORIDE 10 MILLIGRAM(S): 10 TABLET, FILM COATED ORAL at 23:14

## 2023-05-24 RX ADMIN — POLYETHYLENE GLYCOL 3350 17 GRAM(S): 17 POWDER, FOR SOLUTION ORAL at 17:51

## 2023-05-24 RX ADMIN — Medication 1000 MILLIGRAM(S): at 11:25

## 2023-05-24 RX ADMIN — Medication 1000 MILLIGRAM(S): at 06:25

## 2023-05-24 RX ADMIN — SENNA PLUS 2 TABLET(S): 8.6 TABLET ORAL at 23:14

## 2023-05-24 RX ADMIN — CYCLOBENZAPRINE HYDROCHLORIDE 10 MILLIGRAM(S): 10 TABLET, FILM COATED ORAL at 16:25

## 2023-05-24 RX ADMIN — Medication 4 MILLIGRAM(S): at 11:28

## 2023-05-24 RX ADMIN — Medication 1000 MILLIGRAM(S): at 16:25

## 2023-05-24 RX ADMIN — Medication 400 MILLIGRAM(S): at 23:15

## 2023-05-25 ENCOUNTER — TRANSCRIPTION ENCOUNTER (OUTPATIENT)
Age: 58
End: 2023-05-25

## 2023-05-25 VITALS
DIASTOLIC BLOOD PRESSURE: 77 MMHG | HEART RATE: 76 BPM | OXYGEN SATURATION: 98 % | SYSTOLIC BLOOD PRESSURE: 124 MMHG | TEMPERATURE: 99 F | RESPIRATION RATE: 18 BRPM

## 2023-05-25 LAB
ANION GAP SERPL CALC-SCNC: 10 MMOL/L — SIGNIFICANT CHANGE UP (ref 5–17)
BASOPHILS # BLD AUTO: 0.01 K/UL — SIGNIFICANT CHANGE UP (ref 0–0.2)
BASOPHILS NFR BLD AUTO: 0.1 % — SIGNIFICANT CHANGE UP (ref 0–2)
BUN SERPL-MCNC: 17 MG/DL — SIGNIFICANT CHANGE UP (ref 7–23)
CALCIUM SERPL-MCNC: 8.6 MG/DL — SIGNIFICANT CHANGE UP (ref 8.4–10.5)
CHLORIDE SERPL-SCNC: 106 MMOL/L — SIGNIFICANT CHANGE UP (ref 96–108)
CO2 SERPL-SCNC: 24 MMOL/L — SIGNIFICANT CHANGE UP (ref 22–31)
CREAT SERPL-MCNC: 0.77 MG/DL — SIGNIFICANT CHANGE UP (ref 0.5–1.3)
EGFR: 104 ML/MIN/1.73M2 — SIGNIFICANT CHANGE UP
EOSINOPHIL # BLD AUTO: 0.01 K/UL — SIGNIFICANT CHANGE UP (ref 0–0.5)
EOSINOPHIL NFR BLD AUTO: 0.1 % — SIGNIFICANT CHANGE UP (ref 0–6)
GLUCOSE SERPL-MCNC: 123 MG/DL — HIGH (ref 70–99)
HCT VFR BLD CALC: 39.4 % — SIGNIFICANT CHANGE UP (ref 39–50)
HGB BLD-MCNC: 13.6 G/DL — SIGNIFICANT CHANGE UP (ref 13–17)
IMM GRANULOCYTES NFR BLD AUTO: 0.6 % — SIGNIFICANT CHANGE UP (ref 0–0.9)
LYMPHOCYTES # BLD AUTO: 1.14 K/UL — SIGNIFICANT CHANGE UP (ref 1–3.3)
LYMPHOCYTES # BLD AUTO: 6.6 % — LOW (ref 13–44)
MCHC RBC-ENTMCNC: 29.7 PG — SIGNIFICANT CHANGE UP (ref 27–34)
MCHC RBC-ENTMCNC: 34.5 GM/DL — SIGNIFICANT CHANGE UP (ref 32–36)
MCV RBC AUTO: 86 FL — SIGNIFICANT CHANGE UP (ref 80–100)
MONOCYTES # BLD AUTO: 1.07 K/UL — HIGH (ref 0–0.9)
MONOCYTES NFR BLD AUTO: 6.2 % — SIGNIFICANT CHANGE UP (ref 2–14)
NEUTROPHILS # BLD AUTO: 14.91 K/UL — HIGH (ref 1.8–7.4)
NEUTROPHILS NFR BLD AUTO: 86.4 % — HIGH (ref 43–77)
NRBC # BLD: 0 /100 WBCS — SIGNIFICANT CHANGE UP (ref 0–0)
PLATELET # BLD AUTO: 205 K/UL — SIGNIFICANT CHANGE UP (ref 150–400)
POTASSIUM SERPL-MCNC: 4.1 MMOL/L — SIGNIFICANT CHANGE UP (ref 3.5–5.3)
POTASSIUM SERPL-SCNC: 4.1 MMOL/L — SIGNIFICANT CHANGE UP (ref 3.5–5.3)
RBC # BLD: 4.58 M/UL — SIGNIFICANT CHANGE UP (ref 4.2–5.8)
RBC # FLD: 13 % — SIGNIFICANT CHANGE UP (ref 10.3–14.5)
SODIUM SERPL-SCNC: 140 MMOL/L — SIGNIFICANT CHANGE UP (ref 135–145)
WBC # BLD: 17.24 K/UL — HIGH (ref 3.8–10.5)
WBC # FLD AUTO: 17.24 K/UL — HIGH (ref 3.8–10.5)

## 2023-05-25 PROCEDURE — 72020 X-RAY EXAM OF SPINE 1 VIEW: CPT

## 2023-05-25 PROCEDURE — 97162 PT EVAL MOD COMPLEX 30 MIN: CPT

## 2023-05-25 PROCEDURE — 86923 COMPATIBILITY TEST ELECTRIC: CPT

## 2023-05-25 PROCEDURE — 88307 TISSUE EXAM BY PATHOLOGIST: CPT

## 2023-05-25 PROCEDURE — C1713: CPT

## 2023-05-25 PROCEDURE — 80048 BASIC METABOLIC PNL TOTAL CA: CPT

## 2023-05-25 PROCEDURE — 88342 IMHCHEM/IMCYTCHM 1ST ANTB: CPT

## 2023-05-25 PROCEDURE — 85025 COMPLETE CBC W/AUTO DIFF WBC: CPT

## 2023-05-25 PROCEDURE — C1889: CPT

## 2023-05-25 PROCEDURE — 97530 THERAPEUTIC ACTIVITIES: CPT

## 2023-05-25 PROCEDURE — 97116 GAIT TRAINING THERAPY: CPT

## 2023-05-25 PROCEDURE — 36415 COLL VENOUS BLD VENIPUNCTURE: CPT

## 2023-05-25 RX ORDER — SENNA PLUS 8.6 MG/1
2 TABLET ORAL
Qty: 0 | Refills: 0 | DISCHARGE
Start: 2023-05-25

## 2023-05-25 RX ORDER — ACETAMINOPHEN 500 MG
2 TABLET ORAL
Qty: 30 | Refills: 0
Start: 2023-05-25

## 2023-05-25 RX ORDER — CYCLOBENZAPRINE HYDROCHLORIDE 10 MG/1
1 TABLET, FILM COATED ORAL
Qty: 21 | Refills: 0
Start: 2023-05-25 | End: 2023-05-31

## 2023-05-25 RX ORDER — POLYETHYLENE GLYCOL 3350 17 G/17G
17 POWDER, FOR SOLUTION ORAL
Qty: 0 | Refills: 0 | DISCHARGE
Start: 2023-05-25

## 2023-05-25 RX ORDER — OXYCODONE HYDROCHLORIDE 5 MG/1
1 TABLET ORAL
Qty: 12 | Refills: 0
Start: 2023-05-25 | End: 2023-05-27

## 2023-05-25 RX ADMIN — Medication 1000 MILLIGRAM(S): at 06:16

## 2023-05-25 RX ADMIN — POLYETHYLENE GLYCOL 3350 17 GRAM(S): 17 POWDER, FOR SOLUTION ORAL at 06:19

## 2023-05-25 RX ADMIN — Medication 1000 MILLIGRAM(S): at 15:16

## 2023-05-25 RX ADMIN — Medication 5 MILLIGRAM(S): at 10:19

## 2023-05-25 RX ADMIN — Medication 1000 MILLIGRAM(S): at 07:00

## 2023-05-25 RX ADMIN — CYCLOBENZAPRINE HYDROCHLORIDE 10 MILLIGRAM(S): 10 TABLET, FILM COATED ORAL at 08:01

## 2023-05-25 NOTE — DISCHARGE NOTE NURSING/CASE MANAGEMENT/SOCIAL WORK - NSDCPEFALRISK_GEN_ALL_CORE
For information on Fall & Injury Prevention, visit: [email or web address] OR  [email or web address] OR  https://www.cdc.gov/steadi/patient.html

## 2023-05-25 NOTE — DISCHARGE NOTE PROVIDER - NSDCCPTREATMENT_GEN_ALL_CORE_FT
PRINCIPAL PROCEDURE  Procedure: Lumbar laminectomy for tumor  Findings and Treatment: 5/23/23 S/p T10, L1, L5 Laminplasty and Resection of intradural mass  -Please follow up with Dr. Decker in 1-2 week; you may call the office to make an appointment at your earliest convenience.  -You have been started on a new medication, oxycodone.  Oxycodone helps to control pain. Oxycodone is an additive medication so it is important to limit the use of this medication.  Side effects include nausea, vomiting, constipation, dry mouth, lightheadedness, dizziness or drowsiness.  It is important to tell your physician if you experience any of these side effects.  Please take stool softeners to avoid drug induced constipation.  -No strenous activity. No heavy lifting. Do not return to work or operate a motor vehicle until cleared by physician. DO NOT start aspirin / NSAIDS (motrin, advil ...) until cleared by your Neurosurgeon.  -You may shower on the 3rd day after you surgery.  No soaking in tub,  Do not remove steri strips. Do not apply any ointments to incision.  -Return to ER immediately for any of the following: fever, bleeding, new onset numbness/tingling/weakness, nausea and/or vomiting, chest pain, shortness of breath, confusion, seizure, altered mental status, urinary and/or fecal incontinence or retention.       PRINCIPAL PROCEDURE  Procedure: Lumbar laminectomy for tumor  Findings and Treatment: 5/23/23 S/p T10, L1, L5 Laminplasty and Resection of intradural mass  -Please follow up with Dr. Decker in 1-2 week; you may call the office to make an appointment at your earliest convenience.  -You have been started on a new medication, oxycodone.  Oxycodone helps to control pain. Oxycodone is an additive medication so it is important to limit the use of this medication.  Side effects include nausea, vomiting, constipation, dry mouth, lightheadedness, dizziness or drowsiness.  It is important to tell your physician if you experience any of these side effects.  Please take stool softeners to avoid drug induced constipation.  -No strenous activity. No heavy lifting. Do not return to work or operate a motor vehicle until cleared by physician. DO NOT start aspirin / NSAIDS (motrin, advil ...) until cleared by your Neurosurgeon.  -You may shower on the 3rd day after you surgery.  No soaking in tub,  Do not remove steri strips. Do not apply any ointments to incision.  -Return to ER immediately for any of the following: fever, bleeding, new onset numbness/tingling/weakness, nausea and/or vomiting, chest pain, shortness of breath, confusion, seizure, altered mental status, urinary and/or fecal incontinence or retention., intoleratable headaches not improved with medication

## 2023-05-25 NOTE — DISCHARGE NOTE PROVIDER - NSDCQMSTROKE_NEU_ALL_CORE
No Double Island Pedicle Flap Text: We discussed various closure modalities with the patient, including healing by second intention, primary closure, skin graft and various flaps.  The location and configuration of the defect indicated that a double or bilateral island pedicle flap would result in the least disturbance of the position and function of the surrounding anatomic structures, and provide the best result.  The technique, its benefits, alternatives and risks were discussed with the patient.  The patient underwent the procedure as follows: The patient was positioned supine on the operating room table.  The area of the defect and the surrounding skin were anesthetized with buffered 1% lidocaine with epinephrine.  The area was washed with chlorhexidine.  Sterile drapes were applied. \\n\\nThe wound edges were debeveled and extensively undermined.    \\nA double island pedicle flap was designed and incised down to subcutaneous fat, severing the flap entirely from surrounding epidermal and dermal attachments.  Careful deep undermining was performed to create a single subcutaneous pedicle on either side of the defect.  The deepest portion of each subcutaneous pedicle was angled toward the primary defect in order to create hinge-mobility for advancement of the flap.  Extreme care was taken to preserve a portion of the axial plexus to optimize adequate vascular supply to the flap.  Hemostasis was achieved with spot electrodesiccation.  The double island pedicle flaps were advanced into position to cover the primary defect and a key suture was placed to close the secondary defect.  The flap was trimmed to fit the contour of the primary defect.  Additional buried interrupted sutures were used to secure the flap into place and close the secondary defect created by the flap advancement.  Final cutaneous approximation was achieved.  The closure was manually tested and found to be sound for strength and hemostasis.

## 2023-05-25 NOTE — DISCHARGE NOTE PROVIDER - NSDCMRMEDTOKEN_GEN_ALL_CORE_FT
ibuprofen 800 mg oral tablet: 1 tab(s) orally every 8 hours   oxycodone-acetaminophen 5 mg-325 mg oral tablet: 1 tab(s) orally every 6 hours, As Needed -for moderate pain MDD:004  Tylenol 325 mg oral tablet: 2 tab(s) orally every 6 hours    cyclobenzaprine 10 mg oral tablet: 1 tab(s) orally every 8 hours as needed for  muscle spasm MDD: 3  oxyCODONE 5 mg oral tablet: 1 tab(s) orally every 6 hours MDD: 4  Physical Therapy: Dx: T10-T11, L1, L5 laminoplasty, Intradural Mass Resection  Outpatient Physical Therapy 2-3x weekly x6 weeks  Please evaluate and treat  Dr. Ivan Gross  [phone] MDD: 0  polyethylene glycol 3350 oral powder for reconstitution: 17 gram(s) orally 2 times a day  senna leaf extract oral tablet: 2 tab(s) orally once a day (at bedtime)  Tylenol Extra Strength 500 mg oral tablet: 2 tab(s) orally every 8 hours

## 2023-05-25 NOTE — DISCHARGE NOTE PROVIDER - NSDCCPCAREPLAN_GEN_ALL_CORE_FT
PRINCIPAL DISCHARGE DIAGNOSIS  Diagnosis: Mass of spine  Assessment and Plan of Treatment: s/p Laminoplasty and Resection 5/23/23

## 2023-05-25 NOTE — PROGRESS NOTE ADULT - SUBJECTIVE AND OBJECTIVE BOX
Patient seen and examined at bedside.    --Anticoagulation--    T(C): 36.5 (05-23-23 @ 17:00), Max: 36.6 (05-23-23 @ 07:30)  HR: 79 (05-23-23 @ 17:30) (77 - 91)  BP: 137/81 (05-23-23 @ 17:30) (129/74 - 137/81)  RR: 16 (05-23-23 @ 17:30) (16 - 18)  SpO2: 95% (05-23-23 @ 17:30) (95% - 100%)  Wt(kg): --    Exam: BUE 5/5, BLE 5/5, SILT, neg trevino/clonus  
Subjective: Patient seen and examined lying in bed. C/o intermittent incisional pain, x1 episode of transient lower back pain. Also c/o HA while ambulating this AM.  Report not having a BM yet. Denies having any shortness of breath or chest pain.    Overnight events: intermittent lower back pain resolved with medication    Vital Signs Last 24 Hrs  T(C): 36.5 (05-25-23 @ 08:08), Max: 37.1 (05-25-23 @ 00:29)  T(F): 97.7 (05-25-23 @ 08:08), Max: 98.8 (05-25-23 @ 00:29)  HR: 77 (05-25-23 @ 08:08) (65 - 77)  BP: 114/75 (05-25-23 @ 08:08) (114/72 - 131/76)  BP(mean): --  RR: 18 (05-25-23 @ 08:08) (16 - 18)  SpO2: 96% (05-25-23 @ 08:08) (94% - 97%)    I&O's Detail    24 May 2023 07:01  -  25 May 2023 07:00  --------------------------------------------------------  IN:    Oral Fluid: 1557 mL    sodium chloride 0.9%: 825 mL  Total IN: 2382 mL    OUT:    Indwelling Catheter - Urethral (mL): 1650 mL  Total OUT: 1650 mL    Total NET: 732 mL    LABS:                        13.6   17.24 )-----------( 205      ( 25 May 2023 06:44 )             39.4   05-25    140  |  106  |  17  ----------------------------<  123<H>  4.1   |  24  |  0.77    Ca    8.6      25 May 2023 06:44      IMAGING:  < from: Xray Spine Single View (05.23.23 @ 16:39) >  IMPRESSION:  Expected postsurgical hardware in the lumbar spine. No retained surgical   instruments.    < end of copied text >      MEDICATIONS  (STANDING):  acetaminophen     Tablet .. 1000 milliGRAM(s) Oral every 6 hours  bisacodyl 5 milliGRAM(s) Oral every 12 hours  cyclobenzaprine 10 milliGRAM(s) Oral every 8 hours  enoxaparin Injectable 40 milliGRAM(s) SubCutaneous every 24 hours  polyethylene glycol 3350 17 Gram(s) Oral two times a day  senna 2 Tablet(s) Oral at bedtime  simethicone 80 milliGRAM(s) Chew once  sodium chloride 0.9%. 1000 milliLiter(s) (75 mL/Hr) IV Continuous <Continuous>    MEDICATIONS  (PRN):  famotidine    Tablet 20 milliGRAM(s) Oral every 12 hours PRN Dyspepsia  magnesium hydroxide Suspension 30 milliLiter(s) Oral every 12 hours PRN Constipation  ondansetron Injectable 4 milliGRAM(s) IV Push every 6 hours PRN Nausea and/or Vomiting  oxyCODONE    IR 5 milliGRAM(s) Oral every 6 hours PRN Moderate Pain (4 - 6)  oxyCODONE    IR 10 milliGRAM(s) Oral every 4 hours PRN Severe Pain (7 - 10)        PHYSICAL EXAM  Constitutional: No Acute Distress   Neurological: AAOx3, Following Commands, Moving all Extremities   Motor exam:          Upper extremity                         Delt     Bicep     Tricep    HG                                                 R         5/5        5/5        5/5       5/5                                               L          5/5        5/5        5/5       5/5          Lower extremity                        HF         KF        KE       DF         PF                                                  R        5/5        5/5        5/5       5/5         5/5                                               L         5/5        5/5       5/5       5/5          5/5                                                 Sensation: [X] intact to light touch  [] decreased: intermittent numbness behind left thigh  Pulmonary: Clear to Auscultation, No rales, No rhonchi, No wheezes   Cardiovascular: S1, S2, Regular rate and rhythm   Gastrointestinal: Soft, Non-tender, Non-distended   Extremities: No calf tenderness   Incision: clean, dry, intact        
SUBJECTIVE: This is my initial visit with this pt. Laying supine in bed with CPAP machine on this AM. Doing well, no HA, just some Rt Thumb pad numbness.     OVERNIGHT EVENTS: None    Vital Signs Last 24 Hrs  T(C): 36.7 (24 May 2023 09:32), Max: 36.9 (23 May 2023 23:53)  T(F): 98 (24 May 2023 09:32), Max: 98.4 (23 May 2023 23:53)  HR: 76 (24 May 2023 09:32) (57 - 91)  BP: 104/68 (24 May 2023 09:32) (104/68 - 140/79)  BP(mean): 93 (23 May 2023 21:05) (93 - 104)  RR: 16 (24 May 2023 09:32) (15 - 17)  SpO2: 94% (24 May 2023 09:32) (94% - 100%)    Parameters below as of 24 May 2023 09:32  Patient On (Oxygen Delivery Method): room air      IVF: [ ] IVL [ X] NS 75/H  DIET: [X ] Regular [ ] CCD [ ] Renal [ ] Puree [ ] Dysphagia [ ] Tube Feeds:   PCA: [ ] YES [X ] NO   FREEMAN: [ X] YES [ ] NO [ ] VOID Plan to DC 5/25 when OOB/More active  BM: [ ] YES [X ] NO     DRAINS: NA    PHYSICAL EXAM:    Constitutional: No Acute Distress     Neurological: AAOx3, Following Commands, Moving all Extremities     Motor exam:          Upper extremity                         Delt     Bicep     Tricep    HG                                                 R         5/5        5/5        5/5       5/5                                               L          5/5        5/5        5/5       5/5          Lower extremity                        HF         KF        KE       DF         PF                                                  R        5/5        5/5        5/5       5/5         5/5                                               L         5/5        5/5       5/5       5/5          5/5                                                 Sensation: [X] intact to light touch  [] decreased:     Pulmonary: Clear to Auscultation, No rales, No rhonchi, No wheezes     Cardiovascular: S1, S2, Regular rate and rhythm     Gastrointestinal: Soft, Non-tender, Non-distended     Extremities: No calf tenderness     Incision: Sm Dsg x3-CDI/Flat    LABS:                        15.1   16.29 )-----------( 233      ( 24 May 2023 06:30 )             43.1    05-24    140  |  104  |  13  ----------------------------<  144<H>  4.2   |  23  |  0.86    Ca    8.5      24 May 2023 06:30    MRSA/MSSA PCR (05.17.23 @ 17:47)    MRSA PCR Result.: NotDetec    Staph aureus PCR Result: NotDetec    IMAGING:  < from: Xray Spine Single View (05.23.23 @ 16:39) >  IMPRESSION:  Expected postsurgical hardware in the lumbar spine. No retained surgical   instruments.    < end of copied text >    MEDICATIONS  (STANDING):  acetaminophen     Tablet .. 1000 milliGRAM(s) Oral every 6 hours  cyclobenzaprine 10 milliGRAM(s) Oral every 8 hours  senna 2 Tablet(s) Oral at bedtime  sodium chloride 0.9%. 1000 milliLiter(s) (75 mL/Hr) IV Continuous <Continuous>  vancomycin  IVPB 1250 milliGRAM(s) IV Intermittent once    MEDICATIONS  (PRN):  bisacodyl 5 milliGRAM(s) Oral every 12 hours PRN Constipation  famotidine    Tablet 20 milliGRAM(s) Oral every 12 hours PRN Dyspepsia  magnesium hydroxide Suspension 30 milliLiter(s) Oral every 12 hours PRN Constipation  ondansetron Injectable 4 milliGRAM(s) IV Push every 6 hours PRN Nausea and/or Vomiting  oxyCODONE    IR 5 milliGRAM(s) Oral every 6 hours PRN Moderate Pain (4 - 6)

## 2023-05-25 NOTE — DISCHARGE NOTE PROVIDER - NSDCFUADDINST_GEN_ALL_CORE_FT
Return to ER immediately for any of the following: fever, bleeding, new onset numbness/tingling/weakness, nausea and/or vomiting, chest pain, shortness of breath, confusion, seizure, altered mental status, urinary and/or fecal incontinence or retention.

## 2023-05-25 NOTE — DISCHARGE NOTE PROVIDER - CARE PROVIDER_API CALL
Ivan Gross; CJ)  Neurosurgery  [address], [address]  [place] [address]  Phone: [phone]  Fax: [phone]  Follow Up Time:

## 2023-05-25 NOTE — DISCHARGE NOTE PROVIDER - HOSPITAL COURSE
ASSESSMENT: 57 year old male who presented with back and leg pain and was found to have T10, L1 and L5 intradural masses. Presents for resection.  Adm 5/23 and s/p T10, L1, and L5 osteoplastic laminoplasties for resection of intradural extramedullary tumors. Physical therapy recommends outpatient PT. Patient passed trial of void after childers removal. On the day of discharge he is medically and neurologically stable for discharge to home.   ASSESSMENT: 57 year old male who presented with back and leg pain and was found to have T10, L1 and L5 intradural masses. Admitted on 5/23 and s/p T10, L1, and L5 osteoplastic laminoplasties for resection of intradural extramedullary tumors. Physical therapy recommends home with outpatient PT. Patient passed trial of void after childers removal. On the day of discharge he is medically and neurologically stable for discharge to home.

## 2023-05-25 NOTE — PROGRESS NOTE ADULT - ASSESSMENT
ASSESSMENT: 57 year old male who presented with back and leg pain and was found to have T10, L1 and L5 intradural masses. Presents for resection.  Adm 5/23 and s/p T10, L1, and L5 osteoplastic laminoplasties for resection of intradural extramedullary tumors.    PLAN:    NEURO:    H/o intradural mass s/p laminoplasty/resection  q4h neuro checks  Pain control with oxycodone, tylenol, flexeril  PT recommending outpatient PT    CARDS:  -160    PULM:  sat > 92%  encourage incentive spirometry    RENAL:    NS @ 75cc/hr, IVL when tolerating PO intake  D/c childers, TOV, bladder scan prn if patient doesn't in 4-6hrs      GASTRO:   Regular Diet  C/w senna, miralax - add dulcolax, ?Last BM 5/22  ---> Stress ulcer prophylaxis:  famotidine prn    HEME:    monitor H/H    ---> DVT prophylaxis: SCDs, SQL    ENDO:  euglycemia    ID:  afebrile  mild leukocytosis likely steroid induced from short course of dexamethasone    Disposition: Home pending TOV    Will speak with neurosurgeon  #[id number]
57 year old male who presented with back and leg pain and was found to have T10, L1 and L5 intradural masses. Presents for resection. No changes in medical status since seen in clinic. (23 May 2023 08:07)    PROCEDURE: Adm 5/23 T10-11, L1 and L5 laminoplasty for Intradural mass rsxn      POD#1    PLAN:  Neuro: Intradural mass-?when can start ppx SQL, FU path, Flat till 5pm then HOB up slowly to OOB. Will DC Hernandez 5/25 once OOB/more active. Has own CPAP he uses. Dex x 4 doses off now, Leukocytosis reactive from steroids-monitor.  Inc activity/OOB.     Respiratory: Patient instructed to use incentive spirometer [ X] YES [ ] NO              DVT ppx: [ ] SQL ? FU [ ] SQH and Venodynes [ ] Left [ ] Right [ X] Bilateral    Discharge Planning:  The patient was evaluated by PT and recommended TBD upon further assessment FU. He was subsequently DC on >>> in stable condition.          
57M diverticulitis, liver infection, JESSICA p/f LLE pain and LBP found to have 3 intradural lesions now s/p L1, and L5 osteoplastic laminoplasties for resection of intradural extramedullary tumors    -4h PACU then 7T  -Flat for 24h   -4q6 for 24h  -PT/OT once no longer flat  -TOV once ambulatory  -no drains, childers, tri, no PCA

## 2023-05-25 NOTE — DISCHARGE NOTE PROVIDER - NSDCACTIVITY_GEN_ALL_CORE
Return to Work/School allowed/Do not drive or operate machinery/Showering allowed/Do not make important decisions/Walking - Indoors allowed/No heavy lifting/straining/Walking - Outdoors allowed

## 2023-05-25 NOTE — DISCHARGE NOTE NURSING/CASE MANAGEMENT/SOCIAL WORK - PATIENT PORTAL LINK FT
You can access the FollowMyHealth Patient Portal offered by [facility] by registering at the following website: [email or web address]. By joining [facility]’s FollowMyHealth portal, you will also be able to view your health information using other applications (apps) compatible with our system.

## 2023-05-26 LAB — SURGICAL PATHOLOGY STUDY: SIGNIFICANT CHANGE UP

## 2023-05-30 ENCOUNTER — NON-APPOINTMENT (OUTPATIENT)
Age: 58
End: 2023-05-30

## 2023-05-31 NOTE — HISTORY OF PRESENT ILLNESS
[FreeTextEntry1] : 56 YO MALE WITH PMH OF SLEEP APNEA WITH RECENT ADMISSION AT [facility] WITH LIVER ABSCESS OF UNCLEAR ETIOLOGY DOES HAVE A HX OF DIVERTICULOSIS AND ? IF HE HAD DIVERTICULITS PT HAD IR ASPIARTION AND THEN CONTINUE FEVERS AND HAD LIVER DRAINAGE IN THE OPERATING ROOM UNFORTUNATELY  ALL CX WERE NEGATIVE AND PT WAS PLACED ON EMPIRIC MERREM AND DISCHARGED PT HAD A SMALL RASH BUT IT BECAME WORSE AND ABX WERE CHANGED TO IV CIPRO AND ORAL FLAGYL\par PLAN WAS FOR 6 WEEKS IV ABX  THROUGH 8/26 \par HERE FOR FOLLOWUP WITH HIS WIFE
Quality 47: Advance Care Plan: Advance care planning not documented, reason not otherwise specified.
Quality 431: Preventive Care And Screening: Unhealthy Alcohol Use - Screening: Patient not identified as an unhealthy alcohol user when screened for unhealthy alcohol use using a systematic screening method
Quality 226: Preventive Care And Screening: Tobacco Use: Screening And Cessation Intervention: Patient screened for tobacco use and is an ex/non-smoker
Detail Level: Detailed
Quality 130: Documentation Of Current Medications In The Medical Record: Current Medications Documented
Quality 110: Preventive Care And Screening: Influenza Immunization: Influenza immunization was not ordered or administered, reason not given

## 2023-06-05 ENCOUNTER — APPOINTMENT (OUTPATIENT)
Dept: NEUROSURGERY | Facility: CLINIC | Age: 58
End: 2023-06-05
Payer: COMMERCIAL

## 2023-06-05 ENCOUNTER — INPATIENT (INPATIENT)
Facility: HOSPITAL | Age: 58
LOS: 5 days | Discharge: HOME | DRG: 29 | End: 2023-06-11
Attending: NEUROLOGICAL SURGERY | Admitting: NEUROLOGICAL SURGERY
Payer: COMMERCIAL

## 2023-06-05 ENCOUNTER — TRANSCRIPTION ENCOUNTER (OUTPATIENT)
Age: 58
End: 2023-06-05

## 2023-06-05 ENCOUNTER — NON-APPOINTMENT (OUTPATIENT)
Age: 58
End: 2023-06-05

## 2023-06-05 VITALS
DIASTOLIC BLOOD PRESSURE: 89 MMHG | TEMPERATURE: 98 F | OXYGEN SATURATION: 100 % | HEART RATE: 74 BPM | SYSTOLIC BLOOD PRESSURE: 148 MMHG | RESPIRATION RATE: 18 BRPM

## 2023-06-05 DIAGNOSIS — G96.01 CRANIAL CEREBROSPINAL FLUID LEAK, SPONTANEOUS: ICD-10-CM

## 2023-06-05 DIAGNOSIS — Z98.890 OTHER SPECIFIED POSTPROCEDURAL STATES: Chronic | ICD-10-CM

## 2023-06-05 DIAGNOSIS — Z90.89 ACQUIRED ABSENCE OF OTHER ORGANS: Chronic | ICD-10-CM

## 2023-06-05 LAB
ALBUMIN SERPL ELPH-MCNC: 4.3 G/DL — SIGNIFICANT CHANGE UP (ref 3.3–5)
ALP SERPL-CCNC: 63 U/L — SIGNIFICANT CHANGE UP (ref 40–120)
ALT FLD-CCNC: 41 U/L — SIGNIFICANT CHANGE UP (ref 10–45)
ANION GAP SERPL CALC-SCNC: 13 MMOL/L — SIGNIFICANT CHANGE UP (ref 5–17)
APTT BLD: 29.2 SEC — SIGNIFICANT CHANGE UP (ref 27.5–35.5)
AST SERPL-CCNC: 20 U/L — SIGNIFICANT CHANGE UP (ref 10–40)
BASOPHILS # BLD AUTO: 0.1 K/UL — SIGNIFICANT CHANGE UP (ref 0–0.2)
BASOPHILS NFR BLD AUTO: 0.5 % — SIGNIFICANT CHANGE UP (ref 0–2)
BILIRUB SERPL-MCNC: 0.4 MG/DL — SIGNIFICANT CHANGE UP (ref 0.2–1.2)
BLD GP AB SCN SERPL QL: NEGATIVE — SIGNIFICANT CHANGE UP
BUN SERPL-MCNC: 13 MG/DL — SIGNIFICANT CHANGE UP (ref 7–23)
CALCIUM SERPL-MCNC: 9.3 MG/DL — SIGNIFICANT CHANGE UP (ref 8.4–10.5)
CHLORIDE SERPL-SCNC: 103 MMOL/L — SIGNIFICANT CHANGE UP (ref 96–108)
CO2 SERPL-SCNC: 25 MMOL/L — SIGNIFICANT CHANGE UP (ref 22–31)
CREAT SERPL-MCNC: 0.83 MG/DL — SIGNIFICANT CHANGE UP (ref 0.5–1.3)
EGFR: 102 ML/MIN/1.73M2 — SIGNIFICANT CHANGE UP
EOSINOPHIL # BLD AUTO: 0.48 K/UL — SIGNIFICANT CHANGE UP (ref 0–0.5)
EOSINOPHIL NFR BLD AUTO: 2.6 % — SIGNIFICANT CHANGE UP (ref 0–6)
GLUCOSE SERPL-MCNC: 95 MG/DL — SIGNIFICANT CHANGE UP (ref 70–99)
HCT VFR BLD CALC: 45.5 % — SIGNIFICANT CHANGE UP (ref 39–50)
HGB BLD-MCNC: 15.8 G/DL — SIGNIFICANT CHANGE UP (ref 13–17)
IMM GRANULOCYTES NFR BLD AUTO: 0.7 % — SIGNIFICANT CHANGE UP (ref 0–0.9)
INR BLD: 0.97 RATIO — SIGNIFICANT CHANGE UP (ref 0.88–1.16)
LYMPHOCYTES # BLD AUTO: 12.1 % — LOW (ref 13–44)
LYMPHOCYTES # BLD AUTO: 2.23 K/UL — SIGNIFICANT CHANGE UP (ref 1–3.3)
MCHC RBC-ENTMCNC: 29.6 PG — SIGNIFICANT CHANGE UP (ref 27–34)
MCHC RBC-ENTMCNC: 34.7 GM/DL — SIGNIFICANT CHANGE UP (ref 32–36)
MCV RBC AUTO: 85.4 FL — SIGNIFICANT CHANGE UP (ref 80–100)
MONOCYTES # BLD AUTO: 0.74 K/UL — SIGNIFICANT CHANGE UP (ref 0–0.9)
MONOCYTES NFR BLD AUTO: 4 % — SIGNIFICANT CHANGE UP (ref 2–14)
NEUTROPHILS # BLD AUTO: 14.79 K/UL — HIGH (ref 1.8–7.4)
NEUTROPHILS NFR BLD AUTO: 80.1 % — HIGH (ref 43–77)
NRBC # BLD: 0 /100 WBCS — SIGNIFICANT CHANGE UP (ref 0–0)
PLATELET # BLD AUTO: 229 K/UL — SIGNIFICANT CHANGE UP (ref 150–400)
POTASSIUM SERPL-MCNC: 3.8 MMOL/L — SIGNIFICANT CHANGE UP (ref 3.5–5.3)
POTASSIUM SERPL-SCNC: 3.8 MMOL/L — SIGNIFICANT CHANGE UP (ref 3.5–5.3)
PROCALCITONIN SERPL-MCNC: 0.02 NG/ML — SIGNIFICANT CHANGE UP (ref 0.02–0.1)
PROT SERPL-MCNC: 7.1 G/DL — SIGNIFICANT CHANGE UP (ref 6–8.3)
PROTHROM AB SERPL-ACNC: 11.3 SEC — SIGNIFICANT CHANGE UP (ref 10.5–13.4)
RBC # BLD: 5.33 M/UL — SIGNIFICANT CHANGE UP (ref 4.2–5.8)
RBC # FLD: 12.5 % — SIGNIFICANT CHANGE UP (ref 10.3–14.5)
RH IG SCN BLD-IMP: NEGATIVE — SIGNIFICANT CHANGE UP
SODIUM SERPL-SCNC: 141 MMOL/L — SIGNIFICANT CHANGE UP (ref 135–145)
WBC # BLD: 18.46 K/UL — HIGH (ref 3.8–10.5)
WBC # FLD AUTO: 18.46 K/UL — HIGH (ref 3.8–10.5)

## 2023-06-05 PROCEDURE — 99024 POSTOP FOLLOW-UP VISIT: CPT

## 2023-06-05 PROCEDURE — 93010 ELECTROCARDIOGRAM REPORT: CPT

## 2023-06-05 RX ORDER — PROCHLORPERAZINE MALEATE 5 MG
10 TABLET ORAL ONCE
Refills: 0 | Status: DISCONTINUED | OUTPATIENT
Start: 2023-06-05 | End: 2023-06-06

## 2023-06-05 RX ORDER — POLYETHYLENE GLYCOL 3350 17 G/17G
17 POWDER, FOR SOLUTION ORAL DAILY
Refills: 0 | Status: DISCONTINUED | OUTPATIENT
Start: 2023-06-05 | End: 2023-06-06

## 2023-06-05 RX ORDER — ACETAMINOPHEN 500 MG
650 TABLET ORAL EVERY 6 HOURS
Refills: 0 | Status: DISCONTINUED | OUTPATIENT
Start: 2023-06-05 | End: 2023-06-06

## 2023-06-05 RX ORDER — OXYCODONE HYDROCHLORIDE 5 MG/1
5 TABLET ORAL EVERY 4 HOURS
Refills: 0 | Status: DISCONTINUED | OUTPATIENT
Start: 2023-06-05 | End: 2023-06-06

## 2023-06-05 RX ORDER — DIPHENHYDRAMINE HCL 50 MG
25 CAPSULE ORAL ONCE
Refills: 0 | Status: COMPLETED | OUTPATIENT
Start: 2023-06-05 | End: 2023-06-05

## 2023-06-05 RX ORDER — SODIUM CHLORIDE 9 MG/ML
1000 INJECTION, SOLUTION INTRAVENOUS
Refills: 0 | Status: DISCONTINUED | OUTPATIENT
Start: 2023-06-05 | End: 2023-06-06

## 2023-06-05 RX ORDER — ONDANSETRON 8 MG/1
4 TABLET, FILM COATED ORAL EVERY 6 HOURS
Refills: 0 | Status: DISCONTINUED | OUTPATIENT
Start: 2023-06-05 | End: 2023-06-06

## 2023-06-05 RX ORDER — CYCLOBENZAPRINE HYDROCHLORIDE 10 MG/1
10 TABLET, FILM COATED ORAL THREE TIMES A DAY
Refills: 0 | Status: DISCONTINUED | OUTPATIENT
Start: 2023-06-05 | End: 2023-06-06

## 2023-06-05 RX ORDER — PANTOPRAZOLE SODIUM 20 MG/1
40 TABLET, DELAYED RELEASE ORAL
Refills: 0 | Status: DISCONTINUED | OUTPATIENT
Start: 2023-06-05 | End: 2023-06-06

## 2023-06-05 RX ORDER — SENNA PLUS 8.6 MG/1
2 TABLET ORAL AT BEDTIME
Refills: 0 | Status: DISCONTINUED | OUTPATIENT
Start: 2023-06-05 | End: 2023-06-06

## 2023-06-05 RX ORDER — OXYCODONE HYDROCHLORIDE 5 MG/1
10 TABLET ORAL EVERY 4 HOURS
Refills: 0 | Status: DISCONTINUED | OUTPATIENT
Start: 2023-06-05 | End: 2023-06-06

## 2023-06-05 RX ADMIN — Medication 25 MILLIGRAM(S): at 15:39

## 2023-06-05 RX ADMIN — Medication 650 MILLIGRAM(S): at 15:39

## 2023-06-05 RX ADMIN — Medication 650 MILLIGRAM(S): at 22:10

## 2023-06-05 RX ADMIN — Medication 650 MILLIGRAM(S): at 23:30

## 2023-06-05 RX ADMIN — SENNA PLUS 2 TABLET(S): 8.6 TABLET ORAL at 22:10

## 2023-06-05 RX ADMIN — Medication 25 MILLIGRAM(S): at 22:54

## 2023-06-05 NOTE — HISTORY OF PRESENT ILLNESS
[< 3 months] : less than 3 months [FreeTextEntry1] : spine lesion [de-identified] : 5/11/2023\par \par He developed low back pain and hamstring pain. Patient went to the chiropractor. He states that this did not help. When he got no relief he had a MRI done which showed the lesions. He then a a series of testing completed to make sure that the lesions were not "Cancerous".\par \par He then saw Dr. Wan who told him that one of the lesions needed to be removed. \par \par He comes in today seeking a second opinion regarding this surgical option.\par \par Patient currently the patient has low back pain with a shooting pain down his left leg to his foot. He also notes shooting pain in his left foot and toes.\par He has increased pain when he lays down or sits down. He gets relief when he stands and walks.

## 2023-06-05 NOTE — PATIENT PROFILE ADULT - NSPROEXTENSIONSOFSELF_GEN_A_NUR
Baby transferred from peds with continued seizures and R/O meningitis.  Baby irritable on admit and crying on initial assessment.  Dr Moya in to see patient and talk to family at bedside   none

## 2023-06-05 NOTE — H&P ADULT - HISTORY OF PRESENT ILLNESS
Jayden Szymanski  57M PMH JESSICA on CPAP, diverticulitis S/P sigmoid colon resection, liver abscess, S/P debridement of liver 2021, T10/L1/L5 Schwannomas s/p resection. Patient was doing well until Thursday when he felt a pop in his back concerning for suture ruputre, now c/o positional HAs. Denies photophobia/nuchal rigidity.

## 2023-06-05 NOTE — PATIENT PROFILE ADULT - FALL HARM RISK - HARM RISK INTERVENTIONS
Communicate Risk of Fall with Harm to all staff/Monitor for mental status changes/Reinforce activity limits and safety measures with patient and family/Review medications for side effects contributing to fall risk/Tailored Fall Risk Interventions/Use of alarms - bed, chair and/or voice tab/Visual Cue: Yellow wristband and red socks/Bed in lowest position, wheels locked, appropriate side rails in place/Call bell, personal items and telephone in reach/Instruct patient to call for assistance before getting out of bed or chair/Non-slip footwear when patient is out of bed/[place] to call system/Physically safe environment - no spills, clutter or unnecessary equipment/Purposeful Proactive Rounding/Room/bathroom lighting operational, light cord in reach

## 2023-06-05 NOTE — REASON FOR VISIT
[de-identified] : T10-11 laminoplasty for intradural tumor resection [de-identified] : 5/23/2023 [de-identified] : 2 [de-identified] : 57 year old male who comes in today due to constant headache since Thursday 1/1/2023. Patient states that the headache is excruciating. He states that the headache improves when he lays flat so he has been laying down since Friday.\par \par He also notes serous drainage on the bandage from the bottom incision. Bandage was last changed last night. [Spouse] : spouse

## 2023-06-05 NOTE — H&P ADULT - NSHPPHYSICALEXAM_GEN_ALL_CORE
Exam: Intact outside of stable paresthesias to buttocks, no drainage on new dressings incisions c/d/i

## 2023-06-05 NOTE — H&P ADULT - ASSESSMENT
Stephon Jayden  57M PMH JESSICA on CPAP, diverticulitis S/P sigmoid colon resection, liver abscess, S/P debridement of liver 2021, T10/L1/L5 Schwannomas s/p resection. Patient was doing well until Thursday when he felt a pop in his back concerning for suture ruputre, now c/o positional HAs. Denies photophobia/nuchal rigidity.  Exam: Intact outside of stable paresthesias to buttocks, no drainage on new dressings incisions c/d/i  -direct adm 7T under [place], preop for OR tomorrow for exploration and revision of L5 wound, possible T10/L1  -Genetics consult ordered, left several voice mails, will continue to try and get in touch with someone  -HA cocktail  -basic labs, procal

## 2023-06-05 NOTE — ASSESSMENT
[FreeTextEntry1] : impression:\par \par Very pleasant 57 year old male who presents today 12 days s/p T10-11 Laminoplasty for intradural lesion resection. He called the office c/o excruciating headache since Thursday. This headache is only relieved when he lays flat.\par \par \par Discussion:\par \par A long discussion occurred with the patient and his wife regarding both conservative and more aggressive treatment for though CSF leak.\par \par Patient was given the option to continue to lay down and drink caffeine or to open the incision in the OR, access for a leak repair the leak and lay flat for 24 hours after surgery.\par \par Patient expresses concern with waiting and observing since he feels that he has been laying flat and staying inactive for 3 days now and states if anything, the headaches are slightly worse.\par \par All risks and benefits of a surgical repair were discussed in great detail. Patient sates an understanding of all options presented as well as the risks and benefits.\par \par Patient wishes to proceed with a surgical correction.\par \par \par Plan:\par \par 1) Patient to go to [facility] now for admission and fluids, electrolytes and caffeine.\par \par 2) Plan for surgical repair in the AM

## 2023-06-05 NOTE — PHYSICAL EXAM
[Longitudinal] : longitudinal [Clean] : clean [Healing Well] : healing well [No Drainage] : without drainage [Normal Skin] : normal [FreeTextEntry6] : bottom incision with fluid underneath, no drainage [Oriented To Time, Place, And Person] : oriented to person, place, and time [Impaired Insight] : insight and judgment were intact [Affect] : the affect was normal [Person] : oriented to person [Place] : oriented to place [Time] : oriented to time [Short Term Intact] : short term memory intact [Remote Intact] : remote memory intact [Span Intact] : the attention span was normal [Concentration Intact] : normal concentrating ability [Fluency] : fluency intact [Comprehension] : comprehension intact [Current Events] : adequate knowledge of current events [Past History] : adequate knowledge of personal past history [Vocabulary] : adequate range of vocabulary [Cranial Nerves Optic (II)] : visual acuity intact bilaterally,  pupils equal round and reactive to light [Cranial Nerves Oculomotor (III)] : extraocular motion intact [Cranial Nerves Trigeminal (V)] : facial sensation intact symmetrically [Cranial Nerves Facial (VII)] : face symmetrical [Cranial Nerves Vestibulocochlear (VIII)] : hearing was intact bilaterally [Cranial Nerves Glossopharyngeal (IX)] : tongue and palate midline [Cranial Nerves Accessory (XI - Cranial And Spinal)] : head turning and shoulder shrug symmetric [Cranial Nerves Hypoglossal (XII)] : there was no tongue deviation with protrusion [Motor Strength] : muscle strength was normal in all four extremities [No Muscle Atrophy] : normal bulk in all four extremities [Balance] : balance was intact [Past-pointing] : there was no past-pointing [Tremor] : no tremor present [2+] : Patella left 2+ [FreeTextEntry7] : left lateral calf with numbness post operatively [L'Hermitte's] : neck flexion did not produce tingling down the spine/arms [Spurling's - Opposite Side] : Negative Spurling's on opposite side [Spurling's Same Side] : Negative Spurling's on same side [No Visual Abnormalities] : no visible abnormailities [No Tenderness to Palpation] : no spine tenderness on palpation [Full ROM] : full ROM [No Pain with ROM] : no pain with motion in any direction [Straight-Leg Raise Test - Left] : straight leg raise of the left leg was negative [Straight-Leg Raise Test - Right] : straight leg raise  of the right leg was negative [Normal] : normal [Intact] : all reflexes within normal limits bilaterally [Sclera] : the sclera and conjunctiva were normal [PERRL With Normal Accommodation] : pupils were equal in size, round, reactive to light, with normal accommodation [Extraocular Movements] : extraocular movements were intact [Outer Ear] : the ears and nose were normal in appearance [Oropharynx] : the oropharynx was normal [Neck Appearance] : the appearance of the neck was normal [Neck Cervical Mass (___cm)] : no neck mass was observed [Jugular Venous Distention Increased] : there was no jugular-venous distention [Thyroid Diffuse Enlargement] : the thyroid was not enlarged [Thyroid Nodule] : there were no palpable thyroid nodules [Auscultation Breath Sounds / Voice Sounds] : lungs were clear to auscultation bilaterally [Heart Rate And Rhythm] : heart rate was normal and rhythm regular [Heart Sounds] : normal S1 and S2 [Heart Sounds Gallop] : no gallops [Murmurs] : no murmurs [Heart Sounds Pericardial Friction Rub] : no pericardial rub [Abnormal Walk] : normal gait [Nail Clubbing] : no clubbing  or cyanosis of the fingernails [Musculoskeletal - Swelling] : no joint swelling seen [Motor Tone] : muscle strength and tone were normal [Skin Color & Pigmentation] : normal skin color and pigmentation [Skin Turgor] : normal skin turgor [] : no rash

## 2023-06-06 ENCOUNTER — APPOINTMENT (OUTPATIENT)
Dept: NEUROSURGERY | Facility: HOSPITAL | Age: 58
End: 2023-06-06

## 2023-06-06 ENCOUNTER — TRANSCRIPTION ENCOUNTER (OUTPATIENT)
Age: 58
End: 2023-06-06

## 2023-06-06 PROCEDURE — 69990 MICROSURGERY ADD-ON: CPT | Mod: 78

## 2023-06-06 PROCEDURE — 63709 REPAIR SPINAL FLUID LEAKAGE: CPT | Mod: 78

## 2023-06-06 PROCEDURE — 64999 UNLISTED PX NERVOUS SYSTEM: CPT | Mod: 78

## 2023-06-06 DEVICE — SCREW SLF DRILL 6MM TI MATRIXMIDFACE: Type: IMPLANTABLE DEVICE | Status: FUNCTIONAL

## 2023-06-06 DEVICE — SCREW TI SLF DRILL 5MM MATRIXMIDFACE S: Type: IMPLANTABLE DEVICE | Status: FUNCTIONAL

## 2023-06-06 DEVICE — TACHOSIL 9.5 X 4.8CM: Type: IMPLANTABLE DEVICE | Status: FUNCTIONAL

## 2023-06-06 RX ORDER — PANTOPRAZOLE SODIUM 20 MG/1
40 TABLET, DELAYED RELEASE ORAL
Refills: 0 | Status: DISCONTINUED | OUTPATIENT
Start: 2023-06-06 | End: 2023-06-11

## 2023-06-06 RX ORDER — OXYCODONE HYDROCHLORIDE 5 MG/1
5 TABLET ORAL EVERY 4 HOURS
Refills: 0 | Status: DISCONTINUED | OUTPATIENT
Start: 2023-06-06 | End: 2023-06-11

## 2023-06-06 RX ORDER — ACETAZOLAMIDE 250 MG/1
500 TABLET ORAL
Refills: 0 | Status: DISCONTINUED | OUTPATIENT
Start: 2023-06-06 | End: 2023-06-11

## 2023-06-06 RX ORDER — OXYCODONE HYDROCHLORIDE 5 MG/1
10 TABLET ORAL EVERY 4 HOURS
Refills: 0 | Status: DISCONTINUED | OUTPATIENT
Start: 2023-06-06 | End: 2023-06-11

## 2023-06-06 RX ORDER — PIPERACILLIN AND TAZOBACTAM 4; .5 G/20ML; G/20ML
3.38 INJECTION, POWDER, LYOPHILIZED, FOR SOLUTION INTRAVENOUS ONCE
Refills: 0 | Status: COMPLETED | OUTPATIENT
Start: 2023-06-06 | End: 2023-06-06

## 2023-06-06 RX ORDER — MAGNESIUM HYDROXIDE 400 MG/1
30 TABLET, CHEWABLE ORAL DAILY
Refills: 0 | Status: DISCONTINUED | OUTPATIENT
Start: 2023-06-06 | End: 2023-06-09

## 2023-06-06 RX ORDER — POLYETHYLENE GLYCOL 3350 17 G/17G
17 POWDER, FOR SOLUTION ORAL DAILY
Refills: 0 | Status: DISCONTINUED | OUTPATIENT
Start: 2023-06-06 | End: 2023-06-08

## 2023-06-06 RX ORDER — ACETAMINOPHEN 500 MG
650 TABLET ORAL EVERY 6 HOURS
Refills: 0 | Status: DISCONTINUED | OUTPATIENT
Start: 2023-06-06 | End: 2023-06-11

## 2023-06-06 RX ORDER — LANOLIN ALCOHOL/MO/W.PET/CERES
5 CREAM (GRAM) TOPICAL AT BEDTIME
Refills: 0 | Status: DISCONTINUED | OUTPATIENT
Start: 2023-06-06 | End: 2023-06-11

## 2023-06-06 RX ORDER — VANCOMYCIN HCL 1 G
1000 VIAL (EA) INTRAVENOUS ONCE
Refills: 0 | Status: COMPLETED | OUTPATIENT
Start: 2023-06-06 | End: 2023-06-06

## 2023-06-06 RX ORDER — ONDANSETRON 8 MG/1
4 TABLET, FILM COATED ORAL ONCE
Refills: 0 | Status: DISCONTINUED | OUTPATIENT
Start: 2023-06-06 | End: 2023-06-06

## 2023-06-06 RX ORDER — VANCOMYCIN HCL 1 G
1000 VIAL (EA) INTRAVENOUS EVERY 12 HOURS
Refills: 0 | Status: COMPLETED | OUTPATIENT
Start: 2023-06-07 | End: 2023-06-08

## 2023-06-06 RX ORDER — HYDROMORPHONE HYDROCHLORIDE 2 MG/ML
0.5 INJECTION INTRAMUSCULAR; INTRAVENOUS; SUBCUTANEOUS
Refills: 0 | Status: DISCONTINUED | OUTPATIENT
Start: 2023-06-06 | End: 2023-06-06

## 2023-06-06 RX ORDER — CYCLOBENZAPRINE HYDROCHLORIDE 10 MG/1
10 TABLET, FILM COATED ORAL THREE TIMES A DAY
Refills: 0 | Status: DISCONTINUED | OUTPATIENT
Start: 2023-06-06 | End: 2023-06-07

## 2023-06-06 RX ORDER — ONDANSETRON 8 MG/1
4 TABLET, FILM COATED ORAL EVERY 6 HOURS
Refills: 0 | Status: DISCONTINUED | OUTPATIENT
Start: 2023-06-06 | End: 2023-06-11

## 2023-06-06 RX ORDER — VANCOMYCIN HCL 1 G
VIAL (EA) INTRAVENOUS
Refills: 0 | Status: COMPLETED | OUTPATIENT
Start: 2023-06-06 | End: 2023-06-08

## 2023-06-06 RX ORDER — FENTANYL CITRATE 50 UG/ML
50 INJECTION INTRAVENOUS
Refills: 0 | Status: DISCONTINUED | OUTPATIENT
Start: 2023-06-06 | End: 2023-06-06

## 2023-06-06 RX ORDER — PIPERACILLIN AND TAZOBACTAM 4; .5 G/20ML; G/20ML
3.38 INJECTION, POWDER, LYOPHILIZED, FOR SOLUTION INTRAVENOUS EVERY 8 HOURS
Refills: 0 | Status: DISCONTINUED | OUTPATIENT
Start: 2023-06-06 | End: 2023-06-11

## 2023-06-06 RX ORDER — SENNA PLUS 8.6 MG/1
2 TABLET ORAL AT BEDTIME
Refills: 0 | Status: DISCONTINUED | OUTPATIENT
Start: 2023-06-06 | End: 2023-06-11

## 2023-06-06 RX ORDER — SODIUM CHLORIDE 9 MG/ML
1000 INJECTION, SOLUTION INTRAVENOUS
Refills: 0 | Status: DISCONTINUED | OUTPATIENT
Start: 2023-06-06 | End: 2023-06-11

## 2023-06-06 RX ORDER — PROCHLORPERAZINE MALEATE 5 MG
10 TABLET ORAL ONCE
Refills: 0 | Status: DISCONTINUED | OUTPATIENT
Start: 2023-06-06 | End: 2023-06-09

## 2023-06-06 RX ADMIN — PIPERACILLIN AND TAZOBACTAM 25 GRAM(S): 4; .5 INJECTION, POWDER, LYOPHILIZED, FOR SOLUTION INTRAVENOUS at 21:14

## 2023-06-06 RX ADMIN — PIPERACILLIN AND TAZOBACTAM 25 GRAM(S): 4; .5 INJECTION, POWDER, LYOPHILIZED, FOR SOLUTION INTRAVENOUS at 16:49

## 2023-06-06 RX ADMIN — Medication 250 MILLIGRAM(S): at 15:30

## 2023-06-06 RX ADMIN — Medication 5 MILLIGRAM(S): at 21:20

## 2023-06-06 RX ADMIN — PIPERACILLIN AND TAZOBACTAM 200 GRAM(S): 4; .5 INJECTION, POWDER, LYOPHILIZED, FOR SOLUTION INTRAVENOUS at 12:34

## 2023-06-06 RX ADMIN — Medication 5 MILLIGRAM(S): at 21:14

## 2023-06-06 RX ADMIN — SENNA PLUS 2 TABLET(S): 8.6 TABLET ORAL at 21:14

## 2023-06-06 RX ADMIN — ACETAZOLAMIDE 500 MILLIGRAM(S): 250 TABLET ORAL at 18:40

## 2023-06-06 NOTE — BRIEF OPERATIVE NOTE - NSICDXBRIEFPROCEDURE_GEN_ALL_CORE_FT
PROCEDURES:  Exploration, wound, lumbar region, with repair of dural defect if indicated, for CSF leak 06-Jun-2023 11:17:17  Stas Brown

## 2023-06-06 NOTE — PRE-OP CHECKLIST - SURGICAL CONSENT
Head,  normocephalic,  atraumatic,  Face,  Face within normal limits,  Ears,  External ears within normal limits,  Nose/Nasopharynx,  External nose  normal appearance,  nares patent,  no nasal discharge,  Mouth and Throat,  Oral cavity appearance normal,  Breath odor normal,  Lips,  Appearance normal
done

## 2023-06-06 NOTE — PROGRESS NOTE ADULT - SUBJECTIVE AND OBJECTIVE BOX
Patient seen and examined at bedside.    --Anticoagulation--    T(C): 36.4 (06-06-23 @ 17:20), Max: 36.8 (06-06-23 @ 03:09)  HR: 61 (06-06-23 @ 17:20) (55 - 82)  BP: 121/79 (06-06-23 @ 17:20) (106/67 - 144/77)  RR: 18 (06-06-23 @ 17:20) (16 - 18)  SpO2: 97% (06-06-23 @ 17:20) (94% - 100%)  Wt(kg): --    Exam: Intact outside of stable paresthesias to buttocks, no drainage on new dressings incisions c/d/i

## 2023-06-06 NOTE — PROGRESS NOTE ADULT - ASSESSMENT
Jayden Szymanski  57M PMH JESSICA on CPAP, diverticulitis S/P sigmoid colon resection, liver abscess, S/P debridement of liver 2021, T10/L1/L5 Schwannomas s/p resection. Patient was doing well until Thursday when he felt a pop in his back concerning for suture ruputre, now c/o positional HAs. Denies photophobia/nuchal rigidity. Now /s/p revision of lumbar wound for CSF leak  -4h PACU stay->floor  -flat for 48h  -childers while flat/in bed  -diamox,   -vancomycin, zosyn until OR cultures negative  -L deep HMV R superficial ESSIE both off suction

## 2023-06-06 NOTE — PRE-ANESTHESIA EVALUATION ADULT - NSANTHPMHFT_GEN_ALL_CORE
7M PMH JESSICA on CPAP, diverticulitis S/P sigmoid colon resection, liver abscess, S/P debridement of liver 2021, T10/L1/L5 Schwannomas s/p resection. Patient was doing well until Thursday when he felt a pop in his back concerning for suture ruputre, now c/o positional headaches 7M PMH JESSICA on CPAP, diverticulitis S/P sigmoid colon resection, liver abscess, S/P debridement of liver 2021, T10/L1/L5 Schwannomas s/p resection. Patient was doing well until Thursday when he felt a pop in his back concerning for suture rupture, now c/o positional headaches

## 2023-06-06 NOTE — PRE-ANESTHESIA EVALUATION ADULT - NSANTHAIRWAYFT_ENT_ALL_CORE
Mouth opening: >2cm  Thyromental distance: < 3FB  Upper lip bite: adequate  Cervical ROM: grossly intact Mouth opening: LIMITED TO LESS THAN 2 CM  Thyromental distance: < 3FB  Upper lip bite: INADEQUATE  Cervical ROM: grossly intact

## 2023-06-07 RX ORDER — OXYCODONE HYDROCHLORIDE 5 MG/1
10 TABLET ORAL EVERY 6 HOURS
Refills: 0 | Status: DISCONTINUED | OUTPATIENT
Start: 2023-06-07 | End: 2023-06-07

## 2023-06-07 RX ORDER — OXYCODONE HYDROCHLORIDE 5 MG/1
10 TABLET ORAL EVERY 6 HOURS
Refills: 0 | Status: DISCONTINUED | OUTPATIENT
Start: 2023-06-07 | End: 2023-06-08

## 2023-06-07 RX ORDER — OXYCODONE HYDROCHLORIDE 5 MG/1
5 TABLET ORAL EVERY 6 HOURS
Refills: 0 | Status: DISCONTINUED | OUTPATIENT
Start: 2023-06-07 | End: 2023-06-08

## 2023-06-07 RX ORDER — METHOCARBAMOL 500 MG/1
750 TABLET, FILM COATED ORAL EVERY 8 HOURS
Refills: 0 | Status: DISCONTINUED | OUTPATIENT
Start: 2023-06-07 | End: 2023-06-11

## 2023-06-07 RX ADMIN — Medication 250 MILLIGRAM(S): at 05:43

## 2023-06-07 RX ADMIN — Medication 250 MILLIGRAM(S): at 18:30

## 2023-06-07 RX ADMIN — ACETAZOLAMIDE 500 MILLIGRAM(S): 250 TABLET ORAL at 06:25

## 2023-06-07 RX ADMIN — PANTOPRAZOLE SODIUM 40 MILLIGRAM(S): 20 TABLET, DELAYED RELEASE ORAL at 06:25

## 2023-06-07 RX ADMIN — OXYCODONE HYDROCHLORIDE 5 MILLIGRAM(S): 5 TABLET ORAL at 04:55

## 2023-06-07 RX ADMIN — OXYCODONE HYDROCHLORIDE 10 MILLIGRAM(S): 5 TABLET ORAL at 13:00

## 2023-06-07 RX ADMIN — PIPERACILLIN AND TAZOBACTAM 25 GRAM(S): 4; .5 INJECTION, POWDER, LYOPHILIZED, FOR SOLUTION INTRAVENOUS at 15:09

## 2023-06-07 RX ADMIN — ACETAZOLAMIDE 500 MILLIGRAM(S): 250 TABLET ORAL at 18:30

## 2023-06-07 RX ADMIN — METHOCARBAMOL 750 MILLIGRAM(S): 500 TABLET, FILM COATED ORAL at 21:07

## 2023-06-07 RX ADMIN — Medication 5 MILLIGRAM(S): at 21:07

## 2023-06-07 RX ADMIN — OXYCODONE HYDROCHLORIDE 5 MILLIGRAM(S): 5 TABLET ORAL at 21:58

## 2023-06-07 RX ADMIN — OXYCODONE HYDROCHLORIDE 5 MILLIGRAM(S): 5 TABLET ORAL at 05:55

## 2023-06-07 RX ADMIN — Medication 1 TABLET(S): at 12:20

## 2023-06-07 RX ADMIN — SENNA PLUS 2 TABLET(S): 8.6 TABLET ORAL at 21:07

## 2023-06-07 RX ADMIN — PIPERACILLIN AND TAZOBACTAM 25 GRAM(S): 4; .5 INJECTION, POWDER, LYOPHILIZED, FOR SOLUTION INTRAVENOUS at 05:43

## 2023-06-07 RX ADMIN — OXYCODONE HYDROCHLORIDE 5 MILLIGRAM(S): 5 TABLET ORAL at 21:07

## 2023-06-07 RX ADMIN — PIPERACILLIN AND TAZOBACTAM 25 GRAM(S): 4; .5 INJECTION, POWDER, LYOPHILIZED, FOR SOLUTION INTRAVENOUS at 21:07

## 2023-06-07 RX ADMIN — SODIUM CHLORIDE 60 MILLILITER(S): 9 INJECTION, SOLUTION INTRAVENOUS at 21:20

## 2023-06-07 RX ADMIN — OXYCODONE HYDROCHLORIDE 10 MILLIGRAM(S): 5 TABLET ORAL at 12:19

## 2023-06-07 NOTE — PROGRESS NOTE ADULT - SUBJECTIVE AND OBJECTIVE BOX
EVENTS:   No acute events overnight.    VITALS:  T(C): , Max: 36.9 (06-07-23 @ 00:30)  HR:  (55 - 82)  BP:  (93/58 - 144/77)  ABP: --  RR:  (16 - 18)  SpO2:  (93% - 100%)  Wt(kg): --      06-06-23 @ 07:01  -  06-07-23 @ 07:00  --------------------------------------------------------  IN: 1000 mL / OUT: 2305 mL / NET: -1305 mL      LABS:  Na: 141 (06-05 @ 15:44)  K: 3.8 (06-05 @ 15:44)  Cl: 103 (06-05 @ 15:44)  CO2: 25 (06-05 @ 15:44)  BUN: 13 (06-05 @ 15:44)  Cr: 0.83 (06-05 @ 15:44)  Glu: 95(06-05 @ 15:44)    Hgb: 15.8 (06-05 @ 15:44)  Hct: 45.5 (06-05 @ 15:44)  WBC: 18.46 (06-05 @ 15:44)  Plt: 229 (06-05 @ 15:44)    INR: 0.97 06-05-23 @ 15:44  PTT: 29.2 06-05-23 @ 15:44    MEDICATIONS:  acetaminophen     Tablet .. 650 milliGRAM(s) Oral every 6 hours PRN  acetaZOLAMIDE    Tablet 500 milliGRAM(s) Oral two times a day  bisacodyl 5 milliGRAM(s) Oral at bedtime  bisacodyl Suppository 10 milliGRAM(s) Rectal daily PRN  cyclobenzaprine 10 milliGRAM(s) Oral three times a day PRN  lactated ringers. 1000 milliLiter(s) IV Continuous <Continuous>  magnesium hydroxide Suspension 30 milliLiter(s) Oral daily PRN  melatonin 5 milliGRAM(s) Oral at bedtime  multivitamin 1 Tablet(s) Oral daily  ondansetron Injectable 4 milliGRAM(s) IV Push every 6 hours PRN  oxyCODONE    IR 10 milliGRAM(s) Oral every 6 hours PRN  oxyCODONE    IR 5 milliGRAM(s) Oral every 4 hours PRN  oxyCODONE    IR 10 milliGRAM(s) Oral every 4 hours PRN  oxyCODONE    IR 5 milliGRAM(s) Oral every 6 hours PRN  pantoprazole    Tablet 40 milliGRAM(s) Oral before breakfast  piperacillin/tazobactam IVPB.. 3.375 Gram(s) IV Intermittent every 8 hours  polyethylene glycol 3350 17 Gram(s) Oral daily  prochlorperazine   Injectable 10 milliGRAM(s) IntraMuscular once  senna 2 Tablet(s) Oral at bedtime  vancomycin  IVPB        EXAMINATION:  General:  calm  HEENT:  MMM  Neuro:  awake, alert, oriented x 3, follows commands, moves all extremities  Cards:  RRR  Respiratory:  no respiratory distress  Abdomen:  soft  Extremities:  no edema   HPI: 57 M LLE/LBP pain, on CPAP for JESSICA, laying flat. complaining about inability to sleep.    EVENTS:   No acute events overnight.    Vital Signs Last 24 Hrs  T(C): 36.6 (07 Jun 2023 08:41), Max: 36.9 (07 Jun 2023 00:30)  T(F): 97.8 (07 Jun 2023 08:41), Max: 98.4 (07 Jun 2023 00:30)  HR: 66 (07 Jun 2023 08:41) (55 - 82)  BP: 114/75 (07 Jun 2023 08:41) (93/58 - 144/77)  BP(mean): 87 (06 Jun 2023 16:00) (84 - 103)  RR: 18 (07 Jun 2023 08:41) (16 - 18)  SpO2: 94% (07 Jun 2023 08:41) (93% - 100%)    Parameters below as of 07 Jun 2023 08:41  Patient On (Oxygen Delivery Method): room air      I&O's Summary    06 Jun 2023 07:01  -  07 Jun 2023 07:00  --------------------------------------------------------  IN: 1000 mL / OUT: 2305 mL / NET: -1305 mL      LABS:  Na: 141 (06-05 @ 15:44)  K: 3.8 (06-05 @ 15:44)  Cl: 103 (06-05 @ 15:44)  CO2: 25 (06-05 @ 15:44)  BUN: 13 (06-05 @ 15:44)  Cr: 0.83 (06-05 @ 15:44)  Glu: 95(06-05 @ 15:44)    Hgb: 15.8 (06-05 @ 15:44)  Hct: 45.5 (06-05 @ 15:44)  WBC: 18.46 (06-05 @ 15:44)  Plt: 229 (06-05 @ 15:44)    INR: 0.97 06-05-23 @ 15:44  PTT: 29.2 06-05-23 @ 15:44    MEDICATIONS:  acetaminophen     Tablet .. 650 milliGRAM(s) Oral every 6 hours PRN  acetaZOLAMIDE    Tablet 500 milliGRAM(s) Oral two times a day  bisacodyl 5 milliGRAM(s) Oral at bedtime  bisacodyl Suppository 10 milliGRAM(s) Rectal daily PRN  cyclobenzaprine 10 milliGRAM(s) Oral three times a day PRN  lactated ringers. 1000 milliLiter(s) IV Continuous <Continuous>  magnesium hydroxide Suspension 30 milliLiter(s) Oral daily PRN  melatonin 5 milliGRAM(s) Oral at bedtime  multivitamin 1 Tablet(s) Oral daily  ondansetron Injectable 4 milliGRAM(s) IV Push every 6 hours PRN  oxyCODONE    IR 10 milliGRAM(s) Oral every 6 hours PRN  oxyCODONE    IR 5 milliGRAM(s) Oral every 4 hours PRN  oxyCODONE    IR 10 milliGRAM(s) Oral every 4 hours PRN  oxyCODONE    IR 5 milliGRAM(s) Oral every 6 hours PRN  pantoprazole    Tablet 40 milliGRAM(s) Oral before breakfast  piperacillin/tazobactam IVPB.. 3.375 Gram(s) IV Intermittent every 8 hours  polyethylene glycol 3350 17 Gram(s) Oral daily  prochlorperazine   Injectable 10 milliGRAM(s) IntraMuscular once  senna 2 Tablet(s) Oral at bedtime  vancomycin  IVPB        EXAMINATION:  General:  calm  HEENT:  MMM  Neuro:  awake, alert, oriented x 3, follows commands, moves all extremities 5/5  Cards:  RRR  Respiratory:  no respiratory distress  Abdomen:  soft  Extremities:  no edema

## 2023-06-08 RX ORDER — SODIUM CHLORIDE 9 MG/ML
500 INJECTION INTRAMUSCULAR; INTRAVENOUS; SUBCUTANEOUS ONCE
Refills: 0 | Status: COMPLETED | OUTPATIENT
Start: 2023-06-08 | End: 2023-06-08

## 2023-06-08 RX ORDER — POLYETHYLENE GLYCOL 3350 17 G/17G
17 POWDER, FOR SOLUTION ORAL
Refills: 0 | Status: DISCONTINUED | OUTPATIENT
Start: 2023-06-08 | End: 2023-06-11

## 2023-06-08 RX ORDER — SIMETHICONE 80 MG/1
80 TABLET, CHEWABLE ORAL ONCE
Refills: 0 | Status: COMPLETED | OUTPATIENT
Start: 2023-06-08 | End: 2023-06-08

## 2023-06-08 RX ADMIN — Medication 1 TABLET(S): at 14:06

## 2023-06-08 RX ADMIN — PIPERACILLIN AND TAZOBACTAM 25 GRAM(S): 4; .5 INJECTION, POWDER, LYOPHILIZED, FOR SOLUTION INTRAVENOUS at 14:13

## 2023-06-08 RX ADMIN — PIPERACILLIN AND TAZOBACTAM 25 GRAM(S): 4; .5 INJECTION, POWDER, LYOPHILIZED, FOR SOLUTION INTRAVENOUS at 05:05

## 2023-06-08 RX ADMIN — SENNA PLUS 2 TABLET(S): 8.6 TABLET ORAL at 22:32

## 2023-06-08 RX ADMIN — Medication 650 MILLIGRAM(S): at 15:00

## 2023-06-08 RX ADMIN — ACETAZOLAMIDE 500 MILLIGRAM(S): 250 TABLET ORAL at 18:36

## 2023-06-08 RX ADMIN — Medication 250 MILLIGRAM(S): at 05:06

## 2023-06-08 RX ADMIN — SIMETHICONE 80 MILLIGRAM(S): 80 TABLET, CHEWABLE ORAL at 00:30

## 2023-06-08 RX ADMIN — Medication 5 MILLIGRAM(S): at 22:32

## 2023-06-08 RX ADMIN — PIPERACILLIN AND TAZOBACTAM 25 GRAM(S): 4; .5 INJECTION, POWDER, LYOPHILIZED, FOR SOLUTION INTRAVENOUS at 22:32

## 2023-06-08 RX ADMIN — PANTOPRAZOLE SODIUM 40 MILLIGRAM(S): 20 TABLET, DELAYED RELEASE ORAL at 06:15

## 2023-06-08 RX ADMIN — POLYETHYLENE GLYCOL 3350 17 GRAM(S): 17 POWDER, FOR SOLUTION ORAL at 18:35

## 2023-06-08 RX ADMIN — SODIUM CHLORIDE 500 MILLILITER(S): 9 INJECTION INTRAMUSCULAR; INTRAVENOUS; SUBCUTANEOUS at 15:57

## 2023-06-08 RX ADMIN — ACETAZOLAMIDE 500 MILLIGRAM(S): 250 TABLET ORAL at 05:06

## 2023-06-08 RX ADMIN — Medication 650 MILLIGRAM(S): at 14:07

## 2023-06-08 RX ADMIN — Medication 5 MILLIGRAM(S): at 22:33

## 2023-06-08 RX ADMIN — METHOCARBAMOL 750 MILLIGRAM(S): 500 TABLET, FILM COATED ORAL at 15:56

## 2023-06-08 NOTE — PHYSICAL THERAPY INITIAL EVALUATION ADULT - PERTINENT HX OF CURRENT PROBLEM, REHAB EVAL
19-Jul-2021 14:27
16-Jul-2021 15:28
57M PMH JESSICA on CPAP, diverticulitis S/P sigmoid colon resection, liver abscess, S/P debridement of liver 2021, T10/L1/L5 Schwannomas s/p resection. Patient was doing well until Thursday when he felt a pop in his back concerning for suture ruputre, now c/o positional HAs. Pt is s/p Revision of lumbar wound, CSf leak repair, laminoplasty replaced

## 2023-06-08 NOTE — PHYSICAL THERAPY INITIAL EVALUATION ADULT - GENERAL OBSERVATIONS, REHAB EVAL
Chart reviewed. Pt tricia 45 mins PT eval; recd semi supine in bed, NAD, VSS, A/Ox4, +JPx2, +childers

## 2023-06-08 NOTE — PROVIDER CONTACT NOTE (OTHER) - SITUATION
MD Grey informed that patient is c/o severe gas pain MD Grey informed that patient is c/o severe gas pain. Patient abdomen soft and rounded. Positive BS but no flatus.

## 2023-06-08 NOTE — PROGRESS NOTE ADULT - ASSESSMENT
ASSESSMENT: 57M PMH JESSICA on CPAP, diverticulitis S/P sigmoid colon resection, liver abscess, S/P debridement of liver 2021, T10/L1/L5 Schwannomas s/p resection. Patient was doing well until Thursday when he felt a pop in his back concerning for suture ruputre, now c/o positional HAs.    NEURO:  Flat for 48h (6/8 11:30a)  2 drains per NSGY  Pain control: cont robaxin qhs for back pain while sleeping  Activity: [] OOB as tolerated [x] Bedrest [] PT [] OT [] PMNR    PULM:  Incentive spirometry    CV:  Keep -160mmHg    RENAL:  LR @ 60    GI:  Diet: Regular  GI prophylaxis [] not indicated [x] PPI [] other:  Bowel regimen [] colace [x] senna [] other: miralax    ENDO:   Goal euglycemia (-180)    HEME/ONC:  VTE prophylaxis: [x] SCDs [] chemoprophylaxis [x] hold chemoprophylaxis due to: CSF Leak trial, potential RTOR if intervention required.    ID: Afebrile  Monitor fever curve    Dispo: PT recs pending    Will discuss plan with Dr. Gross  #[id number]

## 2023-06-08 NOTE — PROGRESS NOTE ADULT - SUBJECTIVE AND OBJECTIVE BOX
HPI: 57 M LLE/LBP pain, on CPAP for JESSICA, laying flat. complaining about minor headache.    EVENTS:   No acute events overnight.    Vital Signs Last 24 Hrs  T(C): 36.5 (08 Jun 2023 04:58), Max: 36.9 (07 Jun 2023 17:25)  T(F): 97.7 (08 Jun 2023 04:58), Max: 98.4 (07 Jun 2023 17:25)  HR: 63 (08 Jun 2023 04:58) (58 - 66)  BP: 123/81 (08 Jun 2023 04:58) (114/75 - 132/82)  BP(mean): --  RR: 18 (08 Jun 2023 04:58) (18 - 18)  SpO2: 96% (08 Jun 2023 04:58) (94% - 98%)    Parameters below as of 08 Jun 2023 04:58  Patient On (Oxygen Delivery Method): BiPAP/CPAP    I&O's Summary    07 Jun 2023 07:01  -  08 Jun 2023 07:00  --------------------------------------------------------  IN: 2910 mL / OUT: 4295 mL / NET: -1385 mL      LABS:  Na: 141 (06-05 @ 15:44)  K: 3.8 (06-05 @ 15:44)  Cl: 103 (06-05 @ 15:44)  CO2: 25 (06-05 @ 15:44)  BUN: 13 (06-05 @ 15:44)  Cr: 0.83 (06-05 @ 15:44)  Glu: 95(06-05 @ 15:44)    Hgb: 15.8 (06-05 @ 15:44)  Hct: 45.5 (06-05 @ 15:44)  WBC: 18.46 (06-05 @ 15:44)  Plt: 229 (06-05 @ 15:44)    INR: 0.97 06-05-23 @ 15:44  PTT: 29.2 06-05-23 @ 15:44    MEDICATIONS:  acetaminophen     Tablet .. 650 milliGRAM(s) Oral every 6 hours PRN  acetaZOLAMIDE    Tablet 500 milliGRAM(s) Oral two times a day  bisacodyl 5 milliGRAM(s) Oral at bedtime  bisacodyl Suppository 10 milliGRAM(s) Rectal daily PRN  cyclobenzaprine 10 milliGRAM(s) Oral three times a day PRN  lactated ringers. 1000 milliLiter(s) IV Continuous <Continuous>  magnesium hydroxide Suspension 30 milliLiter(s) Oral daily PRN  melatonin 5 milliGRAM(s) Oral at bedtime  multivitamin 1 Tablet(s) Oral daily  ondansetron Injectable 4 milliGRAM(s) IV Push every 6 hours PRN  oxyCODONE    IR 10 milliGRAM(s) Oral every 6 hours PRN  oxyCODONE    IR 5 milliGRAM(s) Oral every 4 hours PRN  oxyCODONE    IR 10 milliGRAM(s) Oral every 4 hours PRN  oxyCODONE    IR 5 milliGRAM(s) Oral every 6 hours PRN  pantoprazole    Tablet 40 milliGRAM(s) Oral before breakfast  piperacillin/tazobactam IVPB.. 3.375 Gram(s) IV Intermittent every 8 hours  polyethylene glycol 3350 17 Gram(s) Oral daily  prochlorperazine   Injectable 10 milliGRAM(s) IntraMuscular once  senna 2 Tablet(s) Oral at bedtime  vancomycin  IVPB        EXAMINATION:  General:  calm  HEENT:  MMM  Neuro:  awake, alert, oriented x 3, follows commands, moves all extremities 5/5  Cards:  RRR  Respiratory:  no respiratory distress  Abdomen:  soft  Extremities:  no edema

## 2023-06-09 LAB
ANION GAP SERPL CALC-SCNC: 12 MMOL/L — SIGNIFICANT CHANGE UP (ref 5–17)
BUN SERPL-MCNC: 17 MG/DL — SIGNIFICANT CHANGE UP (ref 7–23)
CALCIUM SERPL-MCNC: 9.3 MG/DL — SIGNIFICANT CHANGE UP (ref 8.4–10.5)
CHLORIDE SERPL-SCNC: 108 MMOL/L — SIGNIFICANT CHANGE UP (ref 96–108)
CO2 SERPL-SCNC: 19 MMOL/L — LOW (ref 22–31)
CREAT SERPL-MCNC: 1 MG/DL — SIGNIFICANT CHANGE UP (ref 0.5–1.3)
EGFR: 88 ML/MIN/1.73M2 — SIGNIFICANT CHANGE UP
GLUCOSE SERPL-MCNC: 111 MG/DL — HIGH (ref 70–99)
HCT VFR BLD CALC: 41.8 % — SIGNIFICANT CHANGE UP (ref 39–50)
HGB BLD-MCNC: 14.5 G/DL — SIGNIFICANT CHANGE UP (ref 13–17)
MCHC RBC-ENTMCNC: 28.9 PG — SIGNIFICANT CHANGE UP (ref 27–34)
MCHC RBC-ENTMCNC: 34.7 GM/DL — SIGNIFICANT CHANGE UP (ref 32–36)
MCV RBC AUTO: 83.4 FL — SIGNIFICANT CHANGE UP (ref 80–100)
NRBC # BLD: 0 /100 WBCS — SIGNIFICANT CHANGE UP (ref 0–0)
PLATELET # BLD AUTO: 258 K/UL — SIGNIFICANT CHANGE UP (ref 150–400)
POTASSIUM SERPL-MCNC: 3.4 MMOL/L — LOW (ref 3.5–5.3)
POTASSIUM SERPL-SCNC: 3.4 MMOL/L — LOW (ref 3.5–5.3)
RBC # BLD: 5.01 M/UL — SIGNIFICANT CHANGE UP (ref 4.2–5.8)
RBC # FLD: 12.4 % — SIGNIFICANT CHANGE UP (ref 10.3–14.5)
SODIUM SERPL-SCNC: 139 MMOL/L — SIGNIFICANT CHANGE UP (ref 135–145)
WBC # BLD: 9.47 K/UL — SIGNIFICANT CHANGE UP (ref 3.8–10.5)
WBC # FLD AUTO: 9.47 K/UL — SIGNIFICANT CHANGE UP (ref 3.8–10.5)

## 2023-06-09 RX ORDER — MAGNESIUM HYDROXIDE 400 MG/1
30 TABLET, CHEWABLE ORAL DAILY
Refills: 0 | Status: DISCONTINUED | OUTPATIENT
Start: 2023-06-09 | End: 2023-06-11

## 2023-06-09 RX ORDER — ENOXAPARIN SODIUM 100 MG/ML
40 INJECTION SUBCUTANEOUS
Refills: 0 | Status: DISCONTINUED | OUTPATIENT
Start: 2023-06-09 | End: 2023-06-11

## 2023-06-09 RX ORDER — POTASSIUM CHLORIDE 20 MEQ
40 PACKET (EA) ORAL EVERY 4 HOURS
Refills: 0 | Status: COMPLETED | OUTPATIENT
Start: 2023-06-09 | End: 2023-06-09

## 2023-06-09 RX ADMIN — Medication 5 MILLIGRAM(S): at 10:14

## 2023-06-09 RX ADMIN — ENOXAPARIN SODIUM 40 MILLIGRAM(S): 100 INJECTION SUBCUTANEOUS at 22:03

## 2023-06-09 RX ADMIN — Medication 5 MILLIGRAM(S): at 21:10

## 2023-06-09 RX ADMIN — Medication 40 MILLIEQUIVALENT(S): at 18:13

## 2023-06-09 RX ADMIN — PIPERACILLIN AND TAZOBACTAM 25 GRAM(S): 4; .5 INJECTION, POWDER, LYOPHILIZED, FOR SOLUTION INTRAVENOUS at 21:10

## 2023-06-09 RX ADMIN — PIPERACILLIN AND TAZOBACTAM 25 GRAM(S): 4; .5 INJECTION, POWDER, LYOPHILIZED, FOR SOLUTION INTRAVENOUS at 05:46

## 2023-06-09 RX ADMIN — Medication 10 MILLIGRAM(S): at 13:45

## 2023-06-09 RX ADMIN — POLYETHYLENE GLYCOL 3350 17 GRAM(S): 17 POWDER, FOR SOLUTION ORAL at 05:46

## 2023-06-09 RX ADMIN — Medication 40 MILLIEQUIVALENT(S): at 21:54

## 2023-06-09 RX ADMIN — MAGNESIUM HYDROXIDE 30 MILLILITER(S): 400 TABLET, CHEWABLE ORAL at 10:14

## 2023-06-09 RX ADMIN — ACETAZOLAMIDE 500 MILLIGRAM(S): 250 TABLET ORAL at 05:46

## 2023-06-09 RX ADMIN — ACETAZOLAMIDE 500 MILLIGRAM(S): 250 TABLET ORAL at 19:00

## 2023-06-09 RX ADMIN — SODIUM CHLORIDE 60 MILLILITER(S): 9 INJECTION, SOLUTION INTRAVENOUS at 13:50

## 2023-06-09 RX ADMIN — PANTOPRAZOLE SODIUM 40 MILLIGRAM(S): 20 TABLET, DELAYED RELEASE ORAL at 05:46

## 2023-06-09 RX ADMIN — PIPERACILLIN AND TAZOBACTAM 25 GRAM(S): 4; .5 INJECTION, POWDER, LYOPHILIZED, FOR SOLUTION INTRAVENOUS at 13:50

## 2023-06-09 NOTE — PROGRESS NOTE ADULT - ASSESSMENT
ASSESSMENT AND PLAN: 57M PMH JESSICA on CPAP, diverticulitis S/P sigmoid colon resection, liver abscess, S/P debridement of liver 2021, T10/L1/L5 Schwannomas s/p resection. Patient was doing well until Thursday when he felt a pop in his back concerning for suture ruputre, now c/o positional HAs. Denies photophobia/nuchal rigidity.    s/p Revision of lumbar wound, CSf leak repair, laminoplasty replaced 6/6/23    NEURO:   - Continue neuro checks q 4  - Monitor drain outputs - left deep ESSIE OFF SUCTION, right superficial ESSIE FULL SUCTION  - Keep patient flat and monitor for spinal headaches  - Monitor surgical cultures - currently in progress / no growth to date  - Pain control w/ Tylenol prn and Oxycodone prn  - Continue Robaxin for muscle spasm  - Continue Diamox  - Melatonin for sleep aid  - PT: outpatient PT - activity made bedrest / position flat today to monitor for spinal headaches    PULM:   - On room air, O2Sat>98%  - Incentive spirometry  - Patient has his CPAP machine at bedside for JESSICA    CV:  - -160, no issues    ENDO:   - Goal euglycemia    HEME/ONC:        6/5 Leukocytosis, f/u new CBC today to be sent              DVT ppx: SQL started today, SCDs    RENAL:   - LR@60 for maintenance  - 6/5 BMP stable  - Monitor Bicarb while on Diamox  - Keep childers while flat  - 6/9 New BMP to be sent for f/u    ID:   - Afebrile  - 6/6 Surgical cultures so far NGTD / in progress  - Was on Vancomycin for post-op finished yesterday  - Zosyn for post-op to finish on 6/13    GI:    - Changed diet from regular to liquid diet for patients comfort  - Pending BM - senna, miralax and changed from prn to standing dulcolax and magnesium hydroxy / he also got suppository  - Abdominal Xray ordered  - Continue MVT  - Continue protonix    DISCHARGE PLANNING:   PT - outpatient PT, pending hospital course    Plan to be discussed w/ Dr. Gross  [id number]    ASSESSMENT AND PLAN: 57M PMH JESSICA on CPAP, diverticulitis S/P sigmoid colon resection, liver abscess, S/P debridement of liver 2021, T10/L1/L5 Schwannomas s/p resection. Patient was doing well until Thursday when he felt a pop in his back concerning for suture ruputre, now c/o positional HAs. Denies photophobia/nuchal rigidity.    s/p Revision of lumbar wound, CSf leak repair, laminoplasty replaced 6/6/23    NEURO:   - Continue neuro checks q 4  - Monitor drain outputs - left deep ESSIE OFF SUCTION, right superficial ESSIE FULL SUCTION  - Keep patient flat and monitor for spinal headaches  - Monitor surgical cultures - currently in progress / no growth to date  - Pain control w/ Tylenol prn and Oxycodone prn  - Continue Robaxin for muscle spasm  - Continue Diamox  - Melatonin for sleep aid  - PT: outpatient PT - activity made bedrest / position flat today to monitor for spinal headaches    PULM:   - On room air, O2Sat>98%  - Incentive spirometry  - Patient has his CPAP machine at bedside for JESSICA    CV:  - -160, no issues    ENDO:   - Goal euglycemia    HEME/ONC:        6/9 CBC leukocytosis resolved         DVT ppx: SQL started today, SCDs    RENAL:   - LR@60 for maintenance  - 6/5 BMP stable  - Monitor Bicarb while on Diamox  - Keep childers while flat  - 6/9 New BMP to be sent for f/u    ID:   - Afebrile  - 6/6 Surgical cultures so far NGTD / in progress  - Was on Vancomycin for post-op finished yesterday  - Zosyn for post-op to finish on 6/13    GI:    - Changed diet from regular to liquid diet for patients comfort  - Continue senna, miralax and changed from prn to standing dulcolax and magnesium hydroxy / he also got suppository, had a bowel movement this afternoon  - Continue MVT  - Continue protonix    DISCHARGE PLANNING:   PT - outpatient PT, pending hospital course    Plan to be discussed w/ Dr. Gross  [id number]    ASSESSMENT AND PLAN: 57M PMH JESSICA on CPAP, diverticulitis S/P sigmoid colon resection, liver abscess, S/P debridement of liver 2021, T10/L1/L5 Schwannomas s/p resection. Patient was doing well until Thursday when he felt a pop in his back concerning for suture ruputre, now c/o positional HAs. Denies photophobia/nuchal rigidity.    s/p Revision of lumbar wound, CSf leak repair, laminoplasty replaced 6/6/23    NEURO:   - Continue neuro checks q 4  - Monitor drain outputs - left deep ESSIE OFF SUCTION, right superficial ESSIE FULL SUCTION  - Keep patient flat and monitor for spinal headaches  - Monitor surgical cultures - currently in progress / no growth to date  - Pain control w/ Tylenol prn and Oxycodone prn  - Continue Robaxin for muscle spasm  - Continue Diamox  - Melatonin for sleep aid  - PT: outpatient PT - activity made bedrest / position flat today to monitor for spinal headaches    PULM:   - On room air, O2Sat>98%  - Incentive spirometry  - Patient has his CPAP machine at bedside for JESSICA    CV:  - -160, no issues    ENDO:   - Goal euglycemia    HEME/ONC:        6/9 CBC leukocytosis resolved         DVT ppx: SQL started today, SCDs    RENAL:   - LR@60 for maintenance  - 6/5 BMP stable  - Monitor Bicarb while on Diamox  - Keep childers while flat  - 6/9 BMP Hypokalemia, K 3.4, KCl 40meq x 2 doses, will f/u in am    ID:   - Afebrile  - 6/6 Surgical cultures so far NGTD / in progress  - Was on Vancomycin for post-op finished yesterday  - Zosyn for post-op to finish on 6/13    GI:    - Changed diet from regular to liquid diet for patients comfort  - Continue senna, miralax and changed from prn to standing dulcolax and magnesium hydroxy / he also got suppository, had a bowel movement this afternoon  - Continue MVT  - Continue protonix    DISCHARGE PLANNING:   PT - outpatient PT, pending hospital course    Plan to be discussed w/ Dr. Gross  [id number]    ASSESSMENT AND PLAN: 57M PMH JESSICA on CPAP, diverticulitis S/P sigmoid colon resection, liver abscess, S/P debridement of liver 2021, T10/L1/L5 Schwannomas s/p resection. Patient was doing well until Thursday when he felt a pop in his back concerning for suture ruputre, now c/o positional HAs. Denies photophobia/nuchal rigidity.    s/p Revision of lumbar wound, CSf leak repair, laminoplasty replaced 6/6/23    NEURO:   - Continue neuro checks q 4  - Monitor drain outputs - left deep ESSIE OFF SUCTION, right superficial ESSIE FULL SUCTION  - Keep patient flat and monitor for spinal headaches  - Monitor surgical cultures - currently in progress / no growth to date  - Pain control w/ Tylenol prn and Oxycodone prn  - Continue Robaxin for muscle spasm  - Continue Diamox  - Melatonin for sleep aid  - PT: outpatient PT - activity made bedrest / position flat today to monitor for spinal headaches    PULM:   - On room air, O2Sat>98%  - Incentive spirometry  - Patient has his CPAP machine at bedside for JESSICA    CV:  - -160, no issues    ENDO:   - Goal euglycemia    HEME/ONC:        6/9 CBC leukocytosis resolved         DVT ppx: SQL started today, SCDs    RENAL:   - LR@60 for maintenance  - 6/5 BMP stable  - Monitor Bicarb while on Diamox  - Keep childers while flat  - 6/9 BMP Hypokalemia, K 3.4, KCl 40meq x 2 doses, will f/u in am    ID:   - Afebrile  - 6/6 Surgical cultures so far NGTD / in progress  - Was on Vancomycin for post-op finished yesterday  - Zosyn for post-op to finish on 6/13    GI:    - Changed diet from regular to liquid diet for patients comfort, after bowel movement changed back to regular diet (discussed w/ patient & wife at bedside)  - Continue senna, miralax and changed from prn to standing dulcolax and magnesium hydroxy / he also got suppository, had a bowel movement this afternoon  - Continue MVT  - Continue protonix    DISCHARGE PLANNING:   PT - outpatient PT, pending hospital course    Plan to be discussed w/ Dr. Gross  [id number]

## 2023-06-09 NOTE — PROGRESS NOTE ADULT - SUBJECTIVE AND OBJECTIVE BOX
SUBJECTIVE: HPI:  Jayden Szymanski  57M PMH JESSICA on CPAP, diverticulitis S/P sigmoid colon resection, liver abscess, S/P debridement of liver 2021, T10/L1/L5 Schwannomas s/p resection. Patient was doing well until Thursday when he felt a pop in his back concerning for suture ruputre, now c/o positional HAs. Denies photophobia/nuchal rigidity.   (05 Jun 2023 15:18)      OVERNIGHT EVENTS: Patient states HA when he was sitting up overnight, this morning patient kept flat and had left deep ESSIE OFF SUCTION and right superficial ESSIE full suction. Patient also concerned that     Vital Signs Last 24 Hrs  T(C): 36.7 (09 Jun 2023 12:22), Max: 36.7 (08 Jun 2023 20:45)  T(F): 98 (09 Jun 2023 12:22), Max: 98 (08 Jun 2023 20:45)  HR: 62 (09 Jun 2023 12:22) (62 - 74)  BP: 130/92 (09 Jun 2023 12:22) (105/70 - 130/92)  BP(mean): --  RR: 18 (09 Jun 2023 12:22) (18 - 18)  SpO2: 99% (09 Jun 2023 12:22) (98% - 100%)    Parameters below as of 09 Jun 2023 12:22  Patient On (Oxygen Delivery Method): room air        DRAINS:    PHYSICAL EXAM:    Constitutional: No Acute Distress     Neurological: AOx3, Following Commands, Moving all Extremities     Motor exam:          Upper extremity                         Delt     Bicep     Tricep    HG                                                 R         5/5        5/5        5/5       5/5                                               L          5/5        5/5        5/5       5/5          Lower extremity                        HF         KF        KE       DF         PF                                                  R        5/5        5/5        5/5       5/5         5/5                                               L         5/5        5/5       5/5       5/5          5/5                                                 Sensation: [] intact to light touch  [] decreased:     Pulmonary: Clear to Auscultation, No rales, No rhonchi, No wheezes     Cardiovascular: S1, S2, Regular rate and rhythm     Gastrointestinal: Soft, Non-tender, Non-distended     Extremities: No calf tenderness     Incision:     LABS:           MEDICATIONS:  Antibiotics:  piperacillin/tazobactam IVPB.. 3.375 Gram(s) IV Intermittent every 8 hours    Neuro:  acetaminophen     Tablet .. 650 milliGRAM(s) Oral every 6 hours PRN Temp greater or equal to 38C (100.4F), Mild Pain (1 - 3)  melatonin 5 milliGRAM(s) Oral at bedtime  methocarbamol 750 milliGRAM(s) Oral every 8 hours PRN Muscle Spasm  ondansetron Injectable 4 milliGRAM(s) IV Push every 6 hours PRN Nausea and/or Vomiting  oxyCODONE    IR 5 milliGRAM(s) Oral every 4 hours PRN Moderate Pain (4 - 6)  oxyCODONE    IR 10 milliGRAM(s) Oral every 4 hours PRN Severe Pain (7 - 10)  prochlorperazine   Injectable 10 milliGRAM(s) IntraMuscular once    Cardiac:  acetaZOLAMIDE    Tablet 500 milliGRAM(s) Oral two times a day    Pulm:    GI/:  bisacodyl 5 milliGRAM(s) Oral every 12 hours  bisacodyl Suppository 10 milliGRAM(s) Rectal daily PRN Constipation  magnesium hydroxide Suspension 30 milliLiter(s) Oral daily  pantoprazole    Tablet 40 milliGRAM(s) Oral before breakfast  polyethylene glycol 3350 17 Gram(s) Oral two times a day  senna 2 Tablet(s) Oral at bedtime    Other:   enoxaparin Injectable 40 milliGRAM(s) SubCutaneous <User Schedule>  lactated ringers. 1000 milliLiter(s) IV Continuous <Continuous>  multivitamin 1 Tablet(s) Oral daily    DIET: [] Regular [] CCD [] Renal [] Puree [] Dysphagia [] Tube Feeds:     IMAGING:    SUBJECTIVE: HPI:  Jayden Szymanski  57M PMH JESSICA on CPAP, diverticulitis S/P sigmoid colon resection, liver abscess, S/P debridement of liver 2021, T10/L1/L5 Schwannomas s/p resection. Patient was doing well until Thursday when he felt a pop in his back concerning for suture ruputre, now c/o positional HAs. Denies photophobia/nuchal rigidity.   (05 Jun 2023 15:18)      OVERNIGHT EVENTS: Patient states HA when he was sitting up overnight, this morning patient kept flat and had left deep ESSIE OFF SUCTION and right superficial ESSIE full suction. Patient also concerned that has not had bowel movement, made bowel movement medications prn to standing, received suppository and made diet clear liquid for comfort. Spoke to wife at bedside.     Vital Signs Last 24 Hrs  T(C): 36.7 (09 Jun 2023 12:22), Max: 36.7 (08 Jun 2023 20:45)  T(F): 98 (09 Jun 2023 12:22), Max: 98 (08 Jun 2023 20:45)  HR: 62 (09 Jun 2023 12:22) (62 - 74)  BP: 130/92 (09 Jun 2023 12:22) (105/70 - 130/92)  BP(mean): --  RR: 18 (09 Jun 2023 12:22) (18 - 18)  SpO2: 99% (09 Jun 2023 12:22) (98% - 100%)    Parameters below as of 09 Jun 2023 12:22  Patient On (Oxygen Delivery Method): room air        DRAINS: left deep ESSIE - OFF SUCTION 160cc/24hrs and right superficial ESSIE - FULL SUCTION 60cc/24hrs    PHYSICAL EXAM:    Constitutional: No Acute Distress     Neurological: AOx3, Following Commands, Moving all Extremities     Motor exam:          Upper extremity                         Delt     Bicep     Tricep    HG                                                 R         5/5        5/5        5/5       5/5                                               L          5/5        5/5        5/5       5/5          Lower extremity                        HF         KF        KE       DF         PF                                                  R        5/5        5/5        5/5       5/5         5/5                                               L         5/5        5/5       5/5       5/5          5/5                                                 Sensation: [x] intact to light touch  [] decreased:     Pulmonary: Clear to Auscultation, No rales, No rhonchi, No wheezes     Cardiovascular: S1, S2, Regular rate and rhythm     Gastrointestinal: Soft, Non-tender, Non-distended     Extremities: No calf tenderness     Incision: lumbar incision and drain exit sites - clean / dry / intact, Aquacel AG in place C/D/I    LABS:           MEDICATIONS:  Antibiotics:  piperacillin/tazobactam IVPB.. 3.375 Gram(s) IV Intermittent every 8 hours    Neuro:  acetaminophen     Tablet .. 650 milliGRAM(s) Oral every 6 hours PRN Temp greater or equal to 38C (100.4F), Mild Pain (1 - 3)  melatonin 5 milliGRAM(s) Oral at bedtime  methocarbamol 750 milliGRAM(s) Oral every 8 hours PRN Muscle Spasm  ondansetron Injectable 4 milliGRAM(s) IV Push every 6 hours PRN Nausea and/or Vomiting  oxyCODONE    IR 5 milliGRAM(s) Oral every 4 hours PRN Moderate Pain (4 - 6)  oxyCODONE    IR 10 milliGRAM(s) Oral every 4 hours PRN Severe Pain (7 - 10)  prochlorperazine   Injectable 10 milliGRAM(s) IntraMuscular once    Cardiac:  acetaZOLAMIDE    Tablet 500 milliGRAM(s) Oral two times a day    Pulm:    GI/:  bisacodyl 5 milliGRAM(s) Oral every 12 hours  bisacodyl Suppository 10 milliGRAM(s) Rectal daily PRN Constipation  magnesium hydroxide Suspension 30 milliLiter(s) Oral daily  pantoprazole    Tablet 40 milliGRAM(s) Oral before breakfast  polyethylene glycol 3350 17 Gram(s) Oral two times a day  senna 2 Tablet(s) Oral at bedtime    Other:   enoxaparin Injectable 40 milliGRAM(s) SubCutaneous <User Schedule>  lactated ringers. 1000 milliLiter(s) IV Continuous <Continuous>  multivitamin 1 Tablet(s) Oral daily    DIET: [x clear liquid] Regular [] CCD [] Renal [] Puree [] Dysphagia [] Tube Feeds:     IMAGING:   Abdominal Xray ordered. Pending.  SUBJECTIVE: HPI:  Jayden Szymanski  57M PMH JESSICA on CPAP, diverticulitis S/P sigmoid colon resection, liver abscess, S/P debridement of liver 2021, T10/L1/L5 Schwannomas s/p resection. Patient was doing well until Thursday when he felt a pop in his back concerning for suture ruputre, now c/o positional HAs. Denies photophobia/nuchal rigidity.   (05 Jun 2023 15:18)      OVERNIGHT EVENTS: Patient states HA when he was sitting up overnight, this morning patient kept flat and had left deep ESSIE OFF SUCTION and right superficial ESSIE full suction. Patient also concerned that has not had bowel movement, made bowel movement medications prn to standing, received suppository and made diet clear liquid for comfort. Spoke to wife at bedside.     Vital Signs Last 24 Hrs  T(C): 36.7 (09 Jun 2023 12:22), Max: 36.7 (08 Jun 2023 20:45)  T(F): 98 (09 Jun 2023 12:22), Max: 98 (08 Jun 2023 20:45)  HR: 62 (09 Jun 2023 12:22) (62 - 74)  BP: 130/92 (09 Jun 2023 12:22) (105/70 - 130/92)  BP(mean): --  RR: 18 (09 Jun 2023 12:22) (18 - 18)  SpO2: 99% (09 Jun 2023 12:22) (98% - 100%)    Parameters below as of 09 Jun 2023 12:22  Patient On (Oxygen Delivery Method): room air        DRAINS: left deep ESSIE - OFF SUCTION 160cc/24hrs and right superficial ESSIE - FULL SUCTION 60cc/24hrs    PHYSICAL EXAM:    Constitutional: No Acute Distress     Neurological: AOx3, Following Commands, Moving all Extremities     Motor exam:          Upper extremity                         Delt     Bicep     Tricep    HG                                                 R         5/5        5/5        5/5       5/5                                               L          5/5        5/5        5/5       5/5          Lower extremity                        HF         KF        KE       DF         PF                                                  R        5/5        5/5        5/5       5/5         5/5                                               L         5/5        5/5       5/5       5/5          5/5                                                 Sensation: [x] intact to light touch  [] decreased:     Pulmonary: Clear to Auscultation, No rales, No rhonchi, No wheezes     Cardiovascular: S1, S2, Regular rate and rhythm     Gastrointestinal: Soft, Non-tender, Non-distended     Extremities: No calf tenderness     Incision: lumbar incision and drain exit sites - clean / dry / intact, Aquacel AG in place C/D/I    LABS:                          14.5   9.47  )-----------( 258      ( 09 Jun 2023 16:21 )             41.8           MEDICATIONS:  Antibiotics:  piperacillin/tazobactam IVPB.. 3.375 Gram(s) IV Intermittent every 8 hours    Neuro:  acetaminophen     Tablet .. 650 milliGRAM(s) Oral every 6 hours PRN Temp greater or equal to 38C (100.4F), Mild Pain (1 - 3)  melatonin 5 milliGRAM(s) Oral at bedtime  methocarbamol 750 milliGRAM(s) Oral every 8 hours PRN Muscle Spasm  ondansetron Injectable 4 milliGRAM(s) IV Push every 6 hours PRN Nausea and/or Vomiting  oxyCODONE    IR 5 milliGRAM(s) Oral every 4 hours PRN Moderate Pain (4 - 6)  oxyCODONE    IR 10 milliGRAM(s) Oral every 4 hours PRN Severe Pain (7 - 10)  prochlorperazine   Injectable 10 milliGRAM(s) IntraMuscular once    Cardiac:  acetaZOLAMIDE    Tablet 500 milliGRAM(s) Oral two times a day    Pulm:    GI/:  bisacodyl 5 milliGRAM(s) Oral every 12 hours  bisacodyl Suppository 10 milliGRAM(s) Rectal daily PRN Constipation  magnesium hydroxide Suspension 30 milliLiter(s) Oral daily  pantoprazole    Tablet 40 milliGRAM(s) Oral before breakfast  polyethylene glycol 3350 17 Gram(s) Oral two times a day  senna 2 Tablet(s) Oral at bedtime    Other:   enoxaparin Injectable 40 milliGRAM(s) SubCutaneous <User Schedule>  lactated ringers. 1000 milliLiter(s) IV Continuous <Continuous>  multivitamin 1 Tablet(s) Oral daily    DIET: [x clear liquid] Regular [] CCD [] Renal [] Puree [] Dysphagia [] Tube Feeds:     IMAGING:   None. SUBJECTIVE: HPI:  Jayden Szymanski  57M PMH JESSICA on CPAP, diverticulitis S/P sigmoid colon resection, liver abscess, S/P debridement of liver 2021, T10/L1/L5 Schwannomas s/p resection. Patient was doing well until Thursday when he felt a pop in his back concerning for suture ruputre, now c/o positional HAs. Denies photophobia/nuchal rigidity.   (05 Jun 2023 15:18)      OVERNIGHT EVENTS: Patient states HA when he was sitting up overnight, this morning patient kept flat and had left deep ESSIE OFF SUCTION and right superficial ESSIE full suction. Patient also concerned that has not had bowel movement, made bowel movement medications prn to standing, received suppository and made diet clear liquid for comfort. Spoke to wife at bedside.     Vital Signs Last 24 Hrs  T(C): 36.7 (09 Jun 2023 12:22), Max: 36.7 (08 Jun 2023 20:45)  T(F): 98 (09 Jun 2023 12:22), Max: 98 (08 Jun 2023 20:45)  HR: 62 (09 Jun 2023 12:22) (62 - 74)  BP: 130/92 (09 Jun 2023 12:22) (105/70 - 130/92)  BP(mean): --  RR: 18 (09 Jun 2023 12:22) (18 - 18)  SpO2: 99% (09 Jun 2023 12:22) (98% - 100%)    Parameters below as of 09 Jun 2023 12:22  Patient On (Oxygen Delivery Method): room air        DRAINS: left deep ESSIE - OFF SUCTION 160cc/24hrs and right superficial ESSIE - FULL SUCTION 60cc/24hrs    PHYSICAL EXAM:    Constitutional: No Acute Distress     Neurological: AOx3, Following Commands, Moving all Extremities     Motor exam:          Upper extremity                         Delt     Bicep     Tricep    HG                                                 R         5/5        5/5        5/5       5/5                                               L          5/5        5/5        5/5       5/5          Lower extremity                        HF         KF        KE       DF         PF                                                  R        5/5        5/5        5/5       5/5         5/5                                               L         5/5        5/5       5/5       5/5          5/5                                                 Sensation: [x] intact to light touch  [] decreased:     Pulmonary: Clear to Auscultation, No rales, No rhonchi, No wheezes     Cardiovascular: S1, S2, Regular rate and rhythm     Gastrointestinal: Soft, Non-tender, Non-distended     Extremities: No calf tenderness     Incision: lumbar incision and drain exit sites - clean / dry / intact, Aquacel AG in place C/D/I    LABS:                          14.5   9.47  )-----------( 258      ( 09 Jun 2023 16:21 )             41.8     06-09    139  |  108  |  17  ----------------------------<  111<H>  3.4<L>   |  19<L>  |  1.00    Ca    9.3      09 Jun 2023 16:21      MEDICATIONS:  Antibiotics:  piperacillin/tazobactam IVPB.. 3.375 Gram(s) IV Intermittent every 8 hours    Neuro:  acetaminophen     Tablet .. 650 milliGRAM(s) Oral every 6 hours PRN Temp greater or equal to 38C (100.4F), Mild Pain (1 - 3)  melatonin 5 milliGRAM(s) Oral at bedtime  methocarbamol 750 milliGRAM(s) Oral every 8 hours PRN Muscle Spasm  ondansetron Injectable 4 milliGRAM(s) IV Push every 6 hours PRN Nausea and/or Vomiting  oxyCODONE    IR 5 milliGRAM(s) Oral every 4 hours PRN Moderate Pain (4 - 6)  oxyCODONE    IR 10 milliGRAM(s) Oral every 4 hours PRN Severe Pain (7 - 10)  prochlorperazine   Injectable 10 milliGRAM(s) IntraMuscular once    Cardiac:  acetaZOLAMIDE    Tablet 500 milliGRAM(s) Oral two times a day    Pulm:    GI/:  bisacodyl 5 milliGRAM(s) Oral every 12 hours  bisacodyl Suppository 10 milliGRAM(s) Rectal daily PRN Constipation  magnesium hydroxide Suspension 30 milliLiter(s) Oral daily  pantoprazole    Tablet 40 milliGRAM(s) Oral before breakfast  polyethylene glycol 3350 17 Gram(s) Oral two times a day  senna 2 Tablet(s) Oral at bedtime    Other:   enoxaparin Injectable 40 milliGRAM(s) SubCutaneous <User Schedule>  lactated ringers. 1000 milliLiter(s) IV Continuous <Continuous>  multivitamin 1 Tablet(s) Oral daily    DIET: [x clear liquid] Regular [] CCD [] Renal [] Puree [] Dysphagia [] Tube Feeds:     IMAGING:   None.

## 2023-06-10 LAB
ANION GAP SERPL CALC-SCNC: 11 MMOL/L — SIGNIFICANT CHANGE UP (ref 5–17)
BUN SERPL-MCNC: 16 MG/DL — SIGNIFICANT CHANGE UP (ref 7–23)
CALCIUM SERPL-MCNC: 9.1 MG/DL — SIGNIFICANT CHANGE UP (ref 8.4–10.5)
CHLORIDE SERPL-SCNC: 109 MMOL/L — HIGH (ref 96–108)
CO2 SERPL-SCNC: 20 MMOL/L — LOW (ref 22–31)
CREAT SERPL-MCNC: 1.01 MG/DL — SIGNIFICANT CHANGE UP (ref 0.5–1.3)
EGFR: 87 ML/MIN/1.73M2 — SIGNIFICANT CHANGE UP
GLUCOSE SERPL-MCNC: 94 MG/DL — SIGNIFICANT CHANGE UP (ref 70–99)
POTASSIUM SERPL-MCNC: 3.6 MMOL/L — SIGNIFICANT CHANGE UP (ref 3.5–5.3)
POTASSIUM SERPL-SCNC: 3.6 MMOL/L — SIGNIFICANT CHANGE UP (ref 3.5–5.3)
SODIUM SERPL-SCNC: 140 MMOL/L — SIGNIFICANT CHANGE UP (ref 135–145)

## 2023-06-10 RX ADMIN — ENOXAPARIN SODIUM 40 MILLIGRAM(S): 100 INJECTION SUBCUTANEOUS at 21:37

## 2023-06-10 RX ADMIN — ACETAZOLAMIDE 500 MILLIGRAM(S): 250 TABLET ORAL at 05:52

## 2023-06-10 RX ADMIN — PIPERACILLIN AND TAZOBACTAM 25 GRAM(S): 4; .5 INJECTION, POWDER, LYOPHILIZED, FOR SOLUTION INTRAVENOUS at 05:45

## 2023-06-10 RX ADMIN — PIPERACILLIN AND TAZOBACTAM 25 GRAM(S): 4; .5 INJECTION, POWDER, LYOPHILIZED, FOR SOLUTION INTRAVENOUS at 21:37

## 2023-06-10 RX ADMIN — ACETAZOLAMIDE 500 MILLIGRAM(S): 250 TABLET ORAL at 18:28

## 2023-06-10 RX ADMIN — Medication 1 TABLET(S): at 14:14

## 2023-06-10 RX ADMIN — PIPERACILLIN AND TAZOBACTAM 25 GRAM(S): 4; .5 INJECTION, POWDER, LYOPHILIZED, FOR SOLUTION INTRAVENOUS at 14:13

## 2023-06-10 RX ADMIN — PANTOPRAZOLE SODIUM 40 MILLIGRAM(S): 20 TABLET, DELAYED RELEASE ORAL at 05:46

## 2023-06-10 NOTE — PROVIDER CONTACT NOTE (OTHER) - ASSESSMENT
Pt. resting in bed at this time
Patient axox4. VSS. Lumbar dsg sry and intact with ESSIE x2 in place

## 2023-06-10 NOTE — PROVIDER CONTACT NOTE (OTHER) - REASON
patient c/o gas pain
Pt. c/o drainage from lumbar spine incision/Pt. requesting NSX team to look at HM[facility] to back

## 2023-06-10 NOTE — PROGRESS NOTE ADULT - ASSESSMENT
ASSESSMENT AND PLAN: 57M PMH JESSICA on CPAP, diverticulitis S/P sigmoid colon resection, liver abscess, S/P debridement of liver 2021, T10/L1/L5 Schwannomas s/p resection. Patient was doing well until Thursday when he felt a pop in his back concerning for suture ruputre, now c/o positional HAs. Denies photophobia/nuchal rigidity.    s/p Revision of lumbar wound, CSf leak repair, laminoplasty replaced 6/6/23    NEURO:   - Continue neuro checks q 4  - Monitor drain outputs - left deep ESSIE removed and oversewn, right superficial ESSIE FULL SUCTION  - Monitor for headaches, drain exit site leaking  - Monitor surgical cultures - currently in progress / no growth to date  - Pain control w/ Tylenol prn and Oxycodone prn  - Continue Robaxin for muscle spasm  - Continue Diamox  - Melatonin for sleep aid  - PT: outpatient PT -     PULM:   - On room air, O2Sat>98%  - Incentive spirometry  - Patient has his CPAP machine at bedside for JESSICA    CV:  - -160, no issues    ENDO:   - Goal euglycemia    HEME/ONC:        6/9 CBC leukocytosis resolved         DVT ppx: SQL started today, SCDs    RENAL:   - LR@60 for maintenance  - 6/5 BMP stable  - Monitor Bicarb while on Diamox  - Keep childers while flat  - 6/9 BMP Hypokalemia, K 3.4, KCl 40meq x 2 doses, will f/u in am    ID:   - Afebrile  - 6/6 Surgical cultures so far NGTD / in progress  - Was on Vancomycin for post-op finished yesterday  - Zosyn for post-op to finish on 6/13    GI:    - Changed diet from regular to liquid diet for patients comfort, after bowel movement changed back to regular diet (discussed w/ patient & wife at bedside)  - Continue senna, miralax and changed from prn to standing dulcolax and magnesium hydroxy / he also got suppository, had a bowel movement this afternoon  - Continue MVT  - Continue protonix    DISCHARGE PLANNING:   PT - outpatient PT, pending hospital course    Plan to be discussed w/ Dr. Gross  [id number]

## 2023-06-10 NOTE — PROVIDER CONTACT NOTE (OTHER) - SITUATION
Pt. requesting NSX team to look at back drsg/incision/possible leakage.  Pt. also requesting team to look at HMV

## 2023-06-10 NOTE — PROVIDER CONTACT NOTE (OTHER) - ACTION/TREATMENT ORDERED:
Pt. evaluated by NSX Resident/AMR[address] at this time/Monitor//PP/RN
Warm compress applied. Warm tea given to patient. Patient also received biscodyl and senna to help with bowel movement

## 2023-06-10 NOTE — PROGRESS NOTE ADULT - SUBJECTIVE AND OBJECTIVE BOX
SUBJECTIVE: HPI:  Jayden Szymanski  57M PMH JESSICA on CPAP, diverticulitis S/P sigmoid colon resection, liver abscess, S/P debridement of liver 2021, T10/L1/L5 Schwannomas s/p resection. Patient was doing well until Thursday when he felt a pop in his back concerning for suture rupture. c/o positional HAs s/p wound revision. Denies photophobia/nuchal rigidity.    OVERNIGHT EVENTS: No headaches overnight.     ICU Vital Signs Last 24 Hrs  T(C): 36.6 (10 Abhilash 2023 16:54), Max: 36.7 (10 Abhilash 2023 05:32)  T(F): 97.8 (10 Abhilash 2023 16:54), Max: 98 (10 Abhilash 2023 05:32)  HR: 82 (10 Abhilash 2023 16:54) (67 - 86)  BP: 121/87 (10 Abhilash 2023 16:54) (113/73 - 121/87)  BP(mean): --  ABP: --  ABP(mean): --  RR: 18 (10 Abhilash 2023 16:54) (18 - 18)  SpO2: 96% (10 Abhilash 2023 16:54) (96% - 99%)    O2 Parameters below as of 10 Abhilash 2023 16:54  Patient On (Oxygen Delivery Method): room air    PHYSICAL EXAM:    Constitutional: No Acute Distress     Neurological: AOx3, Following Commands, Moving all Extremities     Motor exam:          Upper extremity                         Delt     Bicep     Tricep    HG                                                 R         5/5        5/5        5/5       5/5                                               L          5/5        5/5        5/5       5/5          Lower extremity                        HF         KF        KE       DF         PF                                                  R        5/5        5/5        5/5       5/5         5/5                                               L         5/5        5/5       5/5       5/5          5/5                                                 Sensation: [x] intact to light touch  [] decreased:     Pulmonary: Clear to Auscultation, No rales, No rhonchi, No wheezes     Cardiovascular: S1, S2, Regular rate and rhythm     Gastrointestinal: Soft, Non-tender, Non-distended     Extremities: No calf tenderness     Incision: lumbar incision and drain exit sites - clean / dry / intact, Aquacel AG in place C/D/I    .  LABS:                         14.5   9.47  )-----------( 258      ( 09 Jun 2023 16:21 )             41.8     06-10    140  |  109<H>  |  16  ----------------------------<  94  3.6   |  20<L>  |  1.01    Ca    9.1      10 Abhilash 2023 06:45      RADIOLOGY, EKG & ADDITIONAL TESTS: Reviewed.     MEDICATIONS:  Antibiotics:  piperacillin/tazobactam IVPB.. 3.375 Gram(s) IV Intermittent every 8 hours    Neuro:  acetaminophen     Tablet .. 650 milliGRAM(s) Oral every 6 hours PRN Temp greater or equal to 38C (100.4F), Mild Pain (1 - 3)  melatonin 5 milliGRAM(s) Oral at bedtime  methocarbamol 750 milliGRAM(s) Oral every 8 hours PRN Muscle Spasm  ondansetron Injectable 4 milliGRAM(s) IV Push every 6 hours PRN Nausea and/or Vomiting  oxyCODONE    IR 5 milliGRAM(s) Oral every 4 hours PRN Moderate Pain (4 - 6)  oxyCODONE    IR 10 milliGRAM(s) Oral every 4 hours PRN Severe Pain (7 - 10)  prochlorperazine   Injectable 10 milliGRAM(s) IntraMuscular once    Cardiac:  acetaZOLAMIDE    Tablet 500 milliGRAM(s) Oral two times a day    Pulm:    GI/:  bisacodyl 5 milliGRAM(s) Oral every 12 hours  bisacodyl Suppository 10 milliGRAM(s) Rectal daily PRN Constipation  magnesium hydroxide Suspension 30 milliLiter(s) Oral daily  pantoprazole    Tablet 40 milliGRAM(s) Oral before breakfast  polyethylene glycol 3350 17 Gram(s) Oral two times a day  senna 2 Tablet(s) Oral at bedtime    Other:   enoxaparin Injectable 40 milliGRAM(s) SubCutaneous <User Schedule>  lactated ringers. 1000 milliLiter(s) IV Continuous <Continuous>  multivitamin 1 Tablet(s) Oral daily    DIET: [x clear liquid] Regular [] CCD [] Renal [] Puree [] Dysphagia [] Tube Feeds:     IMAGING:   None.

## 2023-06-10 NOTE — PROGRESS NOTE ADULT - REASON FOR ADMISSION
Positional HAs s/p intradural surgery

## 2023-06-11 ENCOUNTER — TRANSCRIPTION ENCOUNTER (OUTPATIENT)
Age: 58
End: 2023-06-11

## 2023-06-11 VITALS
SYSTOLIC BLOOD PRESSURE: 125 MMHG | RESPIRATION RATE: 18 BRPM | DIASTOLIC BLOOD PRESSURE: 89 MMHG | OXYGEN SATURATION: 100 % | HEART RATE: 70 BPM | TEMPERATURE: 98 F

## 2023-06-11 PROCEDURE — 97116 GAIT TRAINING THERAPY: CPT

## 2023-06-11 PROCEDURE — 80053 COMPREHEN METABOLIC PANEL: CPT

## 2023-06-11 PROCEDURE — 80048 BASIC METABOLIC PNL TOTAL CA: CPT

## 2023-06-11 PROCEDURE — 97530 THERAPEUTIC ACTIVITIES: CPT

## 2023-06-11 PROCEDURE — 87077 CULTURE AEROBIC IDENTIFY: CPT

## 2023-06-11 PROCEDURE — 86850 RBC ANTIBODY SCREEN: CPT

## 2023-06-11 PROCEDURE — 85027 COMPLETE CBC AUTOMATED: CPT

## 2023-06-11 PROCEDURE — C1889: CPT

## 2023-06-11 PROCEDURE — 36415 COLL VENOUS BLD VENIPUNCTURE: CPT

## 2023-06-11 PROCEDURE — 93005 ELECTROCARDIOGRAM TRACING: CPT

## 2023-06-11 PROCEDURE — 85610 PROTHROMBIN TIME: CPT

## 2023-06-11 PROCEDURE — C1713: CPT

## 2023-06-11 PROCEDURE — 84145 PROCALCITONIN (PCT): CPT

## 2023-06-11 PROCEDURE — 86900 BLOOD TYPING SEROLOGIC ABO: CPT

## 2023-06-11 PROCEDURE — 85730 THROMBOPLASTIN TIME PARTIAL: CPT

## 2023-06-11 PROCEDURE — 87102 FUNGUS ISOLATION CULTURE: CPT

## 2023-06-11 PROCEDURE — 87070 CULTURE OTHR SPECIMN AEROBIC: CPT

## 2023-06-11 PROCEDURE — 86901 BLOOD TYPING SEROLOGIC RH(D): CPT

## 2023-06-11 PROCEDURE — 85025 COMPLETE CBC W/AUTO DIFF WBC: CPT

## 2023-06-11 PROCEDURE — 97161 PT EVAL LOW COMPLEX 20 MIN: CPT

## 2023-06-11 RX ORDER — CEPHALEXIN 500 MG
1 CAPSULE ORAL
Qty: 10 | Refills: 0
Start: 2023-06-11 | End: 2023-06-15

## 2023-06-11 RX ADMIN — PIPERACILLIN AND TAZOBACTAM 25 GRAM(S): 4; .5 INJECTION, POWDER, LYOPHILIZED, FOR SOLUTION INTRAVENOUS at 05:11

## 2023-06-11 RX ADMIN — PANTOPRAZOLE SODIUM 40 MILLIGRAM(S): 20 TABLET, DELAYED RELEASE ORAL at 05:28

## 2023-06-11 RX ADMIN — ACETAZOLAMIDE 500 MILLIGRAM(S): 250 TABLET ORAL at 05:10

## 2023-06-11 NOTE — DISCHARGE NOTE PROVIDER - NSDCFUSCHEDAPPT_GEN_ALL_CORE_FT
Ivan Gross Physician [facility]  NEUROSURG [address] [place] [address]  Scheduled Appointment: 06/22/2023

## 2023-06-11 NOTE — DISCHARGE NOTE PROVIDER - CARE PROVIDER_API CALL
Ivan Gross  Neurosurgery  [address] [facility]  [place] [address]  Phone: [phone]  Fax: [phone]  Scheduled Appointment: 06/15/2023

## 2023-06-11 NOTE — DISCHARGE NOTE NURSING/CASE MANAGEMENT/SOCIAL WORK - PATIENT PORTAL LINK FT
You can access the FollowMyHealth Patient Portal offered by [facility] by registering at the following website: [email or web address]. By joining [facility]’s FollowMyHealth portal, you will also be able to view your health information using other applications (apps) compatible with our system.

## 2023-06-11 NOTE — DISCHARGE NOTE PROVIDER - NSDCMRMEDTOKEN_GEN_ALL_CORE_FT
cephalexin 500 mg oral capsule: 1 cap(s) orally 2 times a day  cyclobenzaprine 10 mg oral tablet: 1 tab(s) orally every 8 hours as needed for  muscle spasm MDD: 3  oxyCODONE 5 mg oral tablet: 1 tab(s) orally every 6 hours MDD: 4  Physical Therapy: Dx: T10-T11, L1, L5 laminoplasty, Intradural Mass Resection  Outpatient Physical Therapy 2-3x weekly x6 weeks  Please evaluate and treat  Dr. Ivan Gross  [phone] MDD: 0  polyethylene glycol 3350 oral powder for reconstitution: 17 gram(s) orally 2 times a day  Kathleen Walker: MDD: 0  senna leaf extract oral tablet: 2 tab(s) orally once a day (at bedtime)  Tylenol Extra Strength 500 mg oral tablet: 2 tab(s) orally every 8 hours

## 2023-06-11 NOTE — DISCHARGE NOTE PROVIDER - HOSPITAL COURSE
Had previous surgery 5/23, returned for revision surgery 6/6 (L5 wound revision)    Cleared for D/C by PT on 6/11    Will D/C with superficial ESSIE in place, plan is to keep on suction, monitor output, see Dr. Gross in office thursday    Will take PO keflex until seen in outpatient setting

## 2023-06-12 ENCOUNTER — APPOINTMENT (OUTPATIENT)
Dept: NEUROSURGERY | Facility: CLINIC | Age: 58
End: 2023-06-12

## 2023-06-13 ENCOUNTER — NON-APPOINTMENT (OUTPATIENT)
Age: 58
End: 2023-06-13

## 2023-06-13 NOTE — BRIEF OPERATIVE NOTE - CONDITION POST OP
Please make an appointment to follow up with Neurology Clinic (phone: [phone]). They will contact you regarding scheduling.     stable

## 2023-06-14 ENCOUNTER — INPATIENT (INPATIENT)
Facility: HOSPITAL | Age: 58
LOS: 7 days | Discharge: HOME | DRG: 91 | End: 2023-06-22
Attending: NEUROLOGICAL SURGERY | Admitting: NEUROLOGICAL SURGERY
Payer: COMMERCIAL

## 2023-06-14 VITALS
DIASTOLIC BLOOD PRESSURE: 93 MMHG | RESPIRATION RATE: 18 BRPM | HEART RATE: 67 BPM | OXYGEN SATURATION: 97 % | SYSTOLIC BLOOD PRESSURE: 145 MMHG | TEMPERATURE: 97 F

## 2023-06-14 DIAGNOSIS — Z98.890 OTHER SPECIFIED POSTPROCEDURAL STATES: Chronic | ICD-10-CM

## 2023-06-14 DIAGNOSIS — T88.9XXA COMPLICATION OF SURGICAL AND MEDICAL CARE, UNSPECIFIED, INITIAL ENCOUNTER: ICD-10-CM

## 2023-06-14 DIAGNOSIS — Z90.89 ACQUIRED ABSENCE OF OTHER ORGANS: Chronic | ICD-10-CM

## 2023-06-14 LAB
ANION GAP SERPL CALC-SCNC: 11 MMOL/L — SIGNIFICANT CHANGE UP (ref 5–17)
BASOPHILS # BLD AUTO: 0.07 K/UL — SIGNIFICANT CHANGE UP (ref 0–0.2)
BASOPHILS NFR BLD AUTO: 0.9 % — SIGNIFICANT CHANGE UP (ref 0–2)
BUN SERPL-MCNC: 16 MG/DL — SIGNIFICANT CHANGE UP (ref 7–23)
CALCIUM SERPL-MCNC: 9.4 MG/DL — SIGNIFICANT CHANGE UP (ref 8.4–10.5)
CHLORIDE SERPL-SCNC: 104 MMOL/L — SIGNIFICANT CHANGE UP (ref 96–108)
CO2 SERPL-SCNC: 26 MMOL/L — SIGNIFICANT CHANGE UP (ref 22–31)
CREAT SERPL-MCNC: 0.8 MG/DL — SIGNIFICANT CHANGE UP (ref 0.5–1.3)
EGFR: 103 ML/MIN/1.73M2 — SIGNIFICANT CHANGE UP
EOSINOPHIL # BLD AUTO: 0.46 K/UL — SIGNIFICANT CHANGE UP (ref 0–0.5)
EOSINOPHIL NFR BLD AUTO: 5.8 % — SIGNIFICANT CHANGE UP (ref 0–6)
GLUCOSE SERPL-MCNC: 111 MG/DL — HIGH (ref 70–99)
HCT VFR BLD CALC: 39.6 % — SIGNIFICANT CHANGE UP (ref 39–50)
HGB BLD-MCNC: 13.8 G/DL — SIGNIFICANT CHANGE UP (ref 13–17)
IMM GRANULOCYTES NFR BLD AUTO: 0.5 % — SIGNIFICANT CHANGE UP (ref 0–0.9)
LYMPHOCYTES # BLD AUTO: 1.86 K/UL — SIGNIFICANT CHANGE UP (ref 1–3.3)
LYMPHOCYTES # BLD AUTO: 23.5 % — SIGNIFICANT CHANGE UP (ref 13–44)
MAGNESIUM SERPL-MCNC: 1.9 MG/DL — SIGNIFICANT CHANGE UP (ref 1.6–2.6)
MCHC RBC-ENTMCNC: 28.8 PG — SIGNIFICANT CHANGE UP (ref 27–34)
MCHC RBC-ENTMCNC: 34.8 GM/DL — SIGNIFICANT CHANGE UP (ref 32–36)
MCV RBC AUTO: 82.7 FL — SIGNIFICANT CHANGE UP (ref 80–100)
MONOCYTES # BLD AUTO: 0.69 K/UL — SIGNIFICANT CHANGE UP (ref 0–0.9)
MONOCYTES NFR BLD AUTO: 8.7 % — SIGNIFICANT CHANGE UP (ref 2–14)
NEUTROPHILS # BLD AUTO: 4.8 K/UL — SIGNIFICANT CHANGE UP (ref 1.8–7.4)
NEUTROPHILS NFR BLD AUTO: 60.6 % — SIGNIFICANT CHANGE UP (ref 43–77)
NRBC # BLD: 0 /100 WBCS — SIGNIFICANT CHANGE UP (ref 0–0)
PHOSPHATE SERPL-MCNC: 3 MG/DL — SIGNIFICANT CHANGE UP (ref 2.5–4.5)
PLATELET # BLD AUTO: 248 K/UL — SIGNIFICANT CHANGE UP (ref 150–400)
POTASSIUM SERPL-MCNC: 3.5 MMOL/L — SIGNIFICANT CHANGE UP (ref 3.5–5.3)
POTASSIUM SERPL-SCNC: 3.5 MMOL/L — SIGNIFICANT CHANGE UP (ref 3.5–5.3)
RBC # BLD: 4.79 M/UL — SIGNIFICANT CHANGE UP (ref 4.2–5.8)
RBC # FLD: 12.3 % — SIGNIFICANT CHANGE UP (ref 10.3–14.5)
SODIUM SERPL-SCNC: 141 MMOL/L — SIGNIFICANT CHANGE UP (ref 135–145)
WBC # BLD: 7.92 K/UL — SIGNIFICANT CHANGE UP (ref 3.8–10.5)
WBC # FLD AUTO: 7.92 K/UL — SIGNIFICANT CHANGE UP (ref 3.8–10.5)

## 2023-06-14 PROCEDURE — 99232 SBSQ HOSP IP/OBS MODERATE 35: CPT

## 2023-06-14 PROCEDURE — 62329 THER SPI PNXR CSF FLUOR/CT: CPT

## 2023-06-14 RX ORDER — SENNA PLUS 8.6 MG/1
2 TABLET ORAL AT BEDTIME
Refills: 0 | Status: DISCONTINUED | OUTPATIENT
Start: 2023-06-14 | End: 2023-06-22

## 2023-06-14 RX ORDER — ENOXAPARIN SODIUM 100 MG/ML
40 INJECTION SUBCUTANEOUS
Refills: 0 | Status: DISCONTINUED | OUTPATIENT
Start: 2023-06-15 | End: 2023-06-22

## 2023-06-14 RX ORDER — CYCLOBENZAPRINE HYDROCHLORIDE 10 MG/1
10 TABLET, FILM COATED ORAL THREE TIMES A DAY
Refills: 0 | Status: DISCONTINUED | OUTPATIENT
Start: 2023-06-14 | End: 2023-06-22

## 2023-06-14 RX ORDER — DIPHENHYDRAMINE HCL 50 MG
25 CAPSULE ORAL AT BEDTIME
Refills: 0 | Status: COMPLETED | OUTPATIENT
Start: 2023-06-14 | End: 2023-06-20

## 2023-06-14 RX ORDER — POTASSIUM CHLORIDE 20 MEQ
40 PACKET (EA) ORAL ONCE
Refills: 0 | Status: COMPLETED | OUTPATIENT
Start: 2023-06-14 | End: 2023-06-15

## 2023-06-14 RX ORDER — DIPHENHYDRAMINE HCL 50 MG
25 CAPSULE ORAL ONCE
Refills: 0 | Status: DISCONTINUED | OUTPATIENT
Start: 2023-06-14 | End: 2023-06-14

## 2023-06-14 RX ORDER — OXYCODONE HYDROCHLORIDE 5 MG/1
5 TABLET ORAL EVERY 4 HOURS
Refills: 0 | Status: DISCONTINUED | OUTPATIENT
Start: 2023-06-14 | End: 2023-06-14

## 2023-06-14 RX ORDER — ENOXAPARIN SODIUM 100 MG/ML
40 INJECTION SUBCUTANEOUS
Refills: 0 | Status: DISCONTINUED | OUTPATIENT
Start: 2023-06-14 | End: 2023-06-14

## 2023-06-14 RX ORDER — ALPRAZOLAM 0.25 MG
0.25 TABLET ORAL DAILY
Refills: 0 | Status: DISCONTINUED | OUTPATIENT
Start: 2023-06-14 | End: 2023-06-15

## 2023-06-14 RX ORDER — TAMSULOSIN HYDROCHLORIDE 0.4 MG/1
0.4 CAPSULE ORAL AT BEDTIME
Refills: 0 | Status: DISCONTINUED | OUTPATIENT
Start: 2023-06-14 | End: 2023-06-22

## 2023-06-14 RX ORDER — ACETAMINOPHEN 500 MG
650 TABLET ORAL EVERY 6 HOURS
Refills: 0 | Status: DISCONTINUED | OUTPATIENT
Start: 2023-06-14 | End: 2023-06-22

## 2023-06-14 RX ORDER — CEPHALEXIN 500 MG
500 CAPSULE ORAL EVERY 12 HOURS
Refills: 0 | Status: DISCONTINUED | OUTPATIENT
Start: 2023-06-14 | End: 2023-06-19

## 2023-06-14 RX ORDER — POLYETHYLENE GLYCOL 3350 17 G/17G
17 POWDER, FOR SOLUTION ORAL DAILY
Refills: 0 | Status: DISCONTINUED | OUTPATIENT
Start: 2023-06-14 | End: 2023-06-22

## 2023-06-14 RX ORDER — CEPHALEXIN 500 MG
500 CAPSULE ORAL ONCE
Refills: 0 | Status: COMPLETED | OUTPATIENT
Start: 2023-06-14 | End: 2023-06-14

## 2023-06-14 RX ORDER — MAGNESIUM SULFATE 500 MG/ML
1 VIAL (ML) INJECTION ONCE
Refills: 0 | Status: COMPLETED | OUTPATIENT
Start: 2023-06-14 | End: 2023-06-15

## 2023-06-14 RX ORDER — ONDANSETRON 8 MG/1
4 TABLET, FILM COATED ORAL EVERY 6 HOURS
Refills: 0 | Status: DISCONTINUED | OUTPATIENT
Start: 2023-06-14 | End: 2023-06-22

## 2023-06-14 RX ADMIN — SENNA PLUS 2 TABLET(S): 8.6 TABLET ORAL at 21:06

## 2023-06-14 RX ADMIN — Medication 1 TABLET(S): at 13:20

## 2023-06-14 RX ADMIN — POLYETHYLENE GLYCOL 3350 17 GRAM(S): 17 POWDER, FOR SOLUTION ORAL at 13:21

## 2023-06-14 RX ADMIN — TAMSULOSIN HYDROCHLORIDE 0.4 MILLIGRAM(S): 0.4 CAPSULE ORAL at 21:06

## 2023-06-14 RX ADMIN — Medication 650 MILLIGRAM(S): at 22:45

## 2023-06-14 RX ADMIN — Medication 500 MILLIGRAM(S): at 20:40

## 2023-06-14 RX ADMIN — Medication 500 MILLIGRAM(S): at 15:15

## 2023-06-14 RX ADMIN — Medication 650 MILLIGRAM(S): at 22:00

## 2023-06-14 RX ADMIN — Medication 25 MILLIGRAM(S): at 22:03

## 2023-06-14 NOTE — PROGRESS NOTE ADULT - SUBJECTIVE AND OBJECTIVE BOX
Clinical Indication: CSF leak after lumbar laminectomy    PREPROCEDURE:    Patient presents for lumbar drain insertion under xray guidance.  Risks and benefits were discussed with patient and/or health care proxy.  Risks include but are not limited to headache, bleeding, infection and nerve damage.    Patient and/or health care proxy understands and consents to procedure.    POSTPROCEDURE:    Lumbar puncture was performed at the L2-3  level using a 14 gauge Tuohy needle using fluoroscopic guidance. The opening pressure was measured at 26 cm of water.  A catheter was inserted through th needle and affixed to the skin with a suture and tegaderm.  Patient tolerated the procedure well and left the department in stable condition.

## 2023-06-14 NOTE — H&P ADULT - HISTORY OF PRESENT ILLNESS
Jayden Szymanski  57M PMH JESSICA on CPAP, diverticulitis S/P sigmoid colon resection, liver abscess, S/P debridement of liver 2021, T10/L1/L5 Schwannomas s/p resection (5/23/23) and revision of lumbar wound with csf leak repair (6/6/23). Patient was recently discharged but now re-admitted for placement of lumbar drain for concern for persistent CSF leak.

## 2023-06-14 NOTE — H&P ADULT - HIV OFFER
Component      Latest Ref Rng & Units 5/9/2018   FASTING STATUS      hrs 12   CHOLESTEROL      <200 mg/dL 212 (H)   CALCULATED LDL      <130 mg/dL 113   HDL      >49 mg/dL 71   TRIGLYCERIDE      <150 mg/dL 142   CALCULATED NON HDL      mg/dL 141   CHOL/HDL      <4.5 3.0    melatonin spray 1.5 mg ( [facility] ) if awake in between     unisom one at night if above dont work     Cecilia varghese   Location: [facility] [place] [id number] [place], [address]  Next to [facility]  Cecilia Varghese, RD, CD, RYT  [phone]    
Opt out

## 2023-06-14 NOTE — PROGRESS NOTE ADULT - SUBJECTIVE AND OBJECTIVE BOX
Night rounds   Patient seen and examined    T(C): 36.8 (06-14-23 @ 15:00), Max: 36.8 (06-14-23 @ 11:55)  HR: 81 (06-14-23 @ 15:00) (67 - 82)  BP: 139/74 (06-14-23 @ 15:00) (132/87 - 145/93)  RR: 17 (06-14-23 @ 15:00) (16 - 19)  SpO2: 99% (06-14-23 @ 15:00) (97% - 99%)  06-14-23 @ 07:01  -  06-14-23 @ 18:37  --------------------------------------------------------  IN: 840 mL / OUT: 775 mL / NET: 65 mL    acetaminophen     Tablet .. 650 milliGRAM(s) Oral every 6 hours PRN  ALPRAZolam 0.25 milliGRAM(s) Oral daily PRN  cephalexin 500 milliGRAM(s) Oral every 12 hours  cyclobenzaprine 10 milliGRAM(s) Oral three times a day PRN  diphenhydrAMINE 25 milliGRAM(s) Oral at bedtime PRN  multivitamin 1 Tablet(s) Oral daily  ondansetron Injectable 4 milliGRAM(s) IV Push every 6 hours PRN  oxyCODONE    IR 5 milliGRAM(s) Oral every 4 hours PRN  polyethylene glycol 3350 17 Gram(s) Oral daily  senna 2 Tablet(s) Oral at bedtime  tamsulosin 0.4 milliGRAM(s) Oral at bedtime        Exam unchanged from day rounds , no focal neurologic deficits     labs and imaging reviewed     LD for treatment of CSF leak  LD draining 10 cc/hr       [facility]  Night rounds   Patient seen and examined    T(C): 36.8 (06-14-23 @ 15:00), Max: 36.8 (06-14-23 @ 11:55)  HR: 81 (06-14-23 @ 15:00) (67 - 82)  BP: 139/74 (06-14-23 @ 15:00) (132/87 - 145/93)  RR: 17 (06-14-23 @ 15:00) (16 - 19)  SpO2: 99% (06-14-23 @ 15:00) (97% - 99%)  06-14-23 @ 07:01  -  06-14-23 @ 18:37  --------------------------------------------------------  IN: 840 mL / OUT: 775 mL / NET: 65 mL    acetaminophen     Tablet .. 650 milliGRAM(s) Oral every 6 hours PRN  ALPRAZolam 0.25 milliGRAM(s) Oral daily PRN  cephalexin 500 milliGRAM(s) Oral every 12 hours  cyclobenzaprine 10 milliGRAM(s) Oral three times a day PRN  diphenhydrAMINE 25 milliGRAM(s) Oral at bedtime PRN  multivitamin 1 Tablet(s) Oral daily  ondansetron Injectable 4 milliGRAM(s) IV Push every 6 hours PRN  oxyCODONE    IR 5 milliGRAM(s) Oral every 4 hours PRN  polyethylene glycol 3350 17 Gram(s) Oral daily  senna 2 Tablet(s) Oral at bedtime  tamsulosin 0.4 milliGRAM(s) Oral at bedtime        Exam unchanged from day rounds , no focal neurologic deficits     labs and imaging reviewed     LD for treatment of CSF leak  LD draining 10 cc/hr     not critical   [facility]  Night rounds   Patient seen and examined    T(C): 36.8 (06-14-23 @ 15:00), Max: 36.8 (06-14-23 @ 11:55)  HR: 81 (06-14-23 @ 15:00) (67 - 82)  BP: 139/74 (06-14-23 @ 15:00) (132/87 - 145/93)  RR: 17 (06-14-23 @ 15:00) (16 - 19)  SpO2: 99% (06-14-23 @ 15:00) (97% - 99%)  06-14-23 @ 07:01  -  06-14-23 @ 18:37  --------------------------------------------------------  IN: 840 mL / OUT: 775 mL / NET: 65 mL    acetaminophen     Tablet .. 650 milliGRAM(s) Oral every 6 hours PRN  ALPRAZolam 0.25 milliGRAM(s) Oral daily PRN  cephalexin 500 milliGRAM(s) Oral every 12 hours  cyclobenzaprine 10 milliGRAM(s) Oral three times a day PRN  diphenhydrAMINE 25 milliGRAM(s) Oral at bedtime PRN  multivitamin 1 Tablet(s) Oral daily  ondansetron Injectable 4 milliGRAM(s) IV Push every 6 hours PRN  oxyCODONE    IR 5 milliGRAM(s) Oral every 4 hours PRN  polyethylene glycol 3350 17 Gram(s) Oral daily  senna 2 Tablet(s) Oral at bedtime  tamsulosin 0.4 milliGRAM(s) Oral at bedtime        Exam unchanged from day rounds , no focal neurologic deficits   not complaining of headache     labs and imaging reviewed     LD for treatment of CSF leak  LD draining 10 cc/hr   monitor for CSF leak     not critical   [facility] attending

## 2023-06-14 NOTE — PROGRESS NOTE ADULT - SUBJECTIVE AND OBJECTIVE BOX
HPI:  56 yo with severe JESSICA on CPAP, diverticulitis s/p sigmoid colon resection, liver abscess s/p debridement of liver 2021, who was found to have 3 spinal schwannomas in the setting of back pain s/p T10, L1, and L5 osteoplastic laminoplasties. Post-op course c/b a positional headache, s/p lumbar wound revision on 6/6 with findings of CSF leak s/p primary repair. Discharged home but JPs with CSF output, therefore patient admitted 6/14 for LD for treatment of CSF leak.       VITALS:  T(C): , Max: 36.8 (06-14-23 @ 11:55)  HR:  (67 - 82)  BP:  (132/87 - 145/93)  ABP: --  RR:  (16 - 19)  SpO2:  (97% - 99%)  Wt(kg): --      06-14-23 @ 07:01  -  06-14-23 @ 14:51  --------------------------------------------------------  IN: 360 mL / OUT: 435 mL / NET: -75 mL    MEDICATIONS:  acetaminophen     Tablet .. 650 milliGRAM(s) Oral every 6 hours PRN  cephalexin 500 milliGRAM(s) Oral every 12 hours  cyclobenzaprine 10 milliGRAM(s) Oral three times a day PRN  multivitamin 1 Tablet(s) Oral daily  ondansetron Injectable 4 milliGRAM(s) IV Push every 6 hours PRN  oxyCODONE    IR 5 milliGRAM(s) Oral every 4 hours PRN  polyethylene glycol 3350 17 Gram(s) Oral daily  senna 2 Tablet(s) Oral at bedtime    EXAMINATION:  General:  in NAD  HEENT:  MMM  Neuro:  awake, alert, oriented x 3, follows commands, EOMI, face symmetric, no PD, DF 5/5   Cards:  RRR  Respiratory:  no respiratory distress  Abdomen:  soft  Extremities:  no LE edema    Assessment/Plan:     Neuro:  q4h NC    CV:  SBP goal 1001-60     Pulm:    GI:    Renal:  Na goal    Heme:  SCDs    ID:    Endo:   FS goal 120-180    Patient seen and examined by attending on 6/14/2023.    Patient is critically ill due to ? and at high risk for neurological deterioration or death due to:     HPI:  58 yo with severe JESSICA on CPAP, diverticulitis s/p sigmoid colon resection, liver abscess s/p debridement of liver 2021, who was found to have 3 spinal schwannomas in the setting of back pain s/p T10, L1, and L5 osteoplastic laminoplasties. Post-op course c/b a positional headache, s/p lumbar wound revision on 6/6 with findings of CSF leak s/p primary repair. Discharged home but JPs with CSF output, therefore patient admitted 6/14 for LD for treatment of CSF leak.       VITALS:  T(C): , Max: 36.8 (06-14-23 @ 11:55)  HR:  (67 - 82)  BP:  (132/87 - 145/93)  ABP: --  RR:  (16 - 19)  SpO2:  (97% - 99%)  Wt(kg): --      06-14-23 @ 07:01  -  06-14-23 @ 14:51  --------------------------------------------------------  IN: 360 mL / OUT: 435 mL / NET: -75 mL    MEDICATIONS:  acetaminophen     Tablet .. 650 milliGRAM(s) Oral every 6 hours PRN  cephalexin 500 milliGRAM(s) Oral every 12 hours  cyclobenzaprine 10 milliGRAM(s) Oral three times a day PRN  multivitamin 1 Tablet(s) Oral daily  ondansetron Injectable 4 milliGRAM(s) IV Push every 6 hours PRN  oxyCODONE    IR 5 milliGRAM(s) Oral every 4 hours PRN  polyethylene glycol 3350 17 Gram(s) Oral daily  senna 2 Tablet(s) Oral at bedtime    EXAMINATION:  General:  in NAD  HEENT:  MMM  Neuro:  awake, alert, oriented x 3, follows commands, EOMI, face symmetric, no PD, DF 5/5   Cards:  RRR  Respiratory:  no respiratory distress  Abdomen:  soft  Extremities:  no LE edema    Assessment/Plan:     Neuro:  q4h NC    CV:  SBP goal 100-160    Pulm:    GI:    Renal:  Na goal  start home flomax 0.4mg daily (only home meds)     Heme:  SCDs    ID:    Endo:   FS goal 120-180    Patient seen and examined by attending on 6/14/2023.    Patient is critically ill due to ? and at high risk for neurological deterioration or death due to:     HPI:  56 yo with severe JESSICA on CPAP, diverticulitis s/p sigmoid colon resection, liver abscess s/p debridement of liver 2021, who was found to have 3 spinal schwannomas in the setting of back pain s/p T10, L1, and L5 osteoplastic laminoplasties. Post-op course c/b a positional headache, s/p lumbar wound revision on 6/6 with findings of CSF leak s/p primary repair. Discharged home but JPs with CSF output, therefore patient admitted to NSCU 6/14 for LD for treatment of CSF leak.   Patient at his neurologic baseline post-procedure. No LE weakness. No headache currently.     VITALS:  T(C): , Max: 36.8 (06-14-23 @ 11:55)  HR:  (67 - 82)  BP:  (132/87 - 145/93)  ABP: --  RR:  (16 - 19)  SpO2:  (97% - 99%)  Wt(kg): --      06-14-23 @ 07:01  -  06-14-23 @ 14:51  --------------------------------------------------------  IN: 360 mL / OUT: 435 mL / NET: -75 mL    MEDICATIONS:  acetaminophen     Tablet .. 650 milliGRAM(s) Oral every 6 hours PRN  cephalexin 500 milliGRAM(s) Oral every 12 hours  cyclobenzaprine 10 milliGRAM(s) Oral three times a day PRN  multivitamin 1 Tablet(s) Oral daily  ondansetron Injectable 4 milliGRAM(s) IV Push every 6 hours PRN  oxyCODONE    IR 5 milliGRAM(s) Oral every 4 hours PRN  polyethylene glycol 3350 17 Gram(s) Oral daily  senna 2 Tablet(s) Oral at bedtime    EXAMINATION:  General:  in NAD  HEENT:  MMM  Neuro:  awake, alert, oriented x 3, follows commands, EOMI, face symmetric, no PD, n LE drift, sensation intact in all 4 extremities   Cards:  RRR  Respiratory:  no respiratory distress  Abdomen:  soft  Extremities:  no LE edema    Assessment/Plan:  56 yo with severe JESSICA on CPAP, diverticulitis s/p sigmoid colon resection, liver abscess s/p debridement of liver 2021, who was found to have 3 spinal schwannomas in the setting of back pain s/p T10, L1, and L5 osteoplastic laminoplasties. Post-op course c/b a positional headache, s/p lumbar wound revision on 6/6 with findings of CSF leak s/p primary repair. Discharged home but JPs with CSF output, therefore patient admitted to NSCU 6/14 for LD for treatment of CSF leak.      Neuro:  q4h NC  LD to drain 10cc/hr  low dose Xanax for anxiety     CV:  SBP goal 100-160    Pulm:  on RA, COBY    GI:  regular diet   senna and miralax  no indication for PPI     Renal:  IVL  start home flomax 0.4mg daily (only home meds)     Heme:  SCDs, start Lov ppx tonight is safe from nrsg standpoint     ID:  Keflex ppx for CSF leak as per nrsg    Endo:   FS goal 120-180    Patient seen and examined by attending on 6/14/2023.    Patient is not critically ill but is medically complex 2/2 CSF leak being treated with lumbar drain.

## 2023-06-14 NOTE — PATIENT PROFILE ADULT - FUNCTIONAL ASSESSMENT - DAILY ACTIVITY 5.
4 = No assist / stand by assistance Detail Level: Detailed Quality 130: Documentation Of Current Medications In The Medical Record: Current Medications Documented

## 2023-06-14 NOTE — H&P ADULT - ASSESSMENT
Jayden Szymanski  57M PMH JESSICA on CPAP, diverticulitis S/P sigmoid colon resection, liver abscess, S/P debridement of liver 2021, T10/L1/L5 Schwannomas s/p resection (5/23/23) and revision of lumbar wound with csf leak repair (6/6/23). Patient was recently discharged but now re-admitted for placement of lumbar drain for concern for persistent CSF leak.     -Admitted to NSCU  -Lumbar drain with 10cc/hr of drainage  -Monitor for headache

## 2023-06-14 NOTE — PATIENT PROFILE ADULT - FALL HARM RISK - HARM RISK INTERVENTIONS

## 2023-06-15 LAB
ANION GAP SERPL CALC-SCNC: 11 MMOL/L — SIGNIFICANT CHANGE UP (ref 5–17)
BUN SERPL-MCNC: 18 MG/DL — SIGNIFICANT CHANGE UP (ref 7–23)
CALCIUM SERPL-MCNC: 9.5 MG/DL — SIGNIFICANT CHANGE UP (ref 8.4–10.5)
CHLORIDE SERPL-SCNC: 102 MMOL/L — SIGNIFICANT CHANGE UP (ref 96–108)
CO2 SERPL-SCNC: 25 MMOL/L — SIGNIFICANT CHANGE UP (ref 22–31)
CREAT SERPL-MCNC: 0.76 MG/DL — SIGNIFICANT CHANGE UP (ref 0.5–1.3)
EGFR: 105 ML/MIN/1.73M2 — SIGNIFICANT CHANGE UP
GLUCOSE SERPL-MCNC: 98 MG/DL — SIGNIFICANT CHANGE UP (ref 70–99)
MAGNESIUM SERPL-MCNC: 1.9 MG/DL — SIGNIFICANT CHANGE UP (ref 1.6–2.6)
PHOSPHATE SERPL-MCNC: 3.1 MG/DL — SIGNIFICANT CHANGE UP (ref 2.5–4.5)
POTASSIUM SERPL-MCNC: 4.1 MMOL/L — SIGNIFICANT CHANGE UP (ref 3.5–5.3)
POTASSIUM SERPL-SCNC: 4.1 MMOL/L — SIGNIFICANT CHANGE UP (ref 3.5–5.3)
SODIUM SERPL-SCNC: 138 MMOL/L — SIGNIFICANT CHANGE UP (ref 135–145)

## 2023-06-15 PROCEDURE — 99232 SBSQ HOSP IP/OBS MODERATE 35: CPT

## 2023-06-15 PROCEDURE — 93970 EXTREMITY STUDY: CPT | Mod: 26

## 2023-06-15 RX ORDER — ALPRAZOLAM 0.25 MG
0.25 TABLET ORAL
Refills: 0 | Status: DISCONTINUED | OUTPATIENT
Start: 2023-06-15 | End: 2023-06-18

## 2023-06-15 RX ORDER — LANOLIN ALCOHOL/MO/W.PET/CERES
10 CREAM (GRAM) TOPICAL AT BEDTIME
Refills: 0 | Status: DISCONTINUED | OUTPATIENT
Start: 2023-06-15 | End: 2023-06-22

## 2023-06-15 RX ADMIN — TAMSULOSIN HYDROCHLORIDE 0.4 MILLIGRAM(S): 0.4 CAPSULE ORAL at 21:24

## 2023-06-15 RX ADMIN — Medication 500 MILLIGRAM(S): at 05:24

## 2023-06-15 RX ADMIN — Medication 100 GRAM(S): at 02:32

## 2023-06-15 RX ADMIN — CYCLOBENZAPRINE HYDROCHLORIDE 10 MILLIGRAM(S): 10 TABLET, FILM COATED ORAL at 01:22

## 2023-06-15 RX ADMIN — Medication 500 MILLIGRAM(S): at 17:07

## 2023-06-15 RX ADMIN — SENNA PLUS 2 TABLET(S): 8.6 TABLET ORAL at 21:24

## 2023-06-15 RX ADMIN — ENOXAPARIN SODIUM 40 MILLIGRAM(S): 100 INJECTION SUBCUTANEOUS at 17:07

## 2023-06-15 RX ADMIN — POLYETHYLENE GLYCOL 3350 17 GRAM(S): 17 POWDER, FOR SOLUTION ORAL at 09:44

## 2023-06-15 RX ADMIN — Medication 0.25 MILLIGRAM(S): at 21:23

## 2023-06-15 RX ADMIN — Medication 40 MILLIEQUIVALENT(S): at 01:22

## 2023-06-15 RX ADMIN — Medication 10 MILLIGRAM(S): at 21:24

## 2023-06-15 RX ADMIN — Medication 0.25 MILLIGRAM(S): at 02:30

## 2023-06-15 NOTE — PHYSICAL THERAPY INITIAL EVALUATION ADULT - PLANNED THERAPY INTERVENTIONS, PT EVAL
Stair training: GOAL: Pt will negotiate 12 steps using 1 handrail independently in 2 weeks./gait training

## 2023-06-15 NOTE — PROGRESS NOTE ADULT - ASSESSMENT
Assessment/Plan:  56 yo with severe JESSICA on CPAP, diverticulitis s/p sigmoid colon resection, liver abscess s/p debridement of liver 2021, who was found to have 3 spinal schwannomas in the setting of back pain s/p T10, L1, and L5 osteoplastic laminoplasties with resection of schwannomas on 5/23/23. Post-op course c/b a positional headache, s/p lumbar wound revision on 6/6 with findings of CSF leak s/p primary repair. Discharged home but JPs with CSF output, therefore patient admitted to NSCU 6/14 for LD for treatment of CSF leak.      Neuro:  q4h NC  LD to drain 10cc/hr  low dose Xanax for anxiety PRN  start melatonin 10 mg at night    CV:  SBP goal 100-160    Pulm:  on RA, COBY    GI:  regular diet   senna and miralax  no indication for PPI     Renal:  IVL  c/w home flomax 0.4mg daily (only home med)     Heme:  SCDs, Lov ppx     ID:  Keflex ppx for CSF leak as per nrsg    Endo:   FS goal 120-180      Patient is not critically ill but is medically complex 2/2 CSF leak being treated with lumbar drain.

## 2023-06-15 NOTE — OCCUPATIONAL THERAPY INITIAL EVALUATION ADULT - ADDITIONAL COMMENTS
pt reports lives in private home with spouse, 2-3 steps to enter, flight of stairs inside, walk-in shower. Prior to admission Ind with ADLs/ambulation, no AD/DME. Owns RW does not use.

## 2023-06-15 NOTE — PROGRESS NOTE ADULT - SUBJECTIVE AND OBJECTIVE BOX
Interval events:  No acute interval events    ICU Vital Signs Last 24 Hrs  T(C): 36.7 (15 Abhilash 2023 19:00), Max: 36.8 (15 Abhilash 2023 11:00)  T(F): 98 (15 Abhilash 2023 19:00), Max: 98.2 (15 Abhilash 2023 11:00)  HR: 90 (15 Abhilash 2023 19:00) (61 - 90)  BP: 123/77 (15 Abhilash 2023 19:00) (111/70 - 141/76)  BP(mean): 87 (15 Abhilash 2023 19:00) (78 - 92)  ABP: --  ABP(mean): --  RR: 20 (15 Abhilash 2023 19:00) (16 - 20)  SpO2: 100% (15 Abhilash 2023 19:00) (98% - 100%)    O2 Parameters below as of 15 Abhilash 2023 19:00  Patient On (Oxygen Delivery Method): room air    I&O's Summary    14 Jun 2023 07:01  -  15 Abhilash 2023 07:00  --------------------------------------------------------  IN: 1390 mL / OUT: 1610 mL / NET: -220 mL    15 Abhilash 2023 07:01  -  15 Abhilash 2023 20:29  --------------------------------------------------------  IN: 2160 mL / OUT: 3187 mL / NET: -1027 mL                          13.8   7.92  )-----------( 248      ( 14 Jun 2023 23:11 )             39.6   06-14    141  |  104  |  16  ----------------------------<  111<H>  3.5   |  26  |  0.80    Ca    9.4      14 Jun 2023 23:11  Phos  3.0     06-14  Mg     1.9     06-14    MEDICATIONS  (STANDING):  cephalexin 500 milliGRAM(s) Oral every 12 hours  enoxaparin Injectable 40 milliGRAM(s) SubCutaneous <User Schedule>  melatonin 10 milliGRAM(s) Oral at bedtime  multivitamin 1 Tablet(s) Oral daily  polyethylene glycol 3350 17 Gram(s) Oral daily  senna 2 Tablet(s) Oral at bedtime  tamsulosin 0.4 milliGRAM(s) Oral at bedtime    MEDICATIONS  (PRN):  acetaminophen     Tablet .. 650 milliGRAM(s) Oral every 6 hours PRN Temp greater or equal to 38C (100.4F), Mild Pain (1 - 3)  ALPRAZolam 0.25 milliGRAM(s) Oral two times a day PRN anxiety  cyclobenzaprine 10 milliGRAM(s) Oral three times a day PRN Muscle Spasm  diphenhydrAMINE 25 milliGRAM(s) Oral at bedtime PRN Insomnia at bedtime  ondansetron Injectable 4 milliGRAM(s) IV Push every 6 hours PRN Nausea and/or Vomiting  oxyCODONE    IR 5 milliGRAM(s) Oral every 4 hours PRN Moderate Pain (4 - 6)    EXAMINATION:  General:  in NAD  HEENT:  MMM  Neuro:  awake, alert, oriented x 3, follows commands, EOMI, face symmetric, no PD, no LE drift, sensation intact in all 4 extremities   Cards:  RRR  Respiratory:  no respiratory distress  Abdomen:  soft  Extremities:  no LE edema. LD site clean and dry.

## 2023-06-15 NOTE — PROGRESS NOTE ADULT - SUBJECTIVE AND OBJECTIVE BOX
Interval events:    VITALS:  T(C): , Max: 36.8 (06-14-23 @ 11:55)  HR:  (67 - 82)  BP:  (132/87 - 145/93)  ABP: --  RR:  (16 - 19)  SpO2:  (97% - 99%)  Wt(kg): --      06-14-23 @ 07:01  -  06-15-23 @ 07:00  --------------------------------------------------------  IN: 1390 mL / OUT: 1610 mL / NET: -220 mL      LABS:  Na: 141 (06-14 @ 23:11)  K: 3.5 (06-14 @ 23:11)  Cl: 104 (06-14 @ 23:11)  CO2: 26 (06-14 @ 23:11)  BUN: 16 (06-14 @ 23:11)  Cr: 0.80 (06-14 @ 23:11)  Glu: 111(06-14 @ 23:11)    Hgb: 13.8 (06-14 @ 23:11)  Hct: 39.6 (06-14 @ 23:11)  WBC: 7.92 (06-14 @ 23:11)  Plt: 248 (06-14 @ 23:11)    MEDICATIONS:  acetaminophen     Tablet .. 650 milliGRAM(s) Oral every 6 hours PRN  ALPRAZolam 0.25 milliGRAM(s) Oral daily PRN  cephalexin 500 milliGRAM(s) Oral every 12 hours  cyclobenzaprine 10 milliGRAM(s) Oral three times a day PRN  diphenhydrAMINE 25 milliGRAM(s) Oral at bedtime PRN  enoxaparin Injectable 40 milliGRAM(s) SubCutaneous <User Schedule>  multivitamin 1 Tablet(s) Oral daily  ondansetron Injectable 4 milliGRAM(s) IV Push every 6 hours PRN  oxyCODONE    IR 5 milliGRAM(s) Oral every 4 hours PRN  polyethylene glycol 3350 17 Gram(s) Oral daily  senna 2 Tablet(s) Oral at bedtime  tamsulosin 0.4 milliGRAM(s) Oral at bedtime    EXAMINATION:  General:  in NAD  HEENT:  MMM  Neuro:  awake, alert, oriented x 3, follows commands, EOMI, face symmetric, no PD, n LE drift, sensation intact in all 4 extremities   Cards:  RRR  Respiratory:  no respiratory distress  Abdomen:  soft  Extremities:  no LE edema    Assessment/Plan:  58 yo with severe JESSICA on CPAP, diverticulitis s/p sigmoid colon resection, liver abscess s/p debridement of liver 2021, who was found to have 3 spinal schwannomas in the setting of back pain s/p T10, L1, and L5 osteoplastic laminoplasties with resection of schwannomas on 5/23/23. Post-op course c/b a positional headache, s/p lumbar wound revision on 6/6 with findings of CSF leak s/p primary repair. Discharged home but JPs with CSF output, therefore patient admitted to NSCU 6/14 for LD for treatment of CSF leak.      Neuro:  q4h NC  LD to drain 10cc/hr  low dose Xanax for anxiety     CV:  SBP goal 100-160    Pulm:  on RA, COBY    GI:  regular diet   senna and miralax  no indication for PPI     Renal:  IVL  start home flomax 0.4mg daily (only home meds)     Heme:  SCDs, start Lov ppx tonight is safe from nrsg standpoint     ID:  Keflex ppx for CSF leak as per nrsg    Endo:   FS goal 120-180    Patient seen and examined by attending on 6/15/2023.    Patient is not critically ill but is medically complex 2/2 CSF leak being treated with lumbar drain.      Interval events:  NAEO. Required Benadryl and Xanax for sleep.   This AM patient denies any headaches or any weakness or numbness in LEs.     VITALS:  T(C): , Max: 36.8 (06-14-23 @ 11:55)  HR:  (67 - 82)  BP:  (132/87 - 145/93)  ABP: --  RR:  (16 - 19)  SpO2:  (97% - 99%)  Wt(kg): --      06-14-23 @ 07:01  -  06-15-23 @ 07:00  --------------------------------------------------------  IN: 1390 mL / OUT: 1610 mL / NET: -220 mL      LABS:  Na: 141 (06-14 @ 23:11)  K: 3.5 (06-14 @ 23:11)  Cl: 104 (06-14 @ 23:11)  CO2: 26 (06-14 @ 23:11)  BUN: 16 (06-14 @ 23:11)  Cr: 0.80 (06-14 @ 23:11)  Glu: 111(06-14 @ 23:11)    Hgb: 13.8 (06-14 @ 23:11)  Hct: 39.6 (06-14 @ 23:11)  WBC: 7.92 (06-14 @ 23:11)  Plt: 248 (06-14 @ 23:11)    MEDICATIONS:  acetaminophen     Tablet .. 650 milliGRAM(s) Oral every 6 hours PRN  ALPRAZolam 0.25 milliGRAM(s) Oral daily PRN  cephalexin 500 milliGRAM(s) Oral every 12 hours  cyclobenzaprine 10 milliGRAM(s) Oral three times a day PRN  diphenhydrAMINE 25 milliGRAM(s) Oral at bedtime PRN  enoxaparin Injectable 40 milliGRAM(s) SubCutaneous <User Schedule>  multivitamin 1 Tablet(s) Oral daily  ondansetron Injectable 4 milliGRAM(s) IV Push every 6 hours PRN  oxyCODONE    IR 5 milliGRAM(s) Oral every 4 hours PRN  polyethylene glycol 3350 17 Gram(s) Oral daily  senna 2 Tablet(s) Oral at bedtime  tamsulosin 0.4 milliGRAM(s) Oral at bedtime    EXAMINATION:  General:  in NAD  HEENT:  MMM  Neuro:  awake, alert, oriented x 3, follows commands, EOMI, face symmetric, no PD, no LE drift, sensation intact in all 4 extremities   Cards:  RRR  Respiratory:  no respiratory distress  Abdomen:  soft  Extremities:  no LE edema. LD site clean and dry.     Assessment/Plan:  56 yo with severe JESSICA on CPAP, diverticulitis s/p sigmoid colon resection, liver abscess s/p debridement of liver 2021, who was found to have 3 spinal schwannomas in the setting of back pain s/p T10, L1, and L5 osteoplastic laminoplasties with resection of schwannomas on 5/23/23. Post-op course c/b a positional headache, s/p lumbar wound revision on 6/6 with findings of CSF leak s/p primary repair. Discharged home but JPs with CSF output, therefore patient admitted to NSCU 6/14 for LD for treatment of CSF leak.      Neuro:  q4h NC  LD to drain 10cc/hr  low dose Xanax for anxiety PRN  start melatonin 10 mg at night    CV:  SBP goal 100-160    Pulm:  on RA, COBY    GI:  regular diet   senna and miralax  no indication for PPI     Renal:  IVL  c/w home flomax 0.4mg daily (only home med)     Heme:  SCDs, Lov ppx     ID:  Keflex ppx for CSF leak as per nrsg    Endo:   FS goal 120-180    Patient seen and examined by attending on 6/15/2023.    Patient is not critically ill but is medically complex 2/2 CSF leak being treated with lumbar drain.

## 2023-06-15 NOTE — PHYSICAL THERAPY INITIAL EVALUATION ADULT - GENERAL OBSERVATIONS, REHAB EVAL
Pt admitted for lumbar drain placement, had recent spinal surgery d/c home, CSF noted to be draining into ESSIE drains at home. Pt received semi-supine in bed in NAD, VSS, +cardiac monitoring, +lumbar drain (clamped by ASTRID wells for session), +1 ESSIE drain, pt A&Ox4, agreeable to PT.

## 2023-06-15 NOTE — OCCUPATIONAL THERAPY INITIAL EVALUATION ADULT - PERTINENT HX OF CURRENT PROBLEM, REHAB EVAL
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 Patient was recently discharged but now re-admitted for placement of lumbar drain for concern for persistent CSF leak.

## 2023-06-15 NOTE — PHYSICAL THERAPY INITIAL EVALUATION ADULT - PERTINENT HX OF CURRENT PROBLEM, REHAB EVAL
58 yo with severe JESSICA on CPAP, diverticulitis s/p sigmoid colon resection, liver abscess s/p debridement of liver 2021, who was found to have 3 spinal schwannomas in the setting of back pain s/p T10, L1, and L5 osteoplastic laminoplasties with resection of schwannomas on 5/23/23. Post-op course c/b a positional headache, s/p lumbar wound revision on 6/6 with findings of CSF leak s/p primary repair. Discharged home but JPs with CSF output, therefore patient admitted to NSCU 6/14 for LD for treatment of CSF leak.

## 2023-06-16 PROCEDURE — 99231 SBSQ HOSP IP/OBS SF/LOW 25: CPT

## 2023-06-16 RX ORDER — ASCORBIC ACID 60 MG
500 TABLET,CHEWABLE ORAL DAILY
Refills: 0 | Status: DISCONTINUED | OUTPATIENT
Start: 2023-06-16 | End: 2023-06-22

## 2023-06-16 RX ORDER — MAGNESIUM SULFATE 500 MG/ML
1 VIAL (ML) INJECTION ONCE
Refills: 0 | Status: DISCONTINUED | OUTPATIENT
Start: 2023-06-16 | End: 2023-06-16

## 2023-06-16 RX ORDER — CHLORHEXIDINE GLUCONATE 213 G/1000ML
1 SOLUTION TOPICAL DAILY
Refills: 0 | Status: ACTIVE | OUTPATIENT
Start: 2023-06-16 | End: 2024-05-14

## 2023-06-16 RX ORDER — MAGNESIUM SULFATE 500 MG/ML
1 VIAL (ML) INJECTION ONCE
Refills: 0 | Status: COMPLETED | OUTPATIENT
Start: 2023-06-16 | End: 2023-06-16

## 2023-06-16 RX ADMIN — Medication 500 MILLIGRAM(S): at 11:58

## 2023-06-16 RX ADMIN — Medication 1 TABLET(S): at 11:58

## 2023-06-16 RX ADMIN — CHLORHEXIDINE GLUCONATE 1 APPLICATION(S): 213 SOLUTION TOPICAL at 12:09

## 2023-06-16 RX ADMIN — Medication 650 MILLIGRAM(S): at 18:11

## 2023-06-16 RX ADMIN — Medication 100 GRAM(S): at 00:35

## 2023-06-16 RX ADMIN — Medication 500 MILLIGRAM(S): at 06:28

## 2023-06-16 RX ADMIN — Medication 0.25 MILLIGRAM(S): at 21:06

## 2023-06-16 RX ADMIN — ENOXAPARIN SODIUM 40 MILLIGRAM(S): 100 INJECTION SUBCUTANEOUS at 16:59

## 2023-06-16 RX ADMIN — POLYETHYLENE GLYCOL 3350 17 GRAM(S): 17 POWDER, FOR SOLUTION ORAL at 11:59

## 2023-06-16 RX ADMIN — TAMSULOSIN HYDROCHLORIDE 0.4 MILLIGRAM(S): 0.4 CAPSULE ORAL at 21:06

## 2023-06-16 RX ADMIN — SENNA PLUS 2 TABLET(S): 8.6 TABLET ORAL at 21:06

## 2023-06-16 RX ADMIN — Medication 10 MILLIGRAM(S): at 21:06

## 2023-06-16 RX ADMIN — Medication 500 MILLIGRAM(S): at 16:59

## 2023-06-16 NOTE — DIETITIAN INITIAL EVALUATION ADULT - ENERGY INTAKE
Discussed menu and ordering procedures, importance of consuming adequate calories and protein to aid in healing./Adequate (%)

## 2023-06-16 NOTE — PROGRESS NOTE ADULT - SUBJECTIVE AND OBJECTIVE BOX
SUMMARY:    Daytime Events:     T(C): 36.6 (06-16-23 @ 11:00), Max: 36.7 (06-15-23 @ 15:00)  HR: 81 (06-16-23 @ 11:00) (60 - 90)  BP: 117/80 (06-16-23 @ 11:00) (116/63 - 141/76)  RR: 19 (06-16-23 @ 11:00) (12 - 20)  SpO2: 99% (06-16-23 @ 11:00) (95% - 100%)  Wt(kg): --    Physical Exam:  EXAMINATION:  General: No acute distress  HEENT: Anicteric sclerae  Cardiac: [id number]  Lungs: Clear  Abdomen: Soft, non-tender, +BS  Extremities: No c/c/e  Skin/Incision Site: Clean, dry and intact  Neurologic: Awake, alert, fully oriented, follows commands, PERRL, VFFtc, EOMI, face symmetric, tongue midline, no drift, full strength    IMAGING/DATA: [] Reviewed    acetaminophen     Tablet .. 650 milliGRAM(s) Oral every 6 hours PRN  ALPRAZolam 0.25 milliGRAM(s) Oral two times a day PRN  ascorbic acid 500 milliGRAM(s) Oral daily  cephalexin 500 milliGRAM(s) Oral every 12 hours  chlorhexidine 4% Liquid 1 Application(s) Topical daily  cyclobenzaprine 10 milliGRAM(s) Oral three times a day PRN  diphenhydrAMINE 25 milliGRAM(s) Oral at bedtime PRN  enoxaparin Injectable 40 milliGRAM(s) SubCutaneous <User Schedule>  melatonin 10 milliGRAM(s) Oral at bedtime  multivitamin 1 Tablet(s) Oral daily  ondansetron Injectable 4 milliGRAM(s) IV Push every 6 hours PRN  oxyCODONE    IR 5 milliGRAM(s) Oral every 4 hours PRN  polyethylene glycol 3350 17 Gram(s) Oral daily  senna 2 Tablet(s) Oral at bedtime  tamsulosin 0.4 milliGRAM(s) Oral at bedtime                            13.8   7.92  )-----------( 248      ( 14 Jun 2023 23:11 )             39.6     06-15    138  |  102  |  18  ----------------------------<  98  4.1   |  25  |  0.76    Ca    9.5      15 Abhilash 2023 21:36  Phos  3.1     06-15  Mg     1.9     06-15        DEVICES:   [] Restraints [] ET tube [] central line [] arterial line [] childers [] NGT/OGT [] EVD [] LD [] ESSIE/HMV [] Trach [] PEG [] Chest Tube [] other:

## 2023-06-16 NOTE — PROGRESS NOTE ADULT - ASSESSMENT
Assessment/Plan:  56 yo with severe JESSICA on CPAP, diverticulitis s/p sigmoid colon resection, liver abscess s/p debridement of liver 2021, who was found to have 3 spinal schwannomas in the setting of back pain s/p T10, L1, and L5 osteoplastic laminoplasties with resection of schwannomas on 5/23/23. Post-op course c/b a positional headache, s/p lumbar wound revision on 6/6 with findings of CSF leak s/p primary repair. Discharged home but JPs with CSF output, therefore patient admitted to NSCU 6/14 for LD for treatment of CSF leak.      Neuro:  q4h NC  LD to drain 12cc/hr  low dose Xanax for anxiety PRN  start melatonin 10 mg at night    CV:  SBP goal 100-160    Pulm:  on RA, COBY    GI:  regular diet   senna and miralax  no indication for PPI     Renal:  IVL  c/w home flomax 0.4mg daily (only home med)     Heme:  SCDs, Lov ppx     ID:  Keflex ppx for CSF leak as per nrsg    Endo:   FS goal 120-180      Patient is not critically ill but is medically complex 2/2 CSF leak being treated with lumbar drain.

## 2023-06-16 NOTE — PROGRESS NOTE ADULT - SUBJECTIVE AND OBJECTIVE BOX
Interval events:  No acute interval events    ICU Vital Signs Last 24 Hrs  T(C): 36.7 (15 Abhilash 2023 19:00), Max: 36.8 (15 Abhilash 2023 11:00)  T(F): 98 (15 Abhilash 2023 19:00), Max: 98.2 (15 Abhilash 2023 11:00)  HR: 90 (15 Abhilash 2023 19:00) (61 - 90)  BP: 123/77 (15 Abhilash 2023 19:00) (111/70 - 141/76)  BP(mean): 87 (15 Abhilash 2023 19:00) (78 - 92)  ABP: --  ABP(mean): --  RR: 20 (15 Abhilash 2023 19:00) (16 - 20)  SpO2: 100% (15 Abhilash 2023 19:00) (98% - 100%)    O2 Parameters below as of 15 Abhilash 2023 19:00  Patient On (Oxygen Delivery Method): room air    I&O's Summary    14 Jun 2023 07:01  -  15 Abhilash 2023 07:00  --------------------------------------------------------  IN: 1390 mL / OUT: 1610 mL / NET: -220 mL    15 Abhilash 2023 07:01  -  15 Abhilash 2023 20:29  --------------------------------------------------------  IN: 2160 mL / OUT: 3187 mL / NET: -1027 mL                          13.8   7.92  )-----------( 248      ( 14 Jun 2023 23:11 )             39.6   06-14    141  |  104  |  16  ----------------------------<  111<H>  3.5   |  26  |  0.80    Ca    9.4      14 Jun 2023 23:11  Phos  3.0     06-14  Mg     1.9     06-14    MEDICATIONS  (STANDING):  cephalexin 500 milliGRAM(s) Oral every 12 hours  enoxaparin Injectable 40 milliGRAM(s) SubCutaneous <User Schedule>  melatonin 10 milliGRAM(s) Oral at bedtime  multivitamin 1 Tablet(s) Oral daily  polyethylene glycol 3350 17 Gram(s) Oral daily  senna 2 Tablet(s) Oral at bedtime  tamsulosin 0.4 milliGRAM(s) Oral at bedtime    MEDICATIONS  (PRN):  acetaminophen     Tablet .. 650 milliGRAM(s) Oral every 6 hours PRN Temp greater or equal to 38C (100.4F), Mild Pain (1 - 3)  ALPRAZolam 0.25 milliGRAM(s) Oral two times a day PRN anxiety  cyclobenzaprine 10 milliGRAM(s) Oral three times a day PRN Muscle Spasm  diphenhydrAMINE 25 milliGRAM(s) Oral at bedtime PRN Insomnia at bedtime  ondansetron Injectable 4 milliGRAM(s) IV Push every 6 hours PRN Nausea and/or Vomiting  oxyCODONE    IR 5 milliGRAM(s) Oral every 4 hours PRN Moderate Pain (4 - 6)    EXAMINATION:  General:  in NAD  HEENT:  MMM  Neuro:  awake, alert, oriented x 3, follows commands, EOMI, face symmetric, no PD, no LE drift, sensation intact in all 4 extremities   Cards:  RRR  Respiratory:  no respiratory distress  Abdomen:  soft  Extremities:  no LE edema. LD site clean and dry.      Interval events:  No acute interval events    ICU Vital Signs Last 24 Hrs  T(C): 36.9 (16 Jun 2023 19:00), Max: 36.9 (16 Jun 2023 19:00)  T(F): 98.4 (16 Jun 2023 19:00), Max: 98.4 (16 Jun 2023 19:00)  HR: 80 (16 Jun 2023 19:00) (60 - 87)  BP: 133/72 (16 Jun 2023 19:00) (116/63 - 136/77)  BP(mean): 85 (16 Jun 2023 19:00) (76 - 96)  ABP: --  ABP(mean): --  RR: 20 (16 Jun 2023 19:00) (9 - 21)  SpO2: 98% (16 Jun 2023 19:00) (95% - 100%)    O2 Parameters below as of 16 Jun 2023 19:00  Patient On (Oxygen Delivery Method): room air    I&O's Summary    15 Abhilash 2023 07:01  -  16 Jun 2023 07:00  --------------------------------------------------------  IN: 2460 mL / OUT: 4199 mL / NET: -1739 mL    16 Jun 2023 07:01  -  16 Jun 2023 21:31  --------------------------------------------------------  IN: 1580 mL / OUT: 2563 mL / NET: -983 mL                            13.8   7.92  )-----------( 248      ( 14 Jun 2023 23:11 )             39.6   06-15    138  |  102  |  18  ----------------------------<  98  4.1   |  25  |  0.76    Ca    9.5      15 Abhilash 2023 21:36  Phos  3.1     06-15  Mg     1.9     06-15    MEDICATIONS  (STANDING):  ascorbic acid 500 milliGRAM(s) Oral daily  cephalexin 500 milliGRAM(s) Oral every 12 hours  chlorhexidine 4% Liquid 1 Application(s) Topical daily  enoxaparin Injectable 40 milliGRAM(s) SubCutaneous <User Schedule>  melatonin 10 milliGRAM(s) Oral at bedtime  multivitamin 1 Tablet(s) Oral daily  polyethylene glycol 3350 17 Gram(s) Oral daily  senna 2 Tablet(s) Oral at bedtime  tamsulosin 0.4 milliGRAM(s) Oral at bedtime    MEDICATIONS  (PRN):  acetaminophen     Tablet .. 650 milliGRAM(s) Oral every 6 hours PRN Temp greater or equal to 38C (100.4F), Mild Pain (1 - 3)  ALPRAZolam 0.25 milliGRAM(s) Oral two times a day PRN anxiety  cyclobenzaprine 10 milliGRAM(s) Oral three times a day PRN Muscle Spasm  diphenhydrAMINE 25 milliGRAM(s) Oral at bedtime PRN Insomnia at bedtime  ondansetron Injectable 4 milliGRAM(s) IV Push every 6 hours PRN Nausea and/or Vomiting  oxyCODONE    IR 5 milliGRAM(s) Oral every 4 hours PRN Moderate Pain (4 - 6)    EXAMINATION:  General:  in NAD  HEENT:  MMM  Neuro:  awake, alert, oriented x 3, follows commands, EOMI, face symmetric, no PD, no LE drift, sensation intact in all 4 extremities   Cards:  RRR  Respiratory:  no respiratory distress  Abdomen:  soft  Extremities:  no LE edema. LD site clean and dry.

## 2023-06-16 NOTE — PROGRESS NOTE ADULT - ASSESSMENT
ASSESSMENT/PLAN:  Day 2/7 of LD. Pt/Ot    [] Patient is at high risk of neurologic deterioration/death due to:     Time seen:  Time spent: ___ [] critical care minutes

## 2023-06-16 NOTE — DIETITIAN INITIAL EVALUATION ADULT - ADD RECOMMEND
1. Recommend to continue regular diet as prescribed. Consider Boaz Active 2x daily in setting of antibiotics use.   2. Encourage and monitor PO intake. Encourage use of daily menus. Honor dietary preferences as expressed as able.  3. Continue multivitamin as ordered; consider vitamin C.   4. Monitor wt trends/labs/skin integrity/hydration status/bowel regularity.   5. Obtain and document current wt.

## 2023-06-16 NOTE — DIETITIAN INITIAL EVALUATION ADULT - NSFNSGIIOFT_GEN_A_CORE
-Last BM 6/13. On bowel regimen (senna, Miralax).   -Prescribed daily multivitamin.   -Continues on antibiotics as ordered.

## 2023-06-16 NOTE — DIETITIAN INITIAL EVALUATION ADULT - OTHER INFO
Dosing wt pending. Per pt, current wt likely ~180 lbs and endorsed likelihood of wt loss since May 2023 in setting of complex clinical course/hospitalizations.   Wt trend (5/23): 186.1 lbs.

## 2023-06-16 NOTE — DIETITIAN INITIAL EVALUATION ADULT - REASON INDICATOR FOR ASSESSMENT
Nutrition Assessment warranted for length of stay.  Information obtained from: RN, comprehensive chart review, interdisciplinary medical rounds, patient

## 2023-06-16 NOTE — DIETITIAN INITIAL EVALUATION ADULT - PERTINENT LABORATORY DATA
06-15    138  |  102  |  18  ----------------------------<  98  4.1   |  25  |  0.76    Ca    9.5      15 Abhilash 2023 21:36  Phos  3.1     06-15  Mg     1.9     06-15

## 2023-06-16 NOTE — DIETITIAN INITIAL EVALUATION ADULT - PERTINENT MEDS FT
MEDICATIONS  (STANDING):  cephalexin 500 milliGRAM(s) Oral every 12 hours  chlorhexidine 4% Liquid 1 Application(s) Topical daily  enoxaparin Injectable 40 milliGRAM(s) SubCutaneous <User Schedule>  melatonin 10 milliGRAM(s) Oral at bedtime  multivitamin 1 Tablet(s) Oral daily  polyethylene glycol 3350 17 Gram(s) Oral daily  senna 2 Tablet(s) Oral at bedtime  tamsulosin 0.4 milliGRAM(s) Oral at bedtime    MEDICATIONS  (PRN):  acetaminophen     Tablet .. 650 milliGRAM(s) Oral every 6 hours PRN Temp greater or equal to 38C (100.4F), Mild Pain (1 - 3)  ALPRAZolam 0.25 milliGRAM(s) Oral two times a day PRN anxiety  cyclobenzaprine 10 milliGRAM(s) Oral three times a day PRN Muscle Spasm  diphenhydrAMINE 25 milliGRAM(s) Oral at bedtime PRN Insomnia at bedtime  ondansetron Injectable 4 milliGRAM(s) IV Push every 6 hours PRN Nausea and/or Vomiting  oxyCODONE    IR 5 milliGRAM(s) Oral every 4 hours PRN Moderate Pain (4 - 6)

## 2023-06-16 NOTE — DIETITIAN INITIAL EVALUATION ADULT - ORAL INTAKE PTA/DIET HISTORY
-Pt reports good appetite at baseline; consumes three meals daily. Denies specific food preferences. Denies food allergies or intolerance to chewing/swallowing.   -Hx of taking a multivitamin, vitamin D, probiotic. Requesting to receive vitamin C.   -Requesting prune juice to aid in bowel regularity.

## 2023-06-16 NOTE — DIETITIAN INITIAL EVALUATION ADULT - REASON FOR ADMISSION
Pt is a 56 yo M with PMH: JESSICA on CPAP, diverticulitis s/p sigmoid colon resection, liver abscess s/p debridement of liver 2021. Found to have 3 spinal schwannomas in the setting of back pain s/p T10, L1, and L5 osteoplastic laminoplasties with resection of schwannomas on 5/23/23. Post op course c/b positional headache, s/p lumbar wound revision on 6/6 with findings of CSF leak s/p primary repair. Readmitted to NSCU 6/14 for LD for treatment of CSF leak.

## 2023-06-16 NOTE — PHARMACOTHERAPY INTERVENTION NOTE - COMMENTS
Reviewed appropriate routes of medication administration  MEDICATIONS  (STANDING):  ascorbic acid 500 milliGRAM(s) Oral daily  cephalexin 500 milliGRAM(s) Oral every 12 hours  chlorhexidine 4% Liquid 1 Application(s) Topical daily  enoxaparin Injectable 40 milliGRAM(s) SubCutaneous <User Schedule>  melatonin 10 milliGRAM(s) Oral at bedtime  multivitamin 1 Tablet(s) Oral daily  polyethylene glycol 3350 17 Gram(s) Oral daily  senna 2 Tablet(s) Oral at bedtime  tamsulosin 0.4 milliGRAM(s) Oral at bedtime    MEDICATIONS  (PRN):  acetaminophen     Tablet .. 650 milliGRAM(s) Oral every 6 hours PRN Temp greater or equal to 38C (100.4F), Mild Pain (1 - 3)  ALPRAZolam 0.25 milliGRAM(s) Oral two times a day PRN anxiety  cyclobenzaprine 10 milliGRAM(s) Oral three times a day PRN Muscle Spasm  diphenhydrAMINE 25 milliGRAM(s) Oral at bedtime PRN Insomnia at bedtime  ondansetron Injectable 4 milliGRAM(s) IV Push every 6 hours PRN Nausea and/or Vomiting  oxyCODONE    IR 5 milliGRAM(s) Oral every 4 hours PRN Moderate Pain (4 - 6)  
Reviewed appropriate routes of medication administration  MEDICATIONS  (STANDING):  cephalexin 500 milliGRAM(s) Oral every 12 hours  enoxaparin Injectable 40 milliGRAM(s) SubCutaneous <User Schedule>  melatonin 10 milliGRAM(s) Oral at bedtime  multivitamin 1 Tablet(s) Oral daily  polyethylene glycol 3350 17 Gram(s) Oral daily  senna 2 Tablet(s) Oral at bedtime  tamsulosin 0.4 milliGRAM(s) Oral at bedtime    MEDICATIONS  (PRN):  acetaminophen     Tablet .. 650 milliGRAM(s) Oral every 6 hours PRN Temp greater or equal to 38C (100.4F), Mild Pain (1 - 3)  ALPRAZolam 0.25 milliGRAM(s) Oral two times a day PRN anxiety  cyclobenzaprine 10 milliGRAM(s) Oral three times a day PRN Muscle Spasm  diphenhydrAMINE 25 milliGRAM(s) Oral at bedtime PRN Insomnia at bedtime  ondansetron Injectable 4 milliGRAM(s) IV Push every 6 hours PRN Nausea and/or Vomiting  oxyCODONE    IR 5 milliGRAM(s) Oral every 4 hours PRN Moderate Pain (4 - 6)

## 2023-06-17 LAB
ANION GAP SERPL CALC-SCNC: 11 MMOL/L — SIGNIFICANT CHANGE UP (ref 5–17)
BASOPHILS # BLD AUTO: 0.07 K/UL — SIGNIFICANT CHANGE UP (ref 0–0.2)
BASOPHILS NFR BLD AUTO: 0.7 % — SIGNIFICANT CHANGE UP (ref 0–2)
BUN SERPL-MCNC: 21 MG/DL — SIGNIFICANT CHANGE UP (ref 7–23)
CALCIUM SERPL-MCNC: 9.1 MG/DL — SIGNIFICANT CHANGE UP (ref 8.4–10.5)
CHLORIDE SERPL-SCNC: 101 MMOL/L — SIGNIFICANT CHANGE UP (ref 96–108)
CO2 SERPL-SCNC: 26 MMOL/L — SIGNIFICANT CHANGE UP (ref 22–31)
CREAT SERPL-MCNC: 0.93 MG/DL — SIGNIFICANT CHANGE UP (ref 0.5–1.3)
EGFR: 96 ML/MIN/1.73M2 — SIGNIFICANT CHANGE UP
EOSINOPHIL # BLD AUTO: 0.6 K/UL — HIGH (ref 0–0.5)
EOSINOPHIL NFR BLD AUTO: 5.9 % — SIGNIFICANT CHANGE UP (ref 0–6)
GLUCOSE SERPL-MCNC: 115 MG/DL — HIGH (ref 70–99)
HCT VFR BLD CALC: 41.4 % — SIGNIFICANT CHANGE UP (ref 39–50)
HGB BLD-MCNC: 14.4 G/DL — SIGNIFICANT CHANGE UP (ref 13–17)
IMM GRANULOCYTES NFR BLD AUTO: 0.4 % — SIGNIFICANT CHANGE UP (ref 0–0.9)
LYMPHOCYTES # BLD AUTO: 2.12 K/UL — SIGNIFICANT CHANGE UP (ref 1–3.3)
LYMPHOCYTES # BLD AUTO: 20.7 % — SIGNIFICANT CHANGE UP (ref 13–44)
MAGNESIUM SERPL-MCNC: 1.8 MG/DL — SIGNIFICANT CHANGE UP (ref 1.6–2.6)
MCHC RBC-ENTMCNC: 29.3 PG — SIGNIFICANT CHANGE UP (ref 27–34)
MCHC RBC-ENTMCNC: 34.8 GM/DL — SIGNIFICANT CHANGE UP (ref 32–36)
MCV RBC AUTO: 84.1 FL — SIGNIFICANT CHANGE UP (ref 80–100)
MONOCYTES # BLD AUTO: 0.61 K/UL — SIGNIFICANT CHANGE UP (ref 0–0.9)
MONOCYTES NFR BLD AUTO: 6 % — SIGNIFICANT CHANGE UP (ref 2–14)
NEUTROPHILS # BLD AUTO: 6.79 K/UL — SIGNIFICANT CHANGE UP (ref 1.8–7.4)
NEUTROPHILS NFR BLD AUTO: 66.3 % — SIGNIFICANT CHANGE UP (ref 43–77)
NRBC # BLD: 0 /100 WBCS — SIGNIFICANT CHANGE UP (ref 0–0)
PHOSPHATE SERPL-MCNC: 2.9 MG/DL — SIGNIFICANT CHANGE UP (ref 2.5–4.5)
PLATELET # BLD AUTO: 268 K/UL — SIGNIFICANT CHANGE UP (ref 150–400)
POTASSIUM SERPL-MCNC: 4 MMOL/L — SIGNIFICANT CHANGE UP (ref 3.5–5.3)
POTASSIUM SERPL-SCNC: 4 MMOL/L — SIGNIFICANT CHANGE UP (ref 3.5–5.3)
RBC # BLD: 4.92 M/UL — SIGNIFICANT CHANGE UP (ref 4.2–5.8)
RBC # FLD: 12.2 % — SIGNIFICANT CHANGE UP (ref 10.3–14.5)
SODIUM SERPL-SCNC: 138 MMOL/L — SIGNIFICANT CHANGE UP (ref 135–145)
WBC # BLD: 10.23 K/UL — SIGNIFICANT CHANGE UP (ref 3.8–10.5)
WBC # FLD AUTO: 10.23 K/UL — SIGNIFICANT CHANGE UP (ref 3.8–10.5)

## 2023-06-17 PROCEDURE — 99232 SBSQ HOSP IP/OBS MODERATE 35: CPT

## 2023-06-17 RX ORDER — MAGNESIUM SULFATE 500 MG/ML
1 VIAL (ML) INJECTION ONCE
Refills: 0 | Status: COMPLETED | OUTPATIENT
Start: 2023-06-17 | End: 2023-06-18

## 2023-06-17 RX ADMIN — Medication 500 MILLIGRAM(S): at 06:40

## 2023-06-17 RX ADMIN — Medication 500 MILLIGRAM(S): at 12:31

## 2023-06-17 RX ADMIN — ENOXAPARIN SODIUM 40 MILLIGRAM(S): 100 INJECTION SUBCUTANEOUS at 17:48

## 2023-06-17 RX ADMIN — Medication 1 TABLET(S): at 12:31

## 2023-06-17 RX ADMIN — Medication 500 MILLIGRAM(S): at 17:48

## 2023-06-17 RX ADMIN — TAMSULOSIN HYDROCHLORIDE 0.4 MILLIGRAM(S): 0.4 CAPSULE ORAL at 21:02

## 2023-06-17 RX ADMIN — Medication 0.25 MILLIGRAM(S): at 21:02

## 2023-06-17 RX ADMIN — CHLORHEXIDINE GLUCONATE 1 APPLICATION(S): 213 SOLUTION TOPICAL at 21:01

## 2023-06-17 RX ADMIN — Medication 10 MILLIGRAM(S): at 21:02

## 2023-06-17 NOTE — PROGRESS NOTE ADULT - SUBJECTIVE AND OBJECTIVE BOX
Patient seen and examined at bedside.    --Anticoagulation--  enoxaparin Injectable 40 milliGRAM(s) SubCutaneous <User Schedule>    T(C): 36.6 (06-16-23 @ 23:00), Max: 36.9 (06-16-23 @ 19:00)  HR: 68 (06-16-23 @ 23:09) (60 - 87)  BP: 128/70 (06-16-23 @ 23:00) (116/63 - 136/77)  RR: 14 (06-16-23 @ 23:00) (9 - 21)  SpO2: 95% (06-16-23 @ 23:09) (95% - 100%)  Wt(kg): --    Exam: AOx3, KING 5/5, SILT. Lumbar drain and ESSIE drain site dry.

## 2023-06-17 NOTE — PROGRESS NOTE ADULT - ASSESSMENT
-POD25 T10-11, L1 and L5 laminoplasties for schwannoma resection, POD11 for L5 wound revision w/ primary repair of CSF leak, POD3 for LD insertion by Neuroradiology  -Drain LD at 12cc/hr   -Monitor for leakage from drain sites  -Monitor headaches

## 2023-06-17 NOTE — PROGRESS NOTE ADULT - ASSESSMENT
ASSESSMENT/PLAN:  LD 3/7. currently at 12cc/hr  cont icu    [] Patient is at high risk of neurologic deterioration/death due to:     Time seen:  Time spent: ___ [] critical care minutes

## 2023-06-17 NOTE — PROGRESS NOTE ADULT - SUBJECTIVE AND OBJECTIVE BOX
HPI:  Jayden Szymanski  57M PMH JESSICA on CPAP, diverticulitis S/P sigmoid colon resection, liver abscess, S/P debridement of liver , T10/L1/L5 Schwannomas s/p resection (23) and revision of lumbar wound with csf leak repair (23). Patient was recently discharged but now re-admitted for placement of lumbar drain for concern for persistent CSF leak.      (2023 18:52)    SURGERY:   PAST MEDICAL HISTORY: No pertinent past medical history    Sleep apnea, obstructive    Diverticulitis    History of other infection      PAST SURGICAL HISTORY: No significant past surgical history    S/P tonsillectomy    Diverticulitis    History of surgery of liver      FAMILY HISTORY:  FH: heart attack (Father)  age 58,       ALLERGIES: meropenem (Rash)    **************************************  **************************************    OVERNIGHT EVENTS: [] None    ROS  Unobtainable due to mental status[] Negative []  Positives:    ADMISSION SCORES: GCS: HH: MF: NIHSS: RASS: CAM-ICU: ICP:    ICU Vital Signs Last 24 Hrs  T(C): 36.8 (2023 03:00), Max: 36.9 (2023 19:00)  T(F): 98.2 (2023 03:00), Max: 98.4 (2023 19:00)  HR: 90 (2023 11:00) (68 - 90)  BP: 133/66 (2023 11:00) (118/73 - 136/77)  BP(mean): 81 (2023 11:00) (81 - 89)  ABP: --  ABP(mean): --  RR: 13 (2023 11:00) (13 - 21)  SpO2: 99% (2023 11:00) (95% - 99%)    O2 Parameters below as of 2023 19:00  Patient On (Oxygen Delivery Method): room air           -16 @ 07:01  -  06-17 @ 07:00  --------------------------------------------------------  IN: 1580 mL / OUT: 3588 mL / NET: - mL     @ 07:01   @ 12:56  --------------------------------------------------------  IN: 720 mL / OUT: 49 mL / NET: 671 mL           DEVICES: [] Restraints [] ESSIE/HMV []LD [] ET tube [] Trach [] Chest Tube [] A-line [] Hernandez [] NGT [] Rectal Tube [] EVD [] CVL  [] ICP/LiCOx    NEUROIMAGING:     EEG REPORT:     MEDICATIONS:  acetaminophen     Tablet .. 650 milliGRAM(s) Oral every 6 hours PRN  ALPRAZolam 0.25 milliGRAM(s) Oral two times a day PRN  ascorbic acid 500 milliGRAM(s) Oral daily  cephalexin 500 milliGRAM(s) Oral every 12 hours  chlorhexidine 4% Liquid 1 Application(s) Topical daily  cyclobenzaprine 10 milliGRAM(s) Oral three times a day PRN  diphenhydrAMINE 25 milliGRAM(s) Oral at bedtime PRN  enoxaparin Injectable 40 milliGRAM(s) SubCutaneous <User Schedule>  melatonin 10 milliGRAM(s) Oral at bedtime  multivitamin 1 Tablet(s) Oral daily  ondansetron Injectable 4 milliGRAM(s) IV Push every 6 hours PRN  oxyCODONE    IR 5 milliGRAM(s) Oral every 4 hours PRN  polyethylene glycol 3350 17 Gram(s) Oral daily  senna 2 Tablet(s) Oral at bedtime  tamsulosin 0.4 milliGRAM(s) Oral at bedtime      PHYSICAL EXAM:  a/ox3; fluent fc  pupils=  eomi   motor 5/5      LABS:   06-15    138  |  102  |  18  ----------------------------<  98  4.1   |  25  |  0.76    Ca    9.5      15 Abhilash 2023 21:36  Phos  3.1     06-15  Mg     1.9     06-15

## 2023-06-18 LAB
ANION GAP SERPL CALC-SCNC: 13 MMOL/L — SIGNIFICANT CHANGE UP (ref 5–17)
BASOPHILS # BLD AUTO: 0.07 K/UL — SIGNIFICANT CHANGE UP (ref 0–0.2)
BASOPHILS NFR BLD AUTO: 0.6 % — SIGNIFICANT CHANGE UP (ref 0–2)
BUN SERPL-MCNC: 19 MG/DL — SIGNIFICANT CHANGE UP (ref 7–23)
CALCIUM SERPL-MCNC: 9.5 MG/DL — SIGNIFICANT CHANGE UP (ref 8.4–10.5)
CHLORIDE SERPL-SCNC: 101 MMOL/L — SIGNIFICANT CHANGE UP (ref 96–108)
CO2 SERPL-SCNC: 27 MMOL/L — SIGNIFICANT CHANGE UP (ref 22–31)
CREAT SERPL-MCNC: 0.87 MG/DL — SIGNIFICANT CHANGE UP (ref 0.5–1.3)
EGFR: 101 ML/MIN/1.73M2 — SIGNIFICANT CHANGE UP
EOSINOPHIL # BLD AUTO: 0.56 K/UL — HIGH (ref 0–0.5)
EOSINOPHIL NFR BLD AUTO: 5 % — SIGNIFICANT CHANGE UP (ref 0–6)
GLUCOSE SERPL-MCNC: 98 MG/DL — SIGNIFICANT CHANGE UP (ref 70–99)
HCT VFR BLD CALC: 43.6 % — SIGNIFICANT CHANGE UP (ref 39–50)
HGB BLD-MCNC: 14.8 G/DL — SIGNIFICANT CHANGE UP (ref 13–17)
IMM GRANULOCYTES NFR BLD AUTO: 0.3 % — SIGNIFICANT CHANGE UP (ref 0–0.9)
LYMPHOCYTES # BLD AUTO: 2.47 K/UL — SIGNIFICANT CHANGE UP (ref 1–3.3)
LYMPHOCYTES # BLD AUTO: 22.2 % — SIGNIFICANT CHANGE UP (ref 13–44)
MAGNESIUM SERPL-MCNC: 1.9 MG/DL — SIGNIFICANT CHANGE UP (ref 1.6–2.6)
MCHC RBC-ENTMCNC: 28.8 PG — SIGNIFICANT CHANGE UP (ref 27–34)
MCHC RBC-ENTMCNC: 33.9 GM/DL — SIGNIFICANT CHANGE UP (ref 32–36)
MCV RBC AUTO: 85 FL — SIGNIFICANT CHANGE UP (ref 80–100)
MONOCYTES # BLD AUTO: 0.66 K/UL — SIGNIFICANT CHANGE UP (ref 0–0.9)
MONOCYTES NFR BLD AUTO: 5.9 % — SIGNIFICANT CHANGE UP (ref 2–14)
NEUTROPHILS # BLD AUTO: 7.33 K/UL — SIGNIFICANT CHANGE UP (ref 1.8–7.4)
NEUTROPHILS NFR BLD AUTO: 66 % — SIGNIFICANT CHANGE UP (ref 43–77)
NRBC # BLD: 0 /100 WBCS — SIGNIFICANT CHANGE UP (ref 0–0)
PHOSPHATE SERPL-MCNC: 3 MG/DL — SIGNIFICANT CHANGE UP (ref 2.5–4.5)
PLATELET # BLD AUTO: 273 K/UL — SIGNIFICANT CHANGE UP (ref 150–400)
POTASSIUM SERPL-MCNC: 4.2 MMOL/L — SIGNIFICANT CHANGE UP (ref 3.5–5.3)
POTASSIUM SERPL-SCNC: 4.2 MMOL/L — SIGNIFICANT CHANGE UP (ref 3.5–5.3)
RBC # BLD: 5.13 M/UL — SIGNIFICANT CHANGE UP (ref 4.2–5.8)
RBC # FLD: 12.2 % — SIGNIFICANT CHANGE UP (ref 10.3–14.5)
SODIUM SERPL-SCNC: 141 MMOL/L — SIGNIFICANT CHANGE UP (ref 135–145)
WBC # BLD: 11.12 K/UL — HIGH (ref 3.8–10.5)
WBC # FLD AUTO: 11.12 K/UL — HIGH (ref 3.8–10.5)

## 2023-06-18 PROCEDURE — 99231 SBSQ HOSP IP/OBS SF/LOW 25: CPT

## 2023-06-18 RX ORDER — MAGNESIUM SULFATE 500 MG/ML
1 VIAL (ML) INJECTION ONCE
Refills: 0 | Status: COMPLETED | OUTPATIENT
Start: 2023-06-18 | End: 2023-06-19

## 2023-06-18 RX ADMIN — Medication 500 MILLIGRAM(S): at 12:02

## 2023-06-18 RX ADMIN — Medication 100 GRAM(S): at 06:13

## 2023-06-18 RX ADMIN — Medication 500 MILLIGRAM(S): at 17:07

## 2023-06-18 RX ADMIN — Medication 500 MILLIGRAM(S): at 06:13

## 2023-06-18 RX ADMIN — Medication 650 MILLIGRAM(S): at 18:00

## 2023-06-18 RX ADMIN — ENOXAPARIN SODIUM 40 MILLIGRAM(S): 100 INJECTION SUBCUTANEOUS at 17:04

## 2023-06-18 RX ADMIN — Medication 0.25 MILLIGRAM(S): at 22:19

## 2023-06-18 RX ADMIN — Medication 1 TABLET(S): at 12:02

## 2023-06-18 RX ADMIN — TAMSULOSIN HYDROCHLORIDE 0.4 MILLIGRAM(S): 0.4 CAPSULE ORAL at 22:19

## 2023-06-18 RX ADMIN — CHLORHEXIDINE GLUCONATE 1 APPLICATION(S): 213 SOLUTION TOPICAL at 12:02

## 2023-06-18 RX ADMIN — Medication 650 MILLIGRAM(S): at 10:15

## 2023-06-18 RX ADMIN — Medication 10 MILLIGRAM(S): at 22:19

## 2023-06-18 RX ADMIN — Medication 25 MILLIGRAM(S): at 22:19

## 2023-06-18 RX ADMIN — Medication 650 MILLIGRAM(S): at 09:26

## 2023-06-18 RX ADMIN — Medication 650 MILLIGRAM(S): at 17:03

## 2023-06-18 NOTE — PROGRESS NOTE ADULT - SUBJECTIVE AND OBJECTIVE BOX
NSCU ATTENDING -- ADDITIONAL PROGRESS NOTE    Nighttime rounds were performed -- please refer to earlier Progress Note for HPI details.    T(C): 36.8 (06-18-23 @ 19:00), Max: 37 (06-18-23 @ 03:00)  HR: 69 (06-18-23 @ 19:00) (63 - 85)  BP: 133/76 (06-18-23 @ 19:00) (112/62 - 133/84)  RR: 18 (06-18-23 @ 19:00) (12 - 18)  SpO2: 97% (06-18-23 @ 19:00) (94% - 100%)  Wt(kg): --    Relevant labwork and imaging reviewed.    LD @ 12 cc/hr now.  otherwise intact.  plan for diversion 7 total days.

## 2023-06-18 NOTE — PROGRESS NOTE ADULT - SUBJECTIVE AND OBJECTIVE BOX
NSCU Progress Note    Assessment/Hospital Course:        24 Hour Events/Subjective:  - LD 15cc/h      REVIEW OF SYSTEMS:  - negative except as above    VITALS:   - T(C): 37 (06-18-23 @ 03:00), Max: 37 (06-18-23 @ 03:00)  T(F): 98.6 (06-18-23 @ 03:00), Max: 98.6 (06-18-23 @ 03:00)  HR: 63 (06-18-23 @ 03:00) (63 - 90)  BP: 124/76 (06-17-23 @ 23:00) (117/70 - 133/66)  ABP: --  ABP(mean): --  RR: 12 (06-18-23 @ 03:00) (12 - 18)  SpO2: 97% (06-18-23 @ 03:00) (94% - 100%)      IMAGING/DATA:   - Reviewed          PHYSICAL EXAM:    General: calm  CVS: RRR  Pulm: CTAB  GI: Soft, NTND  Extremities: No LE Edema  Neuro: AOx3, PERRL, EOMI, facial symmetrical, fluent speech, motor 5/5 throughout, no PND, sensation in tact

## 2023-06-18 NOTE — PROGRESS NOTE ADULT - ASSESSMENT
58 yo with severe JESSICA on CPAP, diverticulitis s/p sigmoid colon resection, liver abscess s/p debridement of liver 2021, who was found to have 3 spinal schwannomas in the setting of back pain s/p T10, L1, and L5 osteoplastic laminoplasties with resection of schwannomas on 5/23/23. Post-op course c/b a positional headache, s/p lumbar wound revision on 6/6 with findings of CSF leak s/p primary repair. Discharged home but JPs with CSF output, therefore patient admitted to NSCU 6/14 for LD for treatment of CSF leak.      Neuro:  q4h NC  LD to drain 15cc/hr  low dose Xanax for anxiety PRN  start melatonin 10 mg at night    CV:  SBP goal 100-160    Pulm:  on RA, COBY    GI:  regular diet   senna and miralax  no indication for PPI     Renal:  IVL  c/w home flomax 0.4mg daily (only home med)     Heme:  SCDs, Lov ppx     ID:  Keflex ppx for CSF leak as per nrsg    Endo:   FS goal 120-180

## 2023-06-19 PROCEDURE — 99233 SBSQ HOSP IP/OBS HIGH 50: CPT

## 2023-06-19 RX ORDER — CEFTRIAXONE 500 MG/1
2000 INJECTION, POWDER, FOR SOLUTION INTRAMUSCULAR; INTRAVENOUS EVERY 24 HOURS
Refills: 0 | Status: DISCONTINUED | OUTPATIENT
Start: 2023-06-20 | End: 2023-06-22

## 2023-06-19 RX ORDER — ALPRAZOLAM 0.25 MG
0.25 TABLET ORAL
Refills: 0 | Status: DISCONTINUED | OUTPATIENT
Start: 2023-06-19 | End: 2023-06-22

## 2023-06-19 RX ORDER — CEFTRIAXONE 500 MG/1
2000 INJECTION, POWDER, FOR SOLUTION INTRAMUSCULAR; INTRAVENOUS ONCE
Refills: 0 | Status: COMPLETED | OUTPATIENT
Start: 2023-06-19 | End: 2023-06-19

## 2023-06-19 RX ORDER — CEFTRIAXONE 500 MG/1
INJECTION, POWDER, FOR SOLUTION INTRAMUSCULAR; INTRAVENOUS
Refills: 0 | Status: DISCONTINUED | OUTPATIENT
Start: 2023-06-19 | End: 2023-06-22

## 2023-06-19 RX ADMIN — Medication 100 GRAM(S): at 06:37

## 2023-06-19 RX ADMIN — Medication 1 TABLET(S): at 11:11

## 2023-06-19 RX ADMIN — Medication 500 MILLIGRAM(S): at 06:37

## 2023-06-19 RX ADMIN — CHLORHEXIDINE GLUCONATE 1 APPLICATION(S): 213 SOLUTION TOPICAL at 21:25

## 2023-06-19 RX ADMIN — Medication 25 MILLIGRAM(S): at 21:23

## 2023-06-19 RX ADMIN — ENOXAPARIN SODIUM 40 MILLIGRAM(S): 100 INJECTION SUBCUTANEOUS at 17:08

## 2023-06-19 RX ADMIN — CEFTRIAXONE 100 MILLIGRAM(S): 500 INJECTION, POWDER, FOR SOLUTION INTRAMUSCULAR; INTRAVENOUS at 13:58

## 2023-06-19 RX ADMIN — TAMSULOSIN HYDROCHLORIDE 0.4 MILLIGRAM(S): 0.4 CAPSULE ORAL at 21:24

## 2023-06-19 RX ADMIN — Medication 500 MILLIGRAM(S): at 11:12

## 2023-06-19 RX ADMIN — Medication 0.25 MILLIGRAM(S): at 21:24

## 2023-06-19 RX ADMIN — Medication 10 MILLIGRAM(S): at 21:24

## 2023-06-19 NOTE — CONSULT NOTE ADULT - SUBJECTIVE AND OBJECTIVE BOX
HPI:   Patient is a 57y male with a past history of sigmoid diverticulitis with liver abscess, s/p treatment in  for abscess and elective sigmoid resection in , evaluation earlier in  for lower back pain and found to have spinal cord tumors, s/p elective T10, LI and L5 osteoplasty and laminoplasty on , readmitted earlier in  for  CSF leak and s/p wound revision with laminoplasty replacement on , who was readmitted  with wound drainage and has required a lumbar drain placement.  He had operative cultures sent x2 on  of slightly murky fluid, both cultures in rare amounts have grown Cutibacterium. No fever , he has been ambulatory, minimal headache.  His liver abscess in  required both IR and surgical drainage, he received extended course of antibiotics via PICC line, he recalls a rash to meropenem.      REVIEW OF SYSTEMS:  All other review of systems negative (Comprehensive ROS)    PAST MEDICAL & SURGICAL HISTORY:  Sleep apnea, obstructive      Diverticulitis      History of other infection  Liver infection--2021      S/P tonsillectomy  as a child      History of surgery of liver  Debridement--2021          Allergies    meropenem (Rash)    Intolerances        Antimicrobials Day #  :day 1  cefTRIAXone   IVPB        Other Medications:  acetaminophen     Tablet .. 650 milliGRAM(s) Oral every 6 hours PRN  ascorbic acid 500 milliGRAM(s) Oral daily  chlorhexidine 4% Liquid 1 Application(s) Topical daily  cyclobenzaprine 10 milliGRAM(s) Oral three times a day PRN  diphenhydrAMINE 25 milliGRAM(s) Oral at bedtime PRN  enoxaparin Injectable 40 milliGRAM(s) SubCutaneous <User Schedule>  melatonin 10 milliGRAM(s) Oral at bedtime  multivitamin 1 Tablet(s) Oral daily  ondansetron Injectable 4 milliGRAM(s) IV Push every 6 hours PRN  oxyCODONE    IR 5 milliGRAM(s) Oral every 4 hours PRN  polyethylene glycol 3350 17 Gram(s) Oral daily  senna 2 Tablet(s) Oral at bedtime  tamsulosin 0.4 milliGRAM(s) Oral at bedtime      FAMILY HISTORY:  FH: heart attack (Father)  age 58,         SOCIAL HISTORY:  Smoking: x    ETOH:  x   Drug Use: x        T(F): 97.8 (23 @ 11:00), Max: 98.7 (23 @ 23:00)  HR: 66 (23 @ 11:00)  BP: 114/72 (23 @ 11:00)  RR: 14 (23 @ 11:00)  SpO2: 100% (23 @ 11:00)  Wt(kg): --    PHYSICAL EXAM:  General: alert, no acute distress  Eyes:  anicteric, no conjunctival injection, no discharge  Oropharynx: no lesions or injection 	  Neck: supple, without adenopathy  Lungs: clear to auscultation  Heart: regular rate and rhythm; no murmur, rubs or gallops  Abdomen: soft, nondistended, nontender, without mass or organomegaly  Skin: no lesions  Extremities: no clubbing, cyanosis, or edema  Neurologic: alert, oriented, moves all extremities  Back incisions dressed, LD in place  LAB RESULTS:                        14.8   11.12 )-----------( 273      ( 2023 22:13 )             43.6         141  |  101  |  19  ----------------------------<  98  4.2   |  27  |  0.87    Ca    9.5      2023 22:13  Phos  3.0       Mg     1.9                 MICROBIOLOGY:  RECENT CULTURES:        RADIOLOGY REVIEWED:  < from: [facility] Duplex Lower Ext Vein Scan, Bilat (06.15.23 @ 17:50) >  IMPRESSION:  No evidence of deep venous thrombosis in either lower extremity.    < end of copied text >  < from: Xray Spine Single View (23 @ 16:39) >  FINDINGS:    Initial radiographs demonstrate surgical instruments at the level of   L4-5, L1-2, and T11-12. Subsequent radiograph shows spinal fusion   hardware at the same levels. No retained surgical instruments are   identified.        IMPRESSION:  Expected postsurgical hardware in the lumbar spine. No retained surgical   instruments.    < end of copied text >  < from: Xray Chest 2 Views PA/Lat (22 @ 12:34) >  IMPRESSION:    No radiographic evidence of active chest disease..    < end of copied text >

## 2023-06-19 NOTE — CONSULT NOTE ADULT - ASSESSMENT
Patient is a 57y male with a past history of sigmoid diverticulitis with liver abscess, s/p treatment in 2021 for abscess and elective sigmoid resection in 2022, evaluation earlier in 2023 for lower back pain and found to have spinal cord tumors, s/p elective T10, LI and L5 osteoplasty and laminoplasty on 5/23, readmitted earlier in June for  CSF leak and s/p wound revision with laminoplasty replacement on 6/6, who was readmitted 6/14 with wound drainage and has required a lumbar drain placement.  He had operative cultures sent x2 on 6/6 of slightly murky fluid, both cultures in rare amounts have grown Cutibacterium. No fever , he has been ambulatory, minimal headache.  His liver abscess in 2021 required both IR and surgical drainage, he received extended course of antibiotics via PICC line, he recalls a rash to meropenem.  I think we need to assume the cutibacterium could be acting as a pathogen in the present context. While the organisms were isolated in small quantities, both OR specimens were positive and he has ongoing CSF leak.He has been tolerating cephalexin, I believe it will be safe to use CTX with prior meropenem allergy.  Suggest:  1. CTX 2 gr q24  2. I suspect he will need 4-6 weeks of high dose antibiotics  3. favor PICC  4. Case reviewed with Dr Gross and NS service

## 2023-06-19 NOTE — PROGRESS NOTE ADULT - SUBJECTIVE AND OBJECTIVE BOX
HPI:  Jayden Szymanski  57M PMH JESSICA on CPAP, diverticulitis S/P sigmoid colon resection, liver abscess, S/P debridement of liver , T10/L1/L5 Schwannomas s/p resection (23) and revision of lumbar wound with csf leak repair (23). Patient was recently discharged but now re-admitted for placement of lumbar drain for concern for persistent CSF leak.      (2023 18:52)    SURGERY:   PAST MEDICAL HISTORY: No pertinent past medical history    Sleep apnea, obstructive    Diverticulitis    History of other infection      PAST SURGICAL HISTORY: No significant past surgical history    S/P tonsillectomy    Diverticulitis    History of surgery of liver      FAMILY HISTORY:  FH: heart attack (Father)  age 58,       ALLERGIES: meropenem (Rash)    **************************************  **************************************    OVERNIGHT EVENTS: [] None    ROS  Unobtainable due to mental status[] Negative []  Positives:    ADMISSION SCORES: GCS: HH: MF: NIHSS: RASS: CAM-ICU: ICP:    ICU Vital Signs Last 24 Hrs  T(C): 36.6 (2023 11:00), Max: 37.1 (2023 23:00)  T(F): 97.8 (2023 11:00), Max: 98.7 (2023 23:00)  HR: 66 (2023 11:00) (66 - 82)  BP: 114/72 (2023 11:00) (114/72 - 143/85)  BP(mean): 83 (2023 11:00) (82 - 100)  ABP: --  ABP(mean): --  RR: 14 (2023 11:00) (12 - 18)  SpO2: 100% (2023 11:00) (95% - 100%)    O2 Parameters below as of 2023 07:00  Patient On (Oxygen Delivery Method): room air           - @ 07:01  -  - @ 07:00  --------------------------------------------------------  IN: 1710 mL / OUT: 2712 mL / NET: -1002 mL     @ 07:01  -   @ 12:48  --------------------------------------------------------  IN: 680 mL / OUT: 410 mL / NET: 270 mL           DEVICES: [] Restraints [] ESSIE/HMV []LD [] ET tube [] Trach [] Chest Tube [] A-line [] Hernandez [] NGT [] Rectal Tube [] EVD [] CVL  [] ICP/LiCOx    NEUROIMAGING:     EEG REPORT:     MEDICATIONS:  acetaminophen     Tablet .. 650 milliGRAM(s) Oral every 6 hours PRN  ascorbic acid 500 milliGRAM(s) Oral daily  cephalexin 500 milliGRAM(s) Oral every 12 hours  chlorhexidine 4% Liquid 1 Application(s) Topical daily  cyclobenzaprine 10 milliGRAM(s) Oral three times a day PRN  diphenhydrAMINE 25 milliGRAM(s) Oral at bedtime PRN  enoxaparin Injectable 40 milliGRAM(s) SubCutaneous <User Schedule>  melatonin 10 milliGRAM(s) Oral at bedtime  multivitamin 1 Tablet(s) Oral daily  ondansetron Injectable 4 milliGRAM(s) IV Push every 6 hours PRN  oxyCODONE    IR 5 milliGRAM(s) Oral every 4 hours PRN  polyethylene glycol 3350 17 Gram(s) Oral daily  senna 2 Tablet(s) Oral at bedtime  tamsulosin 0.4 milliGRAM(s) Oral at bedtime      PHYSICAL EXAM:  intubated; eyes closed-no eye opening; not following commands in [place]; will localize on right left side sponatneous      LABS:                        14.8   11.12 )-----------( 273      ( 2023 22:13 )             43.6        141  |  101  |  19  ----------------------------<  98  4.2   |  27  |  0.87    Ca    9.5      2023 22:13  Phos  3.0     06-18  Mg     1.9     06-18

## 2023-06-19 NOTE — PROGRESS NOTE ADULT - ASSESSMENT
ASSESSMENT/PLAN:  Left ICA terminus and left m1 thrombectomy TICI 2C. Pt with significant dementia at baseline-needs assistance with all ADLS, walks with walker  Will attempt to CPAP and wean to extubate as tolerated. family mtg to discuss pts diagnosis and condition-pt with multiple children-1 who lives in [place]    [] Patient is at high risk of neurologic deterioration/death due to:     Time seen:  Time spent: ___ [] critical care minutes

## 2023-06-19 NOTE — PROGRESS NOTE ADULT - SUBJECTIVE AND OBJECTIVE BOX
Patient seen and examined at bedside.    --Anticoagulation--  enoxaparin Injectable 40 milliGRAM(s) SubCutaneous <User Schedule>    T(C): 36.8 (06-18-23 @ 19:00), Max: 37 (06-18-23 @ 03:00)  HR: 68 (06-18-23 @ 23:50) (63 - 84)  BP: 133/76 (06-18-23 @ 19:00) (112/62 - 133/84)  RR: 18 (06-18-23 @ 19:00) (12 - 18)  SpO2: 96% (06-18-23 @ 23:50) (96% - 100%)  Wt(kg): --    Exam: AOx3, EOMI, no facial/drift, KING 5/5, SILT

## 2023-06-19 NOTE — PROGRESS NOTE ADULT - ASSESSMENT
-POD27 T10-11, L1 and L5 laminoplasties for schwannoma resection, POD13 for L5 wound revision w/ primary repair of CSF leak, POD5 for LD insertion by Neuroradiology  -LD drainage decreased down to 12cc/hr from 15cc/hr, after patient noted low-grade but persistent headaches  -Monitor for leakage from drain sites  -Monitor for headaches

## 2023-06-19 NOTE — PROGRESS NOTE ADULT - SUBJECTIVE AND OBJECTIVE BOX
NSCU ATTENDING -- ADDITIONAL PROGRESS NOTE    Nighttime rounds were performed -- please refer to earlier Progress Note for HPI details.    T(C): 36.8 (06-19-23 @ 19:00), Max: 37.1 (06-18-23 @ 23:00)  HR: 89 (06-19-23 @ 21:11) (66 - 98)  BP: 136/68 (06-19-23 @ 19:00) (114/72 - 143/85)  RR: 12 (06-19-23 @ 19:00) (12 - 17)  SpO2: 100% (06-19-23 @ 21:11) (95% - 100%)  Wt(kg): --    Relevant labwork and imaging reviewed.    LD @ 12.  trialed to 15, back down to 12.  drain out 6/21.

## 2023-06-20 PROCEDURE — 99232 SBSQ HOSP IP/OBS MODERATE 35: CPT

## 2023-06-20 RX ADMIN — Medication 25 MILLIGRAM(S): at 21:40

## 2023-06-20 RX ADMIN — Medication 10 MILLIGRAM(S): at 21:40

## 2023-06-20 RX ADMIN — CEFTRIAXONE 100 MILLIGRAM(S): 500 INJECTION, POWDER, FOR SOLUTION INTRAMUSCULAR; INTRAVENOUS at 13:37

## 2023-06-20 RX ADMIN — ENOXAPARIN SODIUM 40 MILLIGRAM(S): 100 INJECTION SUBCUTANEOUS at 17:03

## 2023-06-20 RX ADMIN — Medication 500 MILLIGRAM(S): at 11:22

## 2023-06-20 RX ADMIN — Medication 1 TABLET(S): at 11:22

## 2023-06-20 RX ADMIN — TAMSULOSIN HYDROCHLORIDE 0.4 MILLIGRAM(S): 0.4 CAPSULE ORAL at 21:41

## 2023-06-20 RX ADMIN — Medication 0.25 MILLIGRAM(S): at 21:40

## 2023-06-20 NOTE — PROGRESS NOTE ADULT - SUBJECTIVE AND OBJECTIVE BOX
SUMMARY: 56yo man s/p osteoplastic laminoplasties for spinal schwannomas and back pain, c/b CSF leak, 6/6 lumbar wound revision CSF leak primary repair, readm on 6/14 for CSF leak, s/p LD placement. 6/6 OR culture growing cutibacterium.    no overnight events.    LD at 12cc/hr     REVIEW OF SYSTEMS: [x] Unable to Assess due to neurologic exam   [ ] All ROS addressed below are non-contributory, except:  Neuro: [ ] Headache [ ] Back pain [ ] Numbness [ ] Weakness [ ] Ataxia [ ] Dizziness [ ] Aphasia [ ] Dysarthria [ ] Visual disturbance  Resp: [ ] Shortness of breath/dyspnea [ ] Orthopnea [ ] Cough  CV: [ ] Chest pain [ ] Palpitation [ ] Lightheadedness [ ] Syncope  Renal: [ ] Thirst [ ] Edema  GI: [ ] Nausea [ ] Emesis [ ] Abdominal pain [ ] Constipation [ ] Diarrhea  Hem: [ ] Hematemesis [ ] bBright red blood per rectum  ID: [ ] Fever [ ] Chills [ ] Dysuria  ENT: [ ] Rhinorrhea    VITALS: [x] Reviewed    IMAGING/DATA: [x] Reviewed    IVF FLUIDS/MEDICATIONS: [x] Reviewed    ALLERGIES: Allergies    meropenem (Rash)    Intolerances      DEVICES:   [] Restraints [x] PIVs [] ET tube [] central line [] PICC [] arterial line [] childers [] NGT/OGT [] EVD [x] LD [] ESSIE/HMV [] LiCOX [] ICP monitor [] Trach [] PEG [] Chest Tube [] other:    EXAMINATION:  General: No acute distress  HEENT: Anicteric sclerae  Cardiac: [id number]  Lungs: Clear  Abdomen: Soft, non-tender, +BS  Extremities: No c/c/e  Skin/Incision Site: Clean, dry and intact  Neurologic: Awake, alert, fully oriented, follows commands, PERRL, EOMI, face symmetric, no drift, full strength SUMMARY: 56yo man s/p osteoplastic laminoplasties for spinal schwannomas and back pain, c/b CSF leak, 6/6 lumbar wound revision CSF leak primary repair, readm on 6/14 for CSF leak, s/p LD placement. 6/6 OR culture growing cutibacterium.    no overnight events.    LD at 12cc/hr     REVIEW OF SYSTEMS: [] Unable to Assess due to neurologic exam   [x ] All ROS addressed below are non-contributory, except:  Neuro: [ ] Headache [ ] Back pain [ ] Numbness [ ] Weakness [ ] Ataxia [ ] Dizziness [ ] Aphasia [ ] Dysarthria [ ] Visual disturbance  Resp: [ ] Shortness of breath/dyspnea [ ] Orthopnea [ ] Cough  CV: [ ] Chest pain [ ] Palpitation [ ] Lightheadedness [ ] Syncope  Renal: [ ] Thirst [ ] Edema  GI: [ ] Nausea [ ] Emesis [ ] Abdominal pain [ ] Constipation [ ] Diarrhea  Hem: [ ] Hematemesis [ ] bBright red blood per rectum  ID: [ ] Fever [ ] Chills [ ] Dysuria  ENT: [ ] Rhinorrhea    VITALS: [x] Reviewed    IMAGING/DATA: [x] Reviewed    IVF FLUIDS/MEDICATIONS: [x] Reviewed    ALLERGIES: Allergies    meropenem (Rash)    Intolerances      DEVICES:   [] Restraints [x] PIVs [] ET tube [] central line [] PICC [] arterial line [] childers [] NGT/OGT [] EVD [x] LD [] ESSIE/HMV [] LiCOX [] ICP monitor [] Trach [] PEG [] Chest Tube [] other:    EXAMINATION:  General: No acute distress  HEENT: Anicteric sclerae  Cardiac: [id number]  Lungs: Clear  Abdomen: Soft, non-tender, +BS  Extremities: No c/c/e  Skin/Incision Site: Clean, dry and intact  Neurologic: Awake, alert, fully oriented, follows commands, PERRL, EOMI, face symmetric, no drift, full strength

## 2023-06-20 NOTE — PROGRESS NOTE ADULT - SUBJECTIVE AND OBJECTIVE BOX
NSCU ATTENDING -- ADDITIONAL PROGRESS NOTE    Nighttime rounds were performed -- please refer to earlier Progress Note for HPI details.    T(C): 36.6 (06-20-23 @ 19:00), Max: 36.9 (06-20-23 @ 07:00)  HR: 81 (06-20-23 @ 21:34) (64 - 93)  BP: 128/65 (06-20-23 @ 19:00) (125/78 - 149/73)  RR: 16 (06-20-23 @ 19:00) (12 - 16)  SpO2: 100% (06-20-23 @ 21:34) (96% - 100%)  Wt(kg): --    Relevant labwork and imaging reviewed.    continues lumbar drainage.  clamp tomorrow.  send CSF prior to clamp.

## 2023-06-20 NOTE — PROGRESS NOTE ADULT - SUBJECTIVE AND OBJECTIVE BOX
CC: f/u for  post op spine infection, csf leak  Patient reports he is with a small ha, upset he needs a picc as he cannot play golf or tennis with it in place    REVIEW OF SYSTEMS:  All other review of systems negative (Comprehensive ROS)    Antimicrobials Day #  :2  cefTRIAXone   IVPB      cefTRIAXone   IVPB 2000 milliGRAM(s) IV Intermittent every 24 hours    Other Medications Reviewed    T(F): 97.8 (06-20-23 @ 19:00), Max: 98.5 (06-20-23 @ 11:00)  HR: 88 (06-20-23 @ 19:00)  BP: 128/65 (06-20-23 @ 19:00)  RR: 16 (06-20-23 @ 19:00)  SpO2: 100% (06-20-23 @ 19:00)  Wt(kg): --    PHYSICAL EXAM:  General: alert, no acute distress  Eyes:  anicteric, no conjunctival injection, no discharge  Oropharynx: no lesions or injection 	  Neck: supple, without adenopathy  Lungs: clear to auscultation  Heart: regular rate and rhythm; no murmur, rubs or gallops  Abdomen: soft, nondistended, nontender, without mass or organomegaly  Skin: no lesions  Extremities: no clubbing, cyanosis, or edema  Neurologic: alert, oriented, moves all extremities  back wound is intact  drain with clear fluid   LAB RESULTS:                        14.8   11.12 )-----------( 273      ( 18 Jun 2023 22:13 )             43.6     06-18    141  |  101  |  19  ----------------------------<  98  4.2   |  27  |  0.87    Ca    9.5      18 Jun 2023 22:13  Phos  3.0     06-18  Mg     1.9     06-18          MICROBIOLOGY:  RECENT CULTURES:      RADIOLOGY REVIEWED:    < from: [facility] Duplex Lower Ext Vein Светлана Lopez (06.15.23 @ 17:50) >    ACC: [id number] EXAM:  DUPLEX SCAN EXT VEINS LOWER BI   ORDERED BY: ROMAN NUNEZ     PROCEDURE DATE:  06/15/2023          INTERPRETATION:  CLINICAL INFORMATION: Lower extremity pain. Bedrest.   Status post spinal surgery. Evaluate for DVT.    COMPARISON: None available.    TECHNIQUE: Duplex sonography of the BILATERAL LOWER extremity veins with   color and spectral Doppler, with and without compression.    FINDINGS:    RIGHT:  Normal compressibility of the RIGHT common femoral, femoral and popliteal   veins.  Doppler examination shows normal spontaneous and phasic flow.  No RIGHT calf vein thrombosis is detected.    LEFT:  Normal compressibility of the LEFT common femoral, femoral and popliteal   veins.  Doppler examination shows normal spontaneous and phasic flow.  No LEFT calf vein thrombosis is detected.    IMPRESSION:  No evidence of deep venous thrombosis in either lower extremity.      < end of copied text >            Assessment:  Patient is a 57y male with a past history of sigmoid diverticulitis with liver abscess, s/p treatment in 2021 for abscess and elective sigmoid resection in 2022, evaluation earlier in 2023 for lower back pain and found to have spinal cord tumors, s/p elective T10, LI and L5 osteoplasty and laminoplasty on 5/23, readmitted earlier in June for  CSF leak and s/p wound revision with laminoplasty replacement on 6/6, who was readmitted 6/14 with wound drainage and has required a lumbar drain placement.  He had operative cultures sent x2 on 6/6 of slightly murky fluid, both cultures in rare amounts have grown Cutibacterium. No fever , he has been ambulatory, minimal headache.  His liver abscess in 2021 required both IR and surgical drainage, he received extended course of antibiotics via PICC line, he recalls a rash to meropenem.  I think we need to assume the cutibacterium could be acting as a pathogen in the present context. While the organisms were isolated in small quantities, both OR specimens were positive and he has ongoing CSF leak.He has been tolerating cephalexin, I believe it will be safe to use CTX with prior meropenem allergy. will monitor , so far no rash  Suggest:  1. CTX 2 gr q24-  day 2  2 repeat csf culture to be sure now sterile  place picc, will do 6 weeks total

## 2023-06-20 NOTE — PROGRESS NOTE ADULT - ASSESSMENT
ASSESSMENT/PLAN: spinal schwannomas s/p T10, L1, L5 osteoplastic laminoplasties c/b CSF leak, now s/p LD placement 6/14    NEURO:  LD @12cc/hr  pain control  Activity: [x] mobilize as tolerated [] Bedrest [] PT [] OT [] PMNR    PULM: incentive spirometry as able  keep O2sat>92%    CV:  SBP goal 100-160    RENAL:  Fluids: IVL    GI:  Diet: regular diet  GI prophylaxis [x] not indicated [] PPI [] other:  Bowel regimen [] colace [x] senna [x] other: miralax     ENDO:   Goal euglycemia (-180)    HEME/ONC:  VTE prophylaxis: [x] SCDs [x] chemoprophylaxis [] hold chemoprophylaxis due to: [] high risk of DVT/PE on admission due to:    ID: cont ceftriaxone, bcx x2sets, when negative, PICC placement for 4-6wk abx course     MISC:    SOCIAL/FAMILY:  [x] awaiting [] updated at bedside [] family meeting    CODE STATUS:  [x] Full Code [] DNR [] DNI [] Palliative/Comfort Care    DISPOSITION:  ICU for LD

## 2023-06-21 LAB
APPEARANCE CSF: CLEAR — SIGNIFICANT CHANGE UP
COLOR CSF: SIGNIFICANT CHANGE UP
GLUCOSE CSF-MCNC: 57 MG/DL — SIGNIFICANT CHANGE UP (ref 40–70)
GRAM STN FLD: SIGNIFICANT CHANGE UP
LACTATE CSF-MCNC: 2 MMOL/L — SIGNIFICANT CHANGE UP (ref 1.1–2.4)
NEUTROPHILS # CSF: SIGNIFICANT CHANGE UP (ref 0–6)
NRBC NFR CSF: 2 /UL — SIGNIFICANT CHANGE UP (ref 0–5)
PROT CSF-MCNC: 53 MG/DL — HIGH (ref 15–45)
RBC # CSF: 1 /UL — HIGH (ref 0–0)
SPECIMEN SOURCE: SIGNIFICANT CHANGE UP
TUBE TYPE: SIGNIFICANT CHANGE UP

## 2023-06-21 PROCEDURE — 99232 SBSQ HOSP IP/OBS MODERATE 35: CPT

## 2023-06-21 PROCEDURE — 71045 X-RAY EXAM CHEST 1 VIEW: CPT | Mod: 26

## 2023-06-21 RX ORDER — DIPHENHYDRAMINE HCL 50 MG
25 CAPSULE ORAL AT BEDTIME
Refills: 0 | Status: DISCONTINUED | OUTPATIENT
Start: 2023-06-21 | End: 2023-06-22

## 2023-06-21 RX ADMIN — SENNA PLUS 2 TABLET(S): 8.6 TABLET ORAL at 21:10

## 2023-06-21 RX ADMIN — POLYETHYLENE GLYCOL 3350 17 GRAM(S): 17 POWDER, FOR SOLUTION ORAL at 11:17

## 2023-06-21 RX ADMIN — ENOXAPARIN SODIUM 40 MILLIGRAM(S): 100 INJECTION SUBCUTANEOUS at 17:02

## 2023-06-21 RX ADMIN — TAMSULOSIN HYDROCHLORIDE 0.4 MILLIGRAM(S): 0.4 CAPSULE ORAL at 21:10

## 2023-06-21 RX ADMIN — Medication 500 MILLIGRAM(S): at 11:16

## 2023-06-21 RX ADMIN — Medication 0.25 MILLIGRAM(S): at 21:10

## 2023-06-21 RX ADMIN — Medication 25 MILLIGRAM(S): at 21:10

## 2023-06-21 RX ADMIN — CEFTRIAXONE 100 MILLIGRAM(S): 500 INJECTION, POWDER, FOR SOLUTION INTRAMUSCULAR; INTRAVENOUS at 12:17

## 2023-06-21 RX ADMIN — CHLORHEXIDINE GLUCONATE 1 APPLICATION(S): 213 SOLUTION TOPICAL at 11:33

## 2023-06-21 RX ADMIN — Medication 1 TABLET(S): at 11:16

## 2023-06-21 RX ADMIN — Medication 10 MILLIGRAM(S): at 21:10

## 2023-06-21 NOTE — PROGRESS NOTE ADULT - SUBJECTIVE AND OBJECTIVE BOX
SUMMARY: 58yo man s/p osteoplastic laminoplasties for spinal schwannomas and back pain, c/b CSF leak, 6/6 lumbar wound revision CSF leak primary repair, readm on 6/14 for CSF leak, s/p LD placement. 6/6 OR culture growing cutibacterium.    no overnight events.    LD at 12cc/hr     REVIEW OF SYSTEMS: [x] Unable to Assess due to neurologic exam   [ ] All ROS addressed below are non-contributory, except:  Neuro: [ ] Headache [ ] Back pain [ ] Numbness [ ] Weakness [ ] Ataxia [ ] Dizziness [ ] Aphasia [ ] Dysarthria [ ] Visual disturbance  Resp: [ ] Shortness of breath/dyspnea [ ] Orthopnea [ ] Cough  CV: [ ] Chest pain [ ] Palpitation [ ] Lightheadedness [ ] Syncope  Renal: [ ] Thirst [ ] Edema  GI: [ ] Nausea [ ] Emesis [ ] Abdominal pain [ ] Constipation [ ] Diarrhea  Hem: [ ] Hematemesis [ ] bBright red blood per rectum  ID: [ ] Fever [ ] Chills [ ] Dysuria  ENT: [ ] Rhinorrhea    VITALS: [x] Reviewed    IMAGING/DATA: [x] Reviewed    IVF FLUIDS/MEDICATIONS: [x] Reviewed    ALLERGIES: Allergies    meropenem (Rash)    Intolerances      DEVICES:   [] Restraints [x] PIVs [] ET tube [] central line [] PICC [] arterial line [] childers [] NGT/OGT [] EVD [x] LD [] ESSIE/HMV [] LiCOX [] ICP monitor [] Trach [] PEG [] Chest Tube [] other:    EXAMINATION:  General: No acute distress  HEENT: Anicteric sclerae  Cardiac: [id number]  Lungs: Clear  Abdomen: Soft, non-tender, +BS  Extremities: No c/c/e  Skin/Incision Site: Clean, dry and intact  Neurologic: Awake, alert, fully oriented, follows commands, PERRL, EOMI, face symmetric, no drift, full strength SUMMARY: 56yo man s/p osteoplastic laminoplasties for spinal schwannomas and back pain, c/b CSF leak, 6/6 lumbar wound revision CSF leak primary repair, readm on 6/14 for CSF leak, s/p LD placement. 6/6 OR culture growing cutibacterium.    6/20-21 no overnight events    LD at 12cc/hr; plan per neurosurgery to clamp at noon vs d/c   ambulating    REVIEW OF SYSTEMS: [x] Unable to Assess due to neurologic exam   [ ] All ROS addressed below are non-contributory, except:  Neuro: [ ] Headache [ ] Back pain [ ] Numbness [ ] Weakness [ ] Ataxia [ ] Dizziness [ ] Aphasia [ ] Dysarthria [ ] Visual disturbance  Resp: [ ] Shortness of breath/dyspnea [ ] Orthopnea [ ] Cough  CV: [ ] Chest pain [ ] Palpitation [ ] Lightheadedness [ ] Syncope  Renal: [ ] Thirst [ ] Edema  GI: [ ] Nausea [ ] Emesis [ ] Abdominal pain [ ] Constipation [ ] Diarrhea  Hem: [ ] Hematemesis [ ] bBright red blood per rectum  ID: [ ] Fever [ ] Chills [ ] Dysuria  ENT: [ ] Rhinorrhea    VITALS: [x] Reviewed    IMAGING/DATA: [x] Reviewed    IVF FLUIDS/MEDICATIONS: [x] Reviewed    ALLERGIES: Allergies    meropenem (Rash)    Intolerances      DEVICES:   [] Restraints [x] PIVs [] ET tube [] central line [] PICC [] arterial line [] childers [] NGT/OGT [] EVD [x] LD [] ESSIE/HMV [] LiCOX [] ICP monitor [] Trach [] PEG [] Chest Tube [] other:    EXAMINATION:  General: No acute distress  HEENT: Anicteric sclerae  Cardiac: [id number]  Lungs: Clear  Abdomen: Soft, non-tender, +BS  Extremities: No c/c/e  Skin/Incision Site: Clean, dry and intact  Neurologic: Awake, alert, fully oriented, follows commands, EOMI, face symmetric, no drift, full strength SUMMARY: 58yo man s/p osteoplastic laminoplasties for spinal schwannomas and back pain, c/b CSF leak, 6/6 lumbar wound revision CSF leak primary repair, readm on 6/14 for CSF leak, s/p LD placement. 6/6 OR culture growing cutibacterium.    6/20-21 no overnight events    LD at 12cc/hr; plan per neurosurgery to clamp at noon vs d/c   ambulating    REVIEW OF SYSTEMS: [] Unable to Assess due to neurologic exam   [x ] All ROS addressed below are non-contributory, except: denies positional headache   Neuro: [ ] Headache [ ] Back pain [ ] Numbness [ ] Weakness [ ] Ataxia [ ] Dizziness [ ] Aphasia [ ] Dysarthria [ ] Visual disturbance  Resp: [ ] Shortness of breath/dyspnea [ ] Orthopnea [ ] Cough  CV: [ ] Chest pain [ ] Palpitation [ ] Lightheadedness [ ] Syncope  Renal: [ ] Thirst [ ] Edema  GI: [ ] Nausea [ ] Emesis [ ] Abdominal pain [ ] Constipation [ ] Diarrhea  Hem: [ ] Hematemesis [ ] bBright red blood per rectum  ID: [ ] Fever [ ] Chills [ ] Dysuria  ENT: [ ] Rhinorrhea    VITALS: [x] Reviewed    IMAGING/DATA: [x] Reviewed    IVF FLUIDS/MEDICATIONS: [x] Reviewed    ALLERGIES: Allergies    meropenem (Rash)    Intolerances      DEVICES:   [] Restraints [x] PIVs [] ET tube [] central line [] PICC [] arterial line [] childers [] NGT/OGT [] EVD [x] LD [] ESSIE/HMV [] LiCOX [] ICP monitor [] Trach [] PEG [] Chest Tube [] other:    EXAMINATION:  General: No acute distress  HEENT: Anicteric sclerae  Cardiac: [id number]  Lungs: Clear  Abdomen: Soft, non-tender, +BS  Extremities: No c/c/e  Skin/Incision Site: Clean, dry and intact  Neurologic: Awake, alert, fully oriented, follows commands, EOMI, face symmetric, no drift, full strength

## 2023-06-21 NOTE — PROVIDER CONTACT NOTE (OTHER) - REASON
As per order continue draining lumbar drain 12 cc/hr until 12 pm. 12 pm will be the last hour to drain 12 cc/hr and as per neurosurgical resident draw CSF labs, drain 12 cc, clamp then remove lumbar drain.

## 2023-06-21 NOTE — PROGRESS NOTE ADULT - ASSESSMENT
-POD29 T10-11, L1 and L5 laminoplasties for schwannoma resection, POD15 for L5 wound revision w/ primary repair of CSF leak, POD7 for LD insertion by Neuroradiology  -LD drainage at 12  -Monitor for leakage from drain sites  -Monitor for headaches   -Clamp and potentially d/c LD in AM

## 2023-06-21 NOTE — PROGRESS NOTE ADULT - ASSESSMENT
ASSESSMENT/PLAN: spinal schwannomas s/p T10, L1, L5 osteoplastic laminoplasties c/b CSF leak, now s/p LD placement 6/14    NEURO:  LD @12cc/hr-->clamp vs d/c plan per neurosurgery   need to send CSF at noon, prior to LD d/c   pain control  Activity: [x] mobilize as tolerated [] Bedrest [] PT [] OT [] PMNR    PULM: incentive spirometry as able  keep O2sat>92%    CV:  SBP goal 100-160    RENAL:  Fluids: IVL    GI:  Diet: regular diet  GI prophylaxis [x] not indicated [] PPI [] other:  Bowel regimen [] colace [x] senna [x] other: miralax   LBM    ENDO:   Goal euglycemia (-180)    HEME/ONC:  VTE prophylaxis: [x] SCDs [x] chemoprophylaxis [] hold chemoprophylaxis due to: [] high risk of DVT/PE on admission due to:    ID: cont ceftriaxone, bcx snet 6/20, f/u, when safe, PICC placement for 4-6wk abx course per ID    MISC:    SOCIAL/FAMILY:  [x] awaiting [] updated at bedside [] family meeting    CODE STATUS:  [x] Full Code [] DNR [] DNI [] Palliative/Comfort Care    DISPOSITION:  ICU for LD  ASSESSMENT/PLAN: spinal schwannomas s/p T10, L1, L5 osteoplastic laminoplasties c/b CSF leak, now s/p LD placement 6/14    NEURO:  LD REMOVED 6/21, ESSIE drain in  CSF sent- follow results  pain control  On xanax, flexeril  Activity: [x] mobilize as tolerated [] Bedrest [] PT [] OT [] PMNR    PULM: incentive spirometry as able  keep O2sat>92%  CPAP at night    CV:  SBP goal 100-160    RENAL:  Fluids: IVL  On tamsulosin     GI:  Diet: regular diet  GI prophylaxis [x] not indicated [] PPI [] other:  Bowel regimen [] colace [x] senna [x] other: miralax   LBM 6/19    ENDO:   Goal euglycemia (-180)    HEME/ONC:  VTE prophylaxis: [x] SCDs [x] chemoprophylaxis [] hold chemoprophylaxis due to: [] high risk of DVT/PE on admission due to:    ID:   Afebrile, mild leukocytosis   cont ceftriaxone, bcx snet 6/20, f/u, when safe, PICC placement for 4-6wk abx course per ID    MISC:    SOCIAL/FAMILY:  [x] awaiting [] updated at bedside [] family meeting    CODE STATUS:  [x] Full Code [] DNR [] DNI [] Palliative/Comfort Care    DISPOSITION:  Bed board for floor Detail Level: Detailed

## 2023-06-21 NOTE — PROGRESS NOTE ADULT - ASSESSMENT
ASSESSMENT/PLAN: spinal schwannomas s/p T10, L1, L5 osteoplastic laminoplasties c/b CSF leak, now s/p LD placement 6/14    NEURO:  LD @12cc/hr  pain control  Activity: [x] mobilize as tolerated [] Bedrest [] PT [] OT [] PMNR    PULM: incentive spirometry as able  keep O2sat>92%    CV:  SBP goal 100-160    RENAL:  Fluids: IVL    GI:  Diet: regular diet  GI prophylaxis [x] not indicated [] PPI [] other:  Bowel regimen [] colace [x] senna [x] other: miralax     ENDO:   Goal euglycemia (-180)    HEME/ONC:  VTE prophylaxis: [x] SCDs [x] chemoprophylaxis [] hold chemoprophylaxis due to: [] high risk of DVT/PE on admission due to:    ID: cont ceftriaxone, bcx x2sets, when negative, PICC placement for 4-6wk abx course     MISC:    SOCIAL/FAMILY:  [x] awaiting [] updated at bedside [] family meeting    CODE STATUS:  [x] Full Code [] DNR [] DNI [] Palliative/Comfort Care    DISPOSITION:  ICU for LD  ASSESSMENT/PLAN: spinal schwannomas s/p T10, L1, L5 osteoplastic laminoplasties c/b CSF leak, now s/p LD placement 6/14    NEURO:  LD @12cc/hr-->clamp vs d/c plan per neurosurgery   pain control  Activity: [x] mobilize as tolerated [] Bedrest [] PT [] OT [] PMNR    PULM: incentive spirometry as able  keep O2sat>92%    CV:  SBP goal 100-160    RENAL:  Fluids: IVL    GI:  Diet: regular diet  GI prophylaxis [x] not indicated [] PPI [] other:  Bowel regimen [] colace [x] senna [x] other: miralax   LBM    ENDO:   Goal euglycemia (-180)    HEME/ONC:  VTE prophylaxis: [x] SCDs [x] chemoprophylaxis [] hold chemoprophylaxis due to: [] high risk of DVT/PE on admission due to:    ID: cont ceftriaxone, bcx snet 6/20, f/u, when safe, PICC placement for 4-6wk abx course per ID    MISC:    SOCIAL/FAMILY:  [x] awaiting [] updated at bedside [] family meeting    CODE STATUS:  [x] Full Code [] DNR [] DNI [] Palliative/Comfort Care    DISPOSITION:  ICU for LD  ASSESSMENT/PLAN: spinal schwannomas s/p T10, L1, L5 osteoplastic laminoplasties c/b CSF leak, now s/p LD placement 6/14    NEURO:  LD @12cc/hr-->clamp vs d/c plan per neurosurgery   need to send CSF at noon, prior to LD d/c   pain control  Activity: [x] mobilize as tolerated [] Bedrest [] PT [] OT [] PMNR    PULM: incentive spirometry as able  keep O2sat>92%    CV:  SBP goal 100-160    RENAL:  Fluids: IVL    GI:  Diet: regular diet  GI prophylaxis [x] not indicated [] PPI [] other:  Bowel regimen [] colace [x] senna [x] other: miralax   LBM    ENDO:   Goal euglycemia (-180)    HEME/ONC:  VTE prophylaxis: [x] SCDs [x] chemoprophylaxis [] hold chemoprophylaxis due to: [] high risk of DVT/PE on admission due to:    ID: cont ceftriaxone, bcx snet 6/20, f/u, when safe, PICC placement for 4-6wk abx course per ID    MISC:    SOCIAL/FAMILY:  [x] awaiting [] updated at bedside [] family meeting    CODE STATUS:  [x] Full Code [] DNR [] DNI [] Palliative/Comfort Care    DISPOSITION:  ICU for LD

## 2023-06-21 NOTE — PROVIDER CONTACT NOTE (OTHER) - RECOMMENDATIONS
As per neurosurgical resident draw CSF labs at 12 pm, drain 12 cc/hr, clamp lumbar drain after then remove lumbar drain as per order.

## 2023-06-21 NOTE — CHART NOTE - NSCHARTNOTEFT_GEN_A_CORE
left basilic v single lumen picc placed by picc team at bedside.     length 47cm  power injectable  cxr pending

## 2023-06-21 NOTE — PROGRESS NOTE ADULT - SUBJECTIVE AND OBJECTIVE BOX
SUMMARY: 56yo man s/p osteoplastic laminoplasties for spinal schwannomas and back pain, c/b CSF leak, 6/6 lumbar wound revision CSF leak primary repair, readm on 6/14 for CSF leak, s/p LD placement. 6/6 OR culture growing cutibacterium.    6/20-21 no overnight events    LD at 12cc/hr; plan per neurosurgery to clamp at noon vs d/c   ambulating    6/21 OVERNIGHT EVENT:  No events in the AM. Patient's LD removed. OK for floor.    REVIEW OF SYSTEMS: [] Unable to Assess due to neurologic exam   [x ] All ROS addressed below are non-contributory, except: denies positional headache   Neuro: [ ] Headache [ ] Back pain [ ] Numbness [ ] Weakness [ ] Ataxia [ ] Dizziness [ ] Aphasia [ ] Dysarthria [ ] Visual disturbance  Resp: [ ] Shortness of breath/dyspnea [ ] Orthopnea [ ] Cough  CV: [ ] Chest pain [ ] Palpitation [ ] Lightheadedness [ ] Syncope  Renal: [ ] Thirst [ ] Edema  GI: [ ] Nausea [ ] Emesis [ ] Abdominal pain [ ] Constipation [ ] Diarrhea  Hem: [ ] Hematemesis [ ] bBright red blood per rectum  ID: [ ] Fever [ ] Chills [ ] Dysuria  ENT: [ ] Rhinorrhea    VITALS: [x] Reviewed    IMAGING/DATA: [x] Reviewed    IVF FLUIDS/MEDICATIONS: [x] Reviewed    ALLERGIES: Allergies    meropenem (Rash)    Intolerances      DEVICES:   [] Restraints [x] PIVs [] ET tube [] central line [] PICC [] arterial line [] childers [] NGT/OGT [] EVD [x] LD [] ESSIE/HMV [] LiCOX [] ICP monitor [] Trach [] PEG [] Chest Tube [] other:    EXAMINATION:  General: No acute distress  HEENT: Anicteric sclerae  Cardiac: [id number]  Lungs: Clear  Abdomen: Soft, non-tender, +BS  Extremities: No c/c/e  Skin/Incision Site: Clean, dry and intact  Neurologic: Awake, alert, fully oriented, follows commands, EOMI, face symmetric, no drift, full strength

## 2023-06-21 NOTE — PROVIDER CONTACT NOTE (OTHER) - ASSESSMENT
Pt neurologically remains intact, A&Ox4, PERRL. Pt has no salty/metallic taste in the back of throat. Pt's LD dressing is dry and intact.

## 2023-06-21 NOTE — ADVANCED PRACTICE NURSE CONSULT - ASSESSMENT
Picc Insertion Note  Patient or patient representative educated about central line associated blood stream infection prevention practices.  Catheter type: 4F,  SL Picc  : Bard  Power injectable: Yes  Lot# [id number] EXP [id number]    Informed consent obtained by covering floor team.  Procedure assisted by: Francesca VAT RN  Time out was preformed, confirming the patient's first and last name, date of birth, procedure, and correct site prior to state of procedure.    Patient was placed with HOB 30 degrees. Patient placement site was prepped with chlorhexidine solution, then draped using maximum sterile barrier protection. The area was injected with 2 ml of 1% lidocaine. Using the Bard Site Rite 8, the catheter was placed using the Modified Seldinger Technique. Strict adherence to outline aseptic technique including handwashing, glove and gown, utilizing mask and cap, plus draping the patient with a sterile drape was observed. Patient wore mask and bouffant cap. Upon completion of line placement, the insertion site was covered with a sterile occlusive CHG dressing. Pt tolerated procedure well.   Pre VS: B/P 138/75, HR 87,O2SAT 99% T.  98.3  Post VS: B/P 142/72, HR 88, O2SAt 99%     All materials used for catheter insertion, including the intact guide wires, were accounted for at the end of the procedure.  Number of attempts: 1  Complications/Comments: None    Emergency Placement:  No  Site: New   Anatomical Site of insertion:  Left Basilic  Catheter size/length: 4F,  47cm  US guided Bard single lumen power picc placed    Post procedure verification with chest Xray as per orders.

## 2023-06-21 NOTE — PROGRESS NOTE ADULT - SUBJECTIVE AND OBJECTIVE BOX
CC: f/u for  csf leak  Patient reports he feels ok, had a rash behind the right knee that is resolved.     REVIEW OF SYSTEMS:  All other review of systems negative (Comprehensive ROS)    Antimicrobials Day #  :3/42  cefTRIAXone   IVPB      cefTRIAXone   IVPB 2000 milliGRAM(s) IV Intermittent every 24 hours    Other Medications Reviewed    T(F): 97.8 (06-21-23 @ 11:00), Max: 98.1 (06-20-23 @ 23:00)  HR: 86 (06-21-23 @ 11:00)  BP: 126/70 (06-21-23 @ 11:00)  RR: 14 (06-21-23 @ 11:00)  SpO2: 96% (06-21-23 @ 11:00)  Wt(kg): --    PHYSICAL EXAM:  General: alert, no acute distress  Eyes:  anicteric, no conjunctival injection, no discharge  Oropharynx: no lesions or injection 	  Neck: supple, without adenopathy  Lungs: clear to auscultation  Heart: regular rate and rhythm; no murmur, rubs or gallops  Abdomen: soft, nondistended, nontender, without mass or organomegaly  Skin: no lesions  Extremities: no clubbing, cyanosis, or edema  Neurologic: alert, oriented, moves all extremities  back wound is dressed, csf clear, giancarlo clear  LAB RESULTS:              MICROBIOLOGY:  RECENT CULTURES:      RADIOLOGY REVIEWED:    < from: [facility] Duplex Lower Ext Vein Scan, Светлана (06.15.23 @ 17:50) >    ACC: [id number] EXAM:  DUPLEX SCAN EXT VEINS LOWER BI   ORDERED BY: ROMAN NUNEZ     PROCEDURE DATE:  06/15/2023          INTERPRETATION:  CLINICAL INFORMATION: Lower extremity pain. Bedrest.   Status post spinal surgery. Evaluate for DVT.    COMPARISON: None available.    TECHNIQUE: Duplex sonography of the BILATERAL LOWER extremity veins with   color and spectral Doppler, with and without compression.    FINDINGS:    RIGHT:  Normal compressibility of the RIGHT common femoral, femoral and popliteal   veins.  Doppler examination shows normal spontaneous and phasic flow.  No RIGHT calf vein thrombosis is detected.    LEFT:  Normal compressibility of the LEFT common femoral, femoral and popliteal   veins.  Doppler examination shows normal spontaneous and phasic flow.  No LEFT calf vein thrombosis is detected.    IMPRESSION:  No evidence of deep venous thrombosis in either lower extremity.            --- End of Report ---            < end of copied text >            Assessment:    S/p T10, L1, and L5 osteoplastic laminoplasties for resection of intradural extramedullary tumors (schwannoma) on 5/23, returned with csf leak  s/p revision of lumbar wound, CSf leak repair, laminoplasty replaced on 6/6. He returned 6/14 with leak and now has a lumbar drain. Fluid from 6/6 grew cutiebacterium and infected lumbar area of concern so to finish 6 weeks of iv ceftriaxone. So far he is tolerating it. He has some iv phlebitis left arm but not looking septic and no disseminated rash to suggest drug reaction is apparent.       Plan:  continue ceftriaxone day 3/42  no clinical support for bacteremia so can place picc line today  csf being sent for culture and parameters  lumbar drain per neurosurgery

## 2023-06-22 ENCOUNTER — TRANSCRIPTION ENCOUNTER (OUTPATIENT)
Age: 58
End: 2023-06-22

## 2023-06-22 ENCOUNTER — APPOINTMENT (OUTPATIENT)
Dept: NEUROSURGERY | Facility: CLINIC | Age: 58
End: 2023-06-22

## 2023-06-22 VITALS
TEMPERATURE: 98 F | HEART RATE: 83 BPM | OXYGEN SATURATION: 97 % | SYSTOLIC BLOOD PRESSURE: 122 MMHG | DIASTOLIC BLOOD PRESSURE: 81 MMHG | RESPIRATION RATE: 18 BRPM

## 2023-06-22 PROCEDURE — 71045 X-RAY EXAM CHEST 1 VIEW: CPT

## 2023-06-22 PROCEDURE — 97161 PT EVAL LOW COMPLEX 20 MIN: CPT

## 2023-06-22 PROCEDURE — 97530 THERAPEUTIC ACTIVITIES: CPT

## 2023-06-22 PROCEDURE — C1751: CPT

## 2023-06-22 PROCEDURE — 89051 BODY FLUID CELL COUNT: CPT

## 2023-06-22 PROCEDURE — 87040 BLOOD CULTURE FOR BACTERIA: CPT

## 2023-06-22 PROCEDURE — 83735 ASSAY OF MAGNESIUM: CPT

## 2023-06-22 PROCEDURE — 36569 INSJ PICC 5 YR+ W/O IMAGING: CPT

## 2023-06-22 PROCEDURE — 82945 GLUCOSE OTHER FLUID: CPT

## 2023-06-22 PROCEDURE — 99231 SBSQ HOSP IP/OBS SF/LOW 25: CPT

## 2023-06-22 PROCEDURE — 85025 COMPLETE CBC W/AUTO DIFF WBC: CPT

## 2023-06-22 PROCEDURE — 83605 ASSAY OF LACTIC ACID: CPT

## 2023-06-22 PROCEDURE — 84157 ASSAY OF PROTEIN OTHER: CPT

## 2023-06-22 PROCEDURE — 93970 EXTREMITY STUDY: CPT

## 2023-06-22 PROCEDURE — 62329 THER SPI PNXR CSF FLUOR/CT: CPT

## 2023-06-22 PROCEDURE — 87205 SMEAR GRAM STAIN: CPT

## 2023-06-22 PROCEDURE — 36415 COLL VENOUS BLD VENIPUNCTURE: CPT

## 2023-06-22 PROCEDURE — 97166 OT EVAL MOD COMPLEX 45 MIN: CPT

## 2023-06-22 PROCEDURE — 97116 GAIT TRAINING THERAPY: CPT

## 2023-06-22 PROCEDURE — 87070 CULTURE OTHR SPECIMN AEROBIC: CPT

## 2023-06-22 PROCEDURE — 84100 ASSAY OF PHOSPHORUS: CPT

## 2023-06-22 PROCEDURE — 80048 BASIC METABOLIC PNL TOTAL CA: CPT

## 2023-06-22 RX ORDER — TAMSULOSIN HYDROCHLORIDE 0.4 MG/1
1 CAPSULE ORAL
Qty: 0 | Refills: 0 | DISCHARGE
Start: 2023-06-22

## 2023-06-22 RX ORDER — CALCIUM CARBONATE 500(1250)
1 TABLET ORAL ONCE
Refills: 0 | Status: DISCONTINUED | OUTPATIENT
Start: 2023-06-22 | End: 2023-06-22

## 2023-06-22 RX ORDER — OXYCODONE HYDROCHLORIDE 5 MG/1
1 TABLET ORAL
Qty: 12 | Refills: 0
Start: 2023-06-22 | End: 2023-06-24

## 2023-06-22 RX ORDER — SODIUM CHLORIDE 9 MG/ML
10 INJECTION INTRAMUSCULAR; INTRAVENOUS; SUBCUTANEOUS
Qty: 40 | Refills: 0
Start: 2023-06-22 | End: 2023-07-31

## 2023-06-22 RX ORDER — LANOLIN ALCOHOL/MO/W.PET/CERES
1 CREAM (GRAM) TOPICAL
Qty: 0 | Refills: 0 | DISCHARGE
Start: 2023-06-22

## 2023-06-22 RX ORDER — ACETAMINOPHEN 500 MG
2 TABLET ORAL
Qty: 0 | Refills: 0 | DISCHARGE
Start: 2023-06-22

## 2023-06-22 RX ORDER — DIPHENHYDRAMINE HCL 50 MG
1 CAPSULE ORAL
Qty: 0 | Refills: 0 | DISCHARGE
Start: 2023-06-22

## 2023-06-22 RX ORDER — ALPRAZOLAM 0.25 MG
1 TABLET ORAL
Qty: 0 | Refills: 0 | DISCHARGE
Start: 2023-06-22

## 2023-06-22 RX ORDER — ASCORBIC ACID 60 MG
1 TABLET,CHEWABLE ORAL
Qty: 0 | Refills: 0 | DISCHARGE
Start: 2023-06-22

## 2023-06-22 RX ORDER — CEFTRIAXONE 500 MG/1
2 INJECTION, POWDER, FOR SOLUTION INTRAMUSCULAR; INTRAVENOUS
Qty: 40 | Refills: 0
Start: 2023-06-22 | End: 2023-07-31

## 2023-06-22 RX ADMIN — Medication 1 TABLET(S): at 11:37

## 2023-06-22 RX ADMIN — CEFTRIAXONE 100 MILLIGRAM(S): 500 INJECTION, POWDER, FOR SOLUTION INTRAMUSCULAR; INTRAVENOUS at 12:07

## 2023-06-22 RX ADMIN — CHLORHEXIDINE GLUCONATE 1 APPLICATION(S): 213 SOLUTION TOPICAL at 11:42

## 2023-06-22 RX ADMIN — Medication 500 MILLIGRAM(S): at 11:37

## 2023-06-22 RX ADMIN — POLYETHYLENE GLYCOL 3350 17 GRAM(S): 17 POWDER, FOR SOLUTION ORAL at 11:38

## 2023-06-22 NOTE — PROGRESS NOTE ADULT - SUBJECTIVE AND OBJECTIVE BOX
CC: f/u for Cutibacterium post op infection    Patient reports: he offers no complaints    REVIEW OF SYSTEMS:  All other review of systems negative (Comprehensive ROS)    Antimicrobials Day #  :day 4/42  cefTRIAXone   IVPB 2000 milliGRAM(s) IV Intermittent every 24 hours  cefTRIAXone   IVPB        Other Medications Reviewed  MEDICATIONS  (STANDING):  ascorbic acid 500 milliGRAM(s) Oral daily  cefTRIAXone   IVPB      cefTRIAXone   IVPB 2000 milliGRAM(s) IV Intermittent every 24 hours  chlorhexidine 4% Liquid 1 Application(s) Topical daily  enoxaparin Injectable 40 milliGRAM(s) SubCutaneous <User Schedule>  melatonin 10 milliGRAM(s) Oral at bedtime  multivitamin 1 Tablet(s) Oral daily  polyethylene glycol 3350 17 Gram(s) Oral daily  senna 2 Tablet(s) Oral at bedtime  tamsulosin 0.4 milliGRAM(s) Oral at bedtime    T(F): 97.9 (06-22-23 @ 07:00), Max: 98.1 (06-21-23 @ 23:00)  HR: 78 (06-22-23 @ 09:55)  BP: 116/73 (06-22-23 @ 07:00)  RR: 13 (06-22-23 @ 09:38)  SpO2: 100% (06-22-23 @ 09:55)  Wt(kg): --    PHYSICAL EXAM:  General: alert, no acute distress  Eyes:  anicteric, no conjunctival injection, no discharge  Oropharynx: no lesions or injection 	  Neck: supple, without adenopathy  Lungs: clear to auscultation  Heart: regular rate and rhythm; no murmur, rubs or gallops  Abdomen: soft, nondistended, nontender, without mass or organomegaly  Skin: no lesions  Extremities: no clubbing, cyanosis, or edema  Neurologic: alert, oriented, moves all extremities    LAB RESULTS:      CSF protein 53, glucose 67  cell count 2 wbc, 1 rbc        MICROBIOLOGY:  RECENT CULTURES:  06-21 @ 19:13 .CSF CSF       No polymorphonuclear leukocytes seen  No organisms seen  by cytocentrifuge    06-20 @ 13:15 .Blood Blood-Peripheral     No growth to date.      06-20 @ 13:05 .Blood Blood-Peripheral     No growth to date.          RADIOLOGY REVIEWED:  < from: Xray Chest 1 View- PORTABLE-Urgent (Xray Chest 1 View- PORTABLE-Urgent .) (06.21.23 @ 15:39) >  INTERPRETATION:  CLINICAL INDICATION: PICC placement    TECHNIQUE: Single frontal, portable view of the chest was obtained.    COMPARISON: Chest x-ray 6/14/2022.    FINDINGS:  Interval placement of left-sided PICC line with tip in the SVC.  The heart size is normal.  The lungs are clear.  There is no pneumothorax or pleural effusion.    IMPRESSION:  Left-sided PICC line with tip in the SVC.    < end of copied text >

## 2023-06-22 NOTE — PROGRESS NOTE ADULT - ASSESSMENT
-POD30 T10-11, L1 and L5 laminoplasties for schwannoma resection, POD16 for L5 wound revision w/ primary repair of CSF leak  -Lumbar drain d/c'd  -Monitor for leakage from drain sites  -Monitor for headaches   -Pending txfer to Floor

## 2023-06-22 NOTE — DISCHARGE NOTE PROVIDER - CARE PROVIDER_API CALL
Ivan Gross  Neurosurgery  [address], [address]  [place] [address]  Phone: [phone]  Fax: [phone]  Follow Up Time:    Ivan Gross  Neurosurgery  [address], [address]  [place] [address]  Phone: [phone]  Fax: [phone]  Follow Up Time:     Raj Rosenberg  Infectious Disease  [address], [address]  [place] [address]  Phone: [phone]  Fax: [phone]  Follow Up Time:

## 2023-06-22 NOTE — DISCHARGE NOTE PROVIDER - NSDCMRMEDTOKEN_GEN_ALL_CORE_FT
cephalexin 500 mg oral capsule: 1 cap(s) orally 2 times a day  cyclobenzaprine 10 mg oral tablet: 1 tab(s) orally every 8 hours as needed for  muscle spasm MDD: 3  oxyCODONE 5 mg oral tablet: 1 tab(s) orally every 6 hours MDD: 4  Physical Therapy: Dx: T10-T11, L1, L5 laminoplasty, Intradural Mass Resection  Outpatient Physical Therapy 2-3x weekly x6 weeks  Please evaluate and treat  Dr. Ivan Gross  [phone] MDD: 0  polyethylene glycol 3350 oral powder for reconstitution: 17 gram(s) orally 2 times a day  Rolling Walker: MDD: 0  rolling walker s/p T10-11, L1 and L5 lami for mass rxn: use while ambulating  senna leaf extract oral tablet: 2 tab(s) orally once a day (at bedtime)  Tylenol Extra Strength 500 mg oral tablet: 2 tab(s) orally every 8 hours   cefTRIAXone 2 g/50 mL-iso-osmotic dextrose intravenous solution: 2 gram(s) intravenously once a day End date: August 8/1/2023 MDD: 1  cefTRIAXone 2 g/50 mL-iso-osmotic dextrose intravenous solution: 2 gram(s) intravenously once a day x 40 days MDD: 1  cephalexin 500 mg oral capsule: 1 cap(s) orally 2 times a day  cyclobenzaprine 10 mg oral tablet: 1 tab(s) orally every 8 hours as needed for  muscle spasm MDD: 3  Labs: please draw weekly CBC w/differential and CMP while on IV antibiotics. Please fax results to Dr. Raj Rosenberg. fax number [phone]  Normal Saline Flush 0.9% injectable solution: 10 milliliter(s) injectable once a day 50 10ml flushes MDD: 1  oxyCODONE 5 mg oral tablet: 1 tab(s) orally every 6 hours MDD: 4  Physical Therapy: Dx: T10-T11, L1, L5 laminoplasty, Intradural Mass Resection  Outpatient Physical Therapy 2-3x weekly x6 weeks  Please evaluate and treat  Dr. Ivan Gross  [phone] MDD: 0  polyethylene glycol 3350 oral powder for reconstitution: 17 gram(s) orally 2 times a day  Rolling Walker: MDD: 0  rolling walker s/p T10-11, L1 and L5 lami for mass rxn: use while ambulating  senna leaf extract oral tablet: 2 tab(s) orally once a day (at bedtime)  Tylenol Extra Strength 500 mg oral tablet: 2 tab(s) orally every 8 hours   acetaminophen 325 mg oral tablet: 2 tab(s) orally every 6 hours As needed Temp greater or equal to 38C (100.4F), Mild Pain (1 - 3)  ALPRAZolam 0.25 mg oral tablet: 1 tab(s) orally 2 times a day As needed anxiety  ascorbic acid 500 mg oral tablet: 1 tab(s) orally once a day  cefTRIAXone 2 g/50 mL-iso-osmotic dextrose intravenous solution: 2 gram(s) intravenously once a day End date: August 8/1/2023 MDD: 1  cyclobenzaprine 10 mg oral tablet: 1 tab(s) orally every 8 hours as needed for  muscle spasm MDD: 3  diphenhydrAMINE 25 mg oral capsule: 1 cap(s) orally once a day (at bedtime) As needed Insomnia at bedtime  Labs: please draw weekly CBC w/differential and CMP while on IV antibiotics. Please fax results to Dr. Raj Rosenberg. fax number [phone]  melatonin 10 mg oral tablet: 1 tab(s) orally once a day (at bedtime)  Multiple Vitamins oral tablet: 1 tab(s) orally once a day  Normal Saline Flush 0.9% injectable solution: 10 milliliter(s) injectable once a day 50 10ml flushes MDD: 1  oxyCODONE 5 mg oral tablet: 1 tab(s) orally every 6 hours MDD: 4  polyethylene glycol 3350 oral powder for reconstitution: 17 gram(s) orally 2 times a day  senna leaf extract oral tablet: 2 tab(s) orally once a day (at bedtime)  tamsulosin 0.4 mg oral capsule: 1 cap(s) orally once a day (at bedtime)   acetaminophen 325 mg oral tablet: 2 tab(s) orally every 6 hours As needed Temp greater or equal to 38C (100.4F), Mild Pain (1 - 3)  ALPRAZolam 0.25 mg oral tablet: 1 tab(s) orally 2 times a day As needed anxiety  ascorbic acid 500 mg oral tablet: 1 tab(s) orally once a day  cefTRIAXone 2 g/50 mL-iso-osmotic dextrose intravenous solution: 2 gram(s) intravenously once a day End date: August 8/1/2023 MDD: 1  cyclobenzaprine 10 mg oral tablet: 1 tab(s) orally every 8 hours as needed for  muscle spasm MDD: 3  diphenhydrAMINE 25 mg oral capsule: 1 cap(s) orally once a day (at bedtime) As needed Insomnia at bedtime  Labs: please draw weekly CBC w/differential and CMP while on IV antibiotics. Please fax results to Dr. Raj Rosenberg. fax number [phone]  melatonin 10 mg oral tablet: 1 tab(s) orally once a day (at bedtime)  Multiple Vitamins oral tablet: 1 tab(s) orally once a day  Normal Saline Flush 0.9% injectable solution: 10 milliliter(s) injectable once a day 50 10ml flushes MDD: 1  polyethylene glycol 3350 oral powder for reconstitution: 17 gram(s) orally 2 times a day  senna leaf extract oral tablet: 2 tab(s) orally once a day (at bedtime)  tamsulosin 0.4 mg oral capsule: 1 cap(s) orally once a day (at bedtime)

## 2023-06-22 NOTE — PROGRESS NOTE ADULT - SUBJECTIVE AND OBJECTIVE BOX
Patient seen and examined at bedside.    --Anticoagulation--    T(C): 36.7 (06-21-23 @ 23:00), Max: 36.7 (06-21-23 @ 06:00)  HR: 74 (06-21-23 @ 23:00) (74 - 96)  BP: 126/67 (06-21-23 @ 23:00) (113/83 - 138/70)  RR: 12 (06-21-23 @ 23:00) (12 - 15)  SpO2: 96% (06-21-23 @ 23:00) (95% - 100%)  Wt(kg): --    Exam: AOx3, EOMI, no facial/drift, KING 5/5, SILT, lumbar drain site dry

## 2023-06-22 NOTE — DISCHARGE NOTE PROVIDER - PROVIDER TOKENS
PROVIDER:[TOKEN:[[id number]]] PROVIDER:[TOKEN:[[id number]]],PROVIDER:[TOKEN:[[id number]]]

## 2023-06-22 NOTE — DISCHARGE NOTE PROVIDER - REASON FOR ADMISSION
Lumbar drain placement for CSF leak Lumbar drain placement for CSF leak on 6/14 Lumbar drain placement for CSF leak 6/14  LD removal 6/21  Lumbar drain placement for CSF leak 6/14   LD removed 6/21

## 2023-06-22 NOTE — PROGRESS NOTE ADULT - SUBJECTIVE AND OBJECTIVE BOX
SUMMARY: 58yo man s/p osteoplastic laminoplasties for spinal schwannomas and back pain, c/b CSF leak, 6/6 lumbar wound revision CSF leak primary repair, readm on 6/14 for CSF leak, s/p LD placement. 6/6 OR culture growing cutibacterium.    6/20-21 no overnight events  6/21 LD d/c'ed per neurosurgery   ambulating    no acute events     REVIEW OF SYSTEMS: [] Unable to Assess due to neurologic exam   [x ] All ROS addressed below are non-contributory, except: denies positional headache   Neuro: [ ] Headache [ ] Back pain [ ] Numbness [ ] Weakness [ ] Ataxia [ ] Dizziness [ ] Aphasia [ ] Dysarthria [ ] Visual disturbance  Resp: [ ] Shortness of breath/dyspnea [ ] Orthopnea [ ] Cough  CV: [ ] Chest pain [ ] Palpitation [ ] Lightheadedness [ ] Syncope  Renal: [ ] Thirst [ ] Edema  GI: [ ] Nausea [ ] Emesis [ ] Abdominal pain [ ] Constipation [ ] Diarrhea  Hem: [ ] Hematemesis [ ] bBright red blood per rectum  ID: [ ] Fever [ ] Chills [ ] Dysuria  ENT: [ ] Rhinorrhea    VITALS: [x] Reviewed    IMAGING/DATA: [x] Reviewed    IVF FLUIDS/MEDICATIONS: [x] Reviewed    ALLERGIES: Allergies    meropenem (Rash)    Intolerances      DEVICES:   [] Restraints [x] PIVs [] ET tube [] central line [] PICC [] arterial line [] childers [] NGT/OGT [] EVD [x] LD [] ESSIE/HMV [] LiCOX [] ICP monitor [] Trach [] PEG [] Chest Tube [] other:    EXAMINATION:  General: No acute distress  HEENT: Anicteric sclerae  Cardiac: [id number]  Lungs: Clear  Abdomen: Soft, non-tender, +BS  Extremities: No c/c/e  Skin/Incision Site: Clean, dry and intact  Neurologic: Awake, alert, fully oriented, follows commands, EOMI, face symmetric, no drift, full strength

## 2023-06-22 NOTE — PROGRESS NOTE ADULT - PROVIDER SPECIALTY LIST ADULT
Acupuncture
Infectious Disease
NSICU
Neuroradiology
Infectious Disease
Infectious Disease
NSICU
Neurosurgery
Neurosurgery
NSICU
NSICU
Neurosurgery
NSICU
NSICU
Neurosurgery

## 2023-06-22 NOTE — PROGRESS NOTE ADULT - ASSESSMENT
ASSESSMENT/PLAN: spinal schwannomas s/p T10, L1, L5 osteoplastic laminoplasties c/b CSF leak, now s/p LD placement 6/14    NEURO:  LD d/c'ed, f/u CSF sent prior   pain control  Activity: [x] mobilize as tolerated [] Bedrest [] PT [] OT [] PMNR    PULM: incentive spirometry as able  keep O2sat>92%    CV:  SBP goal 100-160    RENAL:  Fluids: IVL    GI:  Diet: regular diet  GI prophylaxis [x] not indicated [] PPI [] other:  Bowel regimen [] colace [x] senna [x] other: miralax   LBM    ENDO:   Goal euglycemia (-180)    HEME/ONC:  VTE prophylaxis: [x] SCDs [x] chemoprophylaxis [] hold chemoprophylaxis due to: [] high risk of DVT/PE on admission due to:    ID: cont ceftriaxone, bcx snet 6/20 NTD, PICC placed, 4-6wk abx course per ID    MISC:    SOCIAL/FAMILY:  [x] awaiting [] updated at bedside [] family meeting    CODE STATUS:  [x] Full Code [] DNR [] DNI [] Palliative/Comfort Care    DISPOSITION:  stable for transfer to floor vs discharge f/u neurosurgery  ASSESSMENT/PLAN: spinal schwannomas s/p T10, L1, L5 osteoplastic laminoplasties c/b CSF leak, now s/p LD placement 6/14    NEURO:  LD d/c'ed, f/u CSF sent prior   ESSIE drain to be d/c'ed per neurosurgery today   pain control  Activity: [x] mobilize as tolerated [] Bedrest [] PT [] OT [] PMNR    PULM: incentive spirometry as able  keep O2sat>92%    CV:  SBP goal 100-160    RENAL:  Fluids: IVL    GI:  Diet: regular diet  GI prophylaxis [x] not indicated [] PPI [] other:  Bowel regimen [] colace [x] senna [x] other: miralax   LBM    ENDO:   Goal euglycemia (-180)    HEME/ONC:  VTE prophylaxis: [x] SCDs [x] chemoprophylaxis [] hold chemoprophylaxis due to: [] high risk of DVT/PE on admission due to:    ID: cont ceftriaxone, bcx snet 6/20 NTD, PICC placed, 4-6wk abx course per ID    MISC:    SOCIAL/FAMILY:  [x] awaiting [] updated at bedside [] family meeting    CODE STATUS:  [x] Full Code [] DNR [] DNI [] Palliative/Comfort Care    DISPOSITION:  stable for transfer to floor vs discharge f/u neurosurgery

## 2023-06-22 NOTE — DISCHARGE NOTE PROVIDER - HOSPITAL COURSE
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 Patient was recently discharged but now re-admitted on 6/14 for placement of lumbar drain for concern for persistent CSF leak. Lumbar drain placed on 6/14 with Neuroradiology, initially draining at 10cc/hr titrating to symptomatic headaches. Patient monitored in the NSICU. Evaluated by PT/OT who recommended activiy be increased as tolerated, and dispo of No skilled PT/OT needs. He is ambulating appropriately throughout the unit. Drainage titrated from 10 to 12 cc/hr on 6/16, patient tolerated well no headaches. Drainage increased further to 15 cc/hr on 6/17 patient experienced worsening headaches that improved with laying flat so drainage decreased to 12 cc/hr. Lumbar drain removed on 6/21 without complication. 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 Patient was recently discharged but now re-admitted on 6/14 for placement of lumbar drain for concern for persistent CSF leak. Lumbar drain placed on 6/14 with Neuroradiology, initially draining at 10cc/hr titrating to symptomatic headaches. Patient monitored in the NSICU. Evaluated by PT/OT who recommended activiy be increased as tolerated, and dispo of No skilled PT/OT needs. He is ambulating appropriately throughout the unit. Drainage titrated from 10 to 12 cc/hr on 6/16, patient tolerated well no headaches. Drainage increased further to 15 cc/hr on 6/17 patient experienced worsening headaches that improved with laying flat so drainage decreased to 12 cc/hr. Lumbar drain removed on 6/21 without complication. During hospital course, the patients OR cultures from 6/6 finalized with + growth of Cutibacterium. Infectious disease consulted recommended IV ABX Ceftriaxone 2G q24H for 6 weeks. PICC placed bedside in LEFT Basilic vein on 6/21 for long term ABX, CXR confirmation of placement.  CSF sent on 6/21 showing analysis GLU 57, PROTEIN 53, LACTATE 2.0.     On the day of discharge the patient is medically and neurologically stable for discharge. 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 Patient was recently discharged but now re-admitted on 6/14 for placement of lumbar drain for concern for persistent CSF leak. Lumbar drain placed on 6/14 with Neuroradiology, initially draining at 10cc/hr titrating to symptomatic headaches. Patient monitored in the NSICU. Evaluated by PT/OT who recommended activiy be increased as tolerated, and dispo of No skilled PT/OT needs. He is ambulating appropriately throughout the unit. Drainage titrated from 10 to 12 cc/hr on 6/16, patient tolerated well no headaches. Drainage increased further to 15 cc/hr on 6/17 patient experienced worsening headaches that improved with laying flat so drainage decreased to 12 cc/hr. Lumbar drain removed on 6/21 without complication. During hospital course, the patients OR cultures from 6/6 finalized with + growth of Cutibacterium. Infectious disease consulted recommended IV ABX Ceftriaxone 2G q24H for 6 weeks. PICC placed bedside in LEFT Basilic vein on 6/21 for long term ABX, CXR confirmation of placement.  CSF sent on 6/21 showing analysis GLU 57, PROTEIN 53, LACTATE 2.0. home care coordination for antibiotics have been completed.     On the day of discharge the patient is medically and neurologically stable for discharge.

## 2023-06-22 NOTE — PROGRESS NOTE ADULT - TIME BILLING
not critically ill inpatient care as above

## 2023-06-22 NOTE — PROGRESS NOTE ADULT - ASSESSMENT
Patient is a 57y male with a past history of sigmoid diverticulitis with liver abscess, s/p treatment in 2021 for abscess and elective sigmoid resection in 2022, evaluation earlier in 2023 for lower back pain and found to have spinal cord tumors, s/p elective T10, LI and L5 osteoplasty and laminoplasty on 5/23, readmitted earlier in June for  CSF leak and s/p wound revision with laminoplasty replacement on 6/6, who was readmitted 6/14 with wound drainage and has required a lumbar drain placement.  He had operative cultures sent x2 on 6/6 of slightly murky fluid, both cultures in rare amounts have grown Cutibacterium. No fever , he has been ambulatory, minimal headache.  His liver abscess in 2021 required both IR and surgical drainage, he received extended course of antibiotics via PICC line, he recalls a rash to meropenem.  I think we need to assume the cutibacterium could be acting as a pathogen in the present context. While the organisms were isolated in small quantities, both OR specimens were positive and he has ongoing CSF leak.He has been tolerating cephalexin, I believe it will be safe to use CTX with prior meropenem allergy.  He has tolerated CTX so far. CSF benign. Blood cultures are negative  Suggest:  1. CTX 2 gr q24, day 4/28-42. Po options may be considered at later date  2. I suspect he will need 4-6 weeks of high dose antibiotics  3. LD has been clamped  4, disposition per NS  5.Weekly cbc,diff and cmp post discharge.

## 2023-06-22 NOTE — PROGRESS NOTE ADULT - REASON FOR ADMISSION
Lumbar drain placement for CSF leak

## 2023-06-22 NOTE — DISCHARGE NOTE PROVIDER - NSDCCPCAREPLAN_GEN_ALL_CORE_FT
PRINCIPAL DISCHARGE DIAGNOSIS  Diagnosis: CSF leak  Assessment and Plan of Treatment: You had a Lumbar drain placed with neuroradiology at [facility] on 6/14/23 for diagnosis/trreatment of CSF leak. Lumbar drain trial performed in NSCU (titration). Lumbar drain was removed on 6/21/2023  Your ESSIE drain was removed by neurosurgery on 6/22 and site secured with 1 suture   Please follow up with Dr. Gross within 1 week of discharge   Your OR cultures from previous procedure 6/6/23 resulted positive for cutibacterium. You have been prescribed CEFTRIAXONE 2 Grams every 24H for 6 weeks (ending on Aug 1, 2023).   Continue taking bowel regimen with miralax and senna  Continue tylenol (acetaminophen) as needed for pain   Please make all necessary appointments and follow up. Please DO NOT take any Aspirin and NSAIDs (Advil, Aleve, Motrin, Ibuprofen) until cleared by your Neurosurgeon. Please DO NOT do any heavy lifting, bending, twisting and straining. You may shower, but NO SOAP / NO SHAMPOO. DO NOT do any scrubbing. Pat dry only. Please come to the emergency room for any of the following: altered mental status, seizures, pain uncontrolled by pain medications, fevers, leaking / bleeding from surgical site, chest pain and shortness of breath.        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  Assessment and Plan of Treatment: You have a PICC line placed in LEFT upper arm basilic vein on 6/21, this access is important for the administration of long term antibioics   Continue IV medication:   -  CEFTRIAXONE 2G every 24 HOURS (END DATE August 1st 2023)    Diagnosis: Insomnia  Assessment and Plan of Treatment: continue home medications for insomnia   - alprazolam 0.25mg q12h as needed   - benadryl 25 mg once at night as needed     PRINCIPAL DISCHARGE DIAGNOSIS  Diagnosis: CSF leak  Assessment and Plan of Treatment: You had a Lumbar drain placed with neuroradiology at [facility] on 6/14/23 for diagnosis/trreatment of CSF leak. Lumbar drain trial performed in NSCU (titration). Lumbar drain was removed on 6/21/2023  Your ESSIE drain was removed by neurosurgery on 6/22 and site secured with 1 suture   Please follow up with Dr. Gross within 1 week of discharge   Your OR cultures from previous procedure 6/6/23 resulted positive for cutibacterium. You have been prescribed CEFTRIAXONE 2 Grams every 24H for 6 weeks (ending on Aug 1, 2023).   Continue taking bowel regimen with miralax and senna  Continue tylenol (acetaminophen) as needed for pain   Continue taking flexiril as needed for back spasm   Please make all necessary appointments and follow up. Please DO NOT take any Aspirin and NSAIDs (Advil, Aleve, Motrin, Ibuprofen) until cleared by your Neurosurgeon. Please DO NOT do any heavy lifting, bending, twisting and straining. You may shower, but NO SOAP / NO SHAMPOO. DO NOT do any scrubbing. Pat dry only. Please come to the emergency room for any of the following: altered mental status, seizures, pain uncontrolled by pain medications, fevers, leaking / bleeding from surgical site, chest pain and shortness of breath.        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  Assessment and Plan of Treatment: You have a PICC line placed in LEFT upper arm basilic vein on 6/21, this access is important for the administration of long term antibioics   Continue IV medication:   -  CEFTRIAXONE 2G every 24 HOURS (END DATE August 1st 2023)    Diagnosis: Insomnia  Assessment and Plan of Treatment: continue home medications for insomnia   - alprazolam 0.25mg q12h as needed   - benadryl 25 mg once at night as needed     PRINCIPAL DISCHARGE DIAGNOSIS  Diagnosis: CSF leak  Assessment and Plan of Treatment: You had a Lumbar drain placed with neuroradiology at [facility] on 6/14/23 for diagnosis/trreatment of CSF leak. Lumbar drain trial performed in NSCU (titration). Lumbar drain was removed on 6/21/2023  Your surgical ESSIE drain was removed by neurosurgery on 6/22 and site secured with 1 suture (this will be observed at your follow up visit)   Please follow up with Dr. Gross within 1 week of discharge   Your OR cultures from previous procedure 6/6/23 resulted positive for cutibacterium. You have been prescribed CEFTRIAXONE 2 Grams every 24H for 6 weeks (ending on Aug 1, 2023). home care has been coordinated for you   Continue taking bowel regimen with miralax and senna  Continue tylenol (acetaminophen) as needed for pain   Continue taking flexiril as needed for back spasm   Please make all necessary appointments and follow up. Please DO NOT take any Aspirin and NSAIDs (Advil, Aleve, Motrin, Ibuprofen) until cleared by your Neurosurgeon. Please DO NOT do any heavy lifting, bending, twisting and straining. You may shower, but NO SOAP / NO SHAMPOO. DO NOT do any scrubbing. Pat dry only. Please come to the emergency room for any of the following: altered mental status, seizures, pain uncontrolled by pain medications, fevers, leaking / bleeding from surgical site, chest pain and shortness of breath.        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

## 2023-06-22 NOTE — DISCHARGE NOTE NURSING/CASE MANAGEMENT/SOCIAL WORK - PATIENT PORTAL LINK FT
You can access the FollowMyHealth Patient Portal offered by [facility] by registering at the following website: [email or web address]. By joining [email or web address]’s FollowMyHealth portal, you will also be able to view your health information using other applications (apps) compatible with our system.

## 2023-06-22 NOTE — DISCHARGE NOTE PROVIDER - NSDCFUSCHEDAPPT_GEN_ALL_CORE_FT
Ivan Gross Physician [facility]  NEUROSURG [address] [place] [address]  Scheduled Appointment: 06/22/2023

## 2023-06-22 NOTE — DISCHARGE NOTE NURSING/CASE MANAGEMENT/SOCIAL WORK - NSDCPEFALRISK_GEN_ALL_CORE
For information on Fall & Injury Prevention, visit: [email or web address] OR  [email or web address] OR  https://www.cdc.gov/steadi/patient.html

## 2023-06-22 NOTE — PROGRESS NOTE ADULT - THIS PATIENT HAS THE FOLLOWING CONDITION(S)/DIAGNOSES ON THIS ADMISSION:
CSF leak
Functional Quadriplegia
None
Functional Quadriplegia
None
CSF leak
None
Functional Quadriplegia
None
None

## 2023-06-26 NOTE — ED PROVIDER NOTE - OBJECTIVE STATEMENT
5 Pt is a 55y M with PMH of sleep apnea presenting from urgent care for + covid lower leg pain/injury Pt is a 55y M with PMH of sleep apnea presenting from urgent care for + covid. Pt started having symptoms about 1 week ago. Started having night sweats and then symptoms progressed to SOB/ nausea/ fever/ cough/ body aches starting on Tuesday. Pt went to urgent care and tested + for covid on Wednesday. His wife also tested positive as well. He has been taking tylenol. He had cxr at urgent care which showed L sided lower lobe opacity. He denies any chest pain/ abd pain/sick contacts/vomiting/diarrhea/rash. Pt is fully vaccinated with Pfizer in April. NKDA.

## 2023-06-27 NOTE — PHYSICAL THERAPY INITIAL EVALUATION ADULT - ASSISTIVE DEVICE FOR TRANSFER: GAIT, REHAB EVAL
Is This A New Presentation, Or A Follow-Up?: Skin Lesion
Has Your Skin Lesion Been Treated?: not been treated
rolling walker

## 2023-06-29 ENCOUNTER — APPOINTMENT (OUTPATIENT)
Dept: NEUROSURGERY | Facility: CLINIC | Age: 58
End: 2023-06-29
Payer: COMMERCIAL

## 2023-06-29 VITALS
HEART RATE: 80 BPM | HEIGHT: 70 IN | WEIGHT: 183 LBS | DIASTOLIC BLOOD PRESSURE: 91 MMHG | OXYGEN SATURATION: 98 % | SYSTOLIC BLOOD PRESSURE: 136 MMHG | BODY MASS INDEX: 26.2 KG/M2

## 2023-06-29 DIAGNOSIS — G96.00 CEREBROSPINAL FLUID LEAK, UNSPECIFIED: ICD-10-CM

## 2023-06-29 PROCEDURE — 99024 POSTOP FOLLOW-UP VISIT: CPT

## 2023-06-29 NOTE — REASON FOR VISIT
[de-identified] : Wound exploration and CSF leak repair [de-identified] : 6/6/2023 [de-identified] : Patient is here for f/u s/p csf leak repair. Incision is clean and dry. Stitches were removed at both drain sites. Distal incision with slight edema no fluid or drainage noted. Culture from wound exploration on 6/6 are positive for cutibacterium acnes. Patient has a left upper arm PICC line and remains on anbiotics until 8/1/2023. He continues to f/u with ID. [Spouse] : spouse

## 2023-06-29 NOTE — HISTORY OF PRESENT ILLNESS
[< 3 months] : less than 3 months [FreeTextEntry1] : spine lesion [de-identified] : 6/5/2023:\par Patient was seen in office s/p laminoplasty for tumor resection. During visit, wound noted to be draining. Patient with severe headaches. Patient was sent to [facility] to be admitted.\par \par \par \par 5/11/2023\par \par He developed low back pain and hamstring pain. Patient went to the chiropractor. He states that this did not help. When he got no relief he had a MRI done which showed the lesions. He then a a series of testing completed to make sure that the lesions were not "Cancerous".\par \par He then saw Dr. Wan who told him that one of the lesions needed to be removed. \par \par He comes in today seeking a second opinion regarding this surgical option.\par \par Patient currently the patient has low back pain with a shooting pain down his left leg to his foot. He also notes shooting pain in his left foot and toes.\par He has increased pain when he lays down or sits down. He gets relief when he stands and walks.

## 2023-06-29 NOTE — ASSESSMENT
[FreeTextEntry1] : Impression:\par \par Very pleasant 57 year old male who comes in for a post operative visit. He was seen postoperatively after his laminoplasty for tumor resection in may 2023. On post op visit a CSF leak and wound infection were noted. Patient was re admitted to the hospital and on 6/6 had a CSF leak repair and wound I& D. patient with a PICC line and antibiotics following this. CX positive for cutibacterium acnes. \par \par Discussion:\par \par A discussion occurred today regarding his recovery. It was suggested at this time that he wear a padded binder to assist with reabsorption of fluid and aid in healing. Patient was also encouraged to remain on bedrest for another week. Patient to observe distal incision and call the office.\par \par \par \par \par Plan:\par \par 1) Abdominal binder\par \par 2) patient will call the office after a week to give an update\par \par 3) continue with abx as per ID

## 2023-07-05 LAB
CULTURE RESULTS: SIGNIFICANT CHANGE UP
SPECIMEN SOURCE: SIGNIFICANT CHANGE UP

## 2023-07-06 ENCOUNTER — APPOINTMENT (OUTPATIENT)
Dept: NEUROSURGERY | Facility: CLINIC | Age: 58
End: 2023-07-06
Payer: COMMERCIAL

## 2023-07-06 DIAGNOSIS — Z09 ENCOUNTER FOR FOLLOW-UP EXAMINATION AFTER COMPLETED TREATMENT FOR CONDITIONS OTHER THAN MALIGNANT NEOPLASM: ICD-10-CM

## 2023-07-06 PROCEDURE — 99024 POSTOP FOLLOW-UP VISIT: CPT

## 2023-07-10 PROBLEM — Z09 POSTOP CHECK: Status: ACTIVE | Noted: 2022-08-01

## 2023-07-10 NOTE — ASSESSMENT
[FreeTextEntry1] : Impression:\par \par Very pleasant 57 year old male who comes in today in follow up after a laminoplasty for resection of a schwannoma as well as a dural repair and lumbar drain. Patient states that he is not wearing the abdominal binder as he feels it does not help.\par \par \par Discussion:\par \par A discussion occurred at this time regarding his recover thus far and moving forward. Patient was encouraged to slowly increase his activity and begin sitting up and ambulating more at this time.\par Patient and his wife asked appropriate questions and state an understanding of answers given\par Patient was encourage to wear the lumbar/abdominal binder as h was advised on the last visit.\par \par \par Plan:\par \par 1) abdominal binder for 1 week\par \par 2) Gradual increase activity and get OOB\par \par 3) follow up in 4 weeks\par

## 2023-07-10 NOTE — REASON FOR VISIT
[Home] : at home, [unfilled] , at the time of the visit. [Medical Office: ([facility])___] : at the medical office located in  [Patient] : the patient [Self] : self [Follow-Up: _____] : a [unfilled] follow-up visit [Spouse] : spouse [FreeTextEntry1] : 7/6/2023:\par Patient presents today for a wound check. His incision is healing without any further drainage. patient states that he no longer has a headache but also reports that he has basically been laying flat in his bed.\par He inquires about increasing his activity at this time\par \par 5/23/2023:\par patient had a laminoplasty for removal of schwannoma. He was noted to have a dural tear intraoperatively. \par \par  June 5, 2023:\par  he was seen in the office with increased headache and wound drainage. He was re-admitted to the hospital and on 6/6/2023 he had a repair of the tear with a lumbar drain placed.\par \par

## 2023-08-07 ENCOUNTER — APPOINTMENT (OUTPATIENT)
Dept: NEUROSURGERY | Facility: CLINIC | Age: 58
End: 2023-08-07
Payer: COMMERCIAL

## 2023-08-07 VITALS
WEIGHT: 183 LBS | SYSTOLIC BLOOD PRESSURE: 118 MMHG | BODY MASS INDEX: 26.2 KG/M2 | HEART RATE: 58 BPM | OXYGEN SATURATION: 99 % | HEIGHT: 70 IN | DIASTOLIC BLOOD PRESSURE: 77 MMHG

## 2023-08-07 PROCEDURE — 99024 POSTOP FOLLOW-UP VISIT: CPT

## 2023-08-07 NOTE — HISTORY OF PRESENT ILLNESS
[< 3 months] : less than 3 months [FreeTextEntry1] : spine lesion [de-identified] : 6/29/20223: Patient is here for f/u s/p csf leak repair. Incision is clean and dry. Stitches were removed at both drain sites. Distal incision with slight edema no fluid or drainage noted. Culture from wound exploration on 6/6 are positive for cutibacterium acnes. Patient has a left upper arm PICC line and remains on anbiotics until 8/1/2023. He continues to f/u with ID.  6/5/2023: Patient was seen in office s/p laminoplasty for tumor resection. During visit, wound noted to be draining. Patient with severe headaches. Patient was sent to [facility] to be admitted.    5/11/2023  He developed low back pain and hamstring pain. Patient went to the chiropractor. He states that this did not help. When he got no relief he had a MRI done which showed the lesions. He then a a series of testing completed to make sure that the lesions were not "Cancerous".  He then saw Dr. Wan who told him that one of the lesions needed to be removed.   He comes in today seeking a second opinion regarding this surgical option.  Patient currently the patient has low back pain with a shooting pain down his left leg to his foot. He also notes shooting pain in his left foot and toes. He has increased pain when he lays down or sits down. He gets relief when he stands and walks.

## 2023-08-07 NOTE — PHYSICAL EXAM
[Longitudinal] : longitudinal [Clean] : clean [Healing Well] : healing well [No Drainage] : without drainage [Normal Skin] : normal [Oriented To Time, Place, And Person] : oriented to person, place, and time [Impaired Insight] : insight and judgment were intact [Affect] : the affect was normal [Person] : oriented to person [Place] : oriented to place [Time] : oriented to time [Short Term Intact] : short term memory intact [Remote Intact] : remote memory intact [Span Intact] : the attention span was normal [Concentration Intact] : normal concentrating ability [Fluency] : fluency intact [Comprehension] : comprehension intact [Current Events] : adequate knowledge of current events [Past History] : adequate knowledge of personal past history [Vocabulary] : adequate range of vocabulary [Cranial Nerves Optic (II)] : visual acuity intact bilaterally,  pupils equal round and reactive to light [Cranial Nerves Oculomotor (III)] : extraocular motion intact [Cranial Nerves Trigeminal (V)] : facial sensation intact symmetrically [Cranial Nerves Facial (VII)] : face symmetrical [Cranial Nerves Vestibulocochlear (VIII)] : hearing was intact bilaterally [Cranial Nerves Glossopharyngeal (IX)] : tongue and palate midline [Cranial Nerves Accessory (XI - Cranial And Spinal)] : head turning and shoulder shrug symmetric [Cranial Nerves Hypoglossal (XII)] : there was no tongue deviation with protrusion [Motor Strength] : muscle strength was normal in all four extremities [No Muscle Atrophy] : normal bulk in all four extremities [Balance] : balance was intact [2+] : Patella left 2+ [No Visual Abnormalities] : no visible abnormailities [No Tenderness to Palpation] : no spine tenderness on palpation [Full ROM] : full ROM [No Pain with ROM] : no pain with motion in any direction [Normal] : normal [Intact] : all reflexes within normal limits bilaterally [Sclera] : the sclera and conjunctiva were normal [PERRL With Normal Accommodation] : pupils were equal in size, round, reactive to light, with normal accommodation [Extraocular Movements] : extraocular movements were intact [Outer Ear] : the ears and nose were normal in appearance [Oropharynx] : the oropharynx was normal [Neck Appearance] : the appearance of the neck was normal [Neck Cervical Mass (___cm)] : no neck mass was observed [Jugular Venous Distention Increased] : there was no jugular-venous distention [Thyroid Diffuse Enlargement] : the thyroid was not enlarged [Thyroid Nodule] : there were no palpable thyroid nodules [Auscultation Breath Sounds / Voice Sounds] : lungs were clear to auscultation bilaterally [Heart Rate And Rhythm] : heart rate was normal and rhythm regular [Heart Sounds] : normal S1 and S2 [Heart Sounds Gallop] : no gallops [Murmurs] : no murmurs [Heart Sounds Pericardial Friction Rub] : no pericardial rub [Abnormal Walk] : normal gait [Nail Clubbing] : no clubbing  or cyanosis of the fingernails [Musculoskeletal - Swelling] : no joint swelling seen [Motor Tone] : muscle strength and tone were normal [Skin Color & Pigmentation] : normal skin color and pigmentation [Skin Turgor] : normal skin turgor [] : no rash [FreeTextEntry6] : bottom incision with fluid underneath, no drainage [Past-pointing] : there was no past-pointing [Tremor] : no tremor present [FreeTextEntry7] : left lateral calf with numbness post operatively [L'Hermitte's] : neck flexion did not produce tingling down the spine/arms [Spurling's - Opposite Side] : Negative Spurling's on opposite side [Spurling's Same Side] : Negative Spurling's on same side [Straight-Leg Raise Test - Left] : straight leg raise of the left leg was negative [Straight-Leg Raise Test - Right] : straight leg raise  of the right leg was negative

## 2023-08-07 NOTE — ASSESSMENT
[FreeTextEntry1] : Impression: Very pleasant 57-year-old male who presents today accompanied by his wife. Patient comes in today accompanied by his wife for post op appointment. Patient had a tumor resection 5/23 and a CSF leak repair on 6/6.    Discussion:  A long discussion occurred regarding his treatment plan at this point. Patient may engage in power walking, light jogging, physical therapy and swimming. He is not to do any sit ups, push-ups or weightlifting for another 4 weeks.     Plan:  1) Swimming for strengthening  2) Patient may do powerwalking or light jogging  3) No weights or pushups, sit ups  etc for 4 more weeks  4) mri with and without contrast in 3 months (October 2023)

## 2023-08-07 NOTE — REASON FOR VISIT
[Spouse] : spouse [de-identified] : Wound exploration and CSF leak repair [de-identified] : 6/6/2023 [de-identified] : Patient comes in today accompanied by his wife for post op appointment. Patient had a tumor resection 5/23 and a CSF leak repair on 6/6.  Patient continues to wear the lumbar brace to put pressure against area of question. Patient states that he is feeling better. He states that his back muscles feel weak but over all he is improved.  His PICC line was discontinued 1 week ago.

## 2023-09-08 ENCOUNTER — OUTPATIENT (OUTPATIENT)
Dept: OUTPATIENT SERVICES | Facility: HOSPITAL | Age: 58
LOS: 1 days | Discharge: HOME | End: 2023-09-08

## 2023-09-08 DIAGNOSIS — D64.9 ANEMIA, UNSPECIFIED: ICD-10-CM

## 2023-09-08 DIAGNOSIS — Z98.890 OTHER SPECIFIED POSTPROCEDURAL STATES: Chronic | ICD-10-CM

## 2023-09-08 DIAGNOSIS — Z90.89 ACQUIRED ABSENCE OF OTHER ORGANS: Chronic | ICD-10-CM

## 2023-09-11 ENCOUNTER — APPOINTMENT (OUTPATIENT)
Dept: HEMATOLOGY ONCOLOGY | Facility: CLINIC | Age: 58
End: 2023-09-11

## 2023-09-25 ENCOUNTER — NON-APPOINTMENT (OUTPATIENT)
Age: 58
End: 2023-09-25

## 2023-10-31 ENCOUNTER — APPOINTMENT (OUTPATIENT)
Dept: MRI IMAGING | Facility: CLINIC | Age: 58
End: 2023-10-31

## 2023-10-31 ENCOUNTER — APPOINTMENT (OUTPATIENT)
Dept: MRI IMAGING | Facility: CLINIC | Age: 58
End: 2023-10-31
Payer: COMMERCIAL

## 2023-10-31 ENCOUNTER — OUTPATIENT (OUTPATIENT)
Dept: OUTPATIENT SERVICES | Facility: HOSPITAL | Age: 58
LOS: 1 days | End: 2023-10-31
Payer: COMMERCIAL

## 2023-10-31 DIAGNOSIS — Z98.890 OTHER SPECIFIED POSTPROCEDURAL STATES: Chronic | ICD-10-CM

## 2023-10-31 DIAGNOSIS — Z90.89 ACQUIRED ABSENCE OF OTHER ORGANS: Chronic | ICD-10-CM

## 2023-10-31 DIAGNOSIS — M89.8X8 OTHER SPECIFIED DISORDERS OF BONE, OTHER SITE: ICD-10-CM

## 2023-10-31 PROCEDURE — 72158 MRI LUMBAR SPINE W/O & W/DYE: CPT

## 2023-10-31 PROCEDURE — A9585: CPT

## 2023-10-31 PROCEDURE — 72158 MRI LUMBAR SPINE W/O & W/DYE: CPT | Mod: 26

## 2023-11-02 ENCOUNTER — APPOINTMENT (OUTPATIENT)
Dept: SURGICAL ONCOLOGY | Facility: CLINIC | Age: 58
End: 2023-11-02
Payer: COMMERCIAL

## 2023-11-02 VITALS
HEART RATE: 65 BPM | SYSTOLIC BLOOD PRESSURE: 117 MMHG | OXYGEN SATURATION: 99 % | BODY MASS INDEX: 27.35 KG/M2 | DIASTOLIC BLOOD PRESSURE: 82 MMHG | HEIGHT: 70 IN | WEIGHT: 191.06 LBS | TEMPERATURE: 97.6 F

## 2023-11-02 DIAGNOSIS — K86.2 CYST OF PANCREAS: ICD-10-CM

## 2023-11-02 PROCEDURE — 99214 OFFICE O/P EST MOD 30 MIN: CPT

## 2023-11-04 PROBLEM — K86.2 PANCREATIC CYST: Status: ACTIVE | Noted: 2023-11-04

## 2023-11-20 ENCOUNTER — APPOINTMENT (OUTPATIENT)
Dept: NEUROSURGERY | Facility: CLINIC | Age: 58
End: 2023-11-20
Payer: COMMERCIAL

## 2023-11-20 VITALS
SYSTOLIC BLOOD PRESSURE: 145 MMHG | WEIGHT: 185 LBS | OXYGEN SATURATION: 98 % | DIASTOLIC BLOOD PRESSURE: 86 MMHG | BODY MASS INDEX: 26.48 KG/M2 | HEIGHT: 70 IN | HEART RATE: 61 BPM

## 2023-11-20 DIAGNOSIS — M89.8X8 OTHER SPECIFIED DISORDERS OF BONE, OTHER SITE: ICD-10-CM

## 2023-11-20 PROCEDURE — 99213 OFFICE O/P EST LOW 20 MIN: CPT

## 2023-11-20 RX ORDER — GABAPENTIN 100 MG/1
100 CAPSULE ORAL 3 TIMES DAILY
Qty: 90 | Refills: 1 | Status: DISCONTINUED | COMMUNITY
Start: 2023-08-07 | End: 2023-11-20

## 2023-11-20 RX ORDER — GABAPENTIN 100 MG/1
100 CAPSULE ORAL 3 TIMES DAILY
Qty: 90 | Refills: 3 | Status: DISCONTINUED | COMMUNITY
Start: 2023-09-11 | End: 2023-11-20

## 2023-11-20 RX ORDER — APIXABAN 5 MG/1
5 TABLET, FILM COATED ORAL
Qty: 60 | Refills: 1 | Status: DISCONTINUED | COMMUNITY
Start: 2021-08-05 | End: 2023-11-20

## 2023-11-20 RX ORDER — GABAPENTIN 300 MG/1
300 CAPSULE ORAL
Qty: 180 | Refills: 1 | Status: ACTIVE | COMMUNITY
Start: 2023-11-20 | End: 1900-01-01

## 2023-11-20 RX ORDER — GABAPENTIN 300 MG/1
300 CAPSULE ORAL
Refills: 0 | Status: ACTIVE | COMMUNITY

## 2023-11-22 NOTE — PATIENT PROFILE ADULT - FALL HARM RISK - UNIVERSAL INTERVENTIONS
yes
Bed in lowest position, wheels locked, appropriate side rails in place/Call bell, personal items and telephone in reach/Instruct patient to call for assistance before getting out of bed or chair/Non-slip footwear when patient is out of bed/[place] to call system/Physically safe environment - no spills, clutter or unnecessary equipment/Purposeful Proactive Rounding/Room/bathroom lighting operational, light cord in reach

## 2024-01-01 NOTE — PHYSICAL EXAM
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

## 2024-03-23 NOTE — PHYSICAL EXAM
[Normal Finger/nose] : finger to nose coordination [Normal] : no peripheral adenopathy appreciated [de-identified] : eusebiar [de-identified] : right hand:\par skin intact mild swelling no erythema no ecchymosis\par The tenderness over the metacarpals no tenderness over the distal radius snuffbox\par no tenderness over the triquetrum\par Point tenderness over the hamate as well as the hypothenar musculature\par Range of motion of the digits and wrist slightly limited secondary to pain and stiffness finger tip to palm distance of 1 cm\par neurovascularly intact distally Parent(s)

## 2024-04-08 ENCOUNTER — APPOINTMENT (OUTPATIENT)
Dept: MRI IMAGING | Facility: CLINIC | Age: 59
End: 2024-04-08
Payer: COMMERCIAL

## 2024-04-08 ENCOUNTER — NON-APPOINTMENT (OUTPATIENT)
Age: 59
End: 2024-04-08

## 2024-04-08 PROCEDURE — 72158 MRI LUMBAR SPINE W/O & W/DYE: CPT

## 2024-04-08 PROCEDURE — A9585: CPT | Mod: JW

## 2024-04-16 RX ORDER — GABAPENTIN 300 MG/1
300 CAPSULE ORAL
Qty: 180 | Refills: 1 | Status: ACTIVE | COMMUNITY
Start: 2024-04-16 | End: 1900-01-01

## 2024-07-12 ENCOUNTER — OUTPATIENT (OUTPATIENT)
Dept: OUTPATIENT SERVICES | Facility: HOSPITAL | Age: 59
LOS: 1 days | End: 2024-07-12
Payer: COMMERCIAL

## 2024-07-12 ENCOUNTER — APPOINTMENT (OUTPATIENT)
Dept: MRI IMAGING | Facility: CLINIC | Age: 59
End: 2024-07-12
Payer: COMMERCIAL

## 2024-07-12 DIAGNOSIS — M89.8X8 OTHER SPECIFIED DISORDERS OF BONE, OTHER SITE: ICD-10-CM

## 2024-07-12 DIAGNOSIS — Z98.890 OTHER SPECIFIED POSTPROCEDURAL STATES: Chronic | ICD-10-CM

## 2024-07-12 DIAGNOSIS — Z90.89 ACQUIRED ABSENCE OF OTHER ORGANS: Chronic | ICD-10-CM

## 2024-07-12 PROCEDURE — 72158 MRI LUMBAR SPINE W/O & W/DYE: CPT

## 2024-07-12 PROCEDURE — 72158 MRI LUMBAR SPINE W/O & W/DYE: CPT | Mod: 26

## 2024-07-12 PROCEDURE — A9585: CPT

## 2024-08-07 NOTE — REASON FOR VISIT
[Home] : at home, [unfilled] , at the time of the visit. [Medical Office: ([facility])___] : at the medical office located in  [Patient] : the patient [Follow-Up: _____] : a [unfilled] follow-up visit

## 2024-08-07 NOTE — REASON FOR VISIT
[Home] : at home, [unfilled] , at the time of the visit. [Medical Office: ([facility])___] : at the medical office located in  [Patient] : the patient [Follow-Up: _____] : a [unfilled] follow-up visit

## 2024-08-08 ENCOUNTER — APPOINTMENT (OUTPATIENT)
Dept: NEUROSURGERY | Facility: CLINIC | Age: 59
End: 2024-08-08

## 2024-08-08 PROCEDURE — 99212 OFFICE O/P EST SF 10 MIN: CPT

## 2024-08-09 PROBLEM — Z78.9 NO PERTINENT PAST SURGICAL HISTORY: Status: RESOLVED | Noted: 2020-08-28 | Resolved: 2024-08-09

## 2024-08-09 NOTE — ASSESSMENT
[FreeTextEntry1] : IMPRESSION: 58 year old gentleman with multiple symptomatic intradural tumors.  He did have leg pain which was attributed to the L5 lesion.  He did have bilateral leg pain whenever he bears down consistent with a largest of the tumors obliterating his thecal sac at L1 and he also had some abdominal pain that seemed to bother him more recently likely from the T10 lesion.   PLAN: 1. F/U MRI of the LS w/wo in 6 months to monitor the previously noted 0.3 to 0.4 cm enhancing nodules again seen within the right posterior cauda equina at the level of L2 that appears relatively stable. 2. Encouraged to quit smoking.

## 2024-08-09 NOTE — HISTORY OF PRESENT ILLNESS
[FreeTextEntry1] : Mr. Carbajal is a pleasant 58 year old gentleman with multiple symptomatic intradural tumors.  He did have leg pain which was attributed to the L5 lesion.  He did have bilateral leg pain whenever he bears down consistent with a largest of the tumors obliterating his thecal sac at L1 and he also had some abdominal pain that seemed to bother him more recently likely from the T10 lesion. For this reason, we decided to offer him removal of all three of these.  In addition since  these were all  by large areas, we decided to approach each one as an individual operation with a small incision with minimally invasive retractor.  For this reason, we did the surgery although under one anesthesia and with three small operations.  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 Intradural spinal tumor at L1. Intradural spinal tumor at L5. Stage I:  Laminectomy of T10 for removal of intradural tumor.  Use of intraoperative microscope.  Difficulty modifier.  Replacement of T10 lamina with titanium plate in the form of laminoplasty procedure. Stage 2:  Laminectomy at L1 for removal of intradural tumor.  Use of operative microscope.  Difficult modifier.  Replacement of L1 lamina with titanium plate via laminoplasty. Stage 3:  Laminectomy at L5 for removal of intradural tumor.  Use of operative microscope.  Difficulty modifier.  Replacement of L5 lamina with titanium plate in the form of lamina procedure.  Pt had a CSF leak and on 6/6/23 he underwent L5 laminectomy. Use of operating microscope. Repair of spinal fluid leak. 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  Pt had mew MRI of the L spine 7/12/24 that demonstrates a 0.3 to 0.4 cm enhancing nodules again seen within the right posterior cauda equina at the level of L2 that appears relatively stable. Stable mild multilevel degenerative disc disease.

## 2024-10-04 NOTE — PRE-OP CHECKLIST - NS PREOP CHK MONITOR ANESTHESIA CONSENT
Dear Le,     You will receive an email and a message in your [facility] account with instructions on how to schedule your appointment. Schedule this appointment right away, it will  in 3 days. You will receive results in your [facility] account. If you have questions about scheduling call [phone].        What to do in an Emergency:  If you are experiencing a medical emergency, call 911 immediately. Symptoms that require immediate attention require a visit at Urgent Care ([place]), [facility] ([place]) or the Emergency Room of a nearby hospital.    When to contact a provider:  Contact a provider if symptoms are worsening or there is no improvement.     Do not have a primary care provider?   If you do not have a primary care provider or have any questions about your visit, please call the [employer] RN at [phone].    Billing Questions:   If you have any billing concerns, please contact our Billing Department at [phone]. Hours: Mon. - Thu. 7:30 am - 6 pm and Friday 7:30 pm to 5 pm.    Thank you for entrusting us with your care.     You can connect by Video with a [facility] provider  for common and non-urgent symptoms. You can also get care by completing an E-Visit questionnaire. Complete a questionnaire by choosing from a list of common health concerns*. A [facility] provider will respond to your questionnaire answers within an 1 hour (). You will receive a treatment plan, including a prescription if you need one, and/or testing for COVID, Influenza, Mono, RSV, Strep and urine, depending on your symptoms.       COVID Treatment:     Paxlovid is an oral treatment FDA approved for adults. It is available for mild-moderate COVID-19 infections in patients at high risk of progression to severe COVID-19. This medication has been shown to reduce hospitalization and death by 88%.    Notify your healthcare immediately if,  you have renal disease or impairment, you will receive a lower  dose.   you did not list all medications you were taking at the time of your visit.There are several medications that interact with Paxlovid.     Drug interactions:   There are several medication interactions with Paxlovid.   Do not take any additional medication other than what the provider instructed.       Instructions for use:     Paxlovid blister pack contains two medications Nirmatrelvir 300 mg (two 150 mg tablets) with Ritonavir 100 mg, all three tablets taken together twice daily x 5 days.   Missed dose: If a dose is missed within 8 hours of usual administration time, the missed dose should be administered as soon as possible, and normal dosing schedule should resume. If a dose is missed by more than 8 hours, the missed dose should not be administered, and dosing should resume at the next scheduled administration time. Do not double the dose to make up for a missed dose.     Medication Interactions:    If you are using a combined hormonal birth control, please use an effective alternative contraceptive method or an additional barrier method of contraception while taking Paxlovid. Paxlovid may reduce the efficacy of combined hormonal contraceptives.    If you are taking levothyroxine for your thyroid, using Paxlovid together with levothyroxine may decrease the affects of levothyroxine.  Please contact your primary care provider to have your TSH re-evaluated in 30 days.  Contact your primary care provider if you experience tiredness sluggishness, weakness, memory problems, depression, difficulty concentrating, and/or constipation.    If you are taking a medication for your cholesterol, Paxlovid interacts with statin medications. You will need to stop your statin at least 12 hours prior to starting your course of Paxlovid. 5 days after you have completed your Paxlovid course, you may resume taking your statin.      Side effects:   Diarrhea, high blood pressure, body aches, taste disturbance.    Symptoms  Management:     Instructions for Adults:     Take Mucinex (guaifenesin) 600 mg twice daily, this is an expectorant which means it thins the mucus/phlegm so one can blow, cough or spit mucus/phlegm out better.    If needed you may take Sudafed as instructed on the package, if sinus pressure is present (do not take if you have history of high blood pressure/hypertension, are pregnant or breastfeeding).      If needed you may take Tylenol every 4 hours as needed for fever or aches (Do not exceed 4,000mg in 24 hours).    Drink Warm tea with honey.    Salt water gargles and throat lozenges for sore throat.    Instructions for Children:       Give Tylenol or ibuprofen as needed for fever.    Do not give ibuprofen if your child is not drinking enough fluids or are throwing up a lot.   Use Nose Michelle and nasal saline to help suction mucus.  Salt water gargles if able to follow instructions.  Throat lozenges may be given to children 4 and over for a sore throat. Follow package instructions.   Do not give over the counter cough and cold medications to children under 6 years of age. These medications can have serious side effects.    Use mentholated rubs over the chest and front of neck. This can help soothe a cough.    Honey can also help a cough (do not given to babies under 1 year of age):   1-5 years old give ½ teaspoon.    6-11 years old give 1 teaspoon.   and older give 2 teaspoons of honey.        Instructions for Everyone:     Drink lots of fluids.     Use a cool mist Humidifier.     Use a warm moist compress to sinuses 4-6 times daily to help facilitate sinus drainage.      Sterile saline rinses 3 times daily (nasal saline spray)    Cover coughs or sneezes.      Practice good hand hygiene.          ______________________________________________________________________________________________________________________________________  Continue to STAY HOME until,     Your symptoms have improved.   You have not had a  fever (greater than 100.4) for at least 24 hours.   You and are not using fever-reducing medication for 24 hours     Your employer may have different return to work guidelines. Check with your employer before returning to work. If your school age child has COVID, contact your child's school as they may have additional instructions and requirements to return to school.     What you need to know about coronavirus disease 2019 (COVID-19)       Covid-19 Patient information sheet    Coronavirus Disease 2019 (COVID-19): Overview  Coronavirus disease 2019 (COVID-19) is an illness that infects the lungs. It's caused by a type of coronavirus. The virus is called SARS-CoV-2. There are many types of coronaviruses. They are a common cause of colds and bronchitis. They can cause a lung infection called pneumonia. Symptoms can range from mild to severe. Some people have no symptoms. These types of viruses are also found in some animals.   Viruses change (mutate) all the time. The changes lead to different forms of a virus. These are called variants. COVID-19 variants may spread more easily from person to person. They may cause milder symptoms. Or they may cause more severe symptoms.    The virus spreads and infects people easily. It can infect a person more easily if they are not immune to it. The virus most often spreads through droplets of fluid that a person coughs or sneezes into the air. In some cases, you can get it from touching a surface with the virus on it and then touching your eyes, nose, or mouth.      To help prevent spreading the infection, wash your hands often, or use an alcohol-based hand .     To learn more  For the latest from the CDC:  Go to the CDC website  Call 170-IIO-FACB ([phone])  What are the symptoms of COVID-19?  Some people have no symptoms. Some have mild symptoms. Others may have severe symptoms. This varies from person to person. Symptoms may start 2 to 14 days after contact with  the virus. They can include:   Fever  Chills  Coughing  Trouble breathing or feeling short of breath  Sore throat  Stuffy or runny nose  Headache  Body aches  Tiredness  Nausea, vomiting, diarrhea, or belly pain  New loss of sense of smell or taste  What are possible complications of COVID-19?  The virus can cause an infection in the lungs. This is called pneumonia. This can lead to death in some cases. Experts are still learning more about COVID-19 problems. Problems may include:   Low blood pressure  Kidney failure  Inflammation of the brain or heart  Rashes  Some people are at higher risk for problems. This includes:  Older adults  People with heart or lung disease  People with diabetes or kidney disease  People with health conditions that limit the immune system  People who take medicines that limit the immune system  Rarely, a child may have a severe complication. This is called multisystem inflammatory syndrome in children (MIS-C). MIS-C seems to be like Kawasaki disease. This is a rare illness. It causes swelling of blood vessels and body organs. MIS can also happen in adults. But this is less common.     How is COVID-19 diagnosed?  Your healthcare provider will ask:  What symptoms you have  Where you live  If you’ve traveled recently  If you’ve had contact with sick people  If you are vaccinated against COVID-19  If you have had COVID-19  You may have 1 of these tests for COVID-19:  Viral (molecular) test. You may also hear this called a PCR or RT-PCR test. Viral tests are very accurate. A viral test looks for the genetic material (RNA) of the SARS-CoV-2 virus. There are a few ways to do this. A swab may be wiped inside your nose or throat. Or a long swab may be put into your nose down to the back of your throat. Or a sample of your saliva may be taken. Your test results may be back in 45 minutes to a few hours. This depends on the type of test. Some tests must be sent to a lab. These can take several days  for the results. You can now get test kits to use at home. Some of these need a prescription. Follow the instructions in the kit closely if you use a home kit. Some kits show results quickly at home. Others must be sent to a lab for the results.  Antigen test. This can find proteins from the SARS-CoV-2 virus. A swab may be wiped inside your nose or throat. Or a long swab may be put into your nose down to the back of your throat. Some results are back within 15 to 60 minutes. This depends on the type of test. Positive results are very accurate. But false positive results can happen. And the results can be negative even in people with COVID-19. Antigen tests are more likely to miss a COVID-19 infection than a viral (molecular) test. You may need to have a viral test if your antigen test is negative but you have symptoms of COVID-19.  Breath test.  This rapid test is not widely available at this time. It finds SARS-CoV-2 infection in the breath. The test is done at providers' offices, hospitals, and mobile testing sites.  You may have other tests if your provider thinks or confirms that you have COVID-19. These tests may include:   Antibody blood test. This type of test can show if you had the virus in the past. It shows antibodies for the virus in the blood. The accuracy of these tests varies. And they are not available everywhere. An antibody test may not show if you have an infection right now. This is because it can take up to a few weeks for your body to make antibodies. None of the antibody tests can yet be used to tell if a person is immune to the virus.  Sputum culture. If you have a wet cough, you may be asked to cough up a bit of mucus (sputum) from your lungs. This is tested for the virus. It may be tested for pneumonia.  Imaging tests. You may have a chest X-ray or CT scan.  Can you get COVID-19 again?  Yes, you can get COVID-19 more than once. You may not have immunity. You could have lost the immunity. Or  you may get COVID-19 from a different strain (variant) of the virus that you are not immune to. But the COVID-19 vaccine helps lower the risk for COVID-19.   Vaccines for COVID-19  The FDA and CDC advise vaccines for people 6 months and older to help prevent COVID-19. The vaccines can also make the illness less severe. It can keep you from needing to go to the hospital.  And it can prevent the spread of the virus to others. No vaccine is 100% effective at preventing an illness. But getting a vaccine is important. Pregnant or breastfeeding people can have the vaccine. Updated (bivalent) mRNA vaccines from Moderna and [employer] are advised. Other options are available for people who can't or won't get an mRNA vaccine. These are Novavax and J&J LayerVault COVID-19 vaccines.   COVID-19 vaccines are given as a shot (injection) into the muscle. Ask your healthcare provider which vaccine is best for you and your family.   COVID-19 vaccine booster shots  People ages 6 months or older can get a COVID-19 booster shot. It's given a few months after their primary series. Boosters can help with protection against COVID-19 that may have decreased over time.   Booster advice varies by vaccine, age, health, and COVID-19 variants. People ages 65 and older and some people with a very weak immune system should get an updated (bivalent) mRNA booster. Talk with your provider about your risk and how this applies to you.   How is COVID-19 treated?  The best treatments right now are those to help your body while it fights the virus. This is called supportive care. It includes:   Rest. This helps your body fight the illness.  Fluids. Try to drink 6 to 8 glasses of fluids every day. Ask your provider which drinks are best for you. Don't have drinks with caffeine or alcohol.  Over-the-counter (OTC) medicine. These are used to help ease pain and reduce fever. Ask your provider which OTC medicine is safe for you to use.  Talk with your provider if  you have confirmed COVID-19. You may qualify for medicines approved by the FDA to prevent severe COVID-19 infection.   You may need to stay in the hospital for severe illness. Your care may include:   IV fluids. These are given through a vein. This helps to replace fluids in your body.  Oxygen. You may be given extra oxygen. Or you may be put on a breathing machine (ventilator). This is done so you get enough oxygen in your body.  Prone positioning.  Your healthcare team may regularly turn you on your stomach. This is called prone positioning. It helps increase the amount of oxygen you get to your lungs. Follow their instructions on position changes while you're in the hospital and at home.  Antiviral medicines. Antivirals stop the SARS-CoV-2 virus from spreading in the body. The FDA has approved certain antiviral medicines to treat mild to moderate COVID-19 in people who are more likely to get very sick. These treatments are not available for everyone. Talk with your provider to learn more.  Steroids or other anti-inflammatory medicines.  These are used to lessen the inflammation that some people with COVID-19 have. Inflammation can lead to more trouble breathing. It can cause other complications or death.  Monoclonal antibodies. These were once used for earlier COVID-19 strains, but monoclonal antibody treatment is not effective for the latest variant. Talk with your provider to learn more.  COVID-19 convalescent plasma. Plasma is the liquid part of blood. People who had COVID-19 may be asked to donate plasma. This is called COVID-19 convalescent plasma. The plasma may have antibodies. Some people with COVID-19 who have very weak immune systems may benefit from this treatment. Your provider can help decide if it's right for you.  Are you at risk for COVID-19?  You are at risk for COVID-19 if any of these apply to you:  You live in or traveled to an area with cases of COVID-19  You had close contact (within 6 feet)  with someone who had COVID-19  COVID-19 may be spread by people who don't show symptoms.  Date last modified: 4/24/2023   Peter last reviewed this educational content on 9/1/2021 © 2000-2023 [employer]. All rights reserved. This information is not intended as a substitute for professional medical care. Always follow your healthcare professional's instructions.          Viral Syndrome (Adult)  A viral illness may cause a number of symptoms such as fever. Other symptoms depend on the part of the body that the virus affects. If it settles in your nose, throat, and lungs, it may cause cough, sore throat, congestion, runny nose, headache, earache and other ear symptoms, or shortness of breath. If it settles in your stomach and intestinal tract, it may cause nausea, vomiting, cramping, and diarrhea. Sometimes it causes generalized symptoms like \"aching all over,\" feeling tired, loss of energy, or loss of appetite.  A viral illness usually lasts anywhere from several days to several weeks, but sometimes it lasts longer. In some cases, a more serious infection can look like a viral syndrome in the first few days of the illness. You may need another exam and additional tests to know the difference. Watch for the warning signs listed below for when to seek medical advice.  Home care  Follow these guidelines for taking care of yourself at home:  If symptoms are severe, rest at home for the first 2 to 3 days.  Stay away from cigarette smoke - both your smoke and the smoke from others.  You may use over-the-counter acetaminophen or ibuprofen for fever, muscle aching, and headache, unless another medicine was prescribed for this. If you have chronic liver or kidney disease or ever had a stomach ulcer or gastrointestinal bleeding, talk with your healthcare provider before using these medicines. No one who is younger than 18 and ill with a fever should take aspirin. It may cause severe disease or death.  Your  appetite may be poor, so a light diet is fine. Avoid dehydration by drinking 8 to 12, 8-ounce glasses of fluids each day. This may include water; orange juice; lemonade; apple, grape, and cranberry juice; clear fruit drinks; electrolyte replacement and sports drinks; and decaffeinated teas and coffee. If you have been diagnosed with a kidney disease, ask your healthcare provider how much and what types of fluids you should drink to prevent dehydration. If you have kidney disease, drinking too much fluid can cause it build up in the your body and be dangerous to your health.  Over-the-counter remedies won't shorten the length of the illness but may be helpful for symptoms such as cough, sore throat, nasal and sinus congestion, or diarrhea. Don't use decongestants if you have high blood pressure.  Follow-up care  Follow up with your healthcare provider if you do not improve over the next week.  Call 911  Call 911 if any of the following occur:  Convulsion  Feeling weak, dizzy, or like you are going to faint  Chest pain, or more than mild shortness of breath  When to seek medical advice  Call your healthcare provider right away if any of these occur:  Cough with lots of colored sputum (mucus) or blood in your sputum  Chest pain, shortness of breath, wheezing, or trouble breathing  Severe headache; face, neck, or ear pain  Severe, constant pain in the lower right side of your belly (abdominal)  Continued vomiting (can’t keep liquids down)  Frequent diarrhea (more than 5 times a day); blood (red or black color) or mucus in diarrhea  Feeling weak, dizzy, or like you are going to faint  Extreme thirst  Fever of 100.4°F (38°C) or higher, or as directed by your healthcare provider  Peter last reviewed this educational content on 4/1/2018 © 2000-2021 [employer]. All rights reserved. This information is not intended as a substitute for professional medical care. Always follow your healthcare professional's  instructions.         done

## 2024-11-01 ENCOUNTER — APPOINTMENT (OUTPATIENT)
Dept: MRI IMAGING | Facility: CLINIC | Age: 59
End: 2024-11-01
Payer: COMMERCIAL

## 2024-11-01 ENCOUNTER — OUTPATIENT (OUTPATIENT)
Dept: OUTPATIENT SERVICES | Facility: HOSPITAL | Age: 59
LOS: 1 days | End: 2024-11-01
Payer: COMMERCIAL

## 2024-11-01 DIAGNOSIS — Z98.890 OTHER SPECIFIED POSTPROCEDURAL STATES: Chronic | ICD-10-CM

## 2024-11-01 DIAGNOSIS — K86.2 CYST OF PANCREAS: ICD-10-CM

## 2024-11-01 DIAGNOSIS — Z90.89 ACQUIRED ABSENCE OF OTHER ORGANS: Chronic | ICD-10-CM

## 2024-11-01 PROCEDURE — 74183 MRI ABD W/O CNTR FLWD CNTR: CPT | Mod: 26

## 2024-11-01 PROCEDURE — A9585: CPT

## 2024-11-01 PROCEDURE — 74183 MRI ABD W/O CNTR FLWD CNTR: CPT

## 2024-12-01 NOTE — H&P PST ADULT - PROBLEM SELECTOR PLAN 1
No
T10-T11 laminoplasty for interdural mass, L1 laminoplasty for intradural mass, L5 laminoplasty of intradural mass

## 2025-01-09 NOTE — DISCHARGE NOTE PROVIDER - HOSPITAL COURSE
Vinay said the prescription for abiraterone is 500 but insurance only covers 250.  #[phone]   Patient is a 55M w/ hx of sleep apnea on bipap at night who presented from Urgent Care who presents with chills. Of note, he was recently diagnosed with COVID on Wednesday and his sx had started one week ago. He started having night sweats, cough w/o productive sputum, chills, and fever (Tmax- 104F). His wife has also tested positive for COVID19 and is currently asymptomatic. He had gone to the urgent care and was diagnosed with LLL PNA. He has been vaccinated with Pfizer in April. He was febrile to 100.4F and having significant chills. Also, has nausea, SOB, and headache. Denies vomiting, dizziness, abdominal pain and chest pain. He was subsequently told by urgent care to come to the hospital for further management. He has been taking tylenol as needed.    In the ED, his vitals showed Temp- 99.3F, HR- 97, BP- 107/65, RR- 20, O2 sat- 97% RA. Labs notable for leukocytosis w/ neutrophil predominance, transaminitis and elevated procalcitonin. Patient got tylenol, x 1.   (09 Jul 2021 15:53)   Patient is a 55M w/ hx of sleep apnea on bipap at night who initially presented from Urgent Care with chills on 07/09/21. He had recently been diagnosed with COVID the week prior and had been vaccinated with Pfizer in April. He recently had an outpatient dental procedure and began having night sweats, and cough w/o productive sputum, chills, or fever. In the ED, his vitals showed Temp- 99.3F, HR- 97, BP- 107/65, RR- 20, O2 sat- 97% RA. Labs notable for leukocytosis w/ neutrophil predominance, transaminitis and elevated procalcitonin. Patient got tylenol, x 1. Patient got a CT scan and MRI of the liver which showed a liver lesion with a collection consistent with an abscess as well as a thrombus. Patient had an increase in fevers with code sepsis called and IR performed a drainage of the abscess. Cultures were sent which have shown no growth to date. He remained on meropenem and is doing well. He was stable in the hospital. Patient is a 55M w/ hx of sleep apnea on bipap at night who initially presented from Urgent Care with chills on 07/09/21. He had recently been diagnosed with COVID the week prior and had been vaccinated with Pfizer in April. He recently had an outpatient dental procedure and began having night sweats, and cough w/o productive sputum, chills, or fever. In the ED, his vitals showed Temp- 99.3F, HR- 97, BP- 107/65, RR- 20, O2 sat- 97% RA. Labs notable for leukocytosis w/ neutrophil predominance, transaminitis and elevated procalcitonin. Patient got tylenol, x 1. Patient got a CT scan and MRI of the liver which showed a liver lesion with a collection consistent with an abscess as well as a thrombus. Patient had an increase in fevers with code sepsis called and IR performed a drainage of the abscess with drain placement on 7/13/21. On 7/14, he underwent a robotic ultrasound guided incision and drainage of a multiloculated liver abscess. He had a normal intraoperative VÍCTOR. Cultures were sent which have shown no growth to date. He remained on meropenem and is expected to be on a six week course of antibiotics. 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 He was stable in the hospital.

## 2025-01-29 ENCOUNTER — OUTPATIENT (OUTPATIENT)
Dept: OUTPATIENT SERVICES | Facility: HOSPITAL | Age: 60
LOS: 1 days | End: 2025-01-29
Payer: COMMERCIAL

## 2025-01-29 ENCOUNTER — APPOINTMENT (OUTPATIENT)
Dept: MRI IMAGING | Facility: CLINIC | Age: 60
End: 2025-01-29
Payer: COMMERCIAL

## 2025-01-29 DIAGNOSIS — Z98.890 OTHER SPECIFIED POSTPROCEDURAL STATES: Chronic | ICD-10-CM

## 2025-01-29 DIAGNOSIS — Z90.89 ACQUIRED ABSENCE OF OTHER ORGANS: Chronic | ICD-10-CM

## 2025-01-29 DIAGNOSIS — G96.00 CEREBROSPINAL FLUID LEAK, UNSPECIFIED: ICD-10-CM

## 2025-01-29 DIAGNOSIS — M89.8X8 OTHER SPECIFIED DISORDERS OF BONE, OTHER SITE: ICD-10-CM

## 2025-01-29 PROCEDURE — 72158 MRI LUMBAR SPINE W/O & W/DYE: CPT

## 2025-01-29 PROCEDURE — A9585: CPT

## 2025-01-29 PROCEDURE — 72158 MRI LUMBAR SPINE W/O & W/DYE: CPT | Mod: 26

## 2025-02-27 ENCOUNTER — APPOINTMENT (OUTPATIENT)
Dept: NEUROSURGERY | Facility: CLINIC | Age: 60
End: 2025-02-27

## 2025-05-16 NOTE — DISCHARGE NOTE NURSING/CASE MANAGEMENT/SOCIAL WORK - NSDCPEELIQUISREACT_GEN_ALL_CORE
[FreeTextEntry1] : Mr. Murrieta presents for follow up s/p left brachiocephalic AVF creation on 6/19/18. He subsequently underwent balloon assisted maturation in June of 2019. During the COVID19 pandemic, he developed difficulty with access and on 8/21/2020, he underwent LUE AVF pharmacomechanical thrombectomy and balloon angioplasty of proximal venous stenosis.  Since his last visit, he has been doing well, however he has had difficulty with dialysis during the last week and presents for evaluation.  He denies any LUE paresthesia, weakness, pain, or skin discoloration. He underwent fistulogram with balloon angioplasty on 2/15/23. Fistula is being used Mon/Wed/Fri without issues. 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 He reports that this is from repeat use of that portion of the vein and he has instructed his HD center to use more distal vein.   He is planning a trip to [place] November 24, 2023 and wanted to assess the fistula prior to travel. He denies any difficulty with the fistula at HD.  Medications: Cinacalcet, Ca2+, vitamins, Metoprolol, Tadalafil, Trazodone, Ventolin,  [de-identified] : 11/13/23: Mr. Murrieta presents for fistula evaluation prior to travel to [place] (11/24/23-11/30/23). He denies any issues with fistula use at HD. 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   3/25/24: Mr. Murrieta presents for fistula evaluation. He denies any issues with fistula use at HD. He believes that the two small aneurysms (roughly 2cm in diameter) have not increased in size. He denies any wounds/ulcers. His father recently passed away from respiratory/COPD complications.   10/4/24: Mr. Murrieta presents for fistula evaluation. He denies any issues with fistula use at HD. He has two small aneurysms (roughly 2cm in diameter) that have not increased in size. He denies any wounds/ulcers. He is otherwise without complaints.   4/4/25: Mr. Murrieta presents for surveillance of LUE fistula. It is being used at HD without issues. He believes that the aneurysms are stable in size. He denies any wounds. He is without complaints.   5/16/25: Mr. Murrieta presents for surveillance duplex of LUE fistula. It is being used at HD without issues. He believes that the aneurysms are stable in size. He denies any wounds. He is without complaints.  Apixaban/Eliquis increases your risk for bleeding. Notify your doctor if you experience any of the following side effects: bleeding, coughing or vomiting blood, red or black stool, unexpected pain or swelling, itching or hives, chest pain, chest tightness, trouble breathing, changes in how much or how often you urinate, red or pink urine, numbness or tingling in your feet, or unusual muscle weakness. When Apixaban/Eliquis is taken with other medicines, they can affect how it works. Taking other medications such as aspirin, blood thinners, nonsteroidal anti-inflammatories, and medications that treat depression can increase your risk of bleeding. It is very important to tell your health care provider about all of the other medicines, including over-the-counter medications, herbs, and vitamins you are taking. DO NOT start, stop, or change the dosage of any medicine, including over-the-counter medicines, vitamins, and herbal products without your doctor’s approval. Any products containing aspirin or are nonsteroidal anti-inflammatories lessen the blood’s ability to form clots and add to the effect of Apixaban/Eliquis. Never take aspirin or medicines that contain aspirin without speaking to your doctor.

## 2025-06-27 NOTE — [facility] NURSE CONSULT - RECOMMEDATIONS
Sent message in [facility]   Post procedure verbal instructions given to the patient or patient representative. Patient or patient representative  instructed regarding signs and symptoms of infection. Patient instructed to keep dressing dry and clean. Care for catheter as per hospital protocols

## 2025-09-16 ENCOUNTER — APPOINTMENT (OUTPATIENT)
Dept: MRI IMAGING | Facility: CLINIC | Age: 60
End: 2025-09-16
Payer: COMMERCIAL

## 2025-09-16 PROCEDURE — 72158 MRI LUMBAR SPINE W/O & W/DYE: CPT | Mod: 26

## (undated) DEVICE — DRAPE MICROSCOPE OPMI VISIONGUARD 48X118"

## (undated) DEVICE — DRAPE TOWEL BLUE 17" X 24"

## (undated) DEVICE — ELCTR SUBDERMAL CORKSCREW NDL 1.2MM

## (undated) DEVICE — DRSG STERISTRIPS 0.5 X 4"

## (undated) DEVICE — DRSG XEROFORM 1 X 8"

## (undated) DEVICE — ELCTR BOVIE TIP BLADE INSULATED 6.5" EDGE

## (undated) DEVICE — RETRACTOR MIDLINE

## (undated) DEVICE — STRYKER SONOPET TIP STRAIGHT MICRO DIAMETER

## (undated) DEVICE — GLV 8.5 PROTEXIS (WHITE)

## (undated) DEVICE — GLV 6.5 PROTEXIS (WHITE)

## (undated) DEVICE — ELCTR SUBDERMAL NDL CLASSIC 1.5M X 59" (6 COLOR)

## (undated) DEVICE — ELCTR MONOPOLAR STIMULATOR PROBE FLUSH-TIP

## (undated) DEVICE — MARKING PEN W RULER

## (undated) DEVICE — TUBING STRYKER SET AND EXTENDER FILTER TUBING

## (undated) DEVICE — FOLEY TRAY 16FR 5CC LTX UMETER CLOSED

## (undated) DEVICE — PACK LUMBAR LAMI

## (undated) DEVICE — GLV 7 PROTEXIS (WHITE)

## (undated) DEVICE — DRAIN RESERVOIR FOR JACKSON PRATT 100CC CARDINAL

## (undated) DEVICE — DRAPE 3/4 SHEET W REINFORCEMENT 56X77"

## (undated) DEVICE — ELCTR SUBDERMAL NDL 27G X 1/2" WITH TWISTED PAIR

## (undated) DEVICE — Device

## (undated) DEVICE — ELCTR BOVIE TIP BLADE INSULATED 2.75" EDGE

## (undated) DEVICE — GLV 8 PROTEXIS (WHITE)

## (undated) DEVICE — SUT VICRYL 3-0 18" X-1 (POP-OFF)

## (undated) DEVICE — SUT VICRYL 0 18" CT-1 (POP-OFF)

## (undated) DEVICE — STAPLER SKIN VISI-STAT 35 WIDE

## (undated) DEVICE — SPONGE SURGICAL STRIP 1/2 X 6"

## (undated) DEVICE — BLADE SCALPEL SAFETYLOCK #10

## (undated) DEVICE — POSITIONER FOAM EGG CRATE ULNAR 2PCS (PINK)

## (undated) DEVICE — SUT PROLENE 5-0 30" RB-2

## (undated) DEVICE — DRAPE EQUIPMENT BANDED BAG 30 X 30" (SHOWER CAP)

## (undated) DEVICE — MIDAS REX MR8 MATCH HEAD FLUTED LG BORE 3MM X 14CM

## (undated) DEVICE — MEDICATION LABELS W MARKER

## (undated) DEVICE — BIPOLAR FORCEP SYMMETRY BAYONET 7" X 1.5MM SMOOTH (SILVER)

## (undated) DEVICE — GOWN TRIMAX LG

## (undated) DEVICE — ELCTR BIPOLAR CORD J&J 12FT DISP

## (undated) DEVICE — SPECIMEN CONTAINER 100ML

## (undated) DEVICE — DRAIN JACKSON PRATT 7MM FLAT FULL NO TROCAR

## (undated) DEVICE — VENODYNE/SCD SLEEVE CALF LARGE

## (undated) DEVICE — SOL IRR POUR H2O 250ML

## (undated) DEVICE — ELCTR 4-DISC 20MM 49" (RED, BLUE, GREEN, BLACK)

## (undated) DEVICE — TUBING SUCTION 20FT

## (undated) DEVICE — SPONGE SURGICAL STRIP 1/4 X 6"

## (undated) DEVICE — MISONIX BONESCALPEL BLUNT BLADE & TUBESET 20MM

## (undated) DEVICE — POSITIONER FOAM HEADREST (PINK)

## (undated) DEVICE — WARMING BLANKET LOWER ADULT

## (undated) DEVICE — SOL IRR BAG NS 0.9% 3000ML

## (undated) DEVICE — DRAPE MAYO STAND 30"

## (undated) DEVICE — SOL IRR POUR NS 0.9% 500ML

## (undated) DEVICE — FRAZIER SUCTION TIP 12FR

## (undated) DEVICE — GLV 7.5 PROTEXIS (WHITE)

## (undated) DEVICE — ELCTR BIPOLAR PROBE

## (undated) DEVICE — WOUND IRR SURGIPHOR

## (undated) DEVICE — VENODYNE/SCD SLEEVE CALF MEDIUM

## (undated) DEVICE — DRAIN JACKSON PRATT 10MM FLAT FULL NO TROCAR

## (undated) DEVICE — WARMING BLANKET UPPER ADULT

## (undated) DEVICE — NDL SPINAL 18G X 3.5" (PINK)

## (undated) DEVICE — PREP DURAPREP 26CC

## (undated) DEVICE — SUT MONOSOF 3-0 18" C-14

## (undated) DEVICE — ELCTR PEDICLE SCREW PROBE 3MM BALL 1.8MM X 100MM

## (undated) DEVICE — SUT BIOSYN 4-0 18" P-12

## (undated) DEVICE — LAP PAD 18 X 18"

## (undated) DEVICE — SUT VICRYL 2-0 18" CP-2 UNDYED (POP-OFF)

## (undated) DEVICE — SUT VICRYL 0 18" OS-6 (POP-OFF)

## (undated) DEVICE — POSITIONER HEAD REST PRONE

## (undated) DEVICE — URETERAL CATH RED RUBBER 12FR (WHITE)

## (undated) DEVICE — DRAPE 1/2 SHEET 40X57"

## (undated) DEVICE — POSITIONER CUSHION INSERT PRONE VIEW LG

## (undated) DEVICE — MIDAS REX MR7 LUBRICANT DIFFUSER CARTRIDGE

## (undated) DEVICE — DRSG TAPE HYPAFIX 4"

## (undated) DEVICE — SYR LUER LOK 20CC

## (undated) DEVICE — MISONIX BONESCALPEL DIAMOND SHAVER & TUBESET

## (undated) DEVICE — SUCTION CHICAGO TIP 10FR

## (undated) DEVICE — DRAPE IOBAN 23" X 23"

## (undated) DEVICE — CATH IV SAFE INSYTE 14G X 1.75" (ORANGE)

## (undated) DEVICE — DRAIN JACKSON PRATT 3 SPRING RESERVOIR W 10FR PVC DRAIN

## (undated) DEVICE — DRAPE STERILE-Z PATIENT

## (undated) DEVICE — STRYKER INTERPULSE HANDPIECE W IRR SUCTION TUBE

## (undated) DEVICE — DRSG CURITY GAUZE SPONGE 4 X 4" 12-PLY

## (undated) DEVICE — DRSG DERMABOND PRINEO 60CM

## (undated) DEVICE — FRAZIER SUCTION TIP 8FR

## (undated) DEVICE — DRAPE C ARM UNIVERSAL

## (undated) DEVICE — TUBING BIPOLAR IRRIGATOR AND CORD SET

## (undated) DEVICE — DRAPE BACK TABLE COVER HEAVY DUTY 60"

## (undated) DEVICE — MIDAS REX LEGEND MATCH HEAD FLUTED LG BORE 3.0MM X 14CM

## (undated) DEVICE — PREP CHLORAPREP HI-LITE ORANGE 26ML

## (undated) DEVICE — DRSG TELFA 3 X 8

## (undated) DEVICE — VISITEC 4X4

## (undated) DEVICE — ELCTR BOVIE PENCIL HANDPIECE

## (undated) DEVICE — SUT NUROLON 4-0 8-18" TF (POP-OFF)

## (undated) DEVICE — DRAPE C ARM 41X140"